# Patient Record
Sex: MALE | Race: WHITE | Employment: OTHER | URBAN - NONMETROPOLITAN AREA
[De-identification: names, ages, dates, MRNs, and addresses within clinical notes are randomized per-mention and may not be internally consistent; named-entity substitution may affect disease eponyms.]

---

## 2017-02-14 RX ORDER — OXYCODONE HYDROCHLORIDE 15 MG/1
15 TABLET ORAL 4 TIMES DAILY PRN
Qty: 120 TABLET | Refills: 0 | Status: SHIPPED | OUTPATIENT
Start: 2017-02-14 | End: 2017-03-14 | Stop reason: SDUPTHER

## 2017-03-06 ENCOUNTER — HOSPITAL ENCOUNTER (OUTPATIENT)
Dept: CT IMAGING | Age: 62
Discharge: HOME | End: 2017-03-06
Payer: MEDICARE

## 2017-03-06 DIAGNOSIS — J18.9 PNEUMONIA DUE TO INFECTIOUS ORGANISM, UNSPECIFIED LATERALITY, UNSPECIFIED PART OF LUNG: ICD-10-CM

## 2017-03-06 PROCEDURE — 71250 CT THORAX DX C-: CPT

## 2017-03-15 RX ORDER — OXYCODONE HYDROCHLORIDE 15 MG/1
15 TABLET ORAL 4 TIMES DAILY PRN
Qty: 120 TABLET | Refills: 0 | Status: SHIPPED | OUTPATIENT
Start: 2017-03-16 | End: 2017-03-28 | Stop reason: SDUPTHER

## 2017-03-28 ENCOUNTER — HOSPITAL ENCOUNTER (OUTPATIENT)
Dept: PAIN MANAGEMENT | Age: 62
Discharge: HOME | End: 2017-03-28
Payer: MEDICARE

## 2017-03-28 VITALS
WEIGHT: 206 LBS | TEMPERATURE: 97.6 F | HEIGHT: 66 IN | BODY MASS INDEX: 33.11 KG/M2 | DIASTOLIC BLOOD PRESSURE: 80 MMHG | RESPIRATION RATE: 20 BRPM | SYSTOLIC BLOOD PRESSURE: 137 MMHG | OXYGEN SATURATION: 95 % | HEART RATE: 83 BPM

## 2017-03-28 DIAGNOSIS — G89.29 CHRONIC MIDLINE LOW BACK PAIN WITH LEFT-SIDED SCIATICA: ICD-10-CM

## 2017-03-28 DIAGNOSIS — M54.42 CHRONIC MIDLINE LOW BACK PAIN WITH LEFT-SIDED SCIATICA: ICD-10-CM

## 2017-03-28 PROCEDURE — 80307 DRUG TEST PRSMV CHEM ANLYZR: CPT

## 2017-03-28 PROCEDURE — 99213 OFFICE O/P EST LOW 20 MIN: CPT

## 2017-03-28 RX ORDER — OXYCODONE HYDROCHLORIDE 15 MG/1
15 TABLET ORAL 4 TIMES DAILY PRN
Qty: 120 TABLET | Refills: 0 | Status: SHIPPED | OUTPATIENT
Start: 2017-04-15 | End: 2017-05-12 | Stop reason: SDUPTHER

## 2017-03-28 ASSESSMENT — ACTIVITIES OF DAILY LIVING (ADL): EFFECT OF PAIN ON DAILY ACTIVITIES: DAILY ACTIVITY

## 2017-03-28 ASSESSMENT — PAIN DESCRIPTION - LOCATION: LOCATION: BACK

## 2017-03-28 ASSESSMENT — PAIN DESCRIPTION - DESCRIPTORS: DESCRIPTORS: SHARP;CONSTANT

## 2017-03-28 ASSESSMENT — PAIN DESCRIPTION - ONSET: ONSET: ON-GOING

## 2017-03-28 ASSESSMENT — PAIN SCALES - GENERAL: PAINLEVEL_OUTOF10: 7

## 2017-03-28 ASSESSMENT — PAIN DESCRIPTION - PAIN TYPE: TYPE: CHRONIC PAIN

## 2017-03-28 ASSESSMENT — PAIN DESCRIPTION - FREQUENCY: FREQUENCY: CONTINUOUS

## 2017-03-28 ASSESSMENT — PAIN DESCRIPTION - PROGRESSION: CLINICAL_PROGRESSION: NOT CHANGED

## 2017-03-28 ASSESSMENT — PAIN DESCRIPTION - DIRECTION: RADIATING_TOWARDS: RADIATES INTO LEFT LEG

## 2017-03-28 ASSESSMENT — PAIN DESCRIPTION - ORIENTATION: ORIENTATION: LOWER

## 2017-04-03 LAB
AMPHETAMINES, URINE: NEGATIVE NG/ML
BARBITURATES, URINE: NEGATIVE NG/ML
BENZODIAZEPINES, URINE: NEGATIVE NG/ML
CANNABINOIDS, URINE: NEGATIVE NG/ML
COCAINE METABOLITE, URINE: NEGATIVE NG/ML
CODEINE, URINE: NEGATIVE
CREATININE, URINE: 28.2 MG/DL (ref 20–300)
ETHANOL U, QUAN: NEGATIVE %
FENTANYL URINE: NEGATIVE PG/ML
HYDROCODONE, UR CONF: 355 NG/ML
HYDROCODONE, URINE: POSITIVE
HYDROMORPHONE, URINE: NEGATIVE
MEPERIDINE, UR: NEGATIVE NG/ML
METHADONE SCREEN, URINE: NEGATIVE NG/ML
MORPHINE URINE: NEGATIVE
OPIATES, URINE: ABNORMAL NG/ML
OPIATES, URINE: POSITIVE NG/ML
OXYCODONE URINE: POSITIVE
OXYCODONE, UR GC/MS: 2120 NG/ML
OXYCODONE/OXYMORPH, UR: POSITIVE
OXYCODONE/OXYMORPHONE, UR: ABNORMAL NG/ML
OXYMORPHONE, UR GC/MS: 366 NG/ML
OXYMORPHONE, URINE: POSITIVE
PH, URINE: 6.1 (ref 4.5–8.9)
PHENCYCLIDINE, URINE: NEGATIVE NG/ML
PROPOXYPHENE, URINE: NEGATIVE NG/ML

## 2017-04-24 ENCOUNTER — ANESTHESIA (OUTPATIENT)
Dept: ENDOSCOPY | Age: 62
End: 2017-04-24
Payer: MEDICARE

## 2017-04-24 ENCOUNTER — HOSPITAL ENCOUNTER (OUTPATIENT)
Age: 62
Setting detail: OUTPATIENT SURGERY
Discharge: HOME | End: 2017-04-24
Attending: INTERNAL MEDICINE | Admitting: INTERNAL MEDICINE
Payer: MEDICARE

## 2017-04-24 ENCOUNTER — ANESTHESIA EVENT (OUTPATIENT)
Dept: ENDOSCOPY | Age: 62
End: 2017-04-24
Payer: MEDICARE

## 2017-04-24 VITALS
DIASTOLIC BLOOD PRESSURE: 70 MMHG | SYSTOLIC BLOOD PRESSURE: 124 MMHG | BODY MASS INDEX: 32.14 KG/M2 | HEIGHT: 66 IN | RESPIRATION RATE: 20 BRPM | HEART RATE: 76 BPM | TEMPERATURE: 97.6 F | OXYGEN SATURATION: 96 % | WEIGHT: 200 LBS

## 2017-04-24 VITALS
DIASTOLIC BLOOD PRESSURE: 70 MMHG | OXYGEN SATURATION: 92 % | RESPIRATION RATE: 15 BRPM | SYSTOLIC BLOOD PRESSURE: 132 MMHG

## 2017-04-24 PROCEDURE — 3609012700 HC EGD DILATION SAVORY: Performed by: INTERNAL MEDICINE

## 2017-04-24 PROCEDURE — 2580000003 HC RX 258: Performed by: INTERNAL MEDICINE

## 2017-04-24 PROCEDURE — 3700000001 HC ADD 15 MINUTES (ANESTHESIA): Performed by: INTERNAL MEDICINE

## 2017-04-24 PROCEDURE — 2500000003 HC RX 250 WO HCPCS: Performed by: INTERNAL MEDICINE

## 2017-04-24 PROCEDURE — 3700000000 HC ANESTHESIA ATTENDED CARE: Performed by: INTERNAL MEDICINE

## 2017-04-24 PROCEDURE — 7100000011 HC PHASE II RECOVERY - ADDTL 15 MIN: Performed by: INTERNAL MEDICINE

## 2017-04-24 PROCEDURE — 3609012800 HC EGD DIAGNOSTIC ONLY: Performed by: INTERNAL MEDICINE

## 2017-04-24 PROCEDURE — 7100000010 HC PHASE II RECOVERY - FIRST 15 MIN: Performed by: INTERNAL MEDICINE

## 2017-04-24 PROCEDURE — 43248 EGD GUIDE WIRE INSERTION: CPT | Performed by: INTERNAL MEDICINE

## 2017-04-24 PROCEDURE — 2500000003 HC RX 250 WO HCPCS: Performed by: NURSE ANESTHETIST, CERTIFIED REGISTERED

## 2017-04-24 PROCEDURE — 6360000002 HC RX W HCPCS: Performed by: NURSE ANESTHETIST, CERTIFIED REGISTERED

## 2017-04-24 RX ORDER — PROPOFOL 10 MG/ML
INJECTION, EMULSION INTRAVENOUS PRN
Status: DISCONTINUED | OUTPATIENT
Start: 2017-04-24 | End: 2017-04-24 | Stop reason: SDUPTHER

## 2017-04-24 RX ORDER — DIPHENHYDRAMINE HYDROCHLORIDE 50 MG/ML
12.5 INJECTION INTRAMUSCULAR; INTRAVENOUS
Status: DISCONTINUED | OUTPATIENT
Start: 2017-04-24 | End: 2017-04-24 | Stop reason: HOSPADM

## 2017-04-24 RX ORDER — PROMETHAZINE HYDROCHLORIDE 25 MG/ML
6.25 INJECTION, SOLUTION INTRAMUSCULAR; INTRAVENOUS
Status: DISCONTINUED | OUTPATIENT
Start: 2017-04-24 | End: 2017-04-24 | Stop reason: HOSPADM

## 2017-04-24 RX ORDER — SODIUM CHLORIDE, SODIUM LACTATE, POTASSIUM CHLORIDE, CALCIUM CHLORIDE 600; 310; 30; 20 MG/100ML; MG/100ML; MG/100ML; MG/100ML
INJECTION, SOLUTION INTRAVENOUS CONTINUOUS
Status: DISCONTINUED | OUTPATIENT
Start: 2017-04-24 | End: 2017-04-24 | Stop reason: HOSPADM

## 2017-04-24 RX ORDER — ONDANSETRON 2 MG/ML
4 INJECTION INTRAMUSCULAR; INTRAVENOUS
Status: DISCONTINUED | OUTPATIENT
Start: 2017-04-24 | End: 2017-04-24 | Stop reason: HOSPADM

## 2017-04-24 RX ORDER — LIDOCAINE HYDROCHLORIDE 10 MG/ML
1 INJECTION, SOLUTION EPIDURAL; INFILTRATION; INTRACAUDAL; PERINEURAL ONCE
Status: COMPLETED | OUTPATIENT
Start: 2017-04-24 | End: 2017-04-24

## 2017-04-24 RX ORDER — LIDOCAINE HYDROCHLORIDE 20 MG/ML
INJECTION, SOLUTION INFILTRATION; PERINEURAL PRN
Status: DISCONTINUED | OUTPATIENT
Start: 2017-04-24 | End: 2017-04-24 | Stop reason: SDUPTHER

## 2017-04-24 RX ADMIN — LIDOCAINE HYDROCHLORIDE 40 MG: 20 INJECTION, SOLUTION INFILTRATION; PERINEURAL at 12:47

## 2017-04-24 RX ADMIN — LIDOCAINE HYDROCHLORIDE 1 ML: 10 INJECTION, SOLUTION EPIDURAL; INFILTRATION; INTRACAUDAL; PERINEURAL at 11:45

## 2017-04-24 RX ADMIN — PROPOFOL 240 MG: 10 INJECTION, EMULSION INTRAVENOUS at 12:47

## 2017-04-24 RX ADMIN — SODIUM CHLORIDE, POTASSIUM CHLORIDE, SODIUM LACTATE AND CALCIUM CHLORIDE: 600; 310; 30; 20 INJECTION, SOLUTION INTRAVENOUS at 11:45

## 2017-04-24 ASSESSMENT — PAIN SCALES - GENERAL
PAINLEVEL_OUTOF10: 0
PAINLEVEL_OUTOF10: 0

## 2017-04-24 ASSESSMENT — COPD QUESTIONNAIRES: CAT_SEVERITY: MODERATE

## 2017-04-24 ASSESSMENT — PAIN - FUNCTIONAL ASSESSMENT: PAIN_FUNCTIONAL_ASSESSMENT: 0-10

## 2017-05-08 ENCOUNTER — OFFICE VISIT (OUTPATIENT)
Dept: GASTROENTEROLOGY | Age: 62
End: 2017-05-08
Payer: MEDICARE

## 2017-05-08 VITALS
OXYGEN SATURATION: 93 % | WEIGHT: 208 LBS | DIASTOLIC BLOOD PRESSURE: 60 MMHG | BODY MASS INDEX: 33.43 KG/M2 | SYSTOLIC BLOOD PRESSURE: 102 MMHG | HEIGHT: 66 IN | HEART RATE: 65 BPM

## 2017-05-08 DIAGNOSIS — Z87.19 STATUS POST DILATION OF ESOPHAGEAL NARROWING: ICD-10-CM

## 2017-05-08 DIAGNOSIS — Z87.19 HISTORY OF ESOPHAGEAL STRICTURE: ICD-10-CM

## 2017-05-08 DIAGNOSIS — Z98.890 STATUS POST DILATION OF ESOPHAGEAL NARROWING: ICD-10-CM

## 2017-05-08 DIAGNOSIS — K21.9 CHRONIC GERD: Primary | ICD-10-CM

## 2017-05-08 DIAGNOSIS — K29.50 CHRONIC GASTRITIS WITHOUT BLEEDING, UNSPECIFIED GASTRITIS TYPE: ICD-10-CM

## 2017-05-08 PROCEDURE — G8417 CALC BMI ABV UP PARAM F/U: HCPCS | Performed by: NURSE PRACTITIONER

## 2017-05-08 PROCEDURE — 99213 OFFICE O/P EST LOW 20 MIN: CPT | Performed by: NURSE PRACTITIONER

## 2017-05-08 PROCEDURE — G8427 DOCREV CUR MEDS BY ELIG CLIN: HCPCS | Performed by: NURSE PRACTITIONER

## 2017-05-08 PROCEDURE — 3017F COLORECTAL CA SCREEN DOC REV: CPT | Performed by: NURSE PRACTITIONER

## 2017-05-08 PROCEDURE — 1036F TOBACCO NON-USER: CPT | Performed by: NURSE PRACTITIONER

## 2017-05-08 RX ORDER — OMEPRAZOLE 40 MG/1
40 CAPSULE, DELAYED RELEASE ORAL DAILY
COMMUNITY
Start: 2017-02-10

## 2017-05-08 ASSESSMENT — ENCOUNTER SYMPTOMS
CHEST TIGHTNESS: 0
BACK PAIN: 0
TROUBLE SWALLOWING: 0
VOICE CHANGE: 0
RECTAL PAIN: 0
VOMITING: 0
CONSTIPATION: 0
SORE THROAT: 0
SHORTNESS OF BREATH: 0
COUGH: 0
ALLERGIC/IMMUNOLOGIC NEGATIVE: 1
EYES NEGATIVE: 1
NAUSEA: 0
BLOOD IN STOOL: 0
DIARRHEA: 0
ABDOMINAL PAIN: 0

## 2017-05-15 RX ORDER — AMITRIPTYLINE HYDROCHLORIDE 25 MG/1
25-50 TABLET, FILM COATED ORAL NIGHTLY PRN
Qty: 60 TABLET | Refills: 5 | OUTPATIENT
Start: 2017-05-15 | End: 2017-10-19 | Stop reason: SDUPTHER

## 2017-05-15 RX ORDER — OXYCODONE HYDROCHLORIDE 15 MG/1
15 TABLET ORAL 4 TIMES DAILY PRN
Qty: 120 TABLET | Refills: 0 | Status: SHIPPED | OUTPATIENT
Start: 2017-05-15 | End: 2017-06-12 | Stop reason: SDUPTHER

## 2017-06-12 RX ORDER — OXYCODONE HYDROCHLORIDE 15 MG/1
15 TABLET ORAL 4 TIMES DAILY PRN
Qty: 120 TABLET | Refills: 0 | Status: SHIPPED | OUTPATIENT
Start: 2017-06-14 | End: 2017-07-10 | Stop reason: SDUPTHER

## 2017-07-11 RX ORDER — OXYCODONE HYDROCHLORIDE 15 MG/1
15 TABLET ORAL 4 TIMES DAILY PRN
Qty: 120 TABLET | Refills: 0 | Status: SHIPPED | OUTPATIENT
Start: 2017-07-14 | End: 2017-08-09 | Stop reason: SDUPTHER

## 2017-07-17 ENCOUNTER — HOSPITAL ENCOUNTER (OUTPATIENT)
Dept: PAIN MANAGEMENT | Age: 62
Discharge: HOME | End: 2017-07-17
Payer: MEDICARE

## 2017-07-17 VITALS
TEMPERATURE: 97.7 F | BODY MASS INDEX: 34.39 KG/M2 | DIASTOLIC BLOOD PRESSURE: 76 MMHG | HEART RATE: 89 BPM | WEIGHT: 214 LBS | HEIGHT: 66 IN | OXYGEN SATURATION: 90 % | RESPIRATION RATE: 18 BRPM | SYSTOLIC BLOOD PRESSURE: 128 MMHG

## 2017-07-17 DIAGNOSIS — M54.42 CHRONIC MIDLINE LOW BACK PAIN WITH LEFT-SIDED SCIATICA: ICD-10-CM

## 2017-07-17 DIAGNOSIS — G89.29 CHRONIC MIDLINE LOW BACK PAIN WITH LEFT-SIDED SCIATICA: ICD-10-CM

## 2017-07-17 PROCEDURE — 99213 OFFICE O/P EST LOW 20 MIN: CPT

## 2017-07-17 PROCEDURE — 80307 DRUG TEST PRSMV CHEM ANLYZR: CPT

## 2017-07-17 ASSESSMENT — ACTIVITIES OF DAILY LIVING (ADL): EFFECT OF PAIN ON DAILY ACTIVITIES: LIMITS ACTIVITY

## 2017-07-17 ASSESSMENT — PAIN SCALES - GENERAL: PAINLEVEL_OUTOF10: 7

## 2017-07-17 ASSESSMENT — PAIN DESCRIPTION - DESCRIPTORS: DESCRIPTORS: SHARP;CONSTANT

## 2017-07-17 ASSESSMENT — PAIN DESCRIPTION - LOCATION: LOCATION: BACK

## 2017-07-17 ASSESSMENT — PAIN DESCRIPTION - ORIENTATION: ORIENTATION: LOWER

## 2017-07-17 ASSESSMENT — PAIN DESCRIPTION - DIRECTION: RADIATING_TOWARDS: INTO LEFT LEG

## 2017-07-17 ASSESSMENT — PAIN DESCRIPTION - PAIN TYPE: TYPE: CHRONIC PAIN

## 2017-07-17 ASSESSMENT — PAIN DESCRIPTION - FREQUENCY: FREQUENCY: CONTINUOUS

## 2017-07-17 ASSESSMENT — PAIN DESCRIPTION - ONSET: ONSET: ON-GOING

## 2017-07-17 ASSESSMENT — PAIN DESCRIPTION - PROGRESSION: CLINICAL_PROGRESSION: NOT CHANGED

## 2017-07-22 LAB
AMPHETAMINES, URINE: NEGATIVE NG/ML
BARBITURATES, URINE: NEGATIVE NG/ML
BENZODIAZEPINES, URINE: NEGATIVE NG/ML
CANNABINOIDS, URINE: NEGATIVE NG/ML
COCAINE METABOLITE, URINE: NEGATIVE NG/ML
CODEINE, URINE: NEGATIVE
CREATININE, URINE: 30.4 MG/DL (ref 20–300)
ETHANOL U, QUAN: NEGATIVE %
FENTANYL URINE: NEGATIVE PG/ML
HYDROCODONE, UR CONF: 541 NG/ML
HYDROCODONE, URINE: POSITIVE
HYDROMORPHONE, URINE: NEGATIVE
MEPERIDINE, UR: NEGATIVE NG/ML
METHADONE SCREEN, URINE: NEGATIVE NG/ML
MORPHINE URINE: NEGATIVE
OPIATES, URINE: ABNORMAL NG/ML
OPIATES, URINE: POSITIVE NG/ML
OXYCODONE URINE: POSITIVE
OXYCODONE, UR GC/MS: 2990 NG/ML
OXYCODONE/OXYMORPH, UR: POSITIVE
OXYCODONE/OXYMORPHONE, UR: ABNORMAL NG/ML
OXYMORPHONE, UR GC/MS: 376 NG/ML
OXYMORPHONE, URINE: POSITIVE
PH, URINE: 5.6 (ref 4.5–8.9)
PHENCYCLIDINE, URINE: NEGATIVE NG/ML
PROPOXYPHENE, URINE: NEGATIVE NG/ML

## 2017-08-11 RX ORDER — OXYCODONE HYDROCHLORIDE 15 MG/1
15 TABLET ORAL 4 TIMES DAILY PRN
Qty: 120 TABLET | Refills: 0 | Status: SHIPPED | OUTPATIENT
Start: 2017-08-13 | End: 2017-09-11 | Stop reason: SDUPTHER

## 2017-09-11 RX ORDER — OXYCODONE HYDROCHLORIDE 15 MG/1
15 TABLET ORAL 4 TIMES DAILY PRN
Qty: 120 TABLET | Refills: 0 | Status: SHIPPED | OUTPATIENT
Start: 2017-09-12 | End: 2017-10-10 | Stop reason: SDUPTHER

## 2017-10-11 RX ORDER — OXYCODONE HYDROCHLORIDE 15 MG/1
15 TABLET ORAL 4 TIMES DAILY PRN
Qty: 120 TABLET | Refills: 0 | Status: SHIPPED | OUTPATIENT
Start: 2017-10-12 | End: 2017-10-19 | Stop reason: SDUPTHER

## 2017-10-19 ENCOUNTER — HOSPITAL ENCOUNTER (OUTPATIENT)
Dept: PAIN MANAGEMENT | Age: 62
Discharge: HOME | End: 2017-10-19
Payer: MEDICARE

## 2017-10-19 VITALS
HEART RATE: 91 BPM | WEIGHT: 216 LBS | SYSTOLIC BLOOD PRESSURE: 112 MMHG | OXYGEN SATURATION: 90 % | HEIGHT: 66 IN | RESPIRATION RATE: 22 BRPM | DIASTOLIC BLOOD PRESSURE: 68 MMHG | BODY MASS INDEX: 34.72 KG/M2 | TEMPERATURE: 97.3 F

## 2017-10-19 PROCEDURE — 80307 DRUG TEST PRSMV CHEM ANLYZR: CPT

## 2017-10-19 PROCEDURE — 99214 OFFICE O/P EST MOD 30 MIN: CPT

## 2017-10-19 RX ORDER — TIZANIDINE 4 MG/1
4 TABLET ORAL 3 TIMES DAILY PRN
Qty: 90 TABLET | Refills: 2 | Status: SHIPPED | OUTPATIENT
Start: 2017-10-19

## 2017-10-19 RX ORDER — AMITRIPTYLINE HYDROCHLORIDE 25 MG/1
25-50 TABLET, FILM COATED ORAL NIGHTLY PRN
Qty: 60 TABLET | Refills: 2 | Status: SHIPPED | OUTPATIENT
Start: 2017-10-19 | End: 2021-09-29

## 2017-10-19 RX ORDER — OXYCODONE HYDROCHLORIDE 15 MG/1
15 TABLET ORAL 4 TIMES DAILY PRN
Qty: 120 TABLET | Refills: 0 | Status: SHIPPED | OUTPATIENT
Start: 2017-11-11 | End: 2017-12-06 | Stop reason: SDUPTHER

## 2017-10-19 ASSESSMENT — PAIN DESCRIPTION - PAIN TYPE: TYPE: CHRONIC PAIN

## 2017-10-19 ASSESSMENT — PAIN DESCRIPTION - DIRECTION: RADIATING_TOWARDS: INTO LEFT LEG

## 2017-10-19 ASSESSMENT — PAIN DESCRIPTION - ONSET: ONSET: ON-GOING

## 2017-10-19 ASSESSMENT — PAIN DESCRIPTION - FREQUENCY: FREQUENCY: CONTINUOUS

## 2017-10-19 ASSESSMENT — PAIN DESCRIPTION - LOCATION: LOCATION: BACK;LEG

## 2017-10-19 ASSESSMENT — ACTIVITIES OF DAILY LIVING (ADL): EFFECT OF PAIN ON DAILY ACTIVITIES: LIMITS ACTIVITY

## 2017-10-19 ASSESSMENT — PAIN DESCRIPTION - ORIENTATION: ORIENTATION: LOWER;LEFT

## 2017-10-19 ASSESSMENT — PAIN DESCRIPTION - PROGRESSION: CLINICAL_PROGRESSION: NOT CHANGED

## 2017-10-19 ASSESSMENT — PAIN DESCRIPTION - DESCRIPTORS: DESCRIPTORS: SHARP;CONSTANT

## 2017-10-19 ASSESSMENT — PAIN SCALES - GENERAL: PAINLEVEL_OUTOF10: 7

## 2017-10-19 NOTE — PROGRESS NOTES
Nursing Admission Record    Current Issues / Falls / ER Visits:  No    Percentage of Pain Relief after Last Procedure:  na %    How long lasted:  na     Radiology exams received during the last 12 months: No       When na                                              Where na       Imaging on chart: No         Imaging records requested: No  MRI exams received in the past 2 years:  No  Physical therapy during the last 6 months: No       When: na                                             Where na  Labs during the last 12 months: Yes    Education Provided:  [x] Review of Josph Colla  [] Agreement Review  [] Compliance Issues Discussed    [] Cognitive Behavior Needs [x] Exercise [] Review of Test [] Financial Issues  [x] Tobacco/Alcohol Use [x] Teaching [] New Patient [] Picture Obtained    Physician Plan:  [] Outgoing Referral  [] Pharmacy Consult  [] Test Ordered   [] Obtained Test Results / Consult Notes  [] UDS due at next visit, verified per EPIC      [] Suspected Physical Abuse or Suicide Risk assessed - IF YES COMPLETE QUESTIONS BELOW    If any of the following questions are answered yes - contact attending physician for referral:    Has been considering harming self to escape stress, pain problems? [] YES  [] NO  Has a suicide plan? [] YES  [] NO  Has attempted suicide in the past?   [] YES  [] NO  Has a close friend or family member who committed suicide? [] YES  [] NO    Patient Referred To :      Additional Notes:    Assessment Completed by:  Electronically signed by Philippe Van RN on 10/19/2017 at 8:42 AM

## 2017-10-19 NOTE — PROGRESS NOTES
Manjula Garcia/Latosha  Patient Pain Assessment  Progress Note       Chief Complaint   Patient presents with    Lower Back Pain     radiates down left lower extremity    Knee Pain     bilateral     Pain Assessment  Pain Assessment: 0-10  Pain Level: 7  Pain Type: Chronic pain  Pain Location: Back, Leg  Pain Orientation: Lower, Left  Pain Radiating Towards: into left leg  Pain Descriptors: Sharp, Constant  Pain Frequency: Continuous  Pain Onset: On-going  Clinical Progression: Not changed  Effect of Pain on Daily Activities: limits activity         []  Issues of Concern:     [x]  Reports current medication is helping, but continues to have on-going pain    [x]  Reports current pain medication increases ability to do activities of daily living     [x]  Current narcotic medications    [x]  Discussed possible medication side effects, risk of tolerance and/or dependence, alternative treatments    [x]  Encouraged to set goals of decreasing daily narcotic intake    [x]  Discussed effects of long term narcotic use      [x]  Injection options discussed     []Yes [x]No  Current medication side effects, comment if applicable:      []Yes [x]No   Acute bladder or bowel changes    Previous Procedure / Percentage of pain control / Imaging / PT History:  Percentage of Pain Relief after Last Procedure: N/A      Radiology exams received during the last 12 months: No  MRI exams received in the past 2 years:  No  Physical therapy during the last 6 months: No     BMI: Body mass index is 34.86 kg/m².     [x]  Nutrient rich, low fat, low carbohydrate diet discussed   [x]  Positive effect of weight management on pain control discussed    Activity:   [x] Exercise discussed as beneficial to pain reduction, encouraged stretching exercises and to set daily goals    Tobacco use:    [x] Former     Social History     Social History    Marital status:      Spouse name: N/A    Number of children: N/A    Years of education: N/A delayed release capsule       tiZANidine (ZANAFLEX) 4 MG tablet Take 1 tablet by mouth 3 times daily as needed (muscle spasms) 90 tablet 5    latanoprost (XALATAN) 0.005 % ophthalmic solution Place 1 drop into both eyes nightly      timolol (TIMOPTIC-XE) 0.5 % ophthalmic gel-forming Place 1 drop into both eyes 2 times daily      NONFORMULARY Indications: eye drops for glaucoma      albuterol sulfate HFA (PROAIR HFA) 108 (90 BASE) MCG/ACT inhaler Inhale 2 puffs into the lungs every 6 hours as needed for Wheezing 1 Inhaler 0    atorvastatin (LIPITOR) 40 MG tablet Take 40 mg by mouth daily.  Aspirin (ASPIR-81 PO) Take by mouth daily       AMLODIPINE BESYLATE PO Take 5 mg by mouth daily.  LISINOPRIL PO Take 20 mg by mouth daily. No current facility-administered medications for this encounter.       UDS:    Lab Results   Component Value Date    AMPHETUR Negative 07/17/2017    BARBITURATES Negative 07/17/2017    BENZODIAZURI Negative 07/17/2017    CANNANBINURI Negative 07/17/2017    COCAINEMETUR Negative 07/17/2017    OPIAU Positive * (repeat UDS today) 07/17/2017    OXYCOOXYMO Positive * (Oxy-IR per med list) 07/17/2017    PHENCYCU Negative 07/17/2017    LABMETH Negative 07/17/2017    PPXUR Negative 07/17/2017    MEPERIDU Negative 07/17/2017    FENTU Negative 07/17/2017    ETHUQN Negative 07/17/2017          Vitals:  /68   Pulse 91   Temp 97.3 °F (36.3 °C) (Temporal)   Resp 22   Ht 5' 6\" (1.676 m)   Wt 216 lb (98 kg)   SpO2 90%   BMI 34.86 kg/m²    Reports he has COPD and uses supplemental oxygen PRN     Physical Exam:  General appearance: no acute distress  Head: NCAT, EOMI  Skin: Warm, Dry   Musculoskeletal: ambulatory per self, steady gait  Neurologic: alert and oriented X 3, speech clear  Mood and affect: appropriate, no SI or HI    Assessment:    *     Lumbar DDD  *     Lumbar Radiculopathy     Plan:   [x]  Patient is to call with any questions or concerns which may arise prior to

## 2017-11-01 LAB
AMPHETAMINES, URINE: NEGATIVE NG/ML
BARBITURATES, URINE: NEGATIVE NG/ML
BENZODIAZEPINES, URINE: NEGATIVE NG/ML
CANNABINOIDS, URINE: NEGATIVE NG/ML
COCAINE METABOLITE, URINE: NEGATIVE NG/ML
CODEINE, URINE: NEGATIVE
CREATININE, URINE: 67.6 MG/DL (ref 20–300)
ETHANOL U, QUAN: NEGATIVE %
FENTANYL URINE: NEGATIVE PG/ML
HYDROCODONE, UR CONF: 943 NG/ML
HYDROCODONE, URINE: POSITIVE
HYDROMORPHONE, URINE: NEGATIVE
MEPERIDINE, UR: NEGATIVE NG/ML
METHADONE SCREEN, URINE: NEGATIVE NG/ML
MORPHINE URINE: NEGATIVE
OPIATES, URINE: ABNORMAL NG/ML
OPIATES, URINE: POSITIVE NG/ML
OXYCODONE URINE: POSITIVE
OXYCODONE, UR GC/MS: >3000 NG/ML
OXYCODONE/OXYMORPH, UR: POSITIVE
OXYCODONE/OXYMORPHONE, UR: ABNORMAL NG/ML
OXYMORPHONE, UR GC/MS: 984 NG/ML
OXYMORPHONE, URINE: POSITIVE
PH, URINE: 5.8 (ref 4.5–8.9)
PHENCYCLIDINE, URINE: NEGATIVE NG/ML
PROPOXYPHENE, URINE: NEGATIVE NG/ML

## 2017-12-07 RX ORDER — OXYCODONE HYDROCHLORIDE 15 MG/1
15 TABLET ORAL 4 TIMES DAILY PRN
Qty: 120 TABLET | Refills: 0 | Status: SHIPPED | OUTPATIENT
Start: 2017-12-11 | End: 2021-09-29

## 2017-12-12 ENCOUNTER — TELEPHONE (OUTPATIENT)
Dept: PAIN MANAGEMENT | Age: 62
End: 2017-12-12

## 2017-12-19 ENCOUNTER — HOSPITAL ENCOUNTER (OUTPATIENT)
Dept: CT IMAGING | Age: 62
Discharge: HOME | End: 2017-12-19
Payer: MEDICARE

## 2017-12-19 DIAGNOSIS — J44.9 STAGE 2 MODERATE COPD BY GOLD CLASSIFICATION (HCC): ICD-10-CM

## 2017-12-19 PROCEDURE — 71250 CT THORAX DX C-: CPT

## 2018-11-07 ENCOUNTER — TRANSCRIBE ORDERS (OUTPATIENT)
Dept: PULMONOLOGY | Facility: CLINIC | Age: 63
End: 2018-11-07

## 2018-11-07 DIAGNOSIS — R91.8 LUNG NODULES: Primary | ICD-10-CM

## 2018-11-12 ENCOUNTER — HOSPITAL ENCOUNTER (OUTPATIENT)
Dept: CT IMAGING | Age: 63
Discharge: HOME | End: 2018-11-12
Payer: MEDICARE

## 2018-11-12 DIAGNOSIS — R91.8 LUNG NODULES: ICD-10-CM

## 2018-11-12 PROCEDURE — 71250 CT THORAX DX C-: CPT

## 2018-11-27 ENCOUNTER — OFFICE VISIT (OUTPATIENT)
Dept: PULMONOLOGY | Facility: CLINIC | Age: 63
End: 2018-11-27

## 2018-11-27 VITALS
SYSTOLIC BLOOD PRESSURE: 148 MMHG | DIASTOLIC BLOOD PRESSURE: 72 MMHG | HEIGHT: 66 IN | OXYGEN SATURATION: 95 % | WEIGHT: 240 LBS | BODY MASS INDEX: 38.57 KG/M2 | HEART RATE: 88 BPM | RESPIRATION RATE: 16 BRPM

## 2018-11-27 DIAGNOSIS — Z87.891 PERSONAL HISTORY OF NICOTINE DEPENDENCE: ICD-10-CM

## 2018-11-27 DIAGNOSIS — R42 LIGHTHEADEDNESS: ICD-10-CM

## 2018-11-27 DIAGNOSIS — E66.01 CLASS 2 SEVERE OBESITY DUE TO EXCESS CALORIES WITH SERIOUS COMORBIDITY AND BODY MASS INDEX (BMI) OF 38.0 TO 38.9 IN ADULT (HCC): ICD-10-CM

## 2018-11-27 DIAGNOSIS — R91.1 LUNG NODULE: ICD-10-CM

## 2018-11-27 DIAGNOSIS — R40.0 SOMNOLENCE: ICD-10-CM

## 2018-11-27 DIAGNOSIS — J44.9 STAGE 3 SEVERE COPD BY GOLD CLASSIFICATION (HCC): Primary | ICD-10-CM

## 2018-11-27 DIAGNOSIS — J96.11 CHRONIC RESPIRATORY FAILURE WITH HYPOXIA (HCC): ICD-10-CM

## 2018-11-27 DIAGNOSIS — J43.9 PULMONARY EMPHYSEMA, UNSPECIFIED EMPHYSEMA TYPE (HCC): ICD-10-CM

## 2018-11-27 DIAGNOSIS — R06.02 SHORTNESS OF BREATH: ICD-10-CM

## 2018-11-27 DIAGNOSIS — K21.9 GASTROESOPHAGEAL REFLUX DISEASE WITHOUT ESOPHAGITIS: ICD-10-CM

## 2018-11-27 PROBLEM — E66.812 CLASS 2 SEVERE OBESITY DUE TO EXCESS CALORIES WITH SERIOUS COMORBIDITY AND BODY MASS INDEX (BMI) OF 38.0 TO 38.9 IN ADULT: Status: ACTIVE | Noted: 2018-11-27

## 2018-11-27 PROCEDURE — 99214 OFFICE O/P EST MOD 30 MIN: CPT | Performed by: NURSE PRACTITIONER

## 2018-11-27 RX ORDER — BRINZOLAMIDE/BRIMONIDINE TARTRATE 10; 2 MG/ML; MG/ML
SUSPENSION/ DROPS OPHTHALMIC
Refills: 5 | COMMUNITY
Start: 2018-11-15 | End: 2019-01-29

## 2018-11-27 RX ORDER — AMLODIPINE BESYLATE 5 MG/1
5 TABLET ORAL DAILY
Refills: 1 | COMMUNITY
Start: 2018-11-15

## 2018-11-27 RX ORDER — ATORVASTATIN CALCIUM 40 MG/1
40 TABLET, FILM COATED ORAL
Refills: 0 | COMMUNITY
Start: 2018-09-20

## 2018-11-27 RX ORDER — LISINOPRIL 40 MG/1
40 TABLET ORAL DAILY
Refills: 1 | COMMUNITY
Start: 2018-11-15

## 2018-11-27 RX ORDER — OXYCODONE HYDROCHLORIDE 15 MG/1
20 TABLET ORAL
COMMUNITY
Start: 2017-12-11

## 2018-11-27 RX ORDER — ALBUTEROL SULFATE 90 UG/1
AEROSOL, METERED RESPIRATORY (INHALATION)
COMMUNITY
Start: 2016-01-06

## 2018-11-27 RX ORDER — OMEPRAZOLE 40 MG/1
40 CAPSULE, DELAYED RELEASE ORAL DAILY
Refills: 1 | COMMUNITY
Start: 2018-10-31

## 2018-11-27 RX ORDER — LATANOPROST 50 UG/ML
SOLUTION/ DROPS OPHTHALMIC
COMMUNITY
End: 2022-03-31 | Stop reason: CLARIF

## 2018-11-27 RX ORDER — TIZANIDINE 4 MG/1
TABLET ORAL
Refills: 3 | COMMUNITY
Start: 2018-10-16

## 2018-11-27 RX ORDER — AMITRIPTYLINE HYDROCHLORIDE 50 MG/1
50 TABLET, FILM COATED ORAL
Refills: 1 | COMMUNITY
Start: 2018-11-19

## 2018-12-14 ENCOUNTER — HOSPITAL ENCOUNTER (OUTPATIENT)
Dept: NON INVASIVE DIAGNOSTICS | Age: 63
Discharge: HOME | End: 2018-12-14
Payer: MEDICARE

## 2018-12-14 LAB
LV EF: 58 %
LVEF MODALITY: NORMAL

## 2018-12-14 PROCEDURE — 93306 TTE W/DOPPLER COMPLETE: CPT

## 2018-12-17 ENCOUNTER — HOSPITAL ENCOUNTER (OUTPATIENT)
Dept: PULMONOLOGY | Age: 63
Setting detail: THERAPIES SERIES
Discharge: HOME | End: 2018-12-17
Payer: MEDICARE

## 2018-12-17 PROCEDURE — 2700000000 HC OXYGEN THERAPY PER DAY

## 2018-12-17 PROCEDURE — G0424 PULMONARY REHAB W EXER: HCPCS

## 2018-12-19 ENCOUNTER — HOSPITAL ENCOUNTER (OUTPATIENT)
Dept: PULMONOLOGY | Age: 63
Setting detail: THERAPIES SERIES
Discharge: HOME | End: 2018-12-19
Payer: MEDICARE

## 2018-12-19 PROCEDURE — G0424 PULMONARY REHAB W EXER: HCPCS

## 2018-12-19 PROCEDURE — 2700000000 HC OXYGEN THERAPY PER DAY

## 2018-12-21 ENCOUNTER — HOSPITAL ENCOUNTER (OUTPATIENT)
Dept: PULMONOLOGY | Age: 63
Setting detail: THERAPIES SERIES
Discharge: HOME | End: 2018-12-21
Payer: MEDICARE

## 2018-12-21 DIAGNOSIS — R40.0 SOMNOLENCE: ICD-10-CM

## 2018-12-21 PROCEDURE — 2700000000 HC OXYGEN THERAPY PER DAY

## 2018-12-21 PROCEDURE — G0424 PULMONARY REHAB W EXER: HCPCS

## 2018-12-24 ENCOUNTER — HOSPITAL ENCOUNTER (OUTPATIENT)
Dept: PULMONOLOGY | Age: 63
Setting detail: THERAPIES SERIES
End: 2018-12-24
Payer: MEDICARE

## 2018-12-26 ENCOUNTER — HOSPITAL ENCOUNTER (OUTPATIENT)
Dept: PULMONOLOGY | Age: 63
Setting detail: THERAPIES SERIES
Discharge: HOME | End: 2018-12-26
Payer: MEDICARE

## 2018-12-26 DIAGNOSIS — J96.11 CHRONIC RESPIRATORY FAILURE WITH HYPOXIA (HCC): Primary | ICD-10-CM

## 2018-12-26 PROCEDURE — G0424 PULMONARY REHAB W EXER: HCPCS

## 2018-12-26 PROCEDURE — 2700000000 HC OXYGEN THERAPY PER DAY

## 2018-12-27 ENCOUNTER — TELEPHONE (OUTPATIENT)
Dept: PULMONOLOGY | Facility: CLINIC | Age: 63
End: 2018-12-27

## 2018-12-27 ENCOUNTER — APPOINTMENT (OUTPATIENT)
Dept: GENERAL RADIOLOGY | Age: 63
DRG: 193 | End: 2018-12-27
Payer: MEDICARE

## 2018-12-27 ENCOUNTER — HOSPITAL ENCOUNTER (INPATIENT)
Age: 63
LOS: 5 days | Discharge: HOME | DRG: 193 | End: 2019-01-01
Attending: EMERGENCY MEDICINE | Admitting: HOSPITALIST
Payer: MEDICARE

## 2018-12-27 DIAGNOSIS — J96.21 ACUTE ON CHRONIC RESPIRATORY FAILURE WITH HYPOXIA (HCC): Primary | ICD-10-CM

## 2018-12-27 DIAGNOSIS — J44.1 COPD EXACERBATION (HCC): ICD-10-CM

## 2018-12-27 DIAGNOSIS — J96.11 CHRONIC RESPIRATORY FAILURE WITH HYPOXIA (HCC): Primary | ICD-10-CM

## 2018-12-27 DIAGNOSIS — J18.9 PNEUMONIA DUE TO ORGANISM: ICD-10-CM

## 2018-12-27 LAB
ALBUMIN SERPL-MCNC: 3.4 G/DL (ref 3.5–5.2)
ALP BLD-CCNC: 167 U/L (ref 40–130)
ALT SERPL-CCNC: 38 U/L (ref 5–41)
ANION GAP SERPL CALCULATED.3IONS-SCNC: 5 MMOL/L (ref 7–19)
AST SERPL-CCNC: 24 U/L (ref 5–40)
BASE EXCESS ARTERIAL: 6.4 MMOL/L (ref -2–2)
BASOPHILS ABSOLUTE: 0 K/UL (ref 0–0.2)
BASOPHILS RELATIVE PERCENT: 0.3 % (ref 0–1)
BILIRUB SERPL-MCNC: 0.3 MG/DL (ref 0.2–1.2)
BUN BLDV-MCNC: 12 MG/DL (ref 8–23)
CALCIUM SERPL-MCNC: 8.4 MG/DL (ref 8.8–10.2)
CARBOXYHEMOGLOBIN ARTERIAL: 2.3 % (ref 0–5)
CHLORIDE BLD-SCNC: 96 MMOL/L (ref 98–111)
CO2: 36 MMOL/L (ref 22–29)
CREAT SERPL-MCNC: 0.8 MG/DL (ref 0.5–1.2)
EOSINOPHILS ABSOLUTE: 0.5 K/UL (ref 0–0.6)
EOSINOPHILS RELATIVE PERCENT: 5.2 % (ref 0–5)
GFR NON-AFRICAN AMERICAN: >60
GLUCOSE BLD-MCNC: 156 MG/DL (ref 74–109)
HCO3 ARTERIAL: 32.7 MMOL/L (ref 22–26)
HCT VFR BLD CALC: 35.5 % (ref 42–52)
HEMOGLOBIN, ART, EXTENDED: 11.6 G/DL (ref 14–18)
HEMOGLOBIN: 11 G/DL (ref 14–18)
LACTIC ACID: 1.8 MMOL/L (ref 0.5–1.9)
LYMPHOCYTES ABSOLUTE: 1 K/UL (ref 1.1–4.5)
LYMPHOCYTES RELATIVE PERCENT: 10.5 % (ref 20–40)
MAGNESIUM: 2 MG/DL (ref 1.6–2.4)
MCH RBC QN AUTO: 28.7 PG (ref 27–31)
MCHC RBC AUTO-ENTMCNC: 31 G/DL (ref 33–37)
MCV RBC AUTO: 92.7 FL (ref 80–94)
METHEMOGLOBIN ARTERIAL: 1.3 %
MONOCYTES ABSOLUTE: 0.9 K/UL (ref 0–0.9)
MONOCYTES RELATIVE PERCENT: 9.7 % (ref 0–10)
NEUTROPHILS ABSOLUTE: 7.1 K/UL (ref 1.5–7.5)
NEUTROPHILS RELATIVE PERCENT: 73.6 % (ref 50–65)
O2 CONTENT ARTERIAL: 14.5 ML/DL
O2 SAT, ARTERIAL: 89.1 %
O2 THERAPY: ABNORMAL
PCO2 ARTERIAL: 54 MMHG (ref 35–45)
PDW BLD-RTO: 14.7 % (ref 11.5–14.5)
PH ARTERIAL: 7.39 (ref 7.35–7.45)
PLATELET # BLD: 247 K/UL (ref 130–400)
PMV BLD AUTO: 8.3 FL (ref 9.4–12.4)
PO2 ARTERIAL: 52 MMHG (ref 80–100)
POTASSIUM SERPL-SCNC: 4.7 MMOL/L (ref 3.5–5)
POTASSIUM, WHOLE BLOOD: 3.9
PRO-BNP: 61 PG/ML (ref 0–900)
RBC # BLD: 3.83 M/UL (ref 4.7–6.1)
SODIUM BLD-SCNC: 137 MMOL/L (ref 136–145)
TOTAL PROTEIN: 6.1 G/DL (ref 6.6–8.7)
TROPONIN: <0.01 NG/ML (ref 0–0.03)
WBC # BLD: 9.6 K/UL (ref 4.8–10.8)

## 2018-12-27 PROCEDURE — 1210000000 HC MED SURG R&B

## 2018-12-27 PROCEDURE — 87077 CULTURE AEROBIC IDENTIFY: CPT

## 2018-12-27 PROCEDURE — 71046 X-RAY EXAM CHEST 2 VIEWS: CPT

## 2018-12-27 PROCEDURE — 83880 ASSAY OF NATRIURETIC PEPTIDE: CPT

## 2018-12-27 PROCEDURE — 83735 ASSAY OF MAGNESIUM: CPT

## 2018-12-27 PROCEDURE — 96365 THER/PROPH/DIAG IV INF INIT: CPT

## 2018-12-27 PROCEDURE — 94664 DEMO&/EVAL PT USE INHALER: CPT

## 2018-12-27 PROCEDURE — 2700000000 HC OXYGEN THERAPY PER DAY

## 2018-12-27 PROCEDURE — 84484 ASSAY OF TROPONIN QUANT: CPT

## 2018-12-27 PROCEDURE — 6370000000 HC RX 637 (ALT 250 FOR IP): Performed by: NURSE PRACTITIONER

## 2018-12-27 PROCEDURE — 83605 ASSAY OF LACTIC ACID: CPT

## 2018-12-27 PROCEDURE — 6360000002 HC RX W HCPCS: Performed by: NURSE PRACTITIONER

## 2018-12-27 PROCEDURE — 87040 BLOOD CULTURE FOR BACTERIA: CPT

## 2018-12-27 PROCEDURE — 94640 AIRWAY INHALATION TREATMENT: CPT

## 2018-12-27 PROCEDURE — 96375 TX/PRO/DX INJ NEW DRUG ADDON: CPT

## 2018-12-27 PROCEDURE — 82803 BLOOD GASES ANY COMBINATION: CPT

## 2018-12-27 PROCEDURE — 93005 ELECTROCARDIOGRAM TRACING: CPT

## 2018-12-27 PROCEDURE — 2580000003 HC RX 258: Performed by: NURSE PRACTITIONER

## 2018-12-27 PROCEDURE — 36415 COLL VENOUS BLD VENIPUNCTURE: CPT

## 2018-12-27 PROCEDURE — 99223 1ST HOSP IP/OBS HIGH 75: CPT | Performed by: HOSPITALIST

## 2018-12-27 PROCEDURE — 80053 COMPREHEN METABOLIC PANEL: CPT

## 2018-12-27 PROCEDURE — 87186 SC STD MICRODIL/AGAR DIL: CPT

## 2018-12-27 PROCEDURE — 99285 EMERGENCY DEPT VISIT HI MDM: CPT | Performed by: EMERGENCY MEDICINE

## 2018-12-27 PROCEDURE — 84132 ASSAY OF SERUM POTASSIUM: CPT

## 2018-12-27 PROCEDURE — 99285 EMERGENCY DEPT VISIT HI MDM: CPT

## 2018-12-27 PROCEDURE — 36600 WITHDRAWAL OF ARTERIAL BLOOD: CPT

## 2018-12-27 PROCEDURE — 85025 COMPLETE CBC W/AUTO DIFF WBC: CPT

## 2018-12-27 PROCEDURE — 6360000002 HC RX W HCPCS: Performed by: HOSPITALIST

## 2018-12-27 RX ORDER — ONDANSETRON 2 MG/ML
4 INJECTION INTRAMUSCULAR; INTRAVENOUS EVERY 6 HOURS PRN
Status: DISCONTINUED | OUTPATIENT
Start: 2018-12-27 | End: 2019-01-01 | Stop reason: HOSPADM

## 2018-12-27 RX ORDER — SODIUM CHLORIDE 0.9 % (FLUSH) 0.9 %
10 SYRINGE (ML) INJECTION PRN
Status: DISCONTINUED | OUTPATIENT
Start: 2018-12-27 | End: 2019-01-01 | Stop reason: HOSPADM

## 2018-12-27 RX ORDER — IPRATROPIUM BROMIDE AND ALBUTEROL SULFATE 2.5; .5 MG/3ML; MG/3ML
1 SOLUTION RESPIRATORY (INHALATION) ONCE
Status: COMPLETED | OUTPATIENT
Start: 2018-12-27 | End: 2018-12-27

## 2018-12-27 RX ORDER — ATORVASTATIN CALCIUM 40 MG/1
40 TABLET, FILM COATED ORAL NIGHTLY
Status: DISCONTINUED | OUTPATIENT
Start: 2018-12-28 | End: 2019-01-01 | Stop reason: HOSPADM

## 2018-12-27 RX ORDER — METHYLPREDNISOLONE SODIUM SUCCINATE 40 MG/ML
40 INJECTION, POWDER, LYOPHILIZED, FOR SOLUTION INTRAMUSCULAR; INTRAVENOUS EVERY 24 HOURS
Status: DISCONTINUED | OUTPATIENT
Start: 2018-12-27 | End: 2018-12-29

## 2018-12-27 RX ORDER — FORMOTEROL FUMARATE 20 UG/2ML
20 SOLUTION RESPIRATORY (INHALATION) EVERY 12 HOURS SCHEDULED
Status: DISCONTINUED | OUTPATIENT
Start: 2018-12-27 | End: 2019-01-01 | Stop reason: HOSPADM

## 2018-12-27 RX ORDER — DEXAMETHASONE SODIUM PHOSPHATE 10 MG/ML
10 INJECTION, SOLUTION INTRAMUSCULAR; INTRAVENOUS ONCE
Status: COMPLETED | OUTPATIENT
Start: 2018-12-27 | End: 2018-12-27

## 2018-12-27 RX ORDER — ASPIRIN 81 MG/1
81 TABLET ORAL DAILY
Status: DISCONTINUED | OUTPATIENT
Start: 2018-12-28 | End: 2019-01-01 | Stop reason: HOSPADM

## 2018-12-27 RX ORDER — PANTOPRAZOLE SODIUM 40 MG/1
40 TABLET, DELAYED RELEASE ORAL
Status: DISCONTINUED | OUTPATIENT
Start: 2018-12-28 | End: 2018-12-27

## 2018-12-27 RX ORDER — LATANOPROST 50 UG/ML
1 SOLUTION/ DROPS OPHTHALMIC NIGHTLY
Status: DISCONTINUED | OUTPATIENT
Start: 2018-12-28 | End: 2019-01-01 | Stop reason: HOSPADM

## 2018-12-27 RX ORDER — MAGNESIUM SULFATE 1 G/100ML
1 INJECTION INTRAVENOUS PRN
Status: DISCONTINUED | OUTPATIENT
Start: 2018-12-27 | End: 2019-01-01 | Stop reason: HOSPADM

## 2018-12-27 RX ORDER — SODIUM CHLORIDE 0.9 % (FLUSH) 0.9 %
10 SYRINGE (ML) INJECTION EVERY 12 HOURS SCHEDULED
Status: DISCONTINUED | OUTPATIENT
Start: 2018-12-27 | End: 2019-01-01 | Stop reason: HOSPADM

## 2018-12-27 RX ORDER — TIZANIDINE 4 MG/1
4 TABLET ORAL 3 TIMES DAILY PRN
Status: DISCONTINUED | OUTPATIENT
Start: 2018-12-27 | End: 2019-01-01 | Stop reason: HOSPADM

## 2018-12-27 RX ORDER — POTASSIUM CHLORIDE 20 MEQ/1
40 TABLET, EXTENDED RELEASE ORAL PRN
Status: DISCONTINUED | OUTPATIENT
Start: 2018-12-27 | End: 2019-01-01 | Stop reason: HOSPADM

## 2018-12-27 RX ORDER — TIMOLOL MALEATE 5 MG/ML
1 SOLUTION/ DROPS OPHTHALMIC 2 TIMES DAILY
Status: DISCONTINUED | OUTPATIENT
Start: 2018-12-28 | End: 2019-01-01 | Stop reason: HOSPADM

## 2018-12-27 RX ORDER — PANTOPRAZOLE SODIUM 40 MG/1
40 TABLET, DELAYED RELEASE ORAL
Status: DISCONTINUED | OUTPATIENT
Start: 2018-12-28 | End: 2019-01-01 | Stop reason: HOSPADM

## 2018-12-27 RX ORDER — POTASSIUM CHLORIDE 20MEQ/15ML
40 LIQUID (ML) ORAL PRN
Status: DISCONTINUED | OUTPATIENT
Start: 2018-12-27 | End: 2019-01-01 | Stop reason: HOSPADM

## 2018-12-27 RX ORDER — AMLODIPINE BESYLATE 5 MG/1
5 TABLET ORAL DAILY
Status: DISCONTINUED | OUTPATIENT
Start: 2018-12-28 | End: 2019-01-01 | Stop reason: HOSPADM

## 2018-12-27 RX ORDER — ACETAMINOPHEN 325 MG/1
650 TABLET ORAL EVERY 4 HOURS PRN
Status: DISCONTINUED | OUTPATIENT
Start: 2018-12-27 | End: 2019-01-01 | Stop reason: HOSPADM

## 2018-12-27 RX ORDER — BUDESONIDE 0.5 MG/2ML
0.5 INHALANT ORAL EVERY 12 HOURS
Status: DISCONTINUED | OUTPATIENT
Start: 2018-12-27 | End: 2019-01-01 | Stop reason: HOSPADM

## 2018-12-27 RX ORDER — AMITRIPTYLINE HYDROCHLORIDE 50 MG/1
50 TABLET, FILM COATED ORAL NIGHTLY
Status: DISCONTINUED | OUTPATIENT
Start: 2018-12-28 | End: 2019-01-01 | Stop reason: HOSPADM

## 2018-12-27 RX ORDER — LISINOPRIL 20 MG/1
20 TABLET ORAL DAILY
Status: DISCONTINUED | OUTPATIENT
Start: 2018-12-28 | End: 2019-01-01 | Stop reason: HOSPADM

## 2018-12-27 RX ORDER — OXYCODONE HYDROCHLORIDE 5 MG/1
15 TABLET ORAL 4 TIMES DAILY PRN
Status: DISCONTINUED | OUTPATIENT
Start: 2018-12-27 | End: 2019-01-01 | Stop reason: HOSPADM

## 2018-12-27 RX ORDER — ALBUTEROL SULFATE 2.5 MG/3ML
2.5 SOLUTION RESPIRATORY (INHALATION)
Status: DISCONTINUED | OUTPATIENT
Start: 2018-12-27 | End: 2019-01-01 | Stop reason: HOSPADM

## 2018-12-27 RX ORDER — POTASSIUM CHLORIDE 7.45 MG/ML
10 INJECTION INTRAVENOUS PRN
Status: DISCONTINUED | OUTPATIENT
Start: 2018-12-27 | End: 2018-12-31

## 2018-12-27 RX ADMIN — IPRATROPIUM BROMIDE AND ALBUTEROL SULFATE 1 AMPULE: .5; 3 SOLUTION RESPIRATORY (INHALATION) at 18:41

## 2018-12-27 RX ADMIN — Medication 500 MG: at 20:25

## 2018-12-27 RX ADMIN — IPRATROPIUM BROMIDE 0.5 MG: 0.5 SOLUTION RESPIRATORY (INHALATION) at 22:15

## 2018-12-27 RX ADMIN — BUDESONIDE 500 MCG: 0.5 INHALANT RESPIRATORY (INHALATION) at 22:15

## 2018-12-27 RX ADMIN — CEFTRIAXONE 1 G: 1 INJECTION, POWDER, FOR SOLUTION INTRAMUSCULAR; INTRAVENOUS at 20:25

## 2018-12-27 RX ADMIN — DEXAMETHASONE SODIUM PHOSPHATE 10 MG: 10 INJECTION, SOLUTION INTRAMUSCULAR; INTRAVENOUS at 18:53

## 2018-12-27 RX ADMIN — IPRATROPIUM BROMIDE AND ALBUTEROL SULFATE 1 AMPULE: .5; 3 SOLUTION RESPIRATORY (INHALATION) at 19:43

## 2018-12-27 RX ADMIN — FORMOTEROL FUMARATE DIHYDRATE 20 MCG: 20 SOLUTION RESPIRATORY (INHALATION) at 22:27

## 2018-12-27 ASSESSMENT — ENCOUNTER SYMPTOMS
COUGH: 1
WHEEZING: 1
SHORTNESS OF BREATH: 1

## 2018-12-28 PROBLEM — J18.9 COMMUNITY ACQUIRED PNEUMONIA OF RIGHT LOWER LOBE OF LUNG: Status: ACTIVE | Noted: 2018-12-28

## 2018-12-28 PROBLEM — R73.9 HYPERGLYCEMIA: Status: ACTIVE | Noted: 2018-12-28

## 2018-12-28 PROBLEM — Z51.5 PALLIATIVE CARE PATIENT: Status: ACTIVE | Noted: 2018-12-28

## 2018-12-28 PROBLEM — H91.93 BILATERAL HEARING LOSS: Chronic | Status: ACTIVE | Noted: 2018-12-28

## 2018-12-28 LAB
ANION GAP SERPL CALCULATED.3IONS-SCNC: 11 MMOL/L (ref 7–19)
BASOPHILS ABSOLUTE: 0 K/UL (ref 0–0.2)
BASOPHILS RELATIVE PERCENT: 0.2 % (ref 0–1)
BUN BLDV-MCNC: 11 MG/DL (ref 8–23)
CALCIUM SERPL-MCNC: 9.1 MG/DL (ref 8.8–10.2)
CHLORIDE BLD-SCNC: 98 MMOL/L (ref 98–111)
CO2: 30 MMOL/L (ref 22–29)
CREAT SERPL-MCNC: 0.8 MG/DL (ref 0.5–1.2)
EKG P AXIS: 70 DEGREES
EKG P-R INTERVAL: 156 MS
EKG Q-T INTERVAL: 318 MS
EKG QRS DURATION: 96 MS
EKG QTC CALCULATION (BAZETT): 400 MS
EKG T AXIS: 77 DEGREES
EOSINOPHILS ABSOLUTE: 0 K/UL (ref 0–0.6)
EOSINOPHILS RELATIVE PERCENT: 0.1 % (ref 0–5)
GFR NON-AFRICAN AMERICAN: >60
GLUCOSE BLD-MCNC: 149 MG/DL (ref 70–99)
GLUCOSE BLD-MCNC: 170 MG/DL (ref 70–99)
GLUCOSE BLD-MCNC: 199 MG/DL (ref 70–99)
GLUCOSE BLD-MCNC: 239 MG/DL (ref 74–109)
HBA1C MFR BLD: 6.3 % (ref 4–6)
HCT VFR BLD CALC: 36 % (ref 42–52)
HEMOGLOBIN: 11.1 G/DL (ref 14–18)
LYMPHOCYTES ABSOLUTE: 0.3 K/UL (ref 1.1–4.5)
LYMPHOCYTES RELATIVE PERCENT: 2.7 % (ref 20–40)
MCH RBC QN AUTO: 28.6 PG (ref 27–31)
MCHC RBC AUTO-ENTMCNC: 30.8 G/DL (ref 33–37)
MCV RBC AUTO: 92.8 FL (ref 80–94)
MONOCYTES ABSOLUTE: 0.1 K/UL (ref 0–0.9)
MONOCYTES RELATIVE PERCENT: 0.9 % (ref 0–10)
NEUTROPHILS ABSOLUTE: 11.2 K/UL (ref 1.5–7.5)
NEUTROPHILS RELATIVE PERCENT: 95.2 % (ref 50–65)
PDW BLD-RTO: 14.6 % (ref 11.5–14.5)
PERFORMED ON: ABNORMAL
PLATELET # BLD: 265 K/UL (ref 130–400)
PMV BLD AUTO: 8.7 FL (ref 9.4–12.4)
POTASSIUM REFLEX MAGNESIUM: 4.5 MMOL/L (ref 3.5–5)
RBC # BLD: 3.88 M/UL (ref 4.7–6.1)
SODIUM BLD-SCNC: 139 MMOL/L (ref 136–145)
WBC # BLD: 11.8 K/UL (ref 4.8–10.8)

## 2018-12-28 PROCEDURE — 80048 BASIC METABOLIC PNL TOTAL CA: CPT

## 2018-12-28 PROCEDURE — 6370000000 HC RX 637 (ALT 250 FOR IP): Performed by: HOSPITALIST

## 2018-12-28 PROCEDURE — 6360000002 HC RX W HCPCS: Performed by: HOSPITALIST

## 2018-12-28 PROCEDURE — 36415 COLL VENOUS BLD VENIPUNCTURE: CPT

## 2018-12-28 PROCEDURE — 87040 BLOOD CULTURE FOR BACTERIA: CPT

## 2018-12-28 PROCEDURE — 99233 SBSQ HOSP IP/OBS HIGH 50: CPT | Performed by: HOSPITALIST

## 2018-12-28 PROCEDURE — 85025 COMPLETE CBC W/AUTO DIFF WBC: CPT

## 2018-12-28 PROCEDURE — 83036 HEMOGLOBIN GLYCOSYLATED A1C: CPT

## 2018-12-28 PROCEDURE — 1210000000 HC MED SURG R&B

## 2018-12-28 PROCEDURE — 94640 AIRWAY INHALATION TREATMENT: CPT

## 2018-12-28 PROCEDURE — 2700000000 HC OXYGEN THERAPY PER DAY

## 2018-12-28 PROCEDURE — 82948 REAGENT STRIP/BLOOD GLUCOSE: CPT

## 2018-12-28 PROCEDURE — 2580000003 HC RX 258: Performed by: HOSPITALIST

## 2018-12-28 RX ORDER — DEXTROSE MONOHYDRATE 50 MG/ML
100 INJECTION, SOLUTION INTRAVENOUS PRN
Status: DISCONTINUED | OUTPATIENT
Start: 2018-12-28 | End: 2019-01-01 | Stop reason: HOSPADM

## 2018-12-28 RX ORDER — NICOTINE POLACRILEX 4 MG
15 LOZENGE BUCCAL PRN
Status: DISCONTINUED | OUTPATIENT
Start: 2018-12-28 | End: 2019-01-01 | Stop reason: HOSPADM

## 2018-12-28 RX ORDER — DEXTROSE MONOHYDRATE 25 G/50ML
12.5 INJECTION, SOLUTION INTRAVENOUS PRN
Status: DISCONTINUED | OUTPATIENT
Start: 2018-12-28 | End: 2019-01-01 | Stop reason: HOSPADM

## 2018-12-28 RX ADMIN — Medication 10 ML: at 08:24

## 2018-12-28 RX ADMIN — Medication 10 ML: at 00:19

## 2018-12-28 RX ADMIN — ATORVASTATIN CALCIUM 40 MG: 40 TABLET, FILM COATED ORAL at 20:29

## 2018-12-28 RX ADMIN — ATORVASTATIN CALCIUM 40 MG: 40 TABLET, FILM COATED ORAL at 00:18

## 2018-12-28 RX ADMIN — ENOXAPARIN SODIUM 40 MG: 40 INJECTION SUBCUTANEOUS at 08:21

## 2018-12-28 RX ADMIN — AZITHROMYCIN MONOHYDRATE 250 MG: 500 INJECTION, POWDER, LYOPHILIZED, FOR SOLUTION INTRAVENOUS at 20:29

## 2018-12-28 RX ADMIN — METHYLPREDNISOLONE SODIUM SUCCINATE 40 MG: 40 INJECTION, POWDER, FOR SOLUTION INTRAMUSCULAR; INTRAVENOUS at 00:19

## 2018-12-28 RX ADMIN — Medication 1 G: at 17:50

## 2018-12-28 RX ADMIN — LISINOPRIL 20 MG: 20 TABLET ORAL at 08:22

## 2018-12-28 RX ADMIN — AMITRIPTYLINE HYDROCHLORIDE 50 MG: 50 TABLET, FILM COATED ORAL at 00:18

## 2018-12-28 RX ADMIN — OXYCODONE HYDROCHLORIDE 15 MG: 5 TABLET ORAL at 00:18

## 2018-12-28 RX ADMIN — METHYLPREDNISOLONE SODIUM SUCCINATE 40 MG: 40 INJECTION, POWDER, FOR SOLUTION INTRAMUSCULAR; INTRAVENOUS at 20:28

## 2018-12-28 RX ADMIN — IPRATROPIUM BROMIDE 0.5 MG: 0.5 SOLUTION RESPIRATORY (INHALATION) at 18:34

## 2018-12-28 RX ADMIN — INSULIN LISPRO 3 UNITS: 100 INJECTION, SOLUTION INTRAVENOUS; SUBCUTANEOUS at 12:09

## 2018-12-28 RX ADMIN — BUDESONIDE 500 MCG: 0.5 INHALANT RESPIRATORY (INHALATION) at 06:30

## 2018-12-28 RX ADMIN — IPRATROPIUM BROMIDE 0.5 MG: 0.5 SOLUTION RESPIRATORY (INHALATION) at 14:45

## 2018-12-28 RX ADMIN — OXYCODONE HYDROCHLORIDE 15 MG: 5 TABLET ORAL at 20:28

## 2018-12-28 RX ADMIN — FORMOTEROL FUMARATE DIHYDRATE 20 MCG: 20 SOLUTION RESPIRATORY (INHALATION) at 06:30

## 2018-12-28 RX ADMIN — ASPIRIN 81 MG: 81 TABLET, COATED ORAL at 08:22

## 2018-12-28 RX ADMIN — OXYCODONE HYDROCHLORIDE 15 MG: 5 TABLET ORAL at 14:25

## 2018-12-28 RX ADMIN — TIMOLOL MALEATE 1 DROP: 5 SOLUTION OPHTHALMIC at 00:25

## 2018-12-28 RX ADMIN — TIMOLOL MALEATE 1 DROP: 5 SOLUTION OPHTHALMIC at 20:29

## 2018-12-28 RX ADMIN — Medication 10 ML: at 20:30

## 2018-12-28 RX ADMIN — AMLODIPINE BESYLATE 5 MG: 5 TABLET ORAL at 08:22

## 2018-12-28 RX ADMIN — LATANOPROST 1 DROP: 50 SOLUTION/ DROPS OPHTHALMIC at 20:29

## 2018-12-28 RX ADMIN — TIMOLOL MALEATE 1 DROP: 5 SOLUTION OPHTHALMIC at 08:23

## 2018-12-28 RX ADMIN — OXYCODONE HYDROCHLORIDE 15 MG: 5 TABLET ORAL at 08:25

## 2018-12-28 RX ADMIN — AMITRIPTYLINE HYDROCHLORIDE 50 MG: 50 TABLET, FILM COATED ORAL at 20:29

## 2018-12-28 RX ADMIN — BUDESONIDE 500 MCG: 0.5 INHALANT RESPIRATORY (INHALATION) at 18:40

## 2018-12-28 RX ADMIN — IPRATROPIUM BROMIDE 0.5 MG: 0.5 SOLUTION RESPIRATORY (INHALATION) at 06:20

## 2018-12-28 RX ADMIN — LATANOPROST 1 DROP: 50 SOLUTION/ DROPS OPHTHALMIC at 00:25

## 2018-12-28 RX ADMIN — PANTOPRAZOLE SODIUM 40 MG: 40 TABLET, DELAYED RELEASE ORAL at 08:30

## 2018-12-28 RX ADMIN — IPRATROPIUM BROMIDE 0.5 MG: 0.5 SOLUTION RESPIRATORY (INHALATION) at 10:29

## 2018-12-28 ASSESSMENT — PAIN SCALES - GENERAL
PAINLEVEL_OUTOF10: 8
PAINLEVEL_OUTOF10: 7
PAINLEVEL_OUTOF10: 4

## 2018-12-29 ENCOUNTER — APPOINTMENT (OUTPATIENT)
Dept: GENERAL RADIOLOGY | Age: 63
DRG: 193 | End: 2018-12-29
Payer: MEDICARE

## 2018-12-29 LAB
ANION GAP SERPL CALCULATED.3IONS-SCNC: 8 MMOL/L (ref 7–19)
BASOPHILS ABSOLUTE: 0 K/UL (ref 0–0.2)
BASOPHILS RELATIVE PERCENT: 0.1 % (ref 0–1)
BUN BLDV-MCNC: 19 MG/DL (ref 8–23)
CALCIUM SERPL-MCNC: 8.8 MG/DL (ref 8.8–10.2)
CHLORIDE BLD-SCNC: 97 MMOL/L (ref 98–111)
CO2: 32 MMOL/L (ref 22–29)
CREAT SERPL-MCNC: 0.8 MG/DL (ref 0.5–1.2)
EOSINOPHILS ABSOLUTE: 0 K/UL (ref 0–0.6)
EOSINOPHILS RELATIVE PERCENT: 0 % (ref 0–5)
GFR NON-AFRICAN AMERICAN: >60
GLUCOSE BLD-MCNC: 124 MG/DL (ref 70–99)
GLUCOSE BLD-MCNC: 133 MG/DL (ref 70–99)
GLUCOSE BLD-MCNC: 148 MG/DL (ref 70–99)
GLUCOSE BLD-MCNC: 151 MG/DL (ref 74–109)
GLUCOSE BLD-MCNC: 154 MG/DL (ref 70–99)
HCT VFR BLD CALC: 37.5 % (ref 42–52)
HEMOGLOBIN: 11.3 G/DL (ref 14–18)
LYMPHOCYTES ABSOLUTE: 1.5 K/UL (ref 1.1–4.5)
LYMPHOCYTES RELATIVE PERCENT: 6.4 % (ref 20–40)
MCH RBC QN AUTO: 28.5 PG (ref 27–31)
MCHC RBC AUTO-ENTMCNC: 30.1 G/DL (ref 33–37)
MCV RBC AUTO: 94.5 FL (ref 80–94)
MONOCYTES ABSOLUTE: 0.6 K/UL (ref 0–0.9)
MONOCYTES RELATIVE PERCENT: 2.4 % (ref 0–10)
NEUTROPHILS ABSOLUTE: 20.4 K/UL (ref 1.5–7.5)
NEUTROPHILS RELATIVE PERCENT: 90.1 % (ref 50–65)
PDW BLD-RTO: 14.6 % (ref 11.5–14.5)
PERFORMED ON: ABNORMAL
PLATELET # BLD: 284 K/UL (ref 130–400)
PMV BLD AUTO: 8.4 FL (ref 9.4–12.4)
POTASSIUM SERPL-SCNC: 4.9 MMOL/L (ref 3.5–5)
RBC # BLD: 3.97 M/UL (ref 4.7–6.1)
SODIUM BLD-SCNC: 137 MMOL/L (ref 136–145)
WBC # BLD: 22.7 K/UL (ref 4.8–10.8)

## 2018-12-29 PROCEDURE — 6370000000 HC RX 637 (ALT 250 FOR IP): Performed by: HOSPITALIST

## 2018-12-29 PROCEDURE — 6360000002 HC RX W HCPCS: Performed by: HOSPITALIST

## 2018-12-29 PROCEDURE — 36415 COLL VENOUS BLD VENIPUNCTURE: CPT

## 2018-12-29 PROCEDURE — 1210000000 HC MED SURG R&B

## 2018-12-29 PROCEDURE — 80048 BASIC METABOLIC PNL TOTAL CA: CPT

## 2018-12-29 PROCEDURE — 82948 REAGENT STRIP/BLOOD GLUCOSE: CPT

## 2018-12-29 PROCEDURE — 71046 X-RAY EXAM CHEST 2 VIEWS: CPT

## 2018-12-29 PROCEDURE — 99233 SBSQ HOSP IP/OBS HIGH 50: CPT | Performed by: HOSPITALIST

## 2018-12-29 PROCEDURE — 85025 COMPLETE CBC W/AUTO DIFF WBC: CPT

## 2018-12-29 PROCEDURE — 2700000000 HC OXYGEN THERAPY PER DAY

## 2018-12-29 PROCEDURE — 2580000003 HC RX 258: Performed by: HOSPITALIST

## 2018-12-29 PROCEDURE — 94640 AIRWAY INHALATION TREATMENT: CPT

## 2018-12-29 RX ADMIN — TIMOLOL MALEATE 1 DROP: 5 SOLUTION OPHTHALMIC at 07:15

## 2018-12-29 RX ADMIN — OXYCODONE HYDROCHLORIDE 15 MG: 5 TABLET ORAL at 21:04

## 2018-12-29 RX ADMIN — IPRATROPIUM BROMIDE 0.5 MG: 0.5 SOLUTION RESPIRATORY (INHALATION) at 15:12

## 2018-12-29 RX ADMIN — AMLODIPINE BESYLATE 5 MG: 5 TABLET ORAL at 07:07

## 2018-12-29 RX ADMIN — BUDESONIDE 500 MCG: 0.5 INHALANT RESPIRATORY (INHALATION) at 19:23

## 2018-12-29 RX ADMIN — Medication 1 G: at 17:27

## 2018-12-29 RX ADMIN — LATANOPROST 1 DROP: 50 SOLUTION/ DROPS OPHTHALMIC at 21:04

## 2018-12-29 RX ADMIN — AMITRIPTYLINE HYDROCHLORIDE 50 MG: 50 TABLET, FILM COATED ORAL at 21:04

## 2018-12-29 RX ADMIN — LISINOPRIL 20 MG: 20 TABLET ORAL at 07:07

## 2018-12-29 RX ADMIN — FORMOTEROL FUMARATE DIHYDRATE 20 MCG: 20 SOLUTION RESPIRATORY (INHALATION) at 06:12

## 2018-12-29 RX ADMIN — BUDESONIDE 500 MCG: 0.5 INHALANT RESPIRATORY (INHALATION) at 06:12

## 2018-12-29 RX ADMIN — OXYCODONE HYDROCHLORIDE 15 MG: 5 TABLET ORAL at 07:07

## 2018-12-29 RX ADMIN — INSULIN LISPRO 3 UNITS: 100 INJECTION, SOLUTION INTRAVENOUS; SUBCUTANEOUS at 07:07

## 2018-12-29 RX ADMIN — Medication 10 ML: at 21:05

## 2018-12-29 RX ADMIN — Medication 10 ML: at 07:14

## 2018-12-29 RX ADMIN — INSULIN LISPRO 3 UNITS: 100 INJECTION, SOLUTION INTRAVENOUS; SUBCUTANEOUS at 17:27

## 2018-12-29 RX ADMIN — IPRATROPIUM BROMIDE 0.5 MG: 0.5 SOLUTION RESPIRATORY (INHALATION) at 10:35

## 2018-12-29 RX ADMIN — FORMOTEROL FUMARATE DIHYDRATE 20 MCG: 20 SOLUTION RESPIRATORY (INHALATION) at 19:24

## 2018-12-29 RX ADMIN — ATORVASTATIN CALCIUM 40 MG: 40 TABLET, FILM COATED ORAL at 21:04

## 2018-12-29 RX ADMIN — OXYCODONE HYDROCHLORIDE 15 MG: 5 TABLET ORAL at 14:10

## 2018-12-29 RX ADMIN — AZITHROMYCIN MONOHYDRATE 250 MG: 500 INJECTION, POWDER, LYOPHILIZED, FOR SOLUTION INTRAVENOUS at 19:45

## 2018-12-29 RX ADMIN — ASPIRIN 81 MG: 81 TABLET, COATED ORAL at 07:07

## 2018-12-29 RX ADMIN — TIMOLOL MALEATE 1 DROP: 5 SOLUTION OPHTHALMIC at 21:04

## 2018-12-29 RX ADMIN — IPRATROPIUM BROMIDE 0.5 MG: 0.5 SOLUTION RESPIRATORY (INHALATION) at 19:13

## 2018-12-29 RX ADMIN — PANTOPRAZOLE SODIUM 40 MG: 40 TABLET, DELAYED RELEASE ORAL at 07:07

## 2018-12-29 RX ADMIN — IPRATROPIUM BROMIDE 0.5 MG: 0.5 SOLUTION RESPIRATORY (INHALATION) at 06:23

## 2018-12-29 RX ADMIN — ENOXAPARIN SODIUM 40 MG: 40 INJECTION SUBCUTANEOUS at 07:07

## 2018-12-29 ASSESSMENT — PAIN SCALES - GENERAL
PAINLEVEL_OUTOF10: 9
PAINLEVEL_OUTOF10: 0
PAINLEVEL_OUTOF10: 0
PAINLEVEL_OUTOF10: 8
PAINLEVEL_OUTOF10: 0
PAINLEVEL_OUTOF10: 8

## 2018-12-30 LAB
ANION GAP SERPL CALCULATED.3IONS-SCNC: 6 MMOL/L (ref 7–19)
BASOPHILS ABSOLUTE: 0 K/UL (ref 0–0.2)
BASOPHILS RELATIVE PERCENT: 0.1 % (ref 0–1)
BUN BLDV-MCNC: 23 MG/DL (ref 8–23)
CALCIUM SERPL-MCNC: 8.5 MG/DL (ref 8.8–10.2)
CHLORIDE BLD-SCNC: 99 MMOL/L (ref 98–111)
CO2: 35 MMOL/L (ref 22–29)
CREAT SERPL-MCNC: 0.8 MG/DL (ref 0.5–1.2)
EOSINOPHILS ABSOLUTE: 0 K/UL (ref 0–0.6)
EOSINOPHILS RELATIVE PERCENT: 0.1 % (ref 0–5)
GFR NON-AFRICAN AMERICAN: >60
GLUCOSE BLD-MCNC: 101 MG/DL (ref 74–109)
GLUCOSE BLD-MCNC: 103 MG/DL (ref 70–99)
GLUCOSE BLD-MCNC: 149 MG/DL (ref 70–99)
GLUCOSE BLD-MCNC: 91 MG/DL (ref 70–99)
GLUCOSE BLD-MCNC: 91 MG/DL (ref 70–99)
GLUCOSE BLD-MCNC: 95 MG/DL (ref 70–99)
HCT VFR BLD CALC: 33.7 % (ref 42–52)
HEMOGLOBIN: 10.3 G/DL (ref 14–18)
LYMPHOCYTES ABSOLUTE: 2.7 K/UL (ref 1.1–4.5)
LYMPHOCYTES RELATIVE PERCENT: 13.1 % (ref 20–40)
MCH RBC QN AUTO: 29 PG (ref 27–31)
MCHC RBC AUTO-ENTMCNC: 30.6 G/DL (ref 33–37)
MCV RBC AUTO: 94.9 FL (ref 80–94)
MONOCYTES ABSOLUTE: 1.1 K/UL (ref 0–0.9)
MONOCYTES RELATIVE PERCENT: 5.3 % (ref 0–10)
NEUTROPHILS ABSOLUTE: 16.8 K/UL (ref 1.5–7.5)
NEUTROPHILS RELATIVE PERCENT: 80.6 % (ref 50–65)
PDW BLD-RTO: 14.7 % (ref 11.5–14.5)
PERFORMED ON: ABNORMAL
PERFORMED ON: ABNORMAL
PERFORMED ON: NORMAL
PLATELET # BLD: 271 K/UL (ref 130–400)
PMV BLD AUTO: 8.5 FL (ref 9.4–12.4)
POTASSIUM SERPL-SCNC: 4.9 MMOL/L (ref 3.5–5)
RBC # BLD: 3.55 M/UL (ref 4.7–6.1)
SODIUM BLD-SCNC: 140 MMOL/L (ref 136–145)
WBC # BLD: 20.9 K/UL (ref 4.8–10.8)

## 2018-12-30 PROCEDURE — 99232 SBSQ HOSP IP/OBS MODERATE 35: CPT | Performed by: INTERNAL MEDICINE

## 2018-12-30 PROCEDURE — 87205 SMEAR GRAM STAIN: CPT

## 2018-12-30 PROCEDURE — 1210000000 HC MED SURG R&B

## 2018-12-30 PROCEDURE — 6360000002 HC RX W HCPCS: Performed by: HOSPITALIST

## 2018-12-30 PROCEDURE — 80048 BASIC METABOLIC PNL TOTAL CA: CPT

## 2018-12-30 PROCEDURE — 82948 REAGENT STRIP/BLOOD GLUCOSE: CPT

## 2018-12-30 PROCEDURE — 6370000000 HC RX 637 (ALT 250 FOR IP): Performed by: HOSPITALIST

## 2018-12-30 PROCEDURE — 2700000000 HC OXYGEN THERAPY PER DAY

## 2018-12-30 PROCEDURE — 94640 AIRWAY INHALATION TREATMENT: CPT

## 2018-12-30 PROCEDURE — 85025 COMPLETE CBC W/AUTO DIFF WBC: CPT

## 2018-12-30 PROCEDURE — 2580000003 HC RX 258: Performed by: HOSPITALIST

## 2018-12-30 PROCEDURE — 87070 CULTURE OTHR SPECIMN AEROBIC: CPT

## 2018-12-30 PROCEDURE — 36415 COLL VENOUS BLD VENIPUNCTURE: CPT

## 2018-12-30 RX ADMIN — BUDESONIDE 500 MCG: 0.5 INHALANT RESPIRATORY (INHALATION) at 18:46

## 2018-12-30 RX ADMIN — OXYCODONE HYDROCHLORIDE 15 MG: 5 TABLET ORAL at 06:13

## 2018-12-30 RX ADMIN — AZITHROMYCIN MONOHYDRATE 250 MG: 500 INJECTION, POWDER, LYOPHILIZED, FOR SOLUTION INTRAVENOUS at 19:50

## 2018-12-30 RX ADMIN — ATORVASTATIN CALCIUM 40 MG: 40 TABLET, FILM COATED ORAL at 20:23

## 2018-12-30 RX ADMIN — LISINOPRIL 20 MG: 20 TABLET ORAL at 07:47

## 2018-12-30 RX ADMIN — IPRATROPIUM BROMIDE 0.5 MG: 0.5 SOLUTION RESPIRATORY (INHALATION) at 10:00

## 2018-12-30 RX ADMIN — LATANOPROST 1 DROP: 50 SOLUTION/ DROPS OPHTHALMIC at 20:23

## 2018-12-30 RX ADMIN — OXYCODONE HYDROCHLORIDE 15 MG: 5 TABLET ORAL at 23:34

## 2018-12-30 RX ADMIN — OXYCODONE HYDROCHLORIDE 15 MG: 5 TABLET ORAL at 12:13

## 2018-12-30 RX ADMIN — AMITRIPTYLINE HYDROCHLORIDE 50 MG: 50 TABLET, FILM COATED ORAL at 20:23

## 2018-12-30 RX ADMIN — TIMOLOL MALEATE 1 DROP: 5 SOLUTION OPHTHALMIC at 20:23

## 2018-12-30 RX ADMIN — Medication 1 G: at 18:10

## 2018-12-30 RX ADMIN — ASPIRIN 81 MG: 81 TABLET, COATED ORAL at 07:47

## 2018-12-30 RX ADMIN — FORMOTEROL FUMARATE DIHYDRATE 20 MCG: 20 SOLUTION RESPIRATORY (INHALATION) at 06:15

## 2018-12-30 RX ADMIN — IPRATROPIUM BROMIDE 0.5 MG: 0.5 SOLUTION RESPIRATORY (INHALATION) at 18:46

## 2018-12-30 RX ADMIN — Medication 10 ML: at 07:49

## 2018-12-30 RX ADMIN — BUDESONIDE 500 MCG: 0.5 INHALANT RESPIRATORY (INHALATION) at 06:15

## 2018-12-30 RX ADMIN — FORMOTEROL FUMARATE DIHYDRATE 20 MCG: 20 SOLUTION RESPIRATORY (INHALATION) at 18:46

## 2018-12-30 RX ADMIN — TIMOLOL MALEATE 1 DROP: 5 SOLUTION OPHTHALMIC at 07:49

## 2018-12-30 RX ADMIN — ENOXAPARIN SODIUM 40 MG: 40 INJECTION SUBCUTANEOUS at 07:46

## 2018-12-30 RX ADMIN — PANTOPRAZOLE SODIUM 40 MG: 40 TABLET, DELAYED RELEASE ORAL at 06:13

## 2018-12-30 RX ADMIN — IPRATROPIUM BROMIDE 0.5 MG: 0.5 SOLUTION RESPIRATORY (INHALATION) at 06:04

## 2018-12-30 RX ADMIN — IPRATROPIUM BROMIDE 0.5 MG: 0.5 SOLUTION RESPIRATORY (INHALATION) at 13:58

## 2018-12-30 RX ADMIN — OXYCODONE HYDROCHLORIDE 15 MG: 5 TABLET ORAL at 18:10

## 2018-12-30 RX ADMIN — AMLODIPINE BESYLATE 5 MG: 5 TABLET ORAL at 07:47

## 2018-12-30 ASSESSMENT — PAIN SCALES - GENERAL
PAINLEVEL_OUTOF10: 0
PAINLEVEL_OUTOF10: 6
PAINLEVEL_OUTOF10: 5
PAINLEVEL_OUTOF10: 7
PAINLEVEL_OUTOF10: 7
PAINLEVEL_OUTOF10: 8
PAINLEVEL_OUTOF10: 8

## 2018-12-31 ENCOUNTER — HOSPITAL ENCOUNTER (OUTPATIENT)
Dept: PULMONOLOGY | Age: 63
Setting detail: THERAPIES SERIES
End: 2018-12-31
Payer: MEDICARE

## 2018-12-31 PROBLEM — I51.89 GRADE I DIASTOLIC DYSFUNCTION: Chronic | Status: ACTIVE | Noted: 2018-12-31

## 2018-12-31 LAB
ANION GAP SERPL CALCULATED.3IONS-SCNC: 7 MMOL/L (ref 7–19)
BASOPHILS ABSOLUTE: 0.1 K/UL (ref 0–0.2)
BASOPHILS RELATIVE PERCENT: 0.3 % (ref 0–1)
BUN BLDV-MCNC: 23 MG/DL (ref 8–23)
CALCIUM SERPL-MCNC: 8.3 MG/DL (ref 8.8–10.2)
CHLORIDE BLD-SCNC: 97 MMOL/L (ref 98–111)
CO2: 36 MMOL/L (ref 22–29)
CREAT SERPL-MCNC: 0.8 MG/DL (ref 0.5–1.2)
EOSINOPHILS ABSOLUTE: 0.2 K/UL (ref 0–0.6)
EOSINOPHILS RELATIVE PERCENT: 1.2 % (ref 0–5)
GFR NON-AFRICAN AMERICAN: >60
GLUCOSE BLD-MCNC: 105 MG/DL (ref 74–109)
GLUCOSE BLD-MCNC: 114 MG/DL (ref 70–99)
GLUCOSE BLD-MCNC: 144 MG/DL (ref 70–99)
GLUCOSE BLD-MCNC: 99 MG/DL (ref 70–99)
HCT VFR BLD CALC: 36.1 % (ref 42–52)
HEMOGLOBIN: 11 G/DL (ref 14–18)
LYMPHOCYTES ABSOLUTE: 2.9 K/UL (ref 1.1–4.5)
LYMPHOCYTES RELATIVE PERCENT: 19.2 % (ref 20–40)
MCH RBC QN AUTO: 28.3 PG (ref 27–31)
MCHC RBC AUTO-ENTMCNC: 30.5 G/DL (ref 33–37)
MCV RBC AUTO: 92.8 FL (ref 80–94)
MONOCYTES ABSOLUTE: 0.9 K/UL (ref 0–0.9)
MONOCYTES RELATIVE PERCENT: 6.3 % (ref 0–10)
NEUTROPHILS ABSOLUTE: 10.7 K/UL (ref 1.5–7.5)
NEUTROPHILS RELATIVE PERCENT: 72.1 % (ref 50–65)
PDW BLD-RTO: 14.9 % (ref 11.5–14.5)
PERFORMED ON: ABNORMAL
PERFORMED ON: ABNORMAL
PERFORMED ON: NORMAL
PLATELET # BLD: 275 K/UL (ref 130–400)
PMV BLD AUTO: 8.4 FL (ref 9.4–12.4)
POTASSIUM SERPL-SCNC: 4.1 MMOL/L (ref 3.5–5)
RBC # BLD: 3.89 M/UL (ref 4.7–6.1)
SODIUM BLD-SCNC: 140 MMOL/L (ref 136–145)
WBC # BLD: 14.9 K/UL (ref 4.8–10.8)

## 2018-12-31 PROCEDURE — 6370000000 HC RX 637 (ALT 250 FOR IP): Performed by: PHYSICIAN ASSISTANT

## 2018-12-31 PROCEDURE — 82948 REAGENT STRIP/BLOOD GLUCOSE: CPT

## 2018-12-31 PROCEDURE — 99232 SBSQ HOSP IP/OBS MODERATE 35: CPT | Performed by: PHYSICIAN ASSISTANT

## 2018-12-31 PROCEDURE — 6360000002 HC RX W HCPCS: Performed by: HOSPITALIST

## 2018-12-31 PROCEDURE — 94640 AIRWAY INHALATION TREATMENT: CPT

## 2018-12-31 PROCEDURE — 94664 DEMO&/EVAL PT USE INHALER: CPT

## 2018-12-31 PROCEDURE — 80048 BASIC METABOLIC PNL TOTAL CA: CPT

## 2018-12-31 PROCEDURE — 36415 COLL VENOUS BLD VENIPUNCTURE: CPT

## 2018-12-31 PROCEDURE — 85025 COMPLETE CBC W/AUTO DIFF WBC: CPT

## 2018-12-31 PROCEDURE — 1210000000 HC MED SURG R&B

## 2018-12-31 PROCEDURE — 2580000003 HC RX 258: Performed by: HOSPITALIST

## 2018-12-31 PROCEDURE — 2700000000 HC OXYGEN THERAPY PER DAY

## 2018-12-31 PROCEDURE — 6370000000 HC RX 637 (ALT 250 FOR IP): Performed by: HOSPITALIST

## 2018-12-31 RX ORDER — PREDNISONE 20 MG/1
40 TABLET ORAL DAILY
Status: DISCONTINUED | OUTPATIENT
Start: 2018-12-31 | End: 2019-01-01 | Stop reason: HOSPADM

## 2018-12-31 RX ORDER — PREDNISONE 10 MG/1
10 TABLET ORAL DAILY
Status: DISCONTINUED | OUTPATIENT
Start: 2019-01-09 | End: 2019-01-01 | Stop reason: HOSPADM

## 2018-12-31 RX ORDER — PREDNISONE 20 MG/1
20 TABLET ORAL DAILY
Status: DISCONTINUED | OUTPATIENT
Start: 2019-01-06 | End: 2019-01-01 | Stop reason: HOSPADM

## 2018-12-31 RX ORDER — AZITHROMYCIN 250 MG/1
250 TABLET, FILM COATED ORAL DAILY
Status: DISCONTINUED | OUTPATIENT
Start: 2018-12-31 | End: 2019-01-01 | Stop reason: HOSPADM

## 2018-12-31 RX ORDER — CEFDINIR 300 MG/1
300 CAPSULE ORAL EVERY 12 HOURS SCHEDULED
Status: DISCONTINUED | OUTPATIENT
Start: 2018-12-31 | End: 2019-01-01 | Stop reason: HOSPADM

## 2018-12-31 RX ORDER — GUAIFENESIN 600 MG/1
600 TABLET, EXTENDED RELEASE ORAL 2 TIMES DAILY
Status: DISCONTINUED | OUTPATIENT
Start: 2018-12-31 | End: 2019-01-01 | Stop reason: HOSPADM

## 2018-12-31 RX ORDER — FUROSEMIDE 40 MG/1
40 TABLET ORAL ONCE
Status: COMPLETED | OUTPATIENT
Start: 2018-12-31 | End: 2018-12-31

## 2018-12-31 RX ADMIN — PREDNISONE 40 MG: 20 TABLET ORAL at 10:19

## 2018-12-31 RX ADMIN — IPRATROPIUM BROMIDE 0.5 MG: 0.5 SOLUTION RESPIRATORY (INHALATION) at 14:18

## 2018-12-31 RX ADMIN — PANTOPRAZOLE SODIUM 40 MG: 40 TABLET, DELAYED RELEASE ORAL at 06:14

## 2018-12-31 RX ADMIN — IPRATROPIUM BROMIDE 0.5 MG: 0.5 SOLUTION RESPIRATORY (INHALATION) at 18:46

## 2018-12-31 RX ADMIN — TIMOLOL MALEATE 1 DROP: 5 SOLUTION OPHTHALMIC at 20:36

## 2018-12-31 RX ADMIN — OXYCODONE HYDROCHLORIDE 15 MG: 5 TABLET ORAL at 06:14

## 2018-12-31 RX ADMIN — IPRATROPIUM BROMIDE 0.5 MG: 0.5 SOLUTION RESPIRATORY (INHALATION) at 06:16

## 2018-12-31 RX ADMIN — FUROSEMIDE 40 MG: 40 TABLET ORAL at 08:57

## 2018-12-31 RX ADMIN — GUAIFENESIN 600 MG: 600 TABLET, EXTENDED RELEASE ORAL at 12:34

## 2018-12-31 RX ADMIN — FORMOTEROL FUMARATE DIHYDRATE 20 MCG: 20 SOLUTION RESPIRATORY (INHALATION) at 18:34

## 2018-12-31 RX ADMIN — OXYCODONE HYDROCHLORIDE 15 MG: 5 TABLET ORAL at 13:00

## 2018-12-31 RX ADMIN — Medication 10 ML: at 08:40

## 2018-12-31 RX ADMIN — AZITHROMYCIN 250 MG: 250 TABLET, FILM COATED ORAL at 08:57

## 2018-12-31 RX ADMIN — TIMOLOL MALEATE 1 DROP: 5 SOLUTION OPHTHALMIC at 08:41

## 2018-12-31 RX ADMIN — FORMOTEROL FUMARATE DIHYDRATE 20 MCG: 20 SOLUTION RESPIRATORY (INHALATION) at 06:28

## 2018-12-31 RX ADMIN — GUAIFENESIN 600 MG: 600 TABLET, EXTENDED RELEASE ORAL at 20:34

## 2018-12-31 RX ADMIN — CEFDINIR 300 MG: 300 CAPSULE ORAL at 08:57

## 2018-12-31 RX ADMIN — LISINOPRIL 20 MG: 20 TABLET ORAL at 08:38

## 2018-12-31 RX ADMIN — LATANOPROST 1 DROP: 50 SOLUTION/ DROPS OPHTHALMIC at 20:35

## 2018-12-31 RX ADMIN — CEFDINIR 300 MG: 300 CAPSULE ORAL at 20:34

## 2018-12-31 RX ADMIN — IPRATROPIUM BROMIDE 0.5 MG: 0.5 SOLUTION RESPIRATORY (INHALATION) at 10:01

## 2018-12-31 RX ADMIN — ATORVASTATIN CALCIUM 40 MG: 40 TABLET, FILM COATED ORAL at 20:34

## 2018-12-31 RX ADMIN — AMLODIPINE BESYLATE 5 MG: 5 TABLET ORAL at 08:39

## 2018-12-31 RX ADMIN — AMITRIPTYLINE HYDROCHLORIDE 50 MG: 50 TABLET, FILM COATED ORAL at 20:34

## 2018-12-31 RX ADMIN — OXYCODONE HYDROCHLORIDE 15 MG: 5 TABLET ORAL at 19:05

## 2018-12-31 RX ADMIN — ENOXAPARIN SODIUM 40 MG: 40 INJECTION SUBCUTANEOUS at 08:37

## 2018-12-31 RX ADMIN — BUDESONIDE 500 MCG: 0.5 INHALANT RESPIRATORY (INHALATION) at 06:28

## 2018-12-31 RX ADMIN — ASPIRIN 81 MG: 81 TABLET, COATED ORAL at 08:38

## 2018-12-31 RX ADMIN — BUDESONIDE 500 MCG: 0.5 INHALANT RESPIRATORY (INHALATION) at 18:34

## 2018-12-31 ASSESSMENT — PAIN SCALES - GENERAL
PAINLEVEL_OUTOF10: 6
PAINLEVEL_OUTOF10: 8
PAINLEVEL_OUTOF10: 6
PAINLEVEL_OUTOF10: 9

## 2019-01-01 VITALS
SYSTOLIC BLOOD PRESSURE: 132 MMHG | HEIGHT: 66 IN | BODY MASS INDEX: 38.57 KG/M2 | DIASTOLIC BLOOD PRESSURE: 76 MMHG | TEMPERATURE: 97.2 F | OXYGEN SATURATION: 97 % | RESPIRATION RATE: 18 BRPM | HEART RATE: 85 BPM | WEIGHT: 240 LBS

## 2019-01-01 LAB
BLOOD CULTURE, ROUTINE: NORMAL
CULTURE, RESPIRATORY: ABNORMAL
GLUCOSE BLD-MCNC: 178 MG/DL (ref 70–99)
GRAM STAIN RESULT: ABNORMAL
ORGANISM: ABNORMAL
ORGANISM: ABNORMAL
PERFORMED ON: ABNORMAL

## 2019-01-01 PROCEDURE — 99239 HOSP IP/OBS DSCHRG MGMT >30: CPT | Performed by: PHYSICIAN ASSISTANT

## 2019-01-01 PROCEDURE — 6370000000 HC RX 637 (ALT 250 FOR IP): Performed by: HOSPITALIST

## 2019-01-01 PROCEDURE — 6370000000 HC RX 637 (ALT 250 FOR IP): Performed by: PHYSICIAN ASSISTANT

## 2019-01-01 PROCEDURE — 82948 REAGENT STRIP/BLOOD GLUCOSE: CPT

## 2019-01-01 PROCEDURE — 2700000000 HC OXYGEN THERAPY PER DAY

## 2019-01-01 PROCEDURE — 6360000002 HC RX W HCPCS: Performed by: HOSPITALIST

## 2019-01-01 PROCEDURE — 94640 AIRWAY INHALATION TREATMENT: CPT

## 2019-01-01 RX ORDER — AZITHROMYCIN 250 MG/1
250 TABLET, FILM COATED ORAL DAILY
Qty: 4 TABLET | Refills: 0 | Status: SHIPPED | OUTPATIENT
Start: 2019-01-01 | End: 2019-01-05

## 2019-01-01 RX ORDER — PREDNISONE 10 MG/1
TABLET ORAL
Qty: 40 TABLET | Refills: 0 | Status: SHIPPED | OUTPATIENT
Start: 2019-01-01 | End: 2019-01-01

## 2019-01-01 RX ORDER — PREDNISONE 10 MG/1
TABLET ORAL
Qty: 40 TABLET | Refills: 0 | Status: SHIPPED | OUTPATIENT
Start: 2019-01-01 | End: 2019-05-06

## 2019-01-01 RX ORDER — CEFDINIR 300 MG/1
300 CAPSULE ORAL EVERY 12 HOURS SCHEDULED
Qty: 14 CAPSULE | Refills: 0 | Status: SHIPPED | OUTPATIENT
Start: 2019-01-01 | End: 2019-01-08

## 2019-01-01 RX ADMIN — AZITHROMYCIN 250 MG: 250 TABLET, FILM COATED ORAL at 10:13

## 2019-01-01 RX ADMIN — FORMOTEROL FUMARATE DIHYDRATE 20 MCG: 20 SOLUTION RESPIRATORY (INHALATION) at 06:53

## 2019-01-01 RX ADMIN — TIMOLOL MALEATE 1 DROP: 5 SOLUTION OPHTHALMIC at 10:15

## 2019-01-01 RX ADMIN — IPRATROPIUM BROMIDE 0.5 MG: 0.5 SOLUTION RESPIRATORY (INHALATION) at 10:01

## 2019-01-01 RX ADMIN — ASPIRIN 81 MG: 81 TABLET, COATED ORAL at 10:14

## 2019-01-01 RX ADMIN — BUDESONIDE 500 MCG: 0.5 INHALANT RESPIRATORY (INHALATION) at 06:53

## 2019-01-01 RX ADMIN — PANTOPRAZOLE SODIUM 40 MG: 40 TABLET, DELAYED RELEASE ORAL at 06:10

## 2019-01-01 RX ADMIN — OXYCODONE HYDROCHLORIDE 15 MG: 5 TABLET ORAL at 06:10

## 2019-01-01 RX ADMIN — LISINOPRIL 20 MG: 20 TABLET ORAL at 10:13

## 2019-01-01 RX ADMIN — CEFDINIR 300 MG: 300 CAPSULE ORAL at 10:13

## 2019-01-01 RX ADMIN — PREDNISONE 40 MG: 20 TABLET ORAL at 10:14

## 2019-01-01 RX ADMIN — GUAIFENESIN 600 MG: 600 TABLET, EXTENDED RELEASE ORAL at 10:14

## 2019-01-01 RX ADMIN — IPRATROPIUM BROMIDE 0.5 MG: 0.5 SOLUTION RESPIRATORY (INHALATION) at 06:38

## 2019-01-01 RX ADMIN — ENOXAPARIN SODIUM 40 MG: 40 INJECTION SUBCUTANEOUS at 10:14

## 2019-01-01 RX ADMIN — AMLODIPINE BESYLATE 5 MG: 5 TABLET ORAL at 10:14

## 2019-01-01 ASSESSMENT — PAIN SCALES - GENERAL: PAINLEVEL_OUTOF10: 8

## 2019-01-02 ENCOUNTER — CARE COORDINATION (OUTPATIENT)
Dept: CASE MANAGEMENT | Age: 64
End: 2019-01-02

## 2019-01-02 ENCOUNTER — HOSPITAL ENCOUNTER (OUTPATIENT)
Dept: PULMONOLOGY | Age: 64
Setting detail: THERAPIES SERIES
End: 2019-01-02
Payer: MEDICARE

## 2019-01-02 LAB
CULTURE, BLOOD 2: ABNORMAL
CULTURE, BLOOD 2: ABNORMAL
ORGANISM: ABNORMAL

## 2019-01-03 LAB — CULTURE, BLOOD 3: NORMAL

## 2019-01-04 ENCOUNTER — APPOINTMENT (OUTPATIENT)
Dept: PULMONOLOGY | Age: 64
End: 2019-01-04
Payer: MEDICARE

## 2019-01-09 ENCOUNTER — HOSPITAL ENCOUNTER (OUTPATIENT)
Dept: PULMONOLOGY | Age: 64
Setting detail: THERAPIES SERIES
Discharge: HOME | End: 2019-01-09
Payer: MEDICARE

## 2019-01-09 ENCOUNTER — HOSPITAL ENCOUNTER (OUTPATIENT)
Dept: SLEEP CENTER | Age: 64
Discharge: HOME | End: 2019-01-11
Payer: MEDICARE

## 2019-01-09 PROCEDURE — 2700000000 HC OXYGEN THERAPY PER DAY

## 2019-01-09 PROCEDURE — 95810 POLYSOM 6/> YRS 4/> PARAM: CPT | Performed by: PSYCHIATRY & NEUROLOGY

## 2019-01-09 PROCEDURE — G0424 PULMONARY REHAB W EXER: HCPCS

## 2019-01-09 PROCEDURE — 95810 POLYSOM 6/> YRS 4/> PARAM: CPT

## 2019-01-11 ENCOUNTER — HOSPITAL ENCOUNTER (OUTPATIENT)
Dept: PULMONOLOGY | Age: 64
Setting detail: THERAPIES SERIES
Discharge: HOME | End: 2019-01-11
Payer: MEDICARE

## 2019-01-11 PROCEDURE — 2700000000 HC OXYGEN THERAPY PER DAY

## 2019-01-11 PROCEDURE — G0424 PULMONARY REHAB W EXER: HCPCS

## 2019-01-14 ENCOUNTER — HOSPITAL ENCOUNTER (OUTPATIENT)
Dept: PULMONOLOGY | Age: 64
Setting detail: THERAPIES SERIES
Discharge: HOME | End: 2019-01-14
Payer: MEDICARE

## 2019-01-14 PROCEDURE — G0424 PULMONARY REHAB W EXER: HCPCS

## 2019-01-14 PROCEDURE — 2700000000 HC OXYGEN THERAPY PER DAY

## 2019-01-16 ENCOUNTER — HOSPITAL ENCOUNTER (OUTPATIENT)
Dept: PULMONOLOGY | Age: 64
Setting detail: THERAPIES SERIES
Discharge: HOME | End: 2019-01-16
Payer: MEDICARE

## 2019-01-16 PROCEDURE — G0424 PULMONARY REHAB W EXER: HCPCS

## 2019-01-16 PROCEDURE — 2700000000 HC OXYGEN THERAPY PER DAY

## 2019-01-17 ENCOUNTER — HOSPITAL ENCOUNTER (OUTPATIENT)
Dept: GENERAL RADIOLOGY | Age: 64
Discharge: HOME | End: 2019-01-17
Payer: MEDICARE

## 2019-01-17 ENCOUNTER — HOSPITAL ENCOUNTER (OUTPATIENT)
Dept: CT IMAGING | Age: 64
Discharge: HOME | End: 2019-01-17
Payer: MEDICARE

## 2019-01-17 DIAGNOSIS — J44.1 CHRONIC OBSTRUCTIVE PULMONARY DISEASE WITH ACUTE EXACERBATION (HCC): ICD-10-CM

## 2019-01-17 DIAGNOSIS — R91.1 LUNG NODULE: ICD-10-CM

## 2019-01-17 DIAGNOSIS — J15.9 UNSPECIFIED BACTERIAL PNEUMONIA: ICD-10-CM

## 2019-01-17 PROCEDURE — 71250 CT THORAX DX C-: CPT

## 2019-01-17 PROCEDURE — 71046 X-RAY EXAM CHEST 2 VIEWS: CPT

## 2019-01-18 ENCOUNTER — HOSPITAL ENCOUNTER (OUTPATIENT)
Dept: PULMONOLOGY | Age: 64
Setting detail: THERAPIES SERIES
Discharge: HOME | End: 2019-01-18
Payer: MEDICARE

## 2019-01-18 DIAGNOSIS — R91.1 LUNG NODULE: ICD-10-CM

## 2019-01-18 PROCEDURE — 2700000000 HC OXYGEN THERAPY PER DAY

## 2019-01-18 PROCEDURE — G0424 PULMONARY REHAB W EXER: HCPCS

## 2019-01-21 ENCOUNTER — HOSPITAL ENCOUNTER (OUTPATIENT)
Dept: PULMONOLOGY | Age: 64
Setting detail: THERAPIES SERIES
Discharge: HOME | End: 2019-01-21
Payer: MEDICARE

## 2019-01-21 DIAGNOSIS — J44.9 STAGE 3 SEVERE COPD BY GOLD CLASSIFICATION (HCC): Primary | ICD-10-CM

## 2019-01-21 PROCEDURE — G0424 PULMONARY REHAB W EXER: HCPCS

## 2019-01-21 PROCEDURE — 2700000000 HC OXYGEN THERAPY PER DAY

## 2019-01-23 ENCOUNTER — HOSPITAL ENCOUNTER (OUTPATIENT)
Dept: PULMONOLOGY | Age: 64
Setting detail: THERAPIES SERIES
Discharge: HOME | End: 2019-01-23
Payer: MEDICARE

## 2019-01-23 PROCEDURE — 2700000000 HC OXYGEN THERAPY PER DAY

## 2019-01-23 PROCEDURE — G0424 PULMONARY REHAB W EXER: HCPCS

## 2019-01-25 ENCOUNTER — HOSPITAL ENCOUNTER (OUTPATIENT)
Dept: PULMONOLOGY | Age: 64
Setting detail: THERAPIES SERIES
Discharge: HOME | End: 2019-01-25
Payer: MEDICARE

## 2019-01-25 PROCEDURE — G0424 PULMONARY REHAB W EXER: HCPCS

## 2019-01-25 PROCEDURE — 2700000000 HC OXYGEN THERAPY PER DAY

## 2019-01-28 ENCOUNTER — HOSPITAL ENCOUNTER (OUTPATIENT)
Dept: PULMONOLOGY | Age: 64
Setting detail: THERAPIES SERIES
Discharge: HOME | End: 2019-01-28
Payer: MEDICARE

## 2019-01-28 PROCEDURE — G0424 PULMONARY REHAB W EXER: HCPCS

## 2019-01-28 PROCEDURE — 2700000000 HC OXYGEN THERAPY PER DAY

## 2019-01-29 ENCOUNTER — OFFICE VISIT (OUTPATIENT)
Dept: PULMONOLOGY | Facility: CLINIC | Age: 64
End: 2019-01-29

## 2019-01-29 VITALS
HEIGHT: 66 IN | BODY MASS INDEX: 38.57 KG/M2 | WEIGHT: 240 LBS | HEART RATE: 88 BPM | SYSTOLIC BLOOD PRESSURE: 160 MMHG | OXYGEN SATURATION: 90 % | DIASTOLIC BLOOD PRESSURE: 96 MMHG

## 2019-01-29 DIAGNOSIS — R09.02 HYPOXIA: ICD-10-CM

## 2019-01-29 DIAGNOSIS — J44.9 STAGE 3 SEVERE COPD BY GOLD CLASSIFICATION (HCC): Primary | ICD-10-CM

## 2019-01-29 DIAGNOSIS — J96.11 CHRONIC RESPIRATORY FAILURE WITH HYPOXIA (HCC): ICD-10-CM

## 2019-01-29 DIAGNOSIS — J96.21 ACUTE ON CHRONIC RESPIRATORY FAILURE WITH HYPOXEMIA (HCC): ICD-10-CM

## 2019-01-29 LAB — SAO2 % BLD: NORMAL %

## 2019-01-29 PROCEDURE — 94761 N-INVAS EAR/PLS OXIMETRY MLT: CPT | Performed by: NURSE PRACTITIONER

## 2019-01-29 PROCEDURE — 99214 OFFICE O/P EST MOD 30 MIN: CPT | Performed by: NURSE PRACTITIONER

## 2019-01-29 RX ORDER — BUSPIRONE HYDROCHLORIDE 7.5 MG/1
TABLET ORAL
Refills: 2 | COMMUNITY
Start: 2019-01-24 | End: 2019-02-28

## 2019-01-29 RX ORDER — TIMOLOL MALEATE 5 MG/ML
1 SOLUTION/ DROPS OPHTHALMIC 2 TIMES DAILY
COMMUNITY
End: 2022-03-31 | Stop reason: CLARIF

## 2019-02-01 ENCOUNTER — HOSPITAL ENCOUNTER (OUTPATIENT)
Dept: PULMONOLOGY | Age: 64
Setting detail: THERAPIES SERIES
Discharge: HOME | End: 2019-02-01
Payer: MEDICARE

## 2019-02-01 PROCEDURE — G0424 PULMONARY REHAB W EXER: HCPCS

## 2019-02-01 PROCEDURE — 2700000000 HC OXYGEN THERAPY PER DAY

## 2019-02-02 DIAGNOSIS — G47.33 OBSTRUCTIVE SLEEP APNEA: Primary | ICD-10-CM

## 2019-02-04 ENCOUNTER — HOSPITAL ENCOUNTER (OUTPATIENT)
Dept: PULMONOLOGY | Age: 64
Setting detail: THERAPIES SERIES
Discharge: HOME | End: 2019-02-04
Payer: MEDICARE

## 2019-02-04 PROCEDURE — 2700000000 HC OXYGEN THERAPY PER DAY

## 2019-02-04 PROCEDURE — G0424 PULMONARY REHAB W EXER: HCPCS

## 2019-02-06 ENCOUNTER — HOSPITAL ENCOUNTER (OUTPATIENT)
Dept: PULMONOLOGY | Age: 64
Setting detail: THERAPIES SERIES
Discharge: HOME | End: 2019-02-06
Payer: MEDICARE

## 2019-02-06 PROCEDURE — G0424 PULMONARY REHAB W EXER: HCPCS

## 2019-02-06 PROCEDURE — 2700000000 HC OXYGEN THERAPY PER DAY

## 2019-02-08 ENCOUNTER — HOSPITAL ENCOUNTER (OUTPATIENT)
Dept: PULMONOLOGY | Age: 64
Setting detail: THERAPIES SERIES
Discharge: HOME | End: 2019-02-08
Payer: MEDICARE

## 2019-02-08 PROCEDURE — G0424 PULMONARY REHAB W EXER: HCPCS

## 2019-02-08 PROCEDURE — 2700000000 HC OXYGEN THERAPY PER DAY

## 2019-02-11 ENCOUNTER — HOSPITAL ENCOUNTER (OUTPATIENT)
Dept: PULMONOLOGY | Age: 64
Setting detail: THERAPIES SERIES
Discharge: HOME | End: 2019-02-11
Payer: MEDICARE

## 2019-02-11 PROCEDURE — G0424 PULMONARY REHAB W EXER: HCPCS

## 2019-02-11 PROCEDURE — 2700000000 HC OXYGEN THERAPY PER DAY

## 2019-02-13 ENCOUNTER — HOSPITAL ENCOUNTER (OUTPATIENT)
Dept: PULMONOLOGY | Age: 64
Setting detail: THERAPIES SERIES
Discharge: HOME | End: 2019-02-13
Payer: MEDICARE

## 2019-02-13 PROCEDURE — 2700000000 HC OXYGEN THERAPY PER DAY

## 2019-02-13 PROCEDURE — G0424 PULMONARY REHAB W EXER: HCPCS

## 2019-02-15 ENCOUNTER — HOSPITAL ENCOUNTER (OUTPATIENT)
Dept: PULMONOLOGY | Age: 64
Setting detail: THERAPIES SERIES
Discharge: HOME | End: 2019-02-15
Payer: MEDICARE

## 2019-02-15 PROCEDURE — 2700000000 HC OXYGEN THERAPY PER DAY

## 2019-02-15 PROCEDURE — G0424 PULMONARY REHAB W EXER: HCPCS

## 2019-02-18 ENCOUNTER — HOSPITAL ENCOUNTER (OUTPATIENT)
Dept: PULMONOLOGY | Age: 64
Setting detail: THERAPIES SERIES
Discharge: HOME | End: 2019-02-18
Payer: MEDICARE

## 2019-02-18 PROCEDURE — 2700000000 HC OXYGEN THERAPY PER DAY

## 2019-02-18 PROCEDURE — G0424 PULMONARY REHAB W EXER: HCPCS

## 2019-02-20 ENCOUNTER — HOSPITAL ENCOUNTER (OUTPATIENT)
Dept: PULMONOLOGY | Age: 64
Setting detail: THERAPIES SERIES
Discharge: HOME | End: 2019-02-20
Payer: MEDICARE

## 2019-02-20 PROCEDURE — G0424 PULMONARY REHAB W EXER: HCPCS

## 2019-02-20 PROCEDURE — 2700000000 HC OXYGEN THERAPY PER DAY

## 2019-02-27 ENCOUNTER — HOSPITAL ENCOUNTER (OUTPATIENT)
Dept: PULMONOLOGY | Age: 64
Setting detail: THERAPIES SERIES
Discharge: HOME | End: 2019-02-27
Payer: MEDICARE

## 2019-02-27 PROCEDURE — G0424 PULMONARY REHAB W EXER: HCPCS

## 2019-02-27 PROCEDURE — 2700000000 HC OXYGEN THERAPY PER DAY

## 2019-02-28 ENCOUNTER — TELEPHONE (OUTPATIENT)
Dept: GASTROENTEROLOGY | Facility: CLINIC | Age: 64
End: 2019-02-28

## 2019-02-28 ENCOUNTER — OFFICE VISIT (OUTPATIENT)
Dept: GASTROENTEROLOGY | Facility: CLINIC | Age: 64
End: 2019-02-28

## 2019-02-28 ENCOUNTER — TELEPHONE (OUTPATIENT)
Dept: PULMONOLOGY | Facility: CLINIC | Age: 64
End: 2019-02-28

## 2019-02-28 VITALS
HEART RATE: 101 BPM | OXYGEN SATURATION: 100 % | TEMPERATURE: 97.6 F | BODY MASS INDEX: 38.57 KG/M2 | SYSTOLIC BLOOD PRESSURE: 168 MMHG | HEIGHT: 66 IN | WEIGHT: 240 LBS | DIASTOLIC BLOOD PRESSURE: 78 MMHG

## 2019-02-28 DIAGNOSIS — I10 ESSENTIAL HYPERTENSION: ICD-10-CM

## 2019-02-28 DIAGNOSIS — Z86.010 HISTORY OF ADENOMATOUS POLYP OF COLON: ICD-10-CM

## 2019-02-28 DIAGNOSIS — J44.9 STAGE 3 SEVERE COPD BY GOLD CLASSIFICATION (HCC): ICD-10-CM

## 2019-02-28 DIAGNOSIS — J44.9 STAGE 3 SEVERE COPD BY GOLD CLASSIFICATION (HCC): Primary | ICD-10-CM

## 2019-02-28 DIAGNOSIS — R13.14 PHARYNGOESOPHAGEAL DYSPHAGIA: Primary | ICD-10-CM

## 2019-02-28 PROCEDURE — 99214 OFFICE O/P EST MOD 30 MIN: CPT | Performed by: NURSE PRACTITIONER

## 2019-03-05 ENCOUNTER — HOSPITAL ENCOUNTER (OUTPATIENT)
Dept: GENERAL RADIOLOGY | Age: 64
Discharge: HOME | End: 2019-03-05
Payer: MEDICARE

## 2019-03-05 DIAGNOSIS — J44.9 OBSTRUCTIVE CHRONIC BRONCHITIS WITHOUT EXACERBATION (HCC): ICD-10-CM

## 2019-03-05 LAB
BASE EXCESS ARTERIAL: 2.5 MMOL/L (ref -2–2)
CARBOXYHEMOGLOBIN ARTERIAL: 2 % (ref 0–5)
HCO3 ARTERIAL: 28.8 MMOL/L (ref 22–26)
HEMOGLOBIN, ART, EXTENDED: 12.1 G/DL (ref 14–18)
METHEMOGLOBIN ARTERIAL: 1.1 %
O2 CONTENT ARTERIAL: 16.4 ML/DL
O2 SAT, ARTERIAL: 95.7 %
O2 THERAPY: ABNORMAL
PCO2 ARTERIAL: 51 MMHG (ref 35–45)
PH ARTERIAL: 7.36 (ref 7.35–7.45)
PO2 ARTERIAL: 88 MMHG (ref 80–100)
POTASSIUM, WHOLE BLOOD: 4

## 2019-03-05 PROCEDURE — 82803 BLOOD GASES ANY COMBINATION: CPT

## 2019-03-05 PROCEDURE — 71046 X-RAY EXAM CHEST 2 VIEWS: CPT

## 2019-03-05 PROCEDURE — 36600 WITHDRAWAL OF ARTERIAL BLOOD: CPT

## 2019-03-05 PROCEDURE — 84132 ASSAY OF SERUM POTASSIUM: CPT

## 2019-03-15 ENCOUNTER — PROCEDURE VISIT (OUTPATIENT)
Dept: PULMONOLOGY | Facility: CLINIC | Age: 64
End: 2019-03-15

## 2019-03-15 ENCOUNTER — OFFICE VISIT (OUTPATIENT)
Dept: PULMONOLOGY | Facility: CLINIC | Age: 64
End: 2019-03-15

## 2019-03-15 ENCOUNTER — HOSPITAL ENCOUNTER (OUTPATIENT)
Dept: PULMONOLOGY | Age: 64
Setting detail: THERAPIES SERIES
Discharge: HOME | End: 2019-03-15
Payer: MEDICARE

## 2019-03-15 VITALS
BODY MASS INDEX: 38.73 KG/M2 | WEIGHT: 241 LBS | SYSTOLIC BLOOD PRESSURE: 122 MMHG | OXYGEN SATURATION: 95 % | HEIGHT: 66 IN | HEART RATE: 96 BPM | DIASTOLIC BLOOD PRESSURE: 74 MMHG

## 2019-03-15 DIAGNOSIS — Z01.811 PREOP PULMONARY/RESPIRATORY EXAM: Primary | ICD-10-CM

## 2019-03-15 DIAGNOSIS — J44.9 STAGE 3 SEVERE COPD BY GOLD CLASSIFICATION (HCC): Primary | ICD-10-CM

## 2019-03-15 DIAGNOSIS — J43.9 PULMONARY EMPHYSEMA, UNSPECIFIED EMPHYSEMA TYPE (HCC): ICD-10-CM

## 2019-03-15 DIAGNOSIS — R06.02 SHORTNESS OF BREATH: ICD-10-CM

## 2019-03-15 DIAGNOSIS — J98.4 SCARRING OF LUNG: ICD-10-CM

## 2019-03-15 DIAGNOSIS — J96.11 CHRONIC RESPIRATORY FAILURE WITH HYPOXIA (HCC): ICD-10-CM

## 2019-03-15 DIAGNOSIS — J44.9 STAGE 3 SEVERE COPD BY GOLD CLASSIFICATION (HCC): ICD-10-CM

## 2019-03-15 DIAGNOSIS — G47.33 OBSTRUCTIVE SLEEP APNEA: ICD-10-CM

## 2019-03-15 DIAGNOSIS — E66.01 CLASS 2 SEVERE OBESITY DUE TO EXCESS CALORIES WITH SERIOUS COMORBIDITY AND BODY MASS INDEX (BMI) OF 38.0 TO 38.9 IN ADULT (HCC): ICD-10-CM

## 2019-03-15 DIAGNOSIS — K21.9 GASTROESOPHAGEAL REFLUX DISEASE WITHOUT ESOPHAGITIS: ICD-10-CM

## 2019-03-15 DIAGNOSIS — R13.14 PHARYNGOESOPHAGEAL DYSPHAGIA: ICD-10-CM

## 2019-03-15 LAB
DIFF CAP.CO: NORMAL ML/MMHG SEC
FEV1/FVC: 61.5 %
FEV1: NORMAL LITERS
FVC VOL RESPIRATORY: NORMAL LITERS

## 2019-03-15 PROCEDURE — 94729 DIFFUSING CAPACITY: CPT | Performed by: NURSE PRACTITIONER

## 2019-03-15 PROCEDURE — 2700000000 HC OXYGEN THERAPY PER DAY

## 2019-03-15 PROCEDURE — 99214 OFFICE O/P EST MOD 30 MIN: CPT | Performed by: NURSE PRACTITIONER

## 2019-03-15 PROCEDURE — G0424 PULMONARY REHAB W EXER: HCPCS

## 2019-03-15 PROCEDURE — 94010 BREATHING CAPACITY TEST: CPT | Performed by: NURSE PRACTITIONER

## 2019-03-15 RX ORDER — BUSPIRONE HYDROCHLORIDE 7.5 MG/1
TABLET ORAL
COMMUNITY
Start: 2019-01-24

## 2019-03-15 ASSESSMENT — PULMONARY FUNCTION TESTS: FEV1/FVC: 61.50

## 2019-03-18 ENCOUNTER — HOSPITAL ENCOUNTER (OUTPATIENT)
Dept: PULMONOLOGY | Age: 64
Setting detail: THERAPIES SERIES
Discharge: HOME | End: 2019-03-18
Payer: MEDICARE

## 2019-03-18 PROCEDURE — 2700000000 HC OXYGEN THERAPY PER DAY

## 2019-03-18 PROCEDURE — G0424 PULMONARY REHAB W EXER: HCPCS

## 2019-03-20 ENCOUNTER — TELEPHONE (OUTPATIENT)
Dept: GASTROENTEROLOGY | Facility: CLINIC | Age: 64
End: 2019-03-20

## 2019-03-21 ENCOUNTER — HOSPITAL ENCOUNTER (OUTPATIENT)
Facility: HOSPITAL | Age: 64
Setting detail: HOSPITAL OUTPATIENT SURGERY
Discharge: HOME | End: 2019-03-21
Attending: INTERNAL MEDICINE | Admitting: INTERNAL MEDICINE

## 2019-03-21 ENCOUNTER — ANESTHESIA (OUTPATIENT)
Dept: GASTROENTEROLOGY | Facility: HOSPITAL | Age: 64
End: 2019-03-21

## 2019-03-21 ENCOUNTER — ANESTHESIA EVENT (OUTPATIENT)
Dept: GASTROENTEROLOGY | Facility: HOSPITAL | Age: 64
End: 2019-03-21

## 2019-03-21 VITALS
RESPIRATION RATE: 18 BRPM | HEART RATE: 86 BPM | HEIGHT: 65 IN | WEIGHT: 235 LBS | TEMPERATURE: 97.8 F | OXYGEN SATURATION: 99 % | BODY MASS INDEX: 39.15 KG/M2 | DIASTOLIC BLOOD PRESSURE: 72 MMHG | SYSTOLIC BLOOD PRESSURE: 136 MMHG

## 2019-03-21 DIAGNOSIS — R13.14 PHARYNGOESOPHAGEAL DYSPHAGIA: ICD-10-CM

## 2019-03-21 PROCEDURE — 43248 EGD GUIDE WIRE INSERTION: CPT | Performed by: INTERNAL MEDICINE

## 2019-03-21 PROCEDURE — 43239 EGD BIOPSY SINGLE/MULTIPLE: CPT | Performed by: INTERNAL MEDICINE

## 2019-03-21 PROCEDURE — 25010000002 PROPOFOL 10 MG/ML EMULSION: Performed by: NURSE ANESTHETIST, CERTIFIED REGISTERED

## 2019-03-21 PROCEDURE — 88305 TISSUE EXAM BY PATHOLOGIST: CPT | Performed by: INTERNAL MEDICINE

## 2019-03-21 RX ORDER — SODIUM CHLORIDE 9 MG/ML
100 INJECTION, SOLUTION INTRAVENOUS CONTINUOUS
Status: CANCELLED | OUTPATIENT
Start: 2019-03-21

## 2019-03-21 RX ORDER — PROPOFOL 10 MG/ML
VIAL (ML) INTRAVENOUS AS NEEDED
Status: DISCONTINUED | OUTPATIENT
Start: 2019-03-21 | End: 2019-03-21 | Stop reason: SURG

## 2019-03-21 RX ORDER — SODIUM CHLORIDE 0.9 % (FLUSH) 0.9 %
3 SYRINGE (ML) INJECTION EVERY 12 HOURS SCHEDULED
Status: CANCELLED | OUTPATIENT
Start: 2019-03-21

## 2019-03-21 RX ORDER — SODIUM CHLORIDE 0.9 % (FLUSH) 0.9 %
3 SYRINGE (ML) INJECTION AS NEEDED
Status: DISCONTINUED | OUTPATIENT
Start: 2019-03-21 | End: 2019-03-21 | Stop reason: HOSPADM

## 2019-03-21 RX ORDER — LIDOCAINE HYDROCHLORIDE 20 MG/ML
INJECTION, SOLUTION INFILTRATION; PERINEURAL AS NEEDED
Status: DISCONTINUED | OUTPATIENT
Start: 2019-03-21 | End: 2019-03-21 | Stop reason: SURG

## 2019-03-21 RX ORDER — ONDANSETRON 2 MG/ML
4 INJECTION INTRAMUSCULAR; INTRAVENOUS ONCE AS NEEDED
Status: DISCONTINUED | OUTPATIENT
Start: 2019-03-21 | End: 2019-03-21 | Stop reason: HOSPADM

## 2019-03-21 RX ORDER — SODIUM CHLORIDE 9 MG/ML
500 INJECTION, SOLUTION INTRAVENOUS CONTINUOUS PRN
Status: DISCONTINUED | OUTPATIENT
Start: 2019-03-21 | End: 2019-03-21 | Stop reason: HOSPADM

## 2019-03-21 RX ORDER — SODIUM CHLORIDE 0.9 % (FLUSH) 0.9 %
3-10 SYRINGE (ML) INJECTION AS NEEDED
Status: CANCELLED | OUTPATIENT
Start: 2019-03-21

## 2019-03-21 RX ORDER — IPRATROPIUM BROMIDE AND ALBUTEROL SULFATE 2.5; .5 MG/3ML; MG/3ML
3 SOLUTION RESPIRATORY (INHALATION) ONCE
Status: COMPLETED | OUTPATIENT
Start: 2019-03-21 | End: 2019-03-21

## 2019-03-21 RX ADMIN — SODIUM CHLORIDE 500 ML: 9 INJECTION, SOLUTION INTRAVENOUS at 07:34

## 2019-03-21 RX ADMIN — LIDOCAINE HYDROCHLORIDE 100 MG: 20 INJECTION, SOLUTION INFILTRATION; PERINEURAL at 09:18

## 2019-03-21 RX ADMIN — PROPOFOL 250 MG: 10 INJECTION, EMULSION INTRAVENOUS at 09:18

## 2019-03-21 RX ADMIN — IPRATROPIUM BROMIDE AND ALBUTEROL SULFATE 3 ML: 2.5; .5 SOLUTION RESPIRATORY (INHALATION) at 07:36

## 2019-03-22 LAB
CYTO UR: NORMAL
LAB AP CASE REPORT: NORMAL
PATH REPORT.FINAL DX SPEC: NORMAL
PATH REPORT.GROSS SPEC: NORMAL

## 2019-04-01 ENCOUNTER — HOSPITAL ENCOUNTER (OUTPATIENT)
Dept: PULMONOLOGY | Age: 64
Setting detail: THERAPIES SERIES
Discharge: HOME | End: 2019-04-01
Payer: MEDICARE

## 2019-04-01 PROCEDURE — 2700000000 HC OXYGEN THERAPY PER DAY

## 2019-04-01 PROCEDURE — G0424 PULMONARY REHAB W EXER: HCPCS

## 2019-04-03 ENCOUNTER — HOSPITAL ENCOUNTER (OUTPATIENT)
Dept: PULMONOLOGY | Age: 64
Setting detail: THERAPIES SERIES
Discharge: HOME | End: 2019-04-03
Payer: MEDICARE

## 2019-04-05 ENCOUNTER — HOSPITAL ENCOUNTER (OUTPATIENT)
Dept: PULMONOLOGY | Age: 64
Setting detail: THERAPIES SERIES
Discharge: HOME | End: 2019-04-05
Payer: MEDICARE

## 2019-04-05 PROCEDURE — G0424 PULMONARY REHAB W EXER: HCPCS

## 2019-04-05 PROCEDURE — 2700000000 HC OXYGEN THERAPY PER DAY

## 2019-04-23 ENCOUNTER — HOSPITAL ENCOUNTER (OUTPATIENT)
Dept: SLEEP CENTER | Age: 64
Discharge: HOME | End: 2019-04-25
Payer: MEDICARE

## 2019-04-24 ENCOUNTER — HOSPITAL ENCOUNTER (OUTPATIENT)
Dept: PULMONOLOGY | Age: 64
Setting detail: THERAPIES SERIES
Discharge: HOME | End: 2019-04-24
Payer: MEDICARE

## 2019-04-24 PROCEDURE — 2700000000 HC OXYGEN THERAPY PER DAY

## 2019-04-24 PROCEDURE — G0424 PULMONARY REHAB W EXER: HCPCS

## 2019-04-24 NOTE — PROGRESS NOTES
Patient could not tolerate CPAP Pressure of +4 cmh2o. He was hooked up with wires and in bed but could not use the mask. He said the pressure was too much for him, said it made his throat close off. He could not even tolerate when he was sitting up. He left at 10 pm before study was even started.

## 2019-04-26 ENCOUNTER — HOSPITAL ENCOUNTER (OUTPATIENT)
Dept: PULMONOLOGY | Age: 64
Setting detail: THERAPIES SERIES
Discharge: HOME | End: 2019-04-26
Payer: MEDICARE

## 2019-04-26 PROCEDURE — 2700000000 HC OXYGEN THERAPY PER DAY

## 2019-04-26 PROCEDURE — G0424 PULMONARY REHAB W EXER: HCPCS

## 2019-04-29 ENCOUNTER — HOSPITAL ENCOUNTER (OUTPATIENT)
Dept: PULMONOLOGY | Age: 64
Setting detail: THERAPIES SERIES
Discharge: HOME | End: 2019-04-29
Payer: MEDICARE

## 2019-04-29 PROCEDURE — 2700000000 HC OXYGEN THERAPY PER DAY

## 2019-04-29 PROCEDURE — G0424 PULMONARY REHAB W EXER: HCPCS

## 2019-04-30 ENCOUNTER — TELEPHONE (OUTPATIENT)
Dept: PULMONOLOGY | Facility: CLINIC | Age: 64
End: 2019-04-30

## 2019-05-01 ENCOUNTER — HOSPITAL ENCOUNTER (OUTPATIENT)
Dept: PULMONOLOGY | Age: 64
Setting detail: THERAPIES SERIES
Discharge: HOME | End: 2019-05-01
Payer: MEDICARE

## 2019-05-01 PROCEDURE — 2700000000 HC OXYGEN THERAPY PER DAY

## 2019-05-01 PROCEDURE — G0424 PULMONARY REHAB W EXER: HCPCS

## 2019-05-03 ENCOUNTER — HOSPITAL ENCOUNTER (OUTPATIENT)
Dept: PULMONOLOGY | Age: 64
Setting detail: THERAPIES SERIES
Discharge: HOME | End: 2019-05-03
Payer: MEDICARE

## 2019-05-03 PROCEDURE — G0424 PULMONARY REHAB W EXER: HCPCS

## 2019-05-03 PROCEDURE — 2700000000 HC OXYGEN THERAPY PER DAY

## 2019-05-06 ENCOUNTER — APPOINTMENT (OUTPATIENT)
Dept: GENERAL RADIOLOGY | Age: 64
End: 2019-05-06
Payer: MEDICARE

## 2019-05-06 ENCOUNTER — HOSPITAL ENCOUNTER (EMERGENCY)
Age: 64
Discharge: HOME | End: 2019-05-06
Attending: EMERGENCY MEDICINE
Payer: MEDICARE

## 2019-05-06 VITALS
HEART RATE: 90 BPM | RESPIRATION RATE: 20 BRPM | OXYGEN SATURATION: 94 % | WEIGHT: 240 LBS | BODY MASS INDEX: 38.57 KG/M2 | HEIGHT: 66 IN | DIASTOLIC BLOOD PRESSURE: 80 MMHG | SYSTOLIC BLOOD PRESSURE: 159 MMHG | TEMPERATURE: 98.9 F

## 2019-05-06 DIAGNOSIS — J44.1 COPD EXACERBATION (HCC): Primary | ICD-10-CM

## 2019-05-06 LAB
ALBUMIN SERPL-MCNC: 3.6 G/DL (ref 3.5–5.2)
ALP BLD-CCNC: 196 U/L (ref 40–130)
ALT SERPL-CCNC: 24 U/L (ref 5–41)
ANION GAP SERPL CALCULATED.3IONS-SCNC: 12 MMOL/L (ref 7–19)
AST SERPL-CCNC: 29 U/L (ref 5–40)
BASE EXCESS ARTERIAL: 7 MMOL/L (ref -2–2)
BASOPHILS ABSOLUTE: 0.1 K/UL (ref 0–0.2)
BASOPHILS RELATIVE PERCENT: 0.5 % (ref 0–1)
BILIRUB SERPL-MCNC: <0.2 MG/DL (ref 0.2–1.2)
BUN BLDV-MCNC: 9 MG/DL (ref 8–23)
CALCIUM SERPL-MCNC: 9.1 MG/DL (ref 8.8–10.2)
CARBOXYHEMOGLOBIN ARTERIAL: 1.9 % (ref 0–5)
CHLORIDE BLD-SCNC: 98 MMOL/L (ref 98–111)
CO2: 28 MMOL/L (ref 22–29)
CREAT SERPL-MCNC: 0.7 MG/DL (ref 0.5–1.2)
EOSINOPHILS ABSOLUTE: 0.2 K/UL (ref 0–0.6)
EOSINOPHILS RELATIVE PERCENT: 2.1 % (ref 0–5)
GFR NON-AFRICAN AMERICAN: >60
GLUCOSE BLD-MCNC: 138 MG/DL (ref 74–109)
HCO3 ARTERIAL: 33 MMOL/L (ref 22–26)
HCT VFR BLD CALC: 39.3 % (ref 42–52)
HEMOGLOBIN, ART, EXTENDED: 12.3 G/DL (ref 14–18)
HEMOGLOBIN: 12 G/DL (ref 14–18)
LACTIC ACID: 1.6 MMOL/L (ref 0.5–1.9)
LYMPHOCYTES ABSOLUTE: 1.7 K/UL (ref 1.1–4.5)
LYMPHOCYTES RELATIVE PERCENT: 16 % (ref 20–40)
MCH RBC QN AUTO: 29.1 PG (ref 27–31)
MCHC RBC AUTO-ENTMCNC: 30.5 G/DL (ref 33–37)
MCV RBC AUTO: 95.4 FL (ref 80–94)
METHEMOGLOBIN ARTERIAL: 1.3 %
MONOCYTES ABSOLUTE: 0.6 K/UL (ref 0–0.9)
MONOCYTES RELATIVE PERCENT: 5.6 % (ref 0–10)
NEUTROPHILS ABSOLUTE: 8 K/UL (ref 1.5–7.5)
NEUTROPHILS RELATIVE PERCENT: 75.4 % (ref 50–65)
O2 CONTENT ARTERIAL: 16.1 ML/DL
O2 SAT, ARTERIAL: 93.1 %
O2 THERAPY: ABNORMAL
PCO2 ARTERIAL: 52 MMHG (ref 35–45)
PDW BLD-RTO: 13.2 % (ref 11.5–14.5)
PH ARTERIAL: 7.41 (ref 7.35–7.45)
PLATELET # BLD: 303 K/UL (ref 130–400)
PMV BLD AUTO: 8.6 FL (ref 9.4–12.4)
PO2 ARTERIAL: 69 MMHG (ref 80–100)
POTASSIUM SERPL-SCNC: 4.2 MMOL/L (ref 3.5–5)
POTASSIUM, WHOLE BLOOD: 4.1
PRO-BNP: 35 PG/ML (ref 0–900)
RBC # BLD: 4.12 M/UL (ref 4.7–6.1)
SODIUM BLD-SCNC: 138 MMOL/L (ref 136–145)
TOTAL PROTEIN: 7.9 G/DL (ref 6.6–8.7)
TROPONIN: <0.01 NG/ML (ref 0–0.03)
WBC # BLD: 10.6 K/UL (ref 4.8–10.8)

## 2019-05-06 PROCEDURE — 82803 BLOOD GASES ANY COMBINATION: CPT

## 2019-05-06 PROCEDURE — 6370000000 HC RX 637 (ALT 250 FOR IP): Performed by: NURSE PRACTITIONER

## 2019-05-06 PROCEDURE — 36600 WITHDRAWAL OF ARTERIAL BLOOD: CPT

## 2019-05-06 PROCEDURE — 94640 AIRWAY INHALATION TREATMENT: CPT

## 2019-05-06 PROCEDURE — 80053 COMPREHEN METABOLIC PANEL: CPT

## 2019-05-06 PROCEDURE — 6360000002 HC RX W HCPCS: Performed by: EMERGENCY MEDICINE

## 2019-05-06 PROCEDURE — 96374 THER/PROPH/DIAG INJ IV PUSH: CPT

## 2019-05-06 PROCEDURE — 94664 DEMO&/EVAL PT USE INHALER: CPT

## 2019-05-06 PROCEDURE — 99285 EMERGENCY DEPT VISIT HI MDM: CPT

## 2019-05-06 PROCEDURE — 83880 ASSAY OF NATRIURETIC PEPTIDE: CPT

## 2019-05-06 PROCEDURE — 87040 BLOOD CULTURE FOR BACTERIA: CPT

## 2019-05-06 PROCEDURE — 71046 X-RAY EXAM CHEST 2 VIEWS: CPT

## 2019-05-06 PROCEDURE — 6370000000 HC RX 637 (ALT 250 FOR IP): Performed by: EMERGENCY MEDICINE

## 2019-05-06 PROCEDURE — 93005 ELECTROCARDIOGRAM TRACING: CPT

## 2019-05-06 PROCEDURE — 99284 EMERGENCY DEPT VISIT MOD MDM: CPT | Performed by: EMERGENCY MEDICINE

## 2019-05-06 PROCEDURE — 96375 TX/PRO/DX INJ NEW DRUG ADDON: CPT

## 2019-05-06 PROCEDURE — 84132 ASSAY OF SERUM POTASSIUM: CPT

## 2019-05-06 PROCEDURE — 36415 COLL VENOUS BLD VENIPUNCTURE: CPT

## 2019-05-06 PROCEDURE — 2580000003 HC RX 258: Performed by: EMERGENCY MEDICINE

## 2019-05-06 PROCEDURE — 85025 COMPLETE CBC W/AUTO DIFF WBC: CPT

## 2019-05-06 PROCEDURE — 84484 ASSAY OF TROPONIN QUANT: CPT

## 2019-05-06 PROCEDURE — 83605 ASSAY OF LACTIC ACID: CPT

## 2019-05-06 RX ORDER — METHYLPREDNISOLONE SODIUM SUCCINATE 125 MG/2ML
125 INJECTION, POWDER, LYOPHILIZED, FOR SOLUTION INTRAMUSCULAR; INTRAVENOUS ONCE
Status: COMPLETED | OUTPATIENT
Start: 2019-05-06 | End: 2019-05-06

## 2019-05-06 RX ORDER — IPRATROPIUM BROMIDE AND ALBUTEROL SULFATE 2.5; .5 MG/3ML; MG/3ML
1 SOLUTION RESPIRATORY (INHALATION) ONCE
Status: COMPLETED | OUTPATIENT
Start: 2019-05-06 | End: 2019-05-06

## 2019-05-06 RX ORDER — PREDNISONE 50 MG/1
50 TABLET ORAL DAILY
Qty: 5 TABLET | Refills: 0 | Status: SHIPPED | OUTPATIENT
Start: 2019-05-06 | End: 2019-05-11

## 2019-05-06 RX ORDER — CEFDINIR 300 MG/1
300 CAPSULE ORAL 2 TIMES DAILY
Qty: 20 CAPSULE | Refills: 0 | Status: SHIPPED | OUTPATIENT
Start: 2019-05-06 | End: 2019-05-16

## 2019-05-06 RX ORDER — ALBUTEROL SULFATE 2.5 MG/3ML
2.5 SOLUTION RESPIRATORY (INHALATION) EVERY 4 HOURS PRN
Qty: 120 EACH | Refills: 1 | Status: SHIPPED | OUTPATIENT
Start: 2019-05-06 | End: 2021-09-29 | Stop reason: ALTCHOICE

## 2019-05-06 RX ORDER — GUAIFENESIN 600 MG/1
600 TABLET, EXTENDED RELEASE ORAL 2 TIMES DAILY
Qty: 30 TABLET | Refills: 0 | Status: SHIPPED | OUTPATIENT
Start: 2019-05-06 | End: 2019-05-21

## 2019-05-06 RX ORDER — BUSPIRONE HYDROCHLORIDE 7.5 MG/1
7.5 TABLET ORAL 2 TIMES DAILY
COMMUNITY
Start: 2019-01-24

## 2019-05-06 RX ADMIN — METHYLPREDNISOLONE SODIUM SUCCINATE 125 MG: 125 INJECTION, POWDER, FOR SOLUTION INTRAMUSCULAR; INTRAVENOUS at 14:04

## 2019-05-06 RX ADMIN — CEFTRIAXONE 1 G: 1 INJECTION, POWDER, FOR SOLUTION INTRAMUSCULAR; INTRAVENOUS at 14:04

## 2019-05-06 RX ADMIN — IPRATROPIUM BROMIDE AND ALBUTEROL SULFATE 1 AMPULE: .5; 3 SOLUTION RESPIRATORY (INHALATION) at 14:02

## 2019-05-06 RX ADMIN — IPRATROPIUM BROMIDE AND ALBUTEROL SULFATE 1 AMPULE: .5; 3 SOLUTION RESPIRATORY (INHALATION) at 12:20

## 2019-05-06 ASSESSMENT — ENCOUNTER SYMPTOMS: SHORTNESS OF BREATH: 1

## 2019-05-06 NOTE — ED PROVIDER NOTES
[facility] EM[facility] DEPT  eMERGENCY dEPARTMENT eNCOUnter      Pt Name: Yecenia Tenorio  MRN: [id number]  Ranjitgfgallo 1955  Date of evaluation: 5/6/2019  Provider: CATY Portillo    CHIEF COMPLAINT       Chief Complaint   Patient presents with    Shortness of Breath     has copd and is usually on 3l. patient could not catch his breath today         HISTORY OF PRESENT ILLNESS   (Location/Symptom, Timing/Onset,Context/Setting, Quality, Duration, Modifying Factors, Severity)  Note limiting factors. Elie Edgar a 59 y.o. male who presents to the emergency department for evaluation of shortness of breath. Pt tells me that he has had increased shortness of breath from baseline today while walking in his house wearing oxygen at 3L per nasal prongs. He tells me that he has had no change in usual chronic oxygen use. He checked spo2 at home with readings in the 80's per patient. He denies fever as well as changes in cough. He has had no chest pain or worsening lower extremity edema. He has not recently required steroid use. He tells me that he continues to follow with Dr. Fransisca Carlton giving history of COPD. He has no history of pe/dvt. HPI    Nursing Notes were reviewed. REVIEW OF SYSTEMS    (2-9 systems for level 4, 10 or more for level 5)     Review of Systems   Respiratory: Positive for shortness of breath. All other systems reviewed and are negative. A complete review of systems was performed and is negative except as noted above in the HPI.        PAST MEDICAL HISTORY     Past Medical History:   Diagnosis Date    Arthritis     Bilateral hearing loss     CAD (coronary artery disease)     Chronic back pain     Colon polyps     COPD [facility] obstructive pulmonary disease) ([facility] [address]     Depression     Hyperlipidemia     Hypertension     Low back pain 1/29/2016    Oxygen dependent     uses at night    Palliative care patient 12/28/2018         SURGICAL HISTORY       Past Surgical History:   Procedure Laterality Take by mouth daily Historical Med      AMLODIPINE BESYLATE PO Take 5 mg by mouth daily. LISINOPRIL PO Take 20 mg by mouth daily.              ALLERGIES     Lyrica [pregabalin] and Neurontin [gabapentin]    FAMILY HISTORY       Family History   Problem Relation Age of Onset    Kidney Disease Mother     High Blood Pressure Mother     Glaucoma Mother     Alcohol Abuse Father     Heart Disease Brother     No Known Problems Brother     Other Sister         pancreatitis    Brain Cancer Son         also had brain clot, both legs were removed s/p MVA    Colon Cancer Neg Hx     Colon Polyps Neg Hx           SOCIAL HISTORY       Social History     Socioeconomic History    Marital status:      Spouse name: None    Number of children: 1    Years of education: None    Highest education level: None   Occupational History    Occupation: retired , was a bansal of 6 years   Social Needs    Financial resource strain: None    Food insecurity:     Worry: None     Inability: None    Transportation needs:     Medical: None     Non-medical: None   Tobacco Use    Smoking status: Former Smoker     Packs/day: 2.00     Years: 46.00     Pack years: 92.00     Types: Cigarettes     Last attempt to quit: 1/1/2016     Years since quitting: 3.3    Smokeless tobacco: Never Used    Tobacco comment: started at age 15, quit at age 61, smoked at 2 PPD   Substance and Sexual Activity    Alcohol use: Yes     Comment: quit in ~2013    Drug use: No    Sexual activity: None   Lifestyle    Physical activity:     Days per week: None     Minutes per session: None    Stress: None   Relationships    Social connections:     Talks on phone: None     Gets together: None     Attends Orthodox service: None     Active member of club or organization: None     Attends meetings of clubs or organizations: None     Relationship status: None    Intimate partner violence:     Fear of current or ex partner: None     Emotionally abused: None     Physically abused: None     Forced sexual activity: None   Other Topics Concern    None   Social History Narrative    Domiciled: lives in a mobile home, has 3 steps in back, lives with his son and his wife, home is ramped in front    Ambulates: used a walker in the past, walks on his own    Code Status: Full Code    Health Care Proxy: Mrs. Piyush Manley, [phone]       SCREENINGS    Michelle Coma Scale  Eye Opening: Spontaneous  Best Verbal Response: Oriented  Best Motor Response: Obeys commands  Michelle Coma Scale Score: 15        PHYSICAL EXAM    (up to 7 for level 4, 8 or more for level 5)     ED Triage Vitals   BP Temp Temp src Pulse Resp SpO2 Height Weight   05/06/19 1201 05/06/19 1200 -- 05/06/19 1200 05/06/19 1200 05/06/19 1200 05/06/19 1200 05/06/19 1200   (!) 168/68 98.9 °F (37.2 °C)  105 26 92 % 5' 6\" (1.676 m) 240 lb (108.9 kg)       Physical Exam   Constitutional: He is oriented to person, place, and time. He appears well-nourished. Tachycardia and tachypnea on arrival     HENT:   Head: Normocephalic. Right Ear: External ear normal.   Left Ear: External ear normal.   Mouth/Throat: Oropharynx is clear and moist.   Eyes: Pupils are equal, round, and reactive to light. Conjunctivae and EOM are normal.   Neck: Normal range of motion. Cardiovascular: Normal rate, regular rhythm and intact distal pulses. Pulmonary/Chest: Effort normal.   Decreased breath sounds throughout   Abdominal: Soft. Bowel sounds are normal.   Musculoskeletal: Normal range of motion. 2+ edema bilateral lower extremities   Neurological: He is alert and oriented to person, place, and time. Skin: Skin is warm and dry. Vitals reviewed.       DIAGNOSTIC RESULTS     EKG: All EKG's are interpreted by the Emergency Department Physician who either signs or Co-signs this chart in the absence of acardiologist.        RADIOLOGY:   Non-plain film images such as CT, Ultrasound andMRI are read by the radiologist. Plain radiographic images are visualized and preliminarily interpreted by the emergency physician with the below findings:        Interpretation per the Radiologist below, if available at the time of this note:    XR CHEST STANDARD (2 VW)   Final Result   1. COPD with linear right upper lobe scarring and stable right lateral   pleural thickening. Epicardial fat seen along the right heart margin   as described above. No convincing acute radiographic cardiopulmonary   process. .   Signed by Dr Betty Mehta on 5/6/2019 1:47 PM            ED BEDSIDE ULTRASOUND:   Performed by ED Physician - none    LABS:  Labs Reviewed   BLOOD GAS, ARTERIAL - Abnormal; Notable for the following components:       Result Value    pCO2, Arterial 52.0 (*)     pO2, Arterial 69.0 (*)     HCO3, Arterial 33.0 (*)     Base Excess, Arterial 7.0 (*)     Hemoglobin, Art, Extended 12.3 (*)     All other components within normal limits   CBC WITH AUTO DIFFERENTIAL - Abnormal; Notable for the following components:    RBC 4.12 (*)     Hemoglobin 12.0 (*)     Hematocrit 39.3 (*)     MCV 95.4 (*)     MCHC 30.5 (*)     MPV 8.6 (*)     Neutrophils % 75.4 (*)     Lymphocytes % 16.0 (*)     Neutrophils # 8.0 (*)     All other components within normal limits   COMPREHENSIVE METABOLIC PANEL - Abnormal; Notable for the following components:    Glucose 138 (*)     Alkaline Phosphatase 196 (*)     All other components within normal limits   CULTURE BLOOD #1   CULTURE BLOOD #2   BRAIN NATRIURETIC PEPTIDE   LACTIC ACID, PLASMA   TROPONIN   POTASSIUM, WHOLE BLOOD       All other labs were within normal range or not returned as of this dictation.     RE-ASSESSMENT           EMERGENCY DEPARTMENT COURSE and DIFFERENTIALDIAGNOSIS/MDM:   Vitals:    Vitals:    05/06/19 1402 05/06/19 1403 05/06/19 1430 05/06/19 1454   BP: (!) 148/71  (!) 175/82 (!) 159/80   Pulse: 94  95 90   Resp: 16   20   Temp:       SpO2:  93% 94% 94%   Weight:       Height: MDM      CONSULTS:  None    PROCEDURES:  Unless otherwise notedbelow, none     Procedures    FINAL IMPRESSION     1. COPD exacerbation ([address]          DISPOSITION/PLAN   DISPOSITION Decision To Discharge 05/06/2019 02:37:05 PM      PATIENT REFERRED TO:  Ben Laura MD  [address] [facility] [address]  [phone]    Schedule an appointment as soon as possible for a visit         DISCHARGE MEDICATIONS:       Discharge Medication List as of 5/6/2019  2:41 PM      CONTINUE these medications which have CHANGED    Details   predniSONE (DELTASONE) 50 MG tablet Take 1 tablet by mouth daily for 5 days, Disp-5 tablet, R-0Print              Medication List      START taking these medications    cefdinir 300 MG capsule  Commonly known as:  OMNICEF  Take 1 capsule by mouth 2 times daily for 10 days     guaiFENesin 600 MG extended release tablet  Commonly known as:  MUCINEX  Take 1 tablet by mouth 2 times daily for 15 days        CHANGE how you take these medications    * albuterol sulfate  (90 Base) MCG/ACT inhaler  Commonly known as:  PROAIR HFA  Inhale 2 puffs into the lungs every 6 hours as needed for Wheezing  What changed:  Another medication with the same name was added. Make sure you understand how and when to take each. * albuterol (2.5 MG/3ML) 0.083% nebulizer solution  Commonly known as:  PROVENTIL  Take 3 mLs by nebulization every 4 hours as needed for Wheezing or Shortness of Breath  What changed: You were already taking a medication with the same name, and this prescription was added. Make sure you understand how and when to take each. predniSONE 50 MG tablet  Commonly known as:  DELTASONE  Take 1 tablet by mouth daily for 5 days  What changed:    · medication strength  · how much to take  · how to take this  · when to take this  · additional instructions         * This list has 2 medication(s) that are the same as other medications prescribed for you.  Read the directions carefully, and ask your doctor or other care provider to review them with you. ASK your doctor about these medications    amitriptyline 25 MG tablet  Commonly known as:  ELAVIL  Take 1-2 tablets by mouth nightly as needed for Sleep     AMLODIPINE BESYLATE PO     ASPIR-81 PO     atorvastatin 40 MG tablet  Commonly known as:  LIPITOR     busPIRone 7.5 MG tablet  Commonly known as:  BUSPAR     latanoprost 0.005 % ophthalmic solution  Commonly known as:  XALATAN     LISINOPRIL PO     omeprazole 40 MG delayed release capsule  Commonly known as:  PRILOSEC     oxyCODONE 15 MG immediate release tablet  Commonly known as:  OXY-IR  Take 1 tablet by mouth 4 times daily as needed for Pain .  Earliest Fill Date: 12/11/17     timolol 0.5 % ophthalmic gel-forming  Commonly known as:  TIMOPTIC-XE     tiZANidine 4 MG tablet  Commonly known as:  ZANAFLEX  Take 1 tablet by mouth 3 times daily as needed (muscle spasms)     TRELEGY ELLIPTA 100-62.5-25 MCG/INH Aepb  Generic drug:  Fluticasone-Umeclidin-Vilant           Where to Get Your Medications      You can get these medications from any pharmacy    Bring a paper prescription for each of these medications  · albuterol (2.5 MG/3ML) 0.083% nebulizer solution  · cefdinir 300 MG capsule  · guaiFENesin 600 MG extended release tablet  · predniSONE 50 MG tablet         (Pleasenote that portions of this note were completed with a voice recognition program.  Efforts were made to edit the dictations but occasionally words are mis-transcribed.)              Liu Carlton, APRN  05/07/19 0009

## 2019-05-06 NOTE — ED NOTES
Pt ambulated 50ft easily states SOA. 87% O2 sat on 3 L and pulse of 107 when returned to room.       Arabella Magaña RN  05/06/19 Ul. Lizbet Espinosa RN  05/06/19 [id number]

## 2019-05-07 DIAGNOSIS — J44.9 STAGE 3 SEVERE COPD BY GOLD CLASSIFICATION (HCC): Primary | ICD-10-CM

## 2019-05-07 LAB
EKG P AXIS: 67 DEGREES
EKG P-R INTERVAL: 160 MS
EKG Q-T INTERVAL: 350 MS
EKG QRS DURATION: 102 MS
EKG QTC CALCULATION (BAZETT): 409 MS
EKG T AXIS: 75 DEGREES

## 2019-05-07 RX ORDER — ALBUTEROL SULFATE 2.5 MG/3ML
2.5 SOLUTION RESPIRATORY (INHALATION) 4 TIMES DAILY PRN
Qty: 125 VIAL | Refills: 11 | Status: SHIPPED | OUTPATIENT
Start: 2019-05-07 | End: 2020-07-22

## 2019-05-08 ENCOUNTER — HOSPITAL ENCOUNTER (OUTPATIENT)
Dept: PULMONOLOGY | Age: 64
Setting detail: THERAPIES SERIES
Discharge: HOME | End: 2019-05-08
Payer: MEDICARE

## 2019-05-08 PROCEDURE — G0424 PULMONARY REHAB W EXER: HCPCS

## 2019-05-08 PROCEDURE — 2700000000 HC OXYGEN THERAPY PER DAY

## 2019-05-10 ENCOUNTER — HOSPITAL ENCOUNTER (OUTPATIENT)
Dept: PULMONOLOGY | Age: 64
Setting detail: THERAPIES SERIES
Discharge: HOME | End: 2019-05-10
Payer: MEDICARE

## 2019-05-10 PROCEDURE — G0424 PULMONARY REHAB W EXER: HCPCS

## 2019-05-10 PROCEDURE — 2700000000 HC OXYGEN THERAPY PER DAY

## 2019-05-11 LAB
BLOOD CULTURE, ROUTINE: NORMAL
CULTURE, BLOOD 2: NORMAL

## 2019-05-13 ENCOUNTER — HOSPITAL ENCOUNTER (OUTPATIENT)
Dept: PULMONOLOGY | Age: 64
Setting detail: THERAPIES SERIES
Discharge: HOME | End: 2019-05-13
Payer: MEDICARE

## 2019-05-13 PROCEDURE — G0424 PULMONARY REHAB W EXER: HCPCS

## 2019-05-13 PROCEDURE — 2700000000 HC OXYGEN THERAPY PER DAY

## 2019-05-15 ENCOUNTER — HOSPITAL ENCOUNTER (OUTPATIENT)
Dept: PULMONOLOGY | Age: 64
Setting detail: THERAPIES SERIES
End: 2019-05-15
Payer: MEDICARE

## 2019-05-17 ENCOUNTER — OFFICE VISIT (OUTPATIENT)
Dept: PULMONOLOGY | Facility: CLINIC | Age: 64
End: 2019-05-17

## 2019-05-17 VITALS
WEIGHT: 248 LBS | HEART RATE: 94 BPM | OXYGEN SATURATION: 91 % | SYSTOLIC BLOOD PRESSURE: 140 MMHG | DIASTOLIC BLOOD PRESSURE: 80 MMHG | HEIGHT: 66 IN | BODY MASS INDEX: 39.86 KG/M2

## 2019-05-17 DIAGNOSIS — E66.01 MORBID (SEVERE) OBESITY DUE TO EXCESS CALORIES (HCC): ICD-10-CM

## 2019-05-17 DIAGNOSIS — G47.33 OBSTRUCTIVE SLEEP APNEA: ICD-10-CM

## 2019-05-17 DIAGNOSIS — J96.11 CHRONIC RESPIRATORY FAILURE WITH HYPOXIA (HCC): Primary | ICD-10-CM

## 2019-05-17 DIAGNOSIS — Z87.891 PERSONAL HISTORY OF NICOTINE DEPENDENCE: ICD-10-CM

## 2019-05-17 DIAGNOSIS — J44.9 STAGE 3 SEVERE COPD BY GOLD CLASSIFICATION (HCC): ICD-10-CM

## 2019-05-17 PROCEDURE — 99214 OFFICE O/P EST MOD 30 MIN: CPT | Performed by: INTERNAL MEDICINE

## 2019-09-20 ENCOUNTER — OFFICE VISIT (OUTPATIENT)
Dept: PULMONOLOGY | Facility: CLINIC | Age: 64
End: 2019-09-20

## 2019-09-20 ENCOUNTER — TELEPHONE (OUTPATIENT)
Dept: PULMONOLOGY | Facility: CLINIC | Age: 64
End: 2019-09-20

## 2019-09-20 VITALS
HEIGHT: 66 IN | SYSTOLIC BLOOD PRESSURE: 142 MMHG | OXYGEN SATURATION: 98 % | WEIGHT: 249 LBS | BODY MASS INDEX: 40.02 KG/M2 | HEART RATE: 106 BPM | DIASTOLIC BLOOD PRESSURE: 80 MMHG

## 2019-09-20 DIAGNOSIS — J96.11 CHRONIC RESPIRATORY FAILURE WITH HYPOXIA (HCC): Primary | ICD-10-CM

## 2019-09-20 DIAGNOSIS — Z87.891 PERSONAL HISTORY OF NICOTINE DEPENDENCE: ICD-10-CM

## 2019-09-20 DIAGNOSIS — J44.9 STAGE 3 SEVERE COPD BY GOLD CLASSIFICATION (HCC): ICD-10-CM

## 2019-09-20 DIAGNOSIS — E66.01 MORBID OBESITY WITH BMI OF 40.0-44.9, ADULT (HCC): ICD-10-CM

## 2019-09-20 DIAGNOSIS — J43.2 CENTRILOBULAR EMPHYSEMA (HCC): ICD-10-CM

## 2019-09-20 DIAGNOSIS — G47.33 OBSTRUCTIVE SLEEP APNEA: ICD-10-CM

## 2019-09-20 LAB
FEV1/FVC: 62.93 %
FEV1: NORMAL LITERS
FVC VOL RESPIRATORY: NORMAL LITERS

## 2019-09-20 PROCEDURE — 99214 OFFICE O/P EST MOD 30 MIN: CPT | Performed by: INTERNAL MEDICINE

## 2019-09-20 PROCEDURE — 94010 BREATHING CAPACITY TEST: CPT | Performed by: INTERNAL MEDICINE

## 2019-09-20 ASSESSMENT — PULMONARY FUNCTION TESTS: FEV1/FVC: 62.93

## 2019-10-25 DIAGNOSIS — J44.9 STAGE 3 SEVERE COPD BY GOLD CLASSIFICATION (HCC): ICD-10-CM

## 2019-12-13 DIAGNOSIS — J44.9 STAGE 3 SEVERE COPD BY GOLD CLASSIFICATION (HCC): ICD-10-CM

## 2020-02-04 ENCOUNTER — HOSPITAL ENCOUNTER (INPATIENT)
Age: 65
LOS: 5 days | Discharge: HOME | DRG: 871 | End: 2020-02-09
Attending: EMERGENCY MEDICINE | Admitting: INTERNAL MEDICINE
Payer: MEDICARE

## 2020-02-04 ENCOUNTER — APPOINTMENT (OUTPATIENT)
Dept: CT IMAGING | Age: 65
DRG: 871 | End: 2020-02-04
Payer: MEDICARE

## 2020-02-04 ENCOUNTER — APPOINTMENT (OUTPATIENT)
Dept: GENERAL RADIOLOGY | Age: 65
DRG: 871 | End: 2020-02-04
Payer: MEDICARE

## 2020-02-04 DIAGNOSIS — J44.9 STAGE 3 SEVERE COPD BY GOLD CLASSIFICATION (HCC): ICD-10-CM

## 2020-02-04 PROBLEM — Z91.199 PERSONAL HISTORY OF NONCOMPLIANCE WITH MEDICAL TREATMENT, PRESENTING HAZARDS TO HEALTH: Status: ACTIVE | Noted: 2020-02-04

## 2020-02-04 PROBLEM — J18.9 PNEUMONIA: Status: ACTIVE | Noted: 2020-02-04

## 2020-02-04 PROBLEM — E66.09 OBESITY DUE TO EXCESS CALORIES: Status: ACTIVE | Noted: 2020-02-04

## 2020-02-04 LAB
ALBUMIN SERPL-MCNC: 3.9 G/DL (ref 3.5–5.2)
ALP BLD-CCNC: 202 U/L (ref 40–130)
ALT SERPL-CCNC: 22 U/L (ref 5–41)
ANION GAP SERPL CALCULATED.3IONS-SCNC: 11 MMOL/L (ref 7–19)
AST SERPL-CCNC: 19 U/L (ref 5–40)
BASE EXCESS ARTERIAL: 8.1 MMOL/L (ref -2–2)
BASOPHILS ABSOLUTE: 0.1 K/UL (ref 0–0.2)
BASOPHILS RELATIVE PERCENT: 0.3 % (ref 0–1)
BILIRUB SERPL-MCNC: 0.5 MG/DL (ref 0.2–1.2)
BUN BLDV-MCNC: 9 MG/DL (ref 8–23)
CALCIUM SERPL-MCNC: 9 MG/DL (ref 8.8–10.2)
CARBOXYHEMOGLOBIN ARTERIAL: 2.5 % (ref 0–5)
CHLORIDE BLD-SCNC: 90 MMOL/L (ref 98–111)
CO2: 31 MMOL/L (ref 22–29)
CREAT SERPL-MCNC: 0.8 MG/DL (ref 0.5–1.2)
EOSINOPHILS ABSOLUTE: 0 K/UL (ref 0–0.6)
EOSINOPHILS RELATIVE PERCENT: 0 % (ref 0–5)
GFR NON-AFRICAN AMERICAN: >60
GLUCOSE BLD-MCNC: 127 MG/DL (ref 74–109)
HCO3 ARTERIAL: 34.2 MMOL/L (ref 22–26)
HCT VFR BLD CALC: 37.2 % (ref 42–52)
HEMOGLOBIN, ART, EXTENDED: 11.2 G/DL (ref 14–18)
HEMOGLOBIN: 11.3 G/DL (ref 14–18)
IMMATURE GRANULOCYTES #: 0.2 K/UL
LACTIC ACID: 1.6 MMOL/L (ref 0.5–1.9)
LYMPHOCYTES ABSOLUTE: 1 K/UL (ref 1.1–4.5)
LYMPHOCYTES RELATIVE PERCENT: 4.1 % (ref 20–40)
MAGNESIUM: 1.7 MG/DL (ref 1.6–2.4)
MCH RBC QN AUTO: 27.8 PG (ref 27–31)
MCHC RBC AUTO-ENTMCNC: 30.4 G/DL (ref 33–37)
MCV RBC AUTO: 91.6 FL (ref 80–94)
METHEMOGLOBIN ARTERIAL: 0.8 %
MONOCYTES ABSOLUTE: 0.9 K/UL (ref 0–0.9)
MONOCYTES RELATIVE PERCENT: 3.9 % (ref 0–10)
NEUTROPHILS ABSOLUTE: 21.6 K/UL (ref 1.5–7.5)
NEUTROPHILS RELATIVE PERCENT: 91.1 % (ref 50–65)
O2 CONTENT ARTERIAL: 13.3 ML/DL
O2 SAT, ARTERIAL: 84.6 %
O2 THERAPY: ABNORMAL
PCO2 ARTERIAL: 54 MMHG (ref 35–45)
PDW BLD-RTO: 14.8 % (ref 11.5–14.5)
PH ARTERIAL: 7.41 (ref 7.35–7.45)
PLATELET # BLD: 353 K/UL (ref 130–400)
PMV BLD AUTO: 8.5 FL (ref 9.4–12.4)
PO2 ARTERIAL: 46 MMHG (ref 80–100)
POTASSIUM SERPL-SCNC: 4.1 MMOL/L (ref 3.5–5)
POTASSIUM, WHOLE BLOOD: 3.9
PRO-BNP: 102 PG/ML (ref 0–900)
RAPID INFLUENZA  B AGN: NEGATIVE
RAPID INFLUENZA A AGN: NEGATIVE
RBC # BLD: 4.06 M/UL (ref 4.7–6.1)
SODIUM BLD-SCNC: 132 MMOL/L (ref 136–145)
TOTAL PROTEIN: 9.1 G/DL (ref 6.6–8.7)
TROPONIN: <0.01 NG/ML (ref 0–0.03)
WBC # BLD: 23.7 K/UL (ref 4.8–10.8)

## 2020-02-04 PROCEDURE — 94640 AIRWAY INHALATION TREATMENT: CPT

## 2020-02-04 PROCEDURE — 6360000004 HC RX CONTRAST MEDICATION: Performed by: EMERGENCY MEDICINE

## 2020-02-04 PROCEDURE — 83605 ASSAY OF LACTIC ACID: CPT

## 2020-02-04 PROCEDURE — 83735 ASSAY OF MAGNESIUM: CPT

## 2020-02-04 PROCEDURE — 82803 BLOOD GASES ANY COMBINATION: CPT

## 2020-02-04 PROCEDURE — 6370000000 HC RX 637 (ALT 250 FOR IP): Performed by: EMERGENCY MEDICINE

## 2020-02-04 PROCEDURE — 85025 COMPLETE CBC W/AUTO DIFF WBC: CPT

## 2020-02-04 PROCEDURE — 84484 ASSAY OF TROPONIN QUANT: CPT

## 2020-02-04 PROCEDURE — 2580000003 HC RX 258: Performed by: HOSPITALIST

## 2020-02-04 PROCEDURE — 84132 ASSAY OF SERUM POTASSIUM: CPT

## 2020-02-04 PROCEDURE — 87804 INFLUENZA ASSAY W/OPTIC: CPT

## 2020-02-04 PROCEDURE — 96374 THER/PROPH/DIAG INJ IV PUSH: CPT

## 2020-02-04 PROCEDURE — 2500000003 HC RX 250 WO HCPCS: Performed by: HOSPITALIST

## 2020-02-04 PROCEDURE — 6360000002 HC RX W HCPCS: Performed by: HOSPITALIST

## 2020-02-04 PROCEDURE — 71045 X-RAY EXAM CHEST 1 VIEW: CPT

## 2020-02-04 PROCEDURE — 80053 COMPREHEN METABOLIC PANEL: CPT

## 2020-02-04 PROCEDURE — 96375 TX/PRO/DX INJ NEW DRUG ADDON: CPT

## 2020-02-04 PROCEDURE — 6360000002 HC RX W HCPCS: Performed by: EMERGENCY MEDICINE

## 2020-02-04 PROCEDURE — 36600 WITHDRAWAL OF ARTERIAL BLOOD: CPT

## 2020-02-04 PROCEDURE — 87040 BLOOD CULTURE FOR BACTERIA: CPT

## 2020-02-04 PROCEDURE — 99285 EMERGENCY DEPT VISIT HI MDM: CPT

## 2020-02-04 PROCEDURE — 6370000000 HC RX 637 (ALT 250 FOR IP): Performed by: HOSPITALIST

## 2020-02-04 PROCEDURE — 36415 COLL VENOUS BLD VENIPUNCTURE: CPT

## 2020-02-04 PROCEDURE — 83880 ASSAY OF NATRIURETIC PEPTIDE: CPT

## 2020-02-04 PROCEDURE — 2580000003 HC RX 258: Performed by: EMERGENCY MEDICINE

## 2020-02-04 PROCEDURE — 2700000000 HC OXYGEN THERAPY PER DAY

## 2020-02-04 PROCEDURE — 1210000000 HC MED SURG R&B

## 2020-02-04 PROCEDURE — 71275 CT ANGIOGRAPHY CHEST: CPT

## 2020-02-04 PROCEDURE — 93005 ELECTROCARDIOGRAM TRACING: CPT | Performed by: EMERGENCY MEDICINE

## 2020-02-04 RX ORDER — HYDRALAZINE HYDROCHLORIDE 25 MG/1
25 TABLET, FILM COATED ORAL EVERY 8 HOURS PRN
Status: DISCONTINUED | OUTPATIENT
Start: 2020-02-04 | End: 2020-02-09 | Stop reason: HOSPADM

## 2020-02-04 RX ORDER — TIZANIDINE 4 MG/1
4 TABLET ORAL 3 TIMES DAILY PRN
Status: DISCONTINUED | OUTPATIENT
Start: 2020-02-04 | End: 2020-02-09 | Stop reason: HOSPADM

## 2020-02-04 RX ORDER — LATANOPROST 50 UG/ML
1 SOLUTION/ DROPS OPHTHALMIC NIGHTLY
Status: DISCONTINUED | OUTPATIENT
Start: 2020-02-04 | End: 2020-02-09 | Stop reason: HOSPADM

## 2020-02-04 RX ORDER — LISINOPRIL 20 MG/1
20 TABLET ORAL DAILY
Status: DISCONTINUED | OUTPATIENT
Start: 2020-02-05 | End: 2020-02-09 | Stop reason: HOSPADM

## 2020-02-04 RX ORDER — METHYLPREDNISOLONE SODIUM SUCCINATE 125 MG/2ML
60 INJECTION, POWDER, LYOPHILIZED, FOR SOLUTION INTRAMUSCULAR; INTRAVENOUS EVERY 8 HOURS
Status: DISCONTINUED | OUTPATIENT
Start: 2020-02-05 | End: 2020-02-08

## 2020-02-04 RX ORDER — METOPROLOL TARTRATE 5 MG/5ML
2.5 INJECTION INTRAVENOUS ONCE
Status: COMPLETED | OUTPATIENT
Start: 2020-02-04 | End: 2020-02-04

## 2020-02-04 RX ORDER — TIMOLOL MALEATE 5 MG/ML
1 SOLUTION/ DROPS OPHTHALMIC 2 TIMES DAILY
Status: DISCONTINUED | OUTPATIENT
Start: 2020-02-04 | End: 2020-02-09 | Stop reason: HOSPADM

## 2020-02-04 RX ORDER — OXYCODONE HYDROCHLORIDE 5 MG/1
15 TABLET ORAL 4 TIMES DAILY PRN
Status: DISCONTINUED | OUTPATIENT
Start: 2020-02-04 | End: 2020-02-09 | Stop reason: HOSPADM

## 2020-02-04 RX ORDER — BUSPIRONE HYDROCHLORIDE 5 MG/1
7.5 TABLET ORAL 2 TIMES DAILY PRN
Status: DISCONTINUED | OUTPATIENT
Start: 2020-02-04 | End: 2020-02-09 | Stop reason: HOSPADM

## 2020-02-04 RX ORDER — ALBUTEROL SULFATE 2.5 MG/3ML
2.5 SOLUTION RESPIRATORY (INHALATION) EVERY 4 HOURS PRN
Status: DISCONTINUED | OUTPATIENT
Start: 2020-02-04 | End: 2020-02-09 | Stop reason: HOSPADM

## 2020-02-04 RX ORDER — AMITRIPTYLINE HYDROCHLORIDE 50 MG/1
50 TABLET, FILM COATED ORAL NIGHTLY
Status: DISCONTINUED | OUTPATIENT
Start: 2020-02-04 | End: 2020-02-09 | Stop reason: HOSPADM

## 2020-02-04 RX ORDER — IPRATROPIUM BROMIDE AND ALBUTEROL SULFATE 2.5; .5 MG/3ML; MG/3ML
1 SOLUTION RESPIRATORY (INHALATION) EVERY 4 HOURS PRN
Status: DISCONTINUED | OUTPATIENT
Start: 2020-02-04 | End: 2020-02-09 | Stop reason: HOSPADM

## 2020-02-04 RX ORDER — BUDESONIDE 0.5 MG/2ML
0.5 INHALANT ORAL EVERY 12 HOURS
Status: DISCONTINUED | OUTPATIENT
Start: 2020-02-04 | End: 2020-02-05

## 2020-02-04 RX ORDER — POLYETHYLENE GLYCOL 3350 17 G/17G
17 POWDER, FOR SOLUTION ORAL DAILY PRN
Status: DISCONTINUED | OUTPATIENT
Start: 2020-02-04 | End: 2020-02-09 | Stop reason: HOSPADM

## 2020-02-04 RX ORDER — ACETAMINOPHEN 650 MG/1
650 SUPPOSITORY RECTAL EVERY 4 HOURS PRN
Status: DISCONTINUED | OUTPATIENT
Start: 2020-02-04 | End: 2020-02-09 | Stop reason: HOSPADM

## 2020-02-04 RX ORDER — SODIUM CHLORIDE, SODIUM LACTATE, POTASSIUM CHLORIDE, CALCIUM CHLORIDE 600; 310; 30; 20 MG/100ML; MG/100ML; MG/100ML; MG/100ML
INJECTION, SOLUTION INTRAVENOUS CONTINUOUS
Status: ACTIVE | OUTPATIENT
Start: 2020-02-04 | End: 2020-02-05

## 2020-02-04 RX ORDER — METHYLPREDNISOLONE SODIUM SUCCINATE 125 MG/2ML
125 INJECTION, POWDER, LYOPHILIZED, FOR SOLUTION INTRAMUSCULAR; INTRAVENOUS ONCE
Status: COMPLETED | OUTPATIENT
Start: 2020-02-04 | End: 2020-02-04

## 2020-02-04 RX ORDER — SODIUM CHLORIDE 0.9 % (FLUSH) 0.9 %
10 SYRINGE (ML) INJECTION EVERY 12 HOURS SCHEDULED
Status: DISCONTINUED | OUTPATIENT
Start: 2020-02-04 | End: 2020-02-09 | Stop reason: HOSPADM

## 2020-02-04 RX ORDER — SODIUM CHLORIDE, SODIUM LACTATE, POTASSIUM CHLORIDE, AND CALCIUM CHLORIDE .6; .31; .03; .02 G/100ML; G/100ML; G/100ML; G/100ML
500 INJECTION, SOLUTION INTRAVENOUS ONCE
Status: COMPLETED | OUTPATIENT
Start: 2020-02-04 | End: 2020-02-04

## 2020-02-04 RX ORDER — ALBUTEROL SULFATE 2.5 MG/3ML
2.5 SOLUTION RESPIRATORY (INHALATION) EVERY 4 HOURS PRN
Status: DISCONTINUED | OUTPATIENT
Start: 2020-02-04 | End: 2020-02-04

## 2020-02-04 RX ORDER — SODIUM CHLORIDE, SODIUM LACTATE, POTASSIUM CHLORIDE, CALCIUM CHLORIDE 600; 310; 30; 20 MG/100ML; MG/100ML; MG/100ML; MG/100ML
INJECTION, SOLUTION INTRAVENOUS CONTINUOUS
Status: DISCONTINUED | OUTPATIENT
Start: 2020-02-04 | End: 2020-02-04

## 2020-02-04 RX ORDER — IPRATROPIUM BROMIDE AND ALBUTEROL SULFATE 2.5; .5 MG/3ML; MG/3ML
1 SOLUTION RESPIRATORY (INHALATION) ONCE
Status: COMPLETED | OUTPATIENT
Start: 2020-02-04 | End: 2020-02-04

## 2020-02-04 RX ORDER — SODIUM CHLORIDE 0.9 % (FLUSH) 0.9 %
10 SYRINGE (ML) INJECTION PRN
Status: DISCONTINUED | OUTPATIENT
Start: 2020-02-04 | End: 2020-02-09 | Stop reason: HOSPADM

## 2020-02-04 RX ORDER — AMLODIPINE BESYLATE 5 MG/1
5 TABLET ORAL DAILY
Status: DISCONTINUED | OUTPATIENT
Start: 2020-02-05 | End: 2020-02-09 | Stop reason: HOSPADM

## 2020-02-04 RX ORDER — PANTOPRAZOLE SODIUM 40 MG/1
40 TABLET, DELAYED RELEASE ORAL
Status: DISCONTINUED | OUTPATIENT
Start: 2020-02-05 | End: 2020-02-09 | Stop reason: HOSPADM

## 2020-02-04 RX ORDER — ATORVASTATIN CALCIUM 20 MG/1
40 TABLET, FILM COATED ORAL NIGHTLY
Status: DISCONTINUED | OUTPATIENT
Start: 2020-02-04 | End: 2020-02-09 | Stop reason: HOSPADM

## 2020-02-04 RX ORDER — ARFORMOTEROL TARTRATE 15 UG/2ML
15 SOLUTION RESPIRATORY (INHALATION) 2 TIMES DAILY
Status: DISCONTINUED | OUTPATIENT
Start: 2020-02-04 | End: 2020-02-05

## 2020-02-04 RX ORDER — ASPIRIN 81 MG/1
81 TABLET ORAL DAILY
Status: DISCONTINUED | OUTPATIENT
Start: 2020-02-05 | End: 2020-02-09 | Stop reason: HOSPADM

## 2020-02-04 RX ADMIN — METOPROLOL TARTRATE 2.5 MG: 5 INJECTION, SOLUTION INTRAVENOUS at 22:18

## 2020-02-04 RX ADMIN — IPRATROPIUM BROMIDE AND ALBUTEROL SULFATE 1 AMPULE: .5; 3 SOLUTION RESPIRATORY (INHALATION) at 17:05

## 2020-02-04 RX ADMIN — AMITRIPTYLINE HYDROCHLORIDE 50 MG: 50 TABLET, FILM COATED ORAL at 22:18

## 2020-02-04 RX ADMIN — SODIUM CHLORIDE, POTASSIUM CHLORIDE, SODIUM LACTATE AND CALCIUM CHLORIDE: 600; 310; 30; 20 INJECTION, SOLUTION INTRAVENOUS at 22:18

## 2020-02-04 RX ADMIN — METHYLPREDNISOLONE SODIUM SUCCINATE 125 MG: 125 INJECTION, POWDER, FOR SOLUTION INTRAMUSCULAR; INTRAVENOUS at 17:13

## 2020-02-04 RX ADMIN — IOPAMIDOL 90 ML: 755 INJECTION, SOLUTION INTRAVENOUS at 17:42

## 2020-02-04 RX ADMIN — IPRATROPIUM BROMIDE 0.5 MG: 0.5 SOLUTION RESPIRATORY (INHALATION) at 22:48

## 2020-02-04 RX ADMIN — ENOXAPARIN SODIUM 40 MG: 40 INJECTION SUBCUTANEOUS at 22:19

## 2020-02-04 RX ADMIN — BUDESONIDE 500 MCG: 0.5 INHALANT RESPIRATORY (INHALATION) at 22:48

## 2020-02-04 RX ADMIN — Medication 500 MG: at 19:13

## 2020-02-04 RX ADMIN — BUSPIRONE HYDROCHLORIDE 7.5 MG: 5 TABLET ORAL at 22:17

## 2020-02-04 RX ADMIN — CEFTRIAXONE 1 G: 1 INJECTION, POWDER, FOR SOLUTION INTRAMUSCULAR; INTRAVENOUS at 19:07

## 2020-02-04 RX ADMIN — SODIUM CHLORIDE, POTASSIUM CHLORIDE, SODIUM LACTATE AND CALCIUM CHLORIDE 500 ML: 600; 310; 30; 20 INJECTION, SOLUTION INTRAVENOUS at 19:06

## 2020-02-04 RX ADMIN — ARFORMOTEROL TARTRATE 15 MCG: 15 SOLUTION RESPIRATORY (INHALATION) at 22:59

## 2020-02-04 RX ADMIN — ATORVASTATIN CALCIUM 40 MG: 20 TABLET, FILM COATED ORAL at 22:18

## 2020-02-04 RX ADMIN — OXYCODONE HYDROCHLORIDE 15 MG: 5 TABLET ORAL at 22:17

## 2020-02-04 ASSESSMENT — ENCOUNTER SYMPTOMS
COUGH: 1
ABDOMINAL PAIN: 0
VOMITING: 0
SHORTNESS OF BREATH: 1

## 2020-02-04 ASSESSMENT — PAIN SCALES - GENERAL
PAINLEVEL_OUTOF10: 4
PAINLEVEL_OUTOF10: 8

## 2020-02-04 NOTE — LETTER
Beneficiary Notification Letter  BPCI Advanced     Your Doctor or [address],    We wanted to let you know that your health care provider, GERARD BARRETT, has volunteered to take part in our [facility] for [facility] & Medicaid Services (CMS) Bundled Payments for [facility] (BPCI Advanced). This doesnt change your Medicare rights or benefits and you dont need to do anything. What are bundled payments? A bundled payment combines, or bundles together, payments that Medicare makes to your health care providers for the many different kinds of medical services you might get in a specific time period. In BPCI Advanced, this time period could include a hospital inpatient stay or outpatient procedure, plus 90 days. Why would Medicare bundle payments? Bundled payments are thought of as a value-based way to pay because health care providers are responsible for both the quality and cost of medical care they give. This is a relatively new way of paying health care providers compared to thefee-for-service way Medicare has traditionally paid, where providers are paid separately for each service they provide. Bundled payments encourage these providers to work together to provide better, more coordinated care during your hospital stay, or outpatient procedure, and through your recovery. What does BPCI Advance mean for me? Youre more likely to get even better care when hospitals, doctors, and other health care providers work together. In BPCI Advanced, hospitals, doctors, and other health care providers may be rewarded for providing better, more coordinated health care. Medicare will watch BPCI Advanced participants closely to make sure that you and other patients keep getting efficient, high quality care. What do I need to know about BPCI Advanced? Whats most important for you to know is that your Medicare rights and benefits wont change because your health care provider is participating in [address]. Medicare will keep covering all of your medically necessary services. Even though Medicare will pay your doctor in a different way under BPCI Advanced, how much you have to pay wont change. Health care providers and suppliers who are enrolled in Medicare will submit their Medicare claims like they always have. Youll have all the same Medicare rights and protections, including the right to choose which hospital, doctor, or other health care provider you see. If you dont want to get care from a health care provider whos participating in [address] [place], then youll have to choose a different health care provider whos not participating in the Model. How can I give feedback about my health care? Medicare might ask you to take a voluntary survey about the services and care you received from [facility] during your hospital stay or outpatient procedure and for a specific period of time afterwards. You can decide whether you want to take the voluntary survey, but if you do, itll help Medicare make BPCI Advanced and the care of other Medicare patients better. If you have concerns or complaints about your care, you can:   · Talk to your doctor or health care provider. · Contact your Beneficiary and Family Centered Care Quality Improvement   Organization [facility]). You can get your BF[employer]-QIOs phone number  at Medicare.gov/contacts or by calling 1-800-MEDICARE. TTY users can  call 8-470.574.1180. Where can I learn more about BPCI Advanced? Learn more about BPCI Advanced at https://innovation.cms.gov/initiatives/bpci-advanced/:  · A list of all the hospitals and physician group practices in the country participating in [address].   · All of the inpatient and outpatient Clinical Episodes that are currently included under BPCI Advanced. A Clinical Episode is a grouping of medical conditions or diagnoses that are included in the [address] [facility].

## 2020-02-04 NOTE — PROGRESS NOTES
Results for [facility] (MRN [id number]) as of 2/4/2020 17:06   Ref.  Range 2/4/2020 16:57   Hemoglobin, Art, Extended Latest Ref Range: 14.0 - 18.0 g/dL 11.2 (L)   pH, Arterial Latest Ref Range: 7.350 - 7.450  7.410   pCO2, Arterial Latest Ref Range: 35.0 - 45.0 mmHg 54.0 (H)   pO2, Arterial Latest Ref Range: 80.0 - 100.0 mmHg 46.0 (LL)   HCO3, Arterial Latest Ref Range: 22.0 - 26.0 mmol/L 34.2 (H)   Base Excess, Arterial Latest Ref Range: -2.0 - 2.0 mmol/L 8.1 (H)   O2 Sat, Arterial Latest Ref Range: >92 % 84.6 (LL)   O2 Content, Arterial Latest Ref Range: Not Established mL/dL 13.3   Methemoglobin, Arterial Latest Ref Range: <1.5 % 0.8     NC at 3 lpm  Site RR  AT +

## 2020-02-05 PROBLEM — A41.9 SEPSIS (HCC): Status: ACTIVE | Noted: 2020-02-05

## 2020-02-05 LAB
ALBUMIN SERPL-MCNC: 3.6 G/DL (ref 3.5–5.2)
ALP BLD-CCNC: 185 U/L (ref 40–130)
ALT SERPL-CCNC: 22 U/L (ref 5–41)
ANION GAP SERPL CALCULATED.3IONS-SCNC: 13 MMOL/L (ref 7–19)
AST SERPL-CCNC: 20 U/L (ref 5–40)
BASOPHILS ABSOLUTE: 0 K/UL (ref 0–0.2)
BASOPHILS RELATIVE PERCENT: 0.1 % (ref 0–1)
BILIRUB SERPL-MCNC: 0.3 MG/DL (ref 0.2–1.2)
BUN BLDV-MCNC: 11 MG/DL (ref 8–23)
CALCIUM SERPL-MCNC: 9.1 MG/DL (ref 8.8–10.2)
CHLORIDE BLD-SCNC: 92 MMOL/L (ref 98–111)
CO2: 28 MMOL/L (ref 22–29)
CREAT SERPL-MCNC: 0.7 MG/DL (ref 0.5–1.2)
EKG P AXIS: 70 DEGREES
EKG P-R INTERVAL: 166 MS
EKG Q-T INTERVAL: 304 MS
EKG QRS DURATION: 98 MS
EKG QTC CALCULATION (BAZETT): 402 MS
EKG T AXIS: 66 DEGREES
EOSINOPHILS ABSOLUTE: 0 K/UL (ref 0–0.6)
EOSINOPHILS RELATIVE PERCENT: 0 % (ref 0–5)
GFR NON-AFRICAN AMERICAN: >60
GLUCOSE BLD-MCNC: 175 MG/DL (ref 74–109)
HCT VFR BLD CALC: 36.4 % (ref 42–52)
HEMOGLOBIN: 11.1 G/DL (ref 14–18)
IMMATURE GRANULOCYTES #: 0.3 K/UL
LYMPHOCYTES ABSOLUTE: 0.9 K/UL (ref 1.1–4.5)
LYMPHOCYTES RELATIVE PERCENT: 4.1 % (ref 20–40)
MCH RBC QN AUTO: 27.8 PG (ref 27–31)
MCHC RBC AUTO-ENTMCNC: 30.5 G/DL (ref 33–37)
MCV RBC AUTO: 91.2 FL (ref 80–94)
MONOCYTES ABSOLUTE: 0.3 K/UL (ref 0–0.9)
MONOCYTES RELATIVE PERCENT: 1.2 % (ref 0–10)
NEUTROPHILS ABSOLUTE: 20.8 K/UL (ref 1.5–7.5)
NEUTROPHILS RELATIVE PERCENT: 93.5 % (ref 50–65)
PDW BLD-RTO: 14.7 % (ref 11.5–14.5)
PHOSPHORUS: 3.7 MG/DL (ref 2.5–4.5)
PLATELET # BLD: 361 K/UL (ref 130–400)
PMV BLD AUTO: 8.4 FL (ref 9.4–12.4)
POTASSIUM REFLEX MAGNESIUM: 3.8 MMOL/L (ref 3.5–5)
RBC # BLD: 3.99 M/UL (ref 4.7–6.1)
SODIUM BLD-SCNC: 133 MMOL/L (ref 136–145)
TOTAL PROTEIN: 8.7 G/DL (ref 6.6–8.7)
WBC # BLD: 22.3 K/UL (ref 4.8–10.8)

## 2020-02-05 PROCEDURE — 6360000002 HC RX W HCPCS: Performed by: HOSPITALIST

## 2020-02-05 PROCEDURE — 94640 AIRWAY INHALATION TREATMENT: CPT

## 2020-02-05 PROCEDURE — 36415 COLL VENOUS BLD VENIPUNCTURE: CPT

## 2020-02-05 PROCEDURE — 6370000000 HC RX 637 (ALT 250 FOR IP): Performed by: HOSPITALIST

## 2020-02-05 PROCEDURE — 2580000003 HC RX 258: Performed by: HOSPITALIST

## 2020-02-05 PROCEDURE — 84145 PROCALCITONIN (PCT): CPT

## 2020-02-05 PROCEDURE — 85025 COMPLETE CBC W/AUTO DIFF WBC: CPT

## 2020-02-05 PROCEDURE — 2700000000 HC OXYGEN THERAPY PER DAY

## 2020-02-05 PROCEDURE — 93010 ELECTROCARDIOGRAM REPORT: CPT | Performed by: INTERNAL MEDICINE

## 2020-02-05 PROCEDURE — 80053 COMPREHEN METABOLIC PANEL: CPT

## 2020-02-05 PROCEDURE — 1210000000 HC MED SURG R&B

## 2020-02-05 PROCEDURE — 84100 ASSAY OF PHOSPHORUS: CPT

## 2020-02-05 RX ADMIN — IPRATROPIUM BROMIDE 0.5 MG: 0.5 SOLUTION RESPIRATORY (INHALATION) at 07:31

## 2020-02-05 RX ADMIN — OXYCODONE HYDROCHLORIDE 15 MG: 5 TABLET ORAL at 11:55

## 2020-02-05 RX ADMIN — LATANOPROST 1 DROP: 50 SOLUTION OPHTHALMIC at 21:07

## 2020-02-05 RX ADMIN — IPRATROPIUM BROMIDE 0.5 MG: 0.5 SOLUTION RESPIRATORY (INHALATION) at 18:59

## 2020-02-05 RX ADMIN — AZITHROMYCIN MONOHYDRATE 250 MG: 500 INJECTION, POWDER, LYOPHILIZED, FOR SOLUTION INTRAVENOUS at 12:32

## 2020-02-05 RX ADMIN — TIMOLOL MALEATE 1 DROP: 5 SOLUTION OPHTHALMIC at 17:32

## 2020-02-05 RX ADMIN — OXYCODONE HYDROCHLORIDE 15 MG: 5 TABLET ORAL at 08:03

## 2020-02-05 RX ADMIN — ASPIRIN 81 MG: 81 TABLET, COATED ORAL at 08:03

## 2020-02-05 RX ADMIN — METHYLPREDNISOLONE SODIUM SUCCINATE 60 MG: 125 INJECTION, POWDER, FOR SOLUTION INTRAMUSCULAR; INTRAVENOUS at 11:55

## 2020-02-05 RX ADMIN — OXYCODONE HYDROCHLORIDE 15 MG: 5 TABLET ORAL at 17:22

## 2020-02-05 RX ADMIN — ENOXAPARIN SODIUM 40 MG: 40 INJECTION SUBCUTANEOUS at 21:08

## 2020-02-05 RX ADMIN — CEFTRIAXONE 1 G: 1 INJECTION, POWDER, FOR SOLUTION INTRAMUSCULAR; INTRAVENOUS at 12:33

## 2020-02-05 RX ADMIN — ARFORMOTEROL TARTRATE 15 MCG: 15 SOLUTION RESPIRATORY (INHALATION) at 07:18

## 2020-02-05 RX ADMIN — IPRATROPIUM BROMIDE 0.5 MG: 0.5 SOLUTION RESPIRATORY (INHALATION) at 11:32

## 2020-02-05 RX ADMIN — ATORVASTATIN CALCIUM 40 MG: 20 TABLET, FILM COATED ORAL at 21:07

## 2020-02-05 RX ADMIN — METHYLPREDNISOLONE SODIUM SUCCINATE 60 MG: 125 INJECTION, POWDER, FOR SOLUTION INTRAMUSCULAR; INTRAVENOUS at 02:44

## 2020-02-05 RX ADMIN — AMITRIPTYLINE HYDROCHLORIDE 50 MG: 50 TABLET, FILM COATED ORAL at 21:07

## 2020-02-05 RX ADMIN — OXYCODONE HYDROCHLORIDE 15 MG: 5 TABLET ORAL at 21:07

## 2020-02-05 RX ADMIN — BUDESONIDE 500 MCG: 0.5 INHALANT RESPIRATORY (INHALATION) at 07:31

## 2020-02-05 RX ADMIN — SODIUM CHLORIDE, PRESERVATIVE FREE 10 ML: 5 INJECTION INTRAVENOUS at 21:08

## 2020-02-05 RX ADMIN — METHYLPREDNISOLONE SODIUM SUCCINATE 60 MG: 125 INJECTION, POWDER, FOR SOLUTION INTRAMUSCULAR; INTRAVENOUS at 17:23

## 2020-02-05 RX ADMIN — TIMOLOL MALEATE 1 DROP: 5 SOLUTION OPHTHALMIC at 21:07

## 2020-02-05 RX ADMIN — AMLODIPINE BESYLATE 5 MG: 5 TABLET ORAL at 08:03

## 2020-02-05 RX ADMIN — PANTOPRAZOLE SODIUM 40 MG: 40 TABLET, DELAYED RELEASE ORAL at 08:03

## 2020-02-05 RX ADMIN — LISINOPRIL 20 MG: 20 TABLET ORAL at 08:03

## 2020-02-05 RX ADMIN — IPRATROPIUM BROMIDE 0.5 MG: 0.5 SOLUTION RESPIRATORY (INHALATION) at 15:06

## 2020-02-05 ASSESSMENT — PAIN SCALES - GENERAL
PAINLEVEL_OUTOF10: 8
PAINLEVEL_OUTOF10: 8
PAINLEVEL_OUTOF10: 3
PAINLEVEL_OUTOF10: 7
PAINLEVEL_OUTOF10: 8

## 2020-02-05 ASSESSMENT — PAIN DESCRIPTION - ONSET: ONSET: ON-GOING

## 2020-02-05 ASSESSMENT — PAIN DESCRIPTION - PAIN TYPE: TYPE: CHRONIC PAIN

## 2020-02-05 ASSESSMENT — PAIN DESCRIPTION - LOCATION: LOCATION: BACK

## 2020-02-05 ASSESSMENT — PAIN - FUNCTIONAL ASSESSMENT: PAIN_FUNCTIONAL_ASSESSMENT: ACTIVITIES ARE NOT PREVENTED

## 2020-02-05 ASSESSMENT — PAIN DESCRIPTION - PROGRESSION: CLINICAL_PROGRESSION: NOT CHANGED

## 2020-02-05 ASSESSMENT — PAIN DESCRIPTION - FREQUENCY: FREQUENCY: CONTINUOUS

## 2020-02-05 ASSESSMENT — PAIN DESCRIPTION - DESCRIPTORS: DESCRIPTORS: ACHING

## 2020-02-05 ASSESSMENT — PAIN DESCRIPTION - ORIENTATION: ORIENTATION: LOWER

## 2020-02-05 NOTE — PROGRESS NOTES
Palliative Care/Spiritual Care: Met with pt to initiate Palliative Care. Pt says he was  Having trouble breathing and had COPD. Pt is known to Palliative Care. Advance Directives: Pt says he does not have an AD/LW saying his wife and daughter will make medical decisions. Pt is not interested at this time in completing AD/LW. Pain/other symptoms: Pt says he has pain all the time and began receiving pain medication last night. Social/Spiritual: Pt says he attends [employer].         Pt/family discussion r/t goals: Pt says he has a daughter, son, and grandchildren. Pt says he ambulates around his house without assistance. He enjoys spending time with his family. Pt says he cares for his daily needs and can take himself to the bathroom and care for showering needs. Pt's goal is to return home to previous quality of life and also says he is on oxygen at home. Provided spiritual care with sustaining presence, nurtured hope, and prayer. Pt expressed gratitude for spiritual care.      Electronically signed by Suresh Mcgee on 2/5/2020 at 11:45 AM

## 2020-02-05 NOTE — ED PROVIDER NOTES
[facility] EMERGENCY DEPT  eMERGENCY dEPARTMENT eNCOUnter      Pt Name: Dewayne Eastman  MRN: [id number]  [place] 1955  Date of evaluation: 2/4/2020  Provider: John Beck MD    [address] [place] [address]       Chief Complaint   Patient presents with    Shortness of Breath     began last night         HISTORY OF PRESENT ILLNESS   (Location/Symptom, Timing/Onset,Context/Setting, Quality, Duration, Modifying Factors, Severity)  Note limiting factors. Dewayne Eastman is a 59 y.o. male who presents to the emergency department for evaluation of increasing shortness of breath and productive cough. Patient reports the symptoms began yesterday, have been a moderate severity. States that is been producing some yellowish-green sputum. He does report a prior history of chronic lung disease. He wears oxygen intermittently at home. There have really been no relieving factors for the symptoms and he feels they are getting progressively worse. He has not been on any recent antibiotics. He denies any chest pain, palpitations or syncopal events. There have been no relieving factors for the symptoms. HPI    NursingNotes were reviewed. REVIEW OF SYSTEMS    (2-9 systems for level 4, 10 or more for level 5)     Review of Systems   Constitutional: Negative for chills and fever. Respiratory: Positive for cough and shortness of breath. Cardiovascular: Positive for leg swelling. Negative for chest pain and palpitations. Gastrointestinal: Negative for abdominal pain and vomiting. Neurological: Negative for dizziness and syncope. All other systems reviewed and are negative.            PAST MEDICALHISTORY     Past Medical History:   Diagnosis Date    Arthritis     Bilateral hearing loss     CAD (coronary artery disease)     Chronic back pain     Colon polyps     COPD (chronic obstructive pulmonary disease) (HCC)     Depression     Hyperlipidemia     Hypertension     Low back pain 1/29/2016    Oxygen dependent     uses at night    Palliative care patient 12/28/2018         SURGICAL HISTORY       Past Surgical History:   Procedure Laterality Date    BACK SURGERY  2010    COLONOSCOPY  ? 2005    Dr. Liz Rizvi COLONOSCOPY  01/2012    3 polyps, 2 adenomatous, 1 hyperplastic    COLONOSCOPY  01/2015    diverticulosis    FRACTURE SURGERY      KNEE ARTHROSCOPY  2014    GA EGD TRANSORAL BIOPSY SINGLE/MULTIPLE N/A 12/21/2016    Dr MARIA ELENA Rehman-w/dilation over wire, 48 French-Schatzki's Ring, gastritis/gastropathy-prn    UPPER GASTROINTESTINAL ENDOSCOPY  4/24/2017    Dr MARIA ELENA Rehman-thai/dilation over wire, 51 French-Schatzki Ring     UPPER GASTROINTESTINAL ENDOSCOPY  4/24/2017    Dr MARIA ELENA Rehman-w/dilation over wire, 51 French-Schatzki Ring     WRIST FRACTURE SURGERY      [id number]         CURRENT MEDICATIONS     Previous Medications    ALBUTEROL (PROVENTIL) (2.5 MG/3ML) 0.083% NEBULIZER SOLUTION    Take 3 mLs by nebulization every 4 hours as needed for Wheezing or Shortness of Breath    ALBUTEROL SULFATE HFA (PROAIR HFA) 108 (90 BASE) MCG/ACT INHALER    Inhale 2 puffs into the lungs every 6 hours as needed for Wheezing    AMITRIPTYLINE (ELAVIL) 25 MG TABLET    Take 1-2 tablets by mouth nightly as needed for Sleep    AMLODIPINE BESYLATE PO    Take 5 mg by mouth daily. ASPIRIN (ASPIR-81 PO)    Take by mouth daily     ATORVASTATIN (LIPITOR) 40 MG TABLET    Take 40 mg by mouth daily. BUSPIRONE (BUSPAR) 7.5 MG TABLET    1 tablet by Oral route 2 times per day for anxiety    FLUTICASONE-UMECLIDIN-VILANT (TRELEGY ELLIPTA) 100-62.5-25 MCG/INH AEPB    Inhale 1 each into the lungs    LATANOPROST (XALATAN) 0.005 % OPHTHALMIC SOLUTION    Place 1 drop into both eyes nightly    LISINOPRIL PO    Take 20 mg by mouth daily. OMEPRAZOLE (PRILOSEC) 40 MG DELAYED RELEASE CAPSULE    40 mg daily     OXYCODONE (OXY-IR) 15 MG IMMEDIATE RELEASE TABLET    Take 1 tablet by mouth 4 times daily as needed for Pain .  Earliest Fill Date: 12/11/17    TIMOLOL (TIMOPTIC-XE) 0.5 % partner: None     Emotionally abused: None     Physically abused: None     Forced sexual activity: None   Other Topics Concern    None   Social History Narrative    Domiciled: lives in a mobile home, has 3 steps in back, lives with his son and his wife, home is ramped in front    Ambulates: used a walker in the past, walks on his own    Code Status: Full Code    Health Care Proxy: Mrs. Julián Claros, [phone]       SCREENINGS             PHYSICAL EXAM    (up to 7 for level 4, 8 or more for level 5)     ED Triage Vitals [02/04/20 1411]   BP Temp Temp Source Pulse Resp SpO2 Height Weight   (!) 140/74 98.3 °F (36.8 °C) Oral 110 22 (!) 79 % -- 240 lb (108.9 kg)       Physical Exam  Vitals signs and nursing note reviewed. HENT:      Head: Atraumatic. Right Ear: External ear normal.      Left Ear: External ear normal.      Mouth/Throat:      Mouth: Mucous membranes are moist. Mucous membranes are not dry. Pharynx: No posterior oropharyngeal erythema. Eyes:      General: No scleral icterus. Pupils: Pupils are equal, round, and reactive to light. Neck:      Trachea: No tracheal deviation. Cardiovascular:      Rate and Rhythm: Regular rhythm. Tachycardia present. Pulses: Normal pulses. Heart sounds: Normal heart sounds. No murmur. Pulmonary:      Effort: Respiratory distress (tachypnea) present. Breath sounds: No stridor. Rhonchi present. Abdominal:      General: There is no distension. Palpations: Abdomen is soft. Tenderness: There is no abdominal tenderness. There is no guarding. Musculoskeletal:      Right lower leg: Edema (1+) present. Left lower leg: Edema (1+) present. Skin:     Capillary Refill: Capillary refill takes less than 2 seconds. Coloration: Skin is not pale. Findings: No rash. Neurological:      General: No focal deficit present. Mental Status: He is alert and oriented to person, place, and time.    Psychiatric:         Mood

## 2020-02-05 NOTE — PROGRESS NOTES
[facility] Progress Note    Patient:  Silva Beatty  YOB: 1955  Date of Service: 2/5/2020  MRN: [id number]   Acct: [de-identified]   Primary Care Physician: Regan Wagner MD  Advance Directive: Full Code  Admit Date: 2/4/2020       Hospital Day: 1      CHIEF COMPLAINT:     Chief Complaint   Patient presents with    Shortness of Breath     began last night       2/5/2020 8:41 AM  Subjective / Interval History:   Shortness of breath improved. Patient endorses overall improvement. No acute overnight event reported. Patient denies any acute complaints at this time. Brief History:   Mr. Davenport, a 71-year-old male, with a history of CAD, COPD (on home O2), presented to the ED (02/04/2020), on account of progressively worsening shortness of breath and productive cough. Patient was noted to be hypoxic on presentation, with SPO2 in the 80s. Initial chest x-ray, reporting stable chronic lung changes, pulmonary hypoventilation with no acute infiltrate or evidence of CHF. CTA, reports no evidence of acute PE. However reported patchy nodular areas of infiltrates in the right upper lobe and middle lobe, likely infectious etiology. CTA further reported a new mediastinal and hilar lymphadenopathy (infectious versus neoplastic lymph node involvement) compared to previous study. Review of Systems:   Review of Systems  ROS: 14 point review of systems is negative except as specifically addressed above. DIET CARDIAC;   No intake or output data in the 24 hours ending 02/05/20 0841    Medications:  Current Facility-Administered Medications   Medication Dose Route Frequency Provider Last Rate Last Dose    methylPREDNISolone sodium (SOLU-MEDROL) injection 60 mg  60 mg Intravenous Q8H Marlon Dover MD   60 mg at 02/05/20 0244    cefTRIAXone (ROCEPHIN) 1 g in sterile water 10 mL IV syringe  1 g Intravenous Q24H Marlon Dover MD        azithromycin (ZITHROMAX) 250 mg in dextrose 5 % 250 mL IVPB ipratropium (ATROVENT) 0.02 % nebulizer solution 0.5 mg  0.5 mg Nebulization 4x daily Lila Chan MD   0.5 mg at 02/05/20 0731    budesonide (PULMICORT) nebulizer suspension 500 mcg  0.5 mg Nebulization Q12H Lila Chan MD   500 mcg at 02/05/20 0731    Arformoterol Tartrate (BROVANA) nebulizer solution 15 mcg  15 mcg Nebulization BID Lila Chan MD   15 mcg at 02/05/20 7009           methylPREDNISolone  60 mg Intravenous Q8H    cefTRIAXone (ROCEPHIN) IV  1 g Intravenous Q24H    azithromycin  250 mg Intravenous Q24H    sodium chloride flush  10 mL Intravenous 2 times per day    enoxaparin  40 mg Subcutaneous Q24H    timolol  1 drop Both Eyes BID    pantoprazole  40 mg Oral QAM AC    latanoprost  1 drop Both Eyes Nightly    aspirin  81 mg Oral Daily    atorvastatin  40 mg Oral Nightly    amitriptyline  50 mg Oral Nightly    amLODIPine  5 mg Oral Daily    lisinopril  20 mg Oral Daily    ipratropium  0.5 mg Nebulization 4x daily    budesonide  0.5 mg Nebulization Q12H    Arformoterol Tartrate  15 mcg Nebulization BID     sodium chloride flush, polyethylene glycol, ipratropium-albuterol, acetaminophen, hydrALAZINE, tiZANidine, oxyCODONE, busPIRone, albuterol  DIET CARDIAC;       Labs:   CBC with DIFF:  Recent Labs     02/04/20  1500 02/05/20  0440   WBC 23.7* 22.3*   RBC 4.06* 3.99*   HGB 11.3* 11.1*   HCT 37.2* 36.4*   MCV 91.6 91.2   MCH 27.8 27.8   MCHC 30.4* 30.5*   RDW 14.8* 14.7*    361   MPV 8.5* 8.4*   NEUTOPHILPCT 91.1* 93.5*   LYMPHOPCT 4.1* 4.1*   MONOPCT 3.9 1.2   EOSRELPCT 0.0 0.0   BASOPCT 0.3 0.1   NEUTROABS 21.6* 20.8*   LYMPHSABS 1.0* 0.9*   MONOSABS 0.90 0.30   EOSABS 0.00 0.00   BASOSABS 0.10 0.00       CMP/BMP:  Recent Labs     02/04/20  1500 02/04/20  1657 02/05/20  0440   *  --  133*   K 4.1 3.9 3.8   CL 90*  --  92*   CO2 31*  --  28   ANIONGAP 11  --  13   GLUCOSE 127*  --  175*   BUN 9  --  11   CREATININE 0.8  --  0.7   LABGLOM >60  --  >60   CALCIUM 9.0 interface at the right cardiophrenic angle most likely represents a prominent pericardial fat pad, this is stable. No pneumothorax or pleural effusion is identified. No acute osseous abnormality is seen. 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 Pulmonary hypoventilation with no acute infiltrates or evidence of CHF. Stable chronic lung changes. Signed by Dr Shree Stern. Tana on 2/4/2020 5:07 PM    Cta Pulmonary W Contrast    Result Date: 2/4/2020  EXAMINATION:  CTA PULMONARY W CONTRAST  2/4/2020 6:12 PM HISTORY: Hypoxia. Shortness of air. COMPARISON: No comparison study. DLP: 851 mGy-cm. Automated exposure control was utilized. TECHNIQUE: Spiral CT angiography was performed of the chest with contrast. Sagittal, coronal and 3-D images were reconstructed. MEDIASTINUM/VASCULAR: There is atheromatous disease of the thoracic aorta. There is no CT evidence of pulmonary embolus. The heart is normal in size. There is mediastinal lymphadenopathy. A subcarinal lymph node has a short axis diameter of 1.9 cm. Several other lymph nodes are noted in the mediastinum with a couple measuring up to about 1.1 cm in short axis diameter. There are small right hilar lymph nodes. LUNGS: A scarlike area in the right upper lobe anteriorly remains stable. There are some clustered nodules in the right upper lobe medially on images 37 through 39. There is also a clustering of right middle lobe nodules predominantly on image 42. There is paraseptal emphysema. There is probable dependent atelectasis in the lung bases. BONES/SOFT TISSUES/: There are degenerative changes of the spine and shoulders. UPPER ABDOMEN: There is fatty infiltration of the liver. There is fatty infiltration of the pancreas. There is a probable large cyst arising from the upper pole left kidney measuring 5.5 cm.    1. Scarring in the right upper lobe anteriorly. This is stable.  2. Patchy nodular areas of infiltrate in the right upper lobe and right middle lobe likely sounds: Normal heart sounds. No murmur. No friction rub. No gallop. Pulmonary:      Effort: Pulmonary effort is normal. No respiratory distress. Breath sounds: No stridor. Rhonchi present. No wheezing. Comments: Decreased breath sounds bilaterally  Chest:      Chest wall: No tenderness. Abdominal:      General: Bowel sounds are normal. There is no distension. Palpations: Abdomen is soft. Tenderness: There is no abdominal tenderness. Musculoskeletal: Normal range of motion. General: No swelling, tenderness, deformity or signs of injury. Right lower leg: No edema. Left lower leg: No edema. Skin:     General: Skin is warm and dry. Capillary Refill: Capillary refill takes less than 2 seconds. Coloration: Skin is not jaundiced or pale. Findings: No bruising, erythema or lesion. Neurological:      General: No focal deficit present. Mental Status: He is alert and oriented to person, place, and time. Cranial Nerves: No cranial nerve deficit. Sensory: No sensory deficit. Motor: No weakness. Coordination: Coordination normal.   Psychiatric:         Mood and Affect: Mood normal.         Behavior: Behavior normal.         Thought Content: Thought content normal.         Judgment: Judgment normal.         Assessment/plan:           Principal Problem:    Acute on chronic respiratory failure with hypoxia and hypercapnia (HCC)  Active Problems:    COPD with acute exacerbation (HCC)    Leukocytosis (leucocytosis)    Hyperlipidemia    Chronic low back pain    Uncontrolled hypertension    Chronic GERD    History of esophageal stricture    Status post dilation of esophageal narrowing    Grade I diastolic dysfunction    Pneumonia    Obesity due to excess calories    Personal history of noncompliance with medical treatment, presenting hazards to health  Resolved Problems:    * No resolved hospital problems.  *          Acute hypoxic and hypercarbic respiratory failure enthesis acute on chronic)  Pneumonia, due to on specified organism.  Presented to the shortness of breath and productive cough    Hypoxemic on initial presentation, with a PaO2 of 46 mmHg on initial ABG   Hypercarbic, with initial PCO2 of 54 mmHg on initial ABG.  ABG (02/04/2020): 7.41/54/46/34.2   Rapid influenza a and B antigen negative   proBNP within lab reference range   -  Afebrile   -  Leukocytosis (WBC: 23.7)   - Initial Chest x-ray reporting \"Pulmonary hypoventilation with no acute infiltrates or evidence of CHF. Stable chronic lung changes\"  · CTA - 02/04/2020: Scarring in the right upper lobe anteriorly. This is stable. Patchy nodular areas of infiltrate in the right upper lobe and right middle lobe likely representing infectious disease. There is new mediastinal and hilar lymphadenopathy compared to the prior study. These findings are probably related to infection. Neoplastic lymph node involvement is not entirely ruled out. Follow-up recommended. Fatty infiltration of the liver and pancreas. Probable benign cyst arising from the upper pole left kidney measuring 5.5 cm not fully characterized on this study. It appears relatively stable compared to 1/17/2019.  -Procalcitonin level   - Blood culture    - Sputum culture   -Continue Ceftriaxone and Azithromycin   - Oxygen supplementation to keep SPO2 > 92%   - Bronchodilators as needed   - Dextromethorphan/Guaifenesin for symptomatic relief   - Acetaminophen 650 mg PO Q6H/PRN for fever   - Continue to monitor       History of COPD, with acute exacerbation  · - Patient appears to be with labored breathing  · - Requiring additional oxygen supplementation, from baseline, to keep SPO2 >89  · - Nebulized Bronchodilators scheduled and on as-needed basis.   · - Continue Systemic Steroids    · --> methylprednisolone (Solu-Medrol) 125 mg IV ×1 dose Initiated in the ED  · --> Methylprednisolone (Solu-Medrol) 60 mg IV every 8 hours  · - IV antibiotics per practice guidelines. · - Supportive care and pulmonary toilet. · - Supplemental oxygen for respiratory failure. · - Check Blood Gases to monitor for hypercapnia and ensure adequate ventilation and if inadequate will plan noninvasive positive pressure ventilation trial and assess response to therapy. · - Consider BiPAP to help work of breathing if no improvement. · - Continue to monitor          Sepsis   · SIRS 3/4 - afebrile, tachycardic up to HR of 123, tachypneic with RR of up to 27 leukocytosis. · Peak acid within lab reference range  · Source of infection as noted above. · Continue antibiotics        Continue management of other chronic medical conditions - see above and orders. Advance Directive: Full Code    DIET CARDIAC;     DVT prophylaxis: Enoxaparin    Consults Made:   PALLIATIVE CARE EVAL    Discharge planning: tbd    Time Spent is 35 mins in the examination, evaluation, counseling and review of medications, assessment and plan.      Electronically signed by   Carmela Delgado MD, MPH,   Internal Medicine Hospitalist   2/5/2020 8:41 AM

## 2020-02-05 NOTE — H&P
[address] [facility] [facility], [address]    DEPARTMENT OF HOSPITALIST MEDICINE        HISTORY & PHYSICAL:          REASON FOR ADMISSION:  Chief Complaint   Patient presents with    Shortness of Breath     began last night        HISTORY OF PRESENT ILLNESS:  Jina Rinne is an 59 y.o. male. He is a very unfortunate gentleman with COPD with home oxygen dependence who has a 80 pack smoking history, with a history of noncompliance with meds and medical recommendations. He quit smoking 4 years ago and continues with COPD treatment at home. He came to the ER for evaluation with complaints of shortness of breath and cough off and on for the last 2 to 3 weeks which is now getting worse. He did not seek medical attention as he thought itwill improved by itself. Work-up done in the ER which showed right upper lobe pneumonia with COPD exacerbation. He is being admitted for IV antibiotics, breathing treatment and medical management until he gets clinically stable.     PAST MEDICAL HISTORY:  Past Medical History:   Diagnosis Date    Arthritis     Bilateral hearing loss     CAD (coronary artery disease)     Chronic back pain     Colon polyps     COPD (chronic obstructive pulmonary disease) ([facility] [address]     Depression     Hyperlipidemia     Hypertension     Low back pain 1/29/2016    Oxygen dependent     uses at night    Palliative care patient 12/28/2018         PAST SURGICAL HISTORY:  Past Surgical History:   Procedure Laterality Date    BACK SURGERY  2010    COLONOSCOPY  ? 2005    Dr. Francisco Alcaraz COLONOSCOPY  01/2012    3 polyps, 2 adenomatous, 1 hyperplastic    COLONOSCOPY  01/2015    diverticulosis    FRACTURE SURGERY      KNEE ARTHROSCOPY  2014    MD EGD TRANSORAL BIOPSY SINGLE/MULTIPLE N/A 12/21/2016    Dr MARIA ELENA Rehman-thai/dilation over wire, 48 Setswana-Schatzki's Ring, gastritis/gastropathy-prn    UPPER GASTROINTESTINAL ENDOSCOPY  4/24/2017    Dr MARIA ELENA Gómez/dilation over wire, 46 medical treatment, presenting hazards to health  STRICTLY advised patient to be compliant with meds and medical recommendations  Advised patient to get established with a PCP upon discharge, take care of meds, diet and bring patient's self and life back on track       Hyperlipidemia    Chronic low back pain    Chronic GERD    History of esophageal stricture    Status post dilation of esophageal narrowing    Grade I diastolic dysfunction  Chronic medical issues . .. Continue with home meds. Monitor patient closely while admitted. Advised very close f/u with patient's PCP as an outpatient to address chronic medical issues. Repeat labs in a.m. Electrolyte replacement as per protocol. Patient will be monitored very closely on the floor. Further recommendations as per the hospital course. Patient's management will be taken over by our [facility] Team in am.      Attestation:  A minimum of two midnights of inpatient hospital care is anticipated to be required based on the patient's clinical condition which requires intensive medical therapy. At this time the patient is not clinically optimized for safe discharge.        Shaheen Wilson MD  8:16 PM 2/4/2020

## 2020-02-06 ENCOUNTER — TELEPHONE (OUTPATIENT)
Dept: GASTROENTEROLOGY | Facility: CLINIC | Age: 65
End: 2020-02-06

## 2020-02-06 LAB
ANION GAP SERPL CALCULATED.3IONS-SCNC: 10 MMOL/L (ref 7–19)
BASE EXCESS ARTERIAL: 10.1 MMOL/L (ref -2–2)
BASOPHILS ABSOLUTE: 0 K/UL (ref 0–0.2)
BASOPHILS RELATIVE PERCENT: 0.1 % (ref 0–1)
BUN BLDV-MCNC: 19 MG/DL (ref 8–23)
CALCIUM SERPL-MCNC: 9.2 MG/DL (ref 8.8–10.2)
CARBOXYHEMOGLOBIN ARTERIAL: 1.8 % (ref 0–5)
CHLORIDE BLD-SCNC: 93 MMOL/L (ref 98–111)
CO2: 32 MMOL/L (ref 22–29)
CREAT SERPL-MCNC: 0.6 MG/DL (ref 0.5–1.2)
EOSINOPHILS ABSOLUTE: 0 K/UL (ref 0–0.6)
EOSINOPHILS RELATIVE PERCENT: 0 % (ref 0–5)
GFR NON-AFRICAN AMERICAN: >60
GLUCOSE BLD-MCNC: 115 MG/DL (ref 74–109)
HCO3 ARTERIAL: 37.3 MMOL/L (ref 22–26)
HCT VFR BLD CALC: 36.5 % (ref 42–52)
HEMOGLOBIN, ART, EXTENDED: 11.6 G/DL (ref 14–18)
HEMOGLOBIN: 10.9 G/DL (ref 14–18)
IMMATURE GRANULOCYTES #: 0.2 K/UL
LYMPHOCYTES ABSOLUTE: 0.9 K/UL (ref 1.1–4.5)
LYMPHOCYTES RELATIVE PERCENT: 3.6 % (ref 20–40)
MAGNESIUM: 2.4 MG/DL (ref 1.6–2.4)
MCH RBC QN AUTO: 27.8 PG (ref 27–31)
MCHC RBC AUTO-ENTMCNC: 29.9 G/DL (ref 33–37)
MCV RBC AUTO: 93.1 FL (ref 80–94)
METHEMOGLOBIN ARTERIAL: 1.3 %
MONOCYTES ABSOLUTE: 1.2 K/UL (ref 0–0.9)
MONOCYTES RELATIVE PERCENT: 4.8 % (ref 0–10)
NEUTROPHILS ABSOLUTE: 22.1 K/UL (ref 1.5–7.5)
NEUTROPHILS RELATIVE PERCENT: 90.6 % (ref 50–65)
O2 CONTENT ARTERIAL: 14.8 ML/DL
O2 SAT, ARTERIAL: 90.4 %
O2 THERAPY: ABNORMAL
PCO2 ARTERIAL: 63 MMHG (ref 35–45)
PDW BLD-RTO: 14.8 % (ref 11.5–14.5)
PH ARTERIAL: 7.38 (ref 7.35–7.45)
PHOSPHORUS: 3.1 MG/DL (ref 2.5–4.5)
PLATELET # BLD: 412 K/UL (ref 130–400)
PMV BLD AUTO: 8.5 FL (ref 9.4–12.4)
PO2 ARTERIAL: 60 MMHG (ref 80–100)
POTASSIUM SERPL-SCNC: 3.7 MMOL/L (ref 3.5–5)
POTASSIUM, WHOLE BLOOD: 4.3
RBC # BLD: 3.92 M/UL (ref 4.7–6.1)
SODIUM BLD-SCNC: 135 MMOL/L (ref 136–145)
WBC # BLD: 24.4 K/UL (ref 4.8–10.8)

## 2020-02-06 PROCEDURE — 2580000003 HC RX 258: Performed by: HOSPITALIST

## 2020-02-06 PROCEDURE — 36600 WITHDRAWAL OF ARTERIAL BLOOD: CPT

## 2020-02-06 PROCEDURE — 6370000000 HC RX 637 (ALT 250 FOR IP): Performed by: HOSPITALIST

## 2020-02-06 PROCEDURE — 6360000002 HC RX W HCPCS: Performed by: HOSPITALIST

## 2020-02-06 PROCEDURE — 85025 COMPLETE CBC W/AUTO DIFF WBC: CPT

## 2020-02-06 PROCEDURE — 82803 BLOOD GASES ANY COMBINATION: CPT

## 2020-02-06 PROCEDURE — 83735 ASSAY OF MAGNESIUM: CPT

## 2020-02-06 PROCEDURE — 1210000000 HC MED SURG R&B

## 2020-02-06 PROCEDURE — 84132 ASSAY OF SERUM POTASSIUM: CPT

## 2020-02-06 PROCEDURE — 94640 AIRWAY INHALATION TREATMENT: CPT

## 2020-02-06 PROCEDURE — 84100 ASSAY OF PHOSPHORUS: CPT

## 2020-02-06 PROCEDURE — 36415 COLL VENOUS BLD VENIPUNCTURE: CPT

## 2020-02-06 PROCEDURE — 2700000000 HC OXYGEN THERAPY PER DAY

## 2020-02-06 PROCEDURE — 80048 BASIC METABOLIC PNL TOTAL CA: CPT

## 2020-02-06 RX ADMIN — METHYLPREDNISOLONE SODIUM SUCCINATE 60 MG: 125 INJECTION, POWDER, FOR SOLUTION INTRAMUSCULAR; INTRAVENOUS at 02:07

## 2020-02-06 RX ADMIN — IPRATROPIUM BROMIDE 0.5 MG: 0.5 SOLUTION RESPIRATORY (INHALATION) at 11:19

## 2020-02-06 RX ADMIN — PANTOPRAZOLE SODIUM 40 MG: 40 TABLET, DELAYED RELEASE ORAL at 07:00

## 2020-02-06 RX ADMIN — OXYCODONE HYDROCHLORIDE 15 MG: 5 TABLET ORAL at 21:32

## 2020-02-06 RX ADMIN — SODIUM CHLORIDE, PRESERVATIVE FREE 10 ML: 5 INJECTION INTRAVENOUS at 20:24

## 2020-02-06 RX ADMIN — IPRATROPIUM BROMIDE 0.5 MG: 0.5 SOLUTION RESPIRATORY (INHALATION) at 18:38

## 2020-02-06 RX ADMIN — OXYCODONE HYDROCHLORIDE 15 MG: 5 TABLET ORAL at 17:22

## 2020-02-06 RX ADMIN — IPRATROPIUM BROMIDE 0.5 MG: 0.5 SOLUTION RESPIRATORY (INHALATION) at 07:19

## 2020-02-06 RX ADMIN — LATANOPROST 1 DROP: 50 SOLUTION OPHTHALMIC at 20:21

## 2020-02-06 RX ADMIN — AZITHROMYCIN MONOHYDRATE 250 MG: 500 INJECTION, POWDER, LYOPHILIZED, FOR SOLUTION INTRAVENOUS at 14:20

## 2020-02-06 RX ADMIN — TIMOLOL MALEATE 1 DROP: 5 SOLUTION OPHTHALMIC at 09:06

## 2020-02-06 RX ADMIN — METHYLPREDNISOLONE SODIUM SUCCINATE 60 MG: 125 INJECTION, POWDER, FOR SOLUTION INTRAMUSCULAR; INTRAVENOUS at 09:06

## 2020-02-06 RX ADMIN — AMLODIPINE BESYLATE 5 MG: 5 TABLET ORAL at 09:06

## 2020-02-06 RX ADMIN — ATORVASTATIN CALCIUM 40 MG: 20 TABLET, FILM COATED ORAL at 20:20

## 2020-02-06 RX ADMIN — CEFTRIAXONE 1 G: 1 INJECTION, POWDER, FOR SOLUTION INTRAMUSCULAR; INTRAVENOUS at 14:19

## 2020-02-06 RX ADMIN — ENOXAPARIN SODIUM 40 MG: 40 INJECTION SUBCUTANEOUS at 20:20

## 2020-02-06 RX ADMIN — METHYLPREDNISOLONE SODIUM SUCCINATE 60 MG: 125 INJECTION, POWDER, FOR SOLUTION INTRAMUSCULAR; INTRAVENOUS at 18:19

## 2020-02-06 RX ADMIN — SODIUM CHLORIDE, PRESERVATIVE FREE 10 ML: 5 INJECTION INTRAVENOUS at 09:06

## 2020-02-06 RX ADMIN — IPRATROPIUM BROMIDE 0.5 MG: 0.5 SOLUTION RESPIRATORY (INHALATION) at 15:13

## 2020-02-06 RX ADMIN — OXYCODONE HYDROCHLORIDE 15 MG: 5 TABLET ORAL at 11:04

## 2020-02-06 RX ADMIN — AMITRIPTYLINE HYDROCHLORIDE 50 MG: 50 TABLET, FILM COATED ORAL at 20:20

## 2020-02-06 RX ADMIN — LISINOPRIL 20 MG: 20 TABLET ORAL at 09:06

## 2020-02-06 RX ADMIN — OXYCODONE HYDROCHLORIDE 15 MG: 5 TABLET ORAL at 04:40

## 2020-02-06 RX ADMIN — ASPIRIN 81 MG: 81 TABLET, COATED ORAL at 09:06

## 2020-02-06 RX ADMIN — TIMOLOL MALEATE 1 DROP: 5 SOLUTION OPHTHALMIC at 20:20

## 2020-02-06 ASSESSMENT — PAIN DESCRIPTION - PROGRESSION: CLINICAL_PROGRESSION: NOT CHANGED

## 2020-02-06 ASSESSMENT — PAIN DESCRIPTION - FREQUENCY
FREQUENCY: CONTINUOUS
FREQUENCY: CONTINUOUS

## 2020-02-06 ASSESSMENT — PAIN DESCRIPTION - PAIN TYPE
TYPE: CHRONIC PAIN
TYPE: CHRONIC PAIN

## 2020-02-06 ASSESSMENT — PAIN SCALES - GENERAL
PAINLEVEL_OUTOF10: 3
PAINLEVEL_OUTOF10: 9
PAINLEVEL_OUTOF10: 2
PAINLEVEL_OUTOF10: 8
PAINLEVEL_OUTOF10: 0
PAINLEVEL_OUTOF10: 3
PAINLEVEL_OUTOF10: 7
PAINLEVEL_OUTOF10: 3
PAINLEVEL_OUTOF10: 8

## 2020-02-06 ASSESSMENT — PAIN DESCRIPTION - DESCRIPTORS
DESCRIPTORS: ACHING
DESCRIPTORS: ACHING

## 2020-02-06 ASSESSMENT — PAIN DESCRIPTION - ONSET
ONSET: ON-GOING
ONSET: ON-GOING

## 2020-02-06 ASSESSMENT — PAIN DESCRIPTION - ORIENTATION
ORIENTATION: LOWER
ORIENTATION: LOWER

## 2020-02-06 ASSESSMENT — PAIN DESCRIPTION - LOCATION
LOCATION: BACK
LOCATION: BACK

## 2020-02-06 ASSESSMENT — PAIN - FUNCTIONAL ASSESSMENT
PAIN_FUNCTIONAL_ASSESSMENT: ACTIVITIES ARE NOT PREVENTED
PAIN_FUNCTIONAL_ASSESSMENT: ACTIVITIES ARE NOT PREVENTED

## 2020-02-06 NOTE — PLAN OF CARE
Problem: Falls - Risk of:  Goal: Will remain free from falls  Description  Will remain free from falls  2/6/2020 0013 by Mango Alvarenga LPN  Outcome: Ongoing  2/5/2020 1701 by Pirmitivo Tilley RN  Outcome: Ongoing  Goal: Absence of physical injury  Description  Absence of physical injury  2/6/2020 0013 by Mango Alvarenga LPN  Outcome: Ongoing  2/5/2020 1701 by Primitivo Tilley RN  Outcome: Ongoing     Problem: Breathing Pattern - Ineffective:  Goal: Ability to achieve and maintain a regular respiratory rate will improve  Description  Ability to achieve and maintain a regular respiratory rate will improve  Outcome: Ongoing

## 2020-02-06 NOTE — PROGRESS NOTES
[facility] Progress Note    Patient:  Ese Reyna  YOB: 1955  Date of Service: 2/6/2020  MRN: [id number]   Acct: [de-identified]   Primary Care Physician: Loki Meza MD  Advance Directive: Full Code  Admit Date: 2/4/2020       Hospital Day: 2      CHIEF COMPLAINT:     Chief Complaint   Patient presents with    Shortness of Breath     began last night       2/6/2020 8:41 AM  Subjective / Interval History:   No acute overnight event reported. Patient report dyspnea with minimal exertion. Reportedly desaturated, to the 80s while walking to the restroom. Patient however reports overall improvement, including improvement in his shortness of breath. Denies any other acute complaints or distress at this time. · Patient appears to be with some increased work of breathing this a.m. · Follow-up ABG  · Follow-up routine labs this a.m.    02/05/2020  Shortness of breath improved. Patient endorses overall improvement. No acute overnight event reported. Patient denies any acute complaints at this time. Brief History:   Mr. Davenport, a 59-year-old male, with a history of CAD, COPD (on home O2), presented to the ED (02/04/2020), on account of progressively worsening shortness of breath and productive cough. Patient was noted to be hypoxic on presentation, with SPO2 in the 80s. Initial chest x-ray, reporting stable chronic lung changes, pulmonary hypoventilation with no acute infiltrate or evidence of CHF. CTA, reports no evidence of acute PE. However reported patchy nodular areas of infiltrates in the right upper lobe and middle lobe, likely infectious etiology. CTA further reported a new mediastinal and hilar lymphadenopathy (infectious versus neoplastic lymph node involvement) compared to previous study. Review of Systems:   Review of Systems  ROS: 14 point review of systems is negative except as specifically addressed above. DIET CARDIAC;     Intake/Output Summary (Last 24 hours) at 2/6/2020 0841  Last data filed at 2/5/2020 2108  Gross per 24 hour   Intake 730 ml   Output --   Net 730 ml       Medications:  Current Facility-Administered Medications   Medication Dose Route Frequency Provider Last Rate Last Dose    methylPREDNISolone sodium (SOLU-MEDROL) injection 60 mg  60 mg Intravenous Q8H Marlon Dover MD   60 mg at 02/06/20 0207    cefTRIAXone (ROCEPHIN) 1 g in sterile water 10 mL IV syringe  1 g Intravenous Q24H Marlon Dover MD   1 g at 02/05/20 1233    azithromycin (ZITHROMAX) 250 mg in dextrose 5 % 250 mL IVPB  250 mg Intravenous Q24H Marlon Dover MD   Stopped at 02/05/20 1409    sodium chloride flush 0.9 % injection 10 mL  10 mL Intravenous 2 times per day Marlon Dover MD   10 mL at 02/05/20 2108    sodium chloride flush 0.9 % injection 10 mL  10 mL Intravenous PRN Marlon Dover MD        enoxaparin (LOVENOX) injection 40 mg  40 mg Subcutaneous Q24H Marlon Dover MD   40 mg at 02/05/20 2108    polyethylene glycol (GLYCOLAX) packet 17 g  17 g Oral Daily PRN Marlon Dover MD        ipratropium-albuterol (DUONEB) nebulizer solution 1 ampule  1 ampule Inhalation Q4H PRN Marlon Dover MD        acetaminophen (TYLENOL) suppository 650 mg  650 mg Rectal Q4H PRN Marlon Dover MD        hydrALAZINE (APRESOLINE) tablet 25 mg  25 mg Oral Q8H PRN Marlon Dover MD        tiZANidine (ZANAFLEX) tablet 4 mg  4 mg Oral TID PRN Basia Valencia MD        timolol (TIMOPTIC) 0.5 % ophthalmic solution 1 drop  1 drop Both Eyes BID Basia Valencia MD   1 drop at 02/05/20 2107    oxyCODONE (ROXICODONE) immediate release tablet 15 mg  15 mg Oral 4x Daily PRN Basia Valencia MD   15 mg at 02/06/20 0440    pantoprazole (PROTONIX) tablet 40 mg  40 mg Oral QAM AC Basia Valencia MD   40 mg at 02/06/20 0700    busPIRone (BUSPAR) tablet 7.5 mg  7.5 mg Oral BID PRN Basia Valencia MD   7.5 mg at 02/04/20 8337    latanoprost (XALATAN) 0.005 % ophthalmic solution 1 drop  1 drop Both Eyes Nightly Shirley De La Paz BASOSABS 0.10 0.00       CMP/BMP:  Recent Labs     02/04/20  1500 02/04/20  1657 02/05/20  0440   *  --  133*   K 4.1 3.9 3.8   CL 90*  --  92*   CO2 31*  --  28   ANIONGAP 11  --  13   GLUCOSE 127*  --  175*   BUN 9  --  11   CREATININE 0.8  --  0.7   LABGLOM >60  --  >60   CALCIUM 9.0  --  9.1   PROT 9.1*  --  8.7   LABALBU 3.9  --  3.6   BILITOT 0.5  --  0.3   ALKPHOS 202*  --  185*   ALT 22  --  22   AST 19  --  20         CRP:  No results for input(s): CRP in the last 72 hours. Sed Rate:  No results for input(s): SEDRATE in the last 72 hours. HgBA1c:  No components found for: HGBA1C  FLP:  No results found for: TRIG, HDL, LDLCALC, LDLDIRECT, LABVLDL  TSH:  No results found for: TSH  Troponin T:   Recent Labs     02/04/20  1500   TROPONINI <0.01     Pro-BNP: No results for input(s): BNP in the last 72 hours. INR: No results for input(s): INR in the last 72 hours. ABGs:   Lab Results   Component Value Date    PHART 7.410 02/04/2020    PO2ART 46.0 02/04/2020    CTT3XNC 54.0 02/04/2020     UA:No results for input(s): NITRITE, COLORU, PHUR, LABCAST, WBCUA, RBCUA, MUCUS, TRICHOMONAS, YEAST, BACTERIA, CLARITYU, SPECGRAV, LEUKOCYTESUR, UROBILINOGEN, BILIRUBINUR, BLOODU, GLUCOSEU, AMORPHOUS in the last 72 hours. Invalid input(s): Mamta Kroner      Culture Results:    No results for input(s): CXSURG in the last 720 hours. Blood Culture Recent:   Recent Labs     02/04/20 1650   BC No Growth to date. Any change in status will be called. Cultures:   No results for input(s): CULTURE in the last 72 hours. Recent Labs     02/04/20  1650 02/04/20  1655   BC No Growth to date. Any change in status will be called. --    BLOODCULT2  --  No Growth to date. Any change in status will be called. No results for input(s): CXSURG in the last 72 hours.       Recent Labs     02/04/20  1500 02/05/20  0440   MG 1.7  --    PHOS  --  3.7     Recent Labs     02/04/20  1500 02/05/20  0440   AST 19 20   ALT 22 22 BILITOT 0.5 0.3   ALKPHOS 202* 185*         RAD:   Xr Chest Portable    Result Date: 2/4/2020  EXAMINATION: XR CHEST PORTABLE 2/4/2020 5:05 PM HISTORY: Cough. Report: A portable upright frontal view of the chest is compared with the chest x-ray 5/6/2019. The lungs are moderately hypoaerated, there is mild central basilar vascular crowding. There is mild interstitial prominence as before. No pulmonary consolidation is identified. Heart size is normal. Low-density interface at the right cardiophrenic angle most likely represents a prominent pericardial fat pad, this is stable. No pneumothorax or pleural effusion is identified. No acute osseous abnormality is seen. 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 Pulmonary hypoventilation with no acute infiltrates or evidence of CHF. Stable chronic lung changes. Signed by Dr Nikki Thurman. Tana on 2/4/2020 5:07 PM    Cta Pulmonary W Contrast    Result Date: 2/4/2020  EXAMINATION:  CTA PULMONARY W CONTRAST  2/4/2020 6:12 PM HISTORY: Hypoxia. Shortness of air. COMPARISON: No comparison study. DLP: 782 mGy-cm. Automated exposure control was utilized. TECHNIQUE: Spiral CT angiography was performed of the chest with contrast. Sagittal, coronal and 3-D images were reconstructed. MEDIASTINUM/VASCULAR: There is atheromatous disease of the thoracic aorta. There is no CT evidence of pulmonary embolus. The heart is normal in size. There is mediastinal lymphadenopathy. A subcarinal lymph node has a short axis diameter of 1.9 cm. Several other lymph nodes are noted in the mediastinum with a couple measuring up to about 1.1 cm in short axis diameter. There are small right hilar lymph nodes. LUNGS: A scarlike area in the right upper lobe anteriorly remains stable. There are some clustered nodules in the right upper lobe medially on images 37 through 39. There is also a clustering of right middle lobe nodules predominantly on image 42. There is paraseptal emphysema.  There is probable dependent atelectasis in the lung bases. BONES/SOFT TISSUES/: There are degenerative changes of the spine and shoulders. UPPER ABDOMEN: There is fatty infiltration of the liver. There is fatty infiltration of the pancreas. There is a probable large cyst arising from the upper pole left kidney measuring 5.5 cm.    1. Scarring in the right upper lobe anteriorly. This is stable. 2. Patchy nodular areas of infiltrate in the right upper lobe and right middle lobe likely representing infectious disease. 3. There is new mediastinal and hilar lymphadenopathy compared to the prior study. These findings are probably related to infection. Neoplastic lymph node involvement is not entirely ruled out. Follow-up recommended. 4. Fatty infiltration of the liver and pancreas. Probable benign cyst arising from the upper pole left kidney measuring 5.5 cm not fully characterized on this study. It appears relatively stable compared to 1/17/2019. The full report of this exam was immediately signed and available to the emergency room. The patient is currently in the emergency room. Signed by Dr Meri Calderon on 2/4/2020 6:18 PM          Objective:   Vitals: /68   Pulse 88   Temp 97 °F (36.1 °C) (Temporal)   Resp 20   Ht 5' 6\" (1.676 m)   Wt 240 lb (108.9 kg)   SpO2 90%   BMI 38.74 kg/m²   24HR INTAKE/OUTPUT:      Intake/Output Summary (Last 24 hours) at 2/6/2020 0841  Last data filed at 2/5/2020 2108  Gross per 24 hour   Intake 730 ml   Output --   Net 730 ml       Physical Exam  Vitals signs and nursing note reviewed. Constitutional:       General: He is not in acute distress. Appearance: Normal appearance. He is obese. He is not ill-appearing, toxic-appearing or diaphoretic. HENT:      Head: Normocephalic and atraumatic.       Right Ear: External ear normal.      Left Ear: External ear normal.      Nose: Nose normal.      Mouth/Throat:      Mouth: Mucous membranes are moist.      Pharynx: No oropharyngeal exudate or posterior oropharyngeal erythema. Eyes:      General: No scleral icterus. Right eye: No discharge. Left eye: No discharge. Extraocular Movements: Extraocular movements intact. Conjunctiva/sclera: Conjunctivae normal.      Pupils: Pupils are equal, round, and reactive to light. Neck:      Musculoskeletal: Normal range of motion and neck supple. No neck rigidity or muscular tenderness. Vascular: No carotid bruit. Cardiovascular:      Rate and Rhythm: Normal rate and regular rhythm. Pulses: Normal pulses. Heart sounds: Normal heart sounds. No murmur. No friction rub. No gallop. Pulmonary:      Effort: No respiratory distress. Breath sounds: No stridor. Rhonchi present. No wheezing. Comments: Patient with some increased work of breathing. Decreased breath sounds bilaterally  Chest:      Chest wall: No tenderness. Abdominal:      General: Bowel sounds are normal. There is no distension. Palpations: Abdomen is soft. Tenderness: There is no abdominal tenderness. Musculoskeletal: Normal range of motion. General: No swelling, tenderness, deformity or signs of injury. Right lower leg: No edema. Left lower leg: No edema. Skin:     General: Skin is warm and dry. Capillary Refill: Capillary refill takes less than 2 seconds. Coloration: Skin is not jaundiced or pale. Findings: No bruising, erythema or lesion. Neurological:      General: No focal deficit present. Mental Status: He is alert and oriented to person, place, and time. Cranial Nerves: No cranial nerve deficit. Sensory: No sensory deficit. Motor: No weakness. Coordination: Coordination normal.   Psychiatric:         Mood and Affect: Mood normal.         Behavior: Behavior normal.         Thought Content:  Thought content normal.         Judgment: Judgment normal.         Assessment/plan:           Principal Problem:    Acute on chronic respiratory failure with hypoxia and hypercapnia (HCC)  Active Problems:    COPD with acute exacerbation (HCC)    Leukocytosis (leucocytosis)    Hyperlipidemia    Chronic low back pain    Uncontrolled hypertension    Chronic GERD    History of esophageal stricture    Status post dilation of esophageal narrowing    Grade I diastolic dysfunction    Pneumonia    Obesity due to excess calories    Personal history of noncompliance with medical treatment, presenting hazards to health    Sepsis [facility])  Resolved Problems:    * No resolved hospital problems. *          Acute hypoxic and hypercarbic respiratory failure enthesis acute on chronic)  Pneumonia, due to on specified organism.  Presented to the shortness of breath and productive cough    Hypoxemic on initial presentation, with a PaO2 of 46 mmHg on initial ABG   Hypercarbic, with initial PCO2 of 54 mmHg on initial ABG.  ABG (02/04/2020): 7.41/54/46/34.2   Rapid influenza a and B antigen negative   proBNP within lab reference range   -  Afebrile   -  Leukocytosis (WBC: 23.7)   - Initial Chest x-ray reporting \"Pulmonary hypoventilation with no acute infiltrates or evidence of CHF. Stable chronic lung changes\"  · CTA - 02/04/2020: Scarring in the right upper lobe anteriorly. This is stable. Patchy nodular areas of infiltrate in the right upper lobe and right middle lobe likely representing infectious disease. There is new mediastinal and hilar lymphadenopathy compared to the prior study. These findings are probably related to infection. Neoplastic lymph node involvement is not entirely ruled out. Follow-up recommended. Fatty infiltration of the liver and pancreas. Probable benign cyst arising from the upper pole left kidney measuring 5.5 cm not fully characterized on this study. It appears relatively stable compared to 1/17/2019.    -Procalcitonin level   - Blood culture no growth to date   -Continue Ceftriaxone and Azithromycin   - Oxygen supplementation to keep SPO2 > 92%   - Bronchodilators as needed   - Dextromethorphan/Guaifenesin for symptomatic relief   - Acetaminophen 650 mg PO Q6H/PRN for fever   - Continue to monitor       History of COPD, with acute exacerbation  · - Patient appears to be with labored breathing  · - Requiring additional oxygen supplementation, from baseline, to keep SPO2 >89  · - Nebulized Bronchodilators scheduled and on as-needed basis. · - Continue Systemic Steroids    · --> Methylprednisolone (Solu-Medrol) 125 mg IV ×1 dose Initiated in the ED  · --> Continue Methylprednisolone (Solu-Medrol) 60 mg IV every 8 hours  · - IV antibiotics per practice guidelines. · - Supportive care and pulmonary toilet. · - Supplemental oxygen for respiratory failure. · F/u pending Blood Gases to monitor for hypercapnia and ensure adequate ventilation and if inadequate will plan noninvasive positive pressure ventilation trial and assess response to therapy. · - Continue to monitor          Sepsis   · SIRS 3/4 - afebrile, tachycardic up to HR of 123, tachypneic with RR of up to 27 leukocytosis. · Peak acid within lab reference range  · Source of infection as noted above. · Continue antibiotics        Continue management of other chronic medical conditions - see above and orders. Advance Directive: Full Code    DIET CARDIAC;     DVT prophylaxis: Enoxaparin    Consults Made:   PALLIATIVE CARE EVAL    Discharge planning: tbd    Time Spent is 35 mins in the examination, evaluation, counseling and review of medications, assessment and plan.      Electronically signed by   Analy Dominguez MD, MPH,   Internal Medicine Hospitalist   2/6/2020 8:41 AM

## 2020-02-07 LAB
ANION GAP SERPL CALCULATED.3IONS-SCNC: 11 MMOL/L (ref 7–19)
BASE EXCESS ARTERIAL: 11.1 MMOL/L (ref -2–2)
BASE EXCESS ARTERIAL: 7.6 MMOL/L (ref -2–2)
BASOPHILS ABSOLUTE: 0 K/UL (ref 0–0.2)
BASOPHILS RELATIVE PERCENT: 0.1 % (ref 0–1)
BUN BLDV-MCNC: 20 MG/DL (ref 8–23)
CALCIUM SERPL-MCNC: 8.7 MG/DL (ref 8.8–10.2)
CARBOXYHEMOGLOBIN ARTERIAL: 2.2 % (ref 0–5)
CARBOXYHEMOGLOBIN ARTERIAL: 2.2 % (ref 0–5)
CHLORIDE BLD-SCNC: 94 MMOL/L (ref 98–111)
CO2: 32 MMOL/L (ref 22–29)
CREAT SERPL-MCNC: 0.6 MG/DL (ref 0.5–1.2)
EOSINOPHILS ABSOLUTE: 0 K/UL (ref 0–0.6)
EOSINOPHILS RELATIVE PERCENT: 0 % (ref 0–5)
GFR NON-AFRICAN AMERICAN: >60
GLUCOSE BLD-MCNC: 162 MG/DL (ref 74–109)
HCO3 ARTERIAL: 34.7 MMOL/L (ref 22–26)
HCO3 ARTERIAL: 38.7 MMOL/L (ref 22–26)
HCT VFR BLD CALC: 36.8 % (ref 42–52)
HEMOGLOBIN, ART, EXTENDED: 11.1 G/DL (ref 14–18)
HEMOGLOBIN, ART, EXTENDED: 11.8 G/DL (ref 14–18)
HEMOGLOBIN: 11.3 G/DL (ref 14–18)
IMMATURE GRANULOCYTES #: 0.2 K/UL
LYMPHOCYTES ABSOLUTE: 0.9 K/UL (ref 1.1–4.5)
LYMPHOCYTES RELATIVE PERCENT: 4.4 % (ref 20–40)
MCH RBC QN AUTO: 28.3 PG (ref 27–31)
MCHC RBC AUTO-ENTMCNC: 30.7 G/DL (ref 33–37)
MCV RBC AUTO: 92.2 FL (ref 80–94)
METHEMOGLOBIN ARTERIAL: 0.9 %
METHEMOGLOBIN ARTERIAL: 1 %
MONOCYTES ABSOLUTE: 0.4 K/UL (ref 0–0.9)
MONOCYTES RELATIVE PERCENT: 2 % (ref 0–10)
NEUTROPHILS ABSOLUTE: 18.6 K/UL (ref 1.5–7.5)
NEUTROPHILS RELATIVE PERCENT: 92.4 % (ref 50–65)
O2 CONTENT ARTERIAL: 14.8 ML/DL
O2 CONTENT ARTERIAL: 15.5 ML/DL
O2 SAT, ARTERIAL: 93.2 %
O2 SAT, ARTERIAL: 94.5 %
O2 THERAPY: ABNORMAL
O2 THERAPY: ABNORMAL
PCO2 ARTERIAL: 60 MMHG (ref 35–45)
PCO2 ARTERIAL: 67 MMHG (ref 35–45)
PDW BLD-RTO: 14.7 % (ref 11.5–14.5)
PH ARTERIAL: 7.37 (ref 7.35–7.45)
PH ARTERIAL: 7.37 (ref 7.35–7.45)
PLATELET # BLD: 411 K/UL (ref 130–400)
PMV BLD AUTO: 8.3 FL (ref 9.4–12.4)
PO2 ARTERIAL: 70 MMHG (ref 80–100)
PO2 ARTERIAL: 75 MMHG (ref 80–100)
POTASSIUM SERPL-SCNC: 4.1 MMOL/L (ref 3.5–5)
POTASSIUM, WHOLE BLOOD: 3.7
POTASSIUM, WHOLE BLOOD: 4.1
RBC # BLD: 3.99 M/UL (ref 4.7–6.1)
SODIUM BLD-SCNC: 137 MMOL/L (ref 136–145)
WBC # BLD: 20.2 K/UL (ref 4.8–10.8)

## 2020-02-07 PROCEDURE — 6370000000 HC RX 637 (ALT 250 FOR IP): Performed by: HOSPITALIST

## 2020-02-07 PROCEDURE — 36415 COLL VENOUS BLD VENIPUNCTURE: CPT

## 2020-02-07 PROCEDURE — 2580000003 HC RX 258: Performed by: HOSPITALIST

## 2020-02-07 PROCEDURE — 1210000000 HC MED SURG R&B

## 2020-02-07 PROCEDURE — 2700000000 HC OXYGEN THERAPY PER DAY

## 2020-02-07 PROCEDURE — 84132 ASSAY OF SERUM POTASSIUM: CPT

## 2020-02-07 PROCEDURE — 6360000002 HC RX W HCPCS: Performed by: HOSPITALIST

## 2020-02-07 PROCEDURE — 36600 WITHDRAWAL OF ARTERIAL BLOOD: CPT

## 2020-02-07 PROCEDURE — 85025 COMPLETE CBC W/AUTO DIFF WBC: CPT

## 2020-02-07 PROCEDURE — 94640 AIRWAY INHALATION TREATMENT: CPT

## 2020-02-07 PROCEDURE — 80048 BASIC METABOLIC PNL TOTAL CA: CPT

## 2020-02-07 PROCEDURE — 82803 BLOOD GASES ANY COMBINATION: CPT

## 2020-02-07 RX ADMIN — TIMOLOL MALEATE 1 DROP: 5 SOLUTION OPHTHALMIC at 20:00

## 2020-02-07 RX ADMIN — OXYCODONE HYDROCHLORIDE 15 MG: 5 TABLET ORAL at 22:43

## 2020-02-07 RX ADMIN — CEFTRIAXONE 1 G: 1 INJECTION, POWDER, FOR SOLUTION INTRAMUSCULAR; INTRAVENOUS at 14:32

## 2020-02-07 RX ADMIN — IPRATROPIUM BROMIDE 0.5 MG: 0.5 SOLUTION RESPIRATORY (INHALATION) at 20:55

## 2020-02-07 RX ADMIN — METHYLPREDNISOLONE SODIUM SUCCINATE 60 MG: 125 INJECTION, POWDER, FOR SOLUTION INTRAMUSCULAR; INTRAVENOUS at 01:39

## 2020-02-07 RX ADMIN — OXYCODONE HYDROCHLORIDE 15 MG: 5 TABLET ORAL at 04:23

## 2020-02-07 RX ADMIN — AZITHROMYCIN MONOHYDRATE 250 MG: 500 INJECTION, POWDER, LYOPHILIZED, FOR SOLUTION INTRAVENOUS at 14:25

## 2020-02-07 RX ADMIN — IPRATROPIUM BROMIDE 0.5 MG: 0.5 SOLUTION RESPIRATORY (INHALATION) at 07:43

## 2020-02-07 RX ADMIN — PANTOPRAZOLE SODIUM 40 MG: 40 TABLET, DELAYED RELEASE ORAL at 06:03

## 2020-02-07 RX ADMIN — ATORVASTATIN CALCIUM 40 MG: 20 TABLET, FILM COATED ORAL at 20:00

## 2020-02-07 RX ADMIN — ASPIRIN 81 MG: 81 TABLET, COATED ORAL at 09:22

## 2020-02-07 RX ADMIN — SODIUM CHLORIDE, PRESERVATIVE FREE 5 ML: 5 INJECTION INTRAVENOUS at 01:39

## 2020-02-07 RX ADMIN — AMLODIPINE BESYLATE 5 MG: 5 TABLET ORAL at 09:22

## 2020-02-07 RX ADMIN — OXYCODONE HYDROCHLORIDE 15 MG: 5 TABLET ORAL at 17:42

## 2020-02-07 RX ADMIN — IPRATROPIUM BROMIDE 0.5 MG: 0.5 SOLUTION RESPIRATORY (INHALATION) at 15:29

## 2020-02-07 RX ADMIN — ENOXAPARIN SODIUM 40 MG: 40 INJECTION SUBCUTANEOUS at 20:00

## 2020-02-07 RX ADMIN — OXYCODONE HYDROCHLORIDE 15 MG: 5 TABLET ORAL at 10:35

## 2020-02-07 RX ADMIN — LATANOPROST 1 DROP: 50 SOLUTION OPHTHALMIC at 20:00

## 2020-02-07 RX ADMIN — METHYLPREDNISOLONE SODIUM SUCCINATE 60 MG: 125 INJECTION, POWDER, FOR SOLUTION INTRAMUSCULAR; INTRAVENOUS at 09:23

## 2020-02-07 RX ADMIN — LISINOPRIL 20 MG: 20 TABLET ORAL at 09:22

## 2020-02-07 RX ADMIN — SODIUM CHLORIDE, PRESERVATIVE FREE 10 ML: 5 INJECTION INTRAVENOUS at 09:23

## 2020-02-07 RX ADMIN — TIMOLOL MALEATE 1 DROP: 5 SOLUTION OPHTHALMIC at 09:22

## 2020-02-07 RX ADMIN — SODIUM CHLORIDE, PRESERVATIVE FREE 10 ML: 5 INJECTION INTRAVENOUS at 20:00

## 2020-02-07 RX ADMIN — METHYLPREDNISOLONE SODIUM SUCCINATE 60 MG: 125 INJECTION, POWDER, FOR SOLUTION INTRAMUSCULAR; INTRAVENOUS at 17:42

## 2020-02-07 RX ADMIN — IPRATROPIUM BROMIDE 0.5 MG: 0.5 SOLUTION RESPIRATORY (INHALATION) at 11:04

## 2020-02-07 RX ADMIN — AMITRIPTYLINE HYDROCHLORIDE 50 MG: 50 TABLET, FILM COATED ORAL at 20:00

## 2020-02-07 ASSESSMENT — PAIN DESCRIPTION - PAIN TYPE: TYPE: CHRONIC PAIN

## 2020-02-07 ASSESSMENT — PAIN SCALES - GENERAL
PAINLEVEL_OUTOF10: 8
PAINLEVEL_OUTOF10: 4
PAINLEVEL_OUTOF10: 2
PAINLEVEL_OUTOF10: 8

## 2020-02-07 ASSESSMENT — PAIN DESCRIPTION - FREQUENCY: FREQUENCY: CONTINUOUS

## 2020-02-07 ASSESSMENT — PAIN DESCRIPTION - ORIENTATION: ORIENTATION: LOWER

## 2020-02-07 ASSESSMENT — PAIN DESCRIPTION - DESCRIPTORS: DESCRIPTORS: ACHING

## 2020-02-07 ASSESSMENT — PAIN DESCRIPTION - PROGRESSION: CLINICAL_PROGRESSION: NOT CHANGED

## 2020-02-07 ASSESSMENT — PAIN - FUNCTIONAL ASSESSMENT: PAIN_FUNCTIONAL_ASSESSMENT: ACTIVITIES ARE NOT PREVENTED

## 2020-02-07 ASSESSMENT — PAIN DESCRIPTION - LOCATION: LOCATION: BACK

## 2020-02-07 ASSESSMENT — PAIN DESCRIPTION - ONSET: ONSET: ON-GOING

## 2020-02-07 NOTE — PLAN OF CARE
Problem: Falls - Risk of:  Goal: Will remain free from falls  Description  Will remain free from falls  Outcome: Ongoing  Goal: Absence of physical injury  Description  Absence of physical injury  Outcome: Ongoing     Problem: Breathing Pattern - Ineffective:  Goal: Ability to achieve and maintain a regular respiratory rate will improve  Description  Ability to achieve and maintain a regular respiratory rate will improve  Outcome: Ongoing     Problem: Pain:  Goal: Pain level will decrease  Description  Pain level will decrease  Outcome: Ongoing  Goal: Control of acute pain  Description  Control of acute pain  Outcome: Ongoing  Goal: Control of chronic pain  Description  Control of chronic pain  Outcome: Ongoing     Problem: Discharge Planning:  Goal: Discharged to appropriate level of care  Description  Discharged to appropriate level of care  Outcome: Ongoing     Problem:  Activity Intolerance:  Goal: Ability to tolerate increased activity will improve  Description  Ability to tolerate increased activity will improve  Outcome: Ongoing     Problem: Airway Clearance - Ineffective:  Goal: Ability to maintain a clear airway will improve  Description  Ability to maintain a clear airway will improve  Outcome: Ongoing     Problem: Breathing Pattern - Ineffective:  Goal: Ability to achieve and maintain a regular respiratory rate will improve  Description  Ability to achieve and maintain a regular respiratory rate will improve  Outcome: Ongoing     Problem: Gas Exchange - Impaired:  Goal: Levels of oxygenation will improve  Description  Levels of oxygenation will improve  Outcome: Ongoing

## 2020-02-07 NOTE — PROGRESS NOTES
acute infiltrate or evidence of CHF. CTA, reports no evidence of acute PE. However reported patchy nodular areas of infiltrates in the right upper lobe and middle lobe, likely infectious etiology. CTA further reported a new mediastinal and hilar lymphadenopathy (infectious versus neoplastic lymph node involvement) compared to previous study. Review of Systems:   Review of Systems  ROS: 14 point review of systems is negative except as specifically addressed above. DIET CARDIAC;     Intake/Output Summary (Last 24 hours) at 2/7/2020 0759  Last data filed at 2/6/2020 1427  Gross per 24 hour   Intake 820 ml   Output --   Net 820 ml       Medications:  Current Facility-Administered Medications   Medication Dose Route Frequency Provider Last Rate Last Dose    methylPREDNISolone sodium (SOLU-MEDROL) injection 60 mg  60 mg Intravenous Q8H Marlon Dover MD   60 mg at 02/07/20 0139    cefTRIAXone (ROCEPHIN) 1 g in sterile water 10 mL IV syringe  1 g Intravenous Q24H Marlno Dover MD   1 g at 02/06/20 1419    azithromycin (ZITHROMAX) 250 mg in dextrose 5 % 250 mL IVPB  250 mg Intravenous Q24H Marlon Dover MD   Stopped at 02/06/20 1520    sodium chloride flush 0.9 % injection 10 mL  10 mL Intravenous 2 times per day Marlon Dover MD   10 mL at 02/06/20 2024    sodium chloride flush 0.9 % injection 10 mL  10 mL Intravenous PRN Marlon Dover MD   5 mL at 02/07/20 0139    enoxaparin (LOVENOX) injection 40 mg  40 mg Subcutaneous Q24H Marlon Dover MD   40 mg at 02/06/20 2020    polyethylene glycol (GLYCOLAX) packet 17 g  17 g Oral Daily PRN Marlon Dover MD        ipratropium-albuterol (DUONEB) nebulizer solution 1 ampule  1 ampule Inhalation Q4H PRN Marlon Dover MD        acetaminophen (TYLENOL) suppository 650 mg  650 mg Rectal Q4H PRN Marlon Dover MD        hydrALAZINE (APRESOLINE) tablet 25 mg  25 mg Oral Q8H PRN Marlon Dover MD        tiZANidine (ZANAFLEX) tablet 4 mg  4 mg Oral TID PRN Ismael Cruz MD  timolol (TIMOPTIC) 0.5 % ophthalmic solution 1 drop  1 drop Both Eyes BID Chucky Nelson MD   1 drop at 02/06/20 2020    oxyCODONE (ROXICODONE) immediate release tablet 15 mg  15 mg Oral 4x Daily PRN Chucky Nelson MD   15 mg at 02/07/20 0423    pantoprazole (PROTONIX) tablet 40 mg  40 mg Oral QAM AC Chucky Nelson MD   40 mg at 02/07/20 0603    busPIRone (BUSPAR) tablet 7.5 mg  7.5 mg Oral BID PRN Chucky Nelson MD   7.5 mg at 02/04/20 2217    latanoprost (XALATAN) 0.005 % ophthalmic solution 1 drop  1 drop Both Eyes Nightly Chucky Nelson MD   1 drop at 02/06/20 2021    aspirin EC tablet 81 mg  81 mg Oral Daily Chucky Nelson MD   81 mg at 02/06/20 7909    atorvastatin (LIPITOR) tablet 40 mg  40 mg Oral Nightly Chucky Nelson MD   40 mg at 02/06/20 2020    amitriptyline (ELAVIL) tablet 50 mg  50 mg Oral Nightly Chucky Nelson MD   50 mg at 02/06/20 2020    albuterol (PROVENTIL) nebulizer solution 2.5 mg  2.5 mg Nebulization Q4H PRN Chucky Nelson MD        amLODIPine (NORVASC) tablet 5 mg  5 mg Oral Daily Chucky Nelson MD   5 mg at 02/06/20 6994    lisinopril (PRINIVIL;ZESTRIL) tablet 20 mg  20 mg Oral Daily Chucky Nelson MD   20 mg at 02/06/20 2404    ipratropium (ATROVENT) 0.02 % nebulizer solution 0.5 mg  0.5 mg Nebulization 4x daily Chucky Nelson MD   0.5 mg at 02/07/20 0743           methylPREDNISolone  60 mg Intravenous Q8H    cefTRIAXone (ROCEPHIN) IV  1 g Intravenous Q24H    azithromycin  250 mg Intravenous Q24H    sodium chloride flush  10 mL Intravenous 2 times per day    enoxaparin  40 mg Subcutaneous Q24H    timolol  1 drop Both Eyes BID    pantoprazole  40 mg Oral QAM AC    latanoprost  1 drop Both Eyes Nightly    aspirin  81 mg Oral Daily    atorvastatin  40 mg Oral Nightly    amitriptyline  50 mg Oral Nightly    amLODIPine  5 mg Oral Daily    lisinopril  20 mg Oral Daily    ipratropium  0.5 mg Nebulization 4x daily     sodium chloride flush, polyethylene glycol, ipratropium-albuterol, acetaminophen, hydrALAZINE, tiZANidine, oxyCODONE, busPIRone, albuterol  DIET CARDIAC;       Labs:   CBC with DIFF:  Recent Labs     02/05/20  0440 02/06/20  1302 02/07/20  0513   WBC 22.3* 24.4* 20.2*   RBC 3.99* 3.92* 3.99*   HGB 11.1* 10.9* 11.3*   HCT 36.4* 36.5* 36.8*   MCV 91.2 93.1 92.2   MCH 27.8 27.8 28.3   MCHC 30.5* 29.9* 30.7*   RDW 14.7* 14.8* 14.7*    412* 411*   MPV 8.4* 8.5* 8.3*   NEUTOPHILPCT 93.5* 90.6* 92.4*   LYMPHOPCT 4.1* 3.6* 4.4*   MONOPCT 1.2 4.8 2.0   EOSRELPCT 0.0 0.0 0.0   BASOPCT 0.1 0.1 0.1   NEUTROABS 20.8* 22.1* 18.6*   LYMPHSABS 0.9* 0.9* 0.9*   MONOSABS 0.30 1.20* 0.40   EOSABS 0.00 0.00 0.00   BASOSABS 0.00 0.00 0.00       CMP/BMP:  Recent Labs     02/04/20  1500  02/05/20  0440 02/06/20  1302 02/06/20  1325 02/07/20  0513   *  --  133* 135*  --  137   K 4.1   < > 3.8 3.7 4.3 4.1   CL 90*  --  92* 93*  --  94*   CO2 31*  --  28 32*  --  32*   ANIONGAP 11  --  13 10  --  11   GLUCOSE 127*  --  175* 115*  --  162*   BUN 9  --  11 19  --  20   CREATININE 0.8  --  0.7 0.6  --  0.6   LABGLOM >60  --  >60 >60  --  >60   CALCIUM 9.0  --  9.1 9.2  --  8.7*   PROT 9.1*  --  8.7  --   --   --    LABALBU 3.9  --  3.6  --   --   --    BILITOT 0.5  --  0.3  --   --   --    ALKPHOS 202*  --  185*  --   --   --    ALT 22  --  22  --   --   --    AST 19  --  20  --   --   --     < > = values in this interval not displayed. CRP:  No results for input(s): CRP in the last 72 hours. Sed Rate:  No results for input(s): SEDRATE in the last 72 hours. HgBA1c:  No components found for: HGBA1C  FLP:  No results found for: TRIG, HDL, LDLCALC, LDLDIRECT, LABVLDL  TSH:  No results found for: TSH  Troponin T:   Recent Labs     02/04/20  1500   TROPONINI <0.01     Pro-BNP: No results for input(s): BNP in the last 72 hours. INR: No results for input(s): INR in the last 72 hours.   ABGs:   Lab Results   Component Value Date    PHART 7.380 02/06/2020    PO2ART 60.0 02/06/2020    OWC0GUN 63.0 02/06/2020     UA:No results for input(s): NITRITE, COLORU, PHUR, LABCAST, WBCUA, RBCUA, MUCUS, TRICHOMONAS, YEAST, BACTERIA, CLARITYU, SPECGRAV, LEUKOCYTESUR, UROBILINOGEN, BILIRUBINUR, BLOODU, GLUCOSEU, AMORPHOUS in the last 72 hours. Invalid input(s): Jane Vo      Culture Results:    No results for input(s): CXSURG in the last 720 hours. Blood Culture Recent:   Recent Labs     02/04/20  1650   BC No Growth to date. Any change in status will be called. Cultures:   No results for input(s): CULTURE in the last 72 hours. Recent Labs     02/04/20  1650 02/04/20  1655   BC No Growth to date. Any change in status will be called. --    BLOODCULT2  --  No Growth to date. Any change in status will be called. No results for input(s): CXSURG in the last 72 hours. Recent Labs     02/04/20  1500 02/05/20  0440 02/06/20  1302   MG 1.7  --  2.4   PHOS  --  3.7 3.1     Recent Labs     02/04/20  1500 02/05/20  0440   AST 19 20   ALT 22 22   BILITOT 0.5 0.3   ALKPHOS 202* 185*         RAD:   Xr Chest Portable    Result Date: 2/4/2020  EXAMINATION: XR CHEST PORTABLE 2/4/2020 5:05 PM HISTORY: Cough. Report: A portable upright frontal view of the chest is compared with the chest x-ray 5/6/2019. The lungs are moderately hypoaerated, there is mild central basilar vascular crowding. There is mild interstitial prominence as before. No pulmonary consolidation is identified. Heart size is normal. Low-density interface at the right cardiophrenic angle most likely represents a prominent pericardial fat pad, this is stable. No pneumothorax or pleural effusion is identified. No acute osseous abnormality is seen. 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 Pulmonary hypoventilation with no acute infiltrates or evidence of CHF. Stable chronic lung changes. Signed by Dr Dilip Fajardo on 2/4/2020 5:07 PM    Cta Pulmonary W Contrast    Result Date: 2/4/2020  EXAMINATION:  CTA PULMONARY W CONTRAST  2/4/2020 6:12 PM HISTORY: Hypoxia. Shortness of air. COMPARISON: No comparison study. DLP: 568 mGy-cm. Automated exposure control was utilized. TECHNIQUE: Spiral CT angiography was performed of the chest with contrast. Sagittal, coronal and 3-D images were reconstructed. MEDIASTINUM/VASCULAR: There is atheromatous disease of the thoracic aorta. There is no CT evidence of pulmonary embolus. The heart is normal in size. There is mediastinal lymphadenopathy. A subcarinal lymph node has a short axis diameter of 1.9 cm. Several other lymph nodes are noted in the mediastinum with a couple measuring up to about 1.1 cm in short axis diameter. There are small right hilar lymph nodes. LUNGS: A scarlike area in the right upper lobe anteriorly remains stable. There are some clustered nodules in the right upper lobe medially on images 37 through 39. There is also a clustering of right middle lobe nodules predominantly on image 42. There is paraseptal emphysema. There is probable dependent atelectasis in the lung bases. BONES/SOFT TISSUES/: There are degenerative changes of the spine and shoulders. UPPER ABDOMEN: There is fatty infiltration of the liver. There is fatty infiltration of the pancreas. There is a probable large cyst arising from the upper pole left kidney measuring 5.5 cm.    1. Scarring in the right upper lobe anteriorly. This is stable. 2. Patchy nodular areas of infiltrate in the right upper lobe and right middle lobe likely representing infectious disease. 3. There is new mediastinal and hilar lymphadenopathy compared to the prior study. These findings are probably related to infection. Neoplastic lymph node involvement is not entirely ruled out. Follow-up recommended. 4. Fatty infiltration of the liver and pancreas. Probable benign cyst arising from the upper pole left kidney measuring 5.5 cm not fully characterized on this study. It appears relatively stable compared to 1/17/2019.  The full report of Tenderness: There is no abdominal tenderness. Musculoskeletal: Normal range of motion. General: No swelling, tenderness, deformity or signs of injury. Right lower leg: No edema. Left lower leg: No edema. Skin:     General: Skin is warm and dry. Capillary Refill: Capillary refill takes less than 2 seconds. Coloration: Skin is not jaundiced or pale. Findings: No bruising, erythema or lesion. Neurological:      General: No focal deficit present. Mental Status: He is alert and oriented to person, place, and time. Cranial Nerves: No cranial nerve deficit. Sensory: No sensory deficit. Motor: No weakness. Coordination: Coordination normal.   Psychiatric:         Mood and Affect: Mood normal.         Behavior: Behavior normal.         Thought Content: Thought content normal.         Judgment: Judgment normal.         Assessment/plan:           Principal Problem:    Acute on chronic respiratory failure with hypoxia and hypercapnia (HCC)  Active Problems:    COPD with acute exacerbation (HCC)    Leukocytosis (leucocytosis)    Hyperlipidemia    Chronic low back pain    Uncontrolled hypertension    Chronic GERD    History of esophageal stricture    Status post dilation of esophageal narrowing    Grade I diastolic dysfunction    Pneumonia    Obesity due to excess calories    Personal history of noncompliance with medical treatment, presenting hazards to health    Sepsis [facility])  Resolved Problems:    * No resolved hospital problems. *          Acute hypoxic and hypercarbic respiratory failure enthesis acute on chronic)  Pneumonia, due to on specified organism.  Presented to the shortness of breath and productive cough    Hypoxemic on initial presentation, with a PaO2 of 46 mmHg on initial ABG   Hypercarbic, with initial PCO2 of 54 mmHg on initial ABG.    Rapid influenza a and B antigen negative   proBNP within lab reference range   -  Afebrile   -  Leukocytosis (WBC: 23.7)   - Initial Chest x-ray reporting \"Pulmonary hypoventilation with no acute infiltrates or evidence of CHF. Stable chronic lung changes\"  · CTA - 02/04/2020: Scarring in the right upper lobe anteriorly. This is stable. Patchy nodular areas of infiltrate in the right upper lobe and right middle lobe likely representing infectious disease. There is new mediastinal and hilar lymphadenopathy compared to the prior study. These findings are probably related to infection. Neoplastic lymph node involvement is not entirely ruled out. Follow-up recommended. Fatty infiltration of the liver and pancreas. Probable benign cyst arising from the upper pole left kidney measuring 5.5 cm not fully characterized on this study. It appears relatively stable compared to 1/17/2019.  -Procalcitonin level pending   - Blood culture no growth to date   -Continue Ceftriaxone and Azithromycin   - Oxygen supplementation to keep SPO2 > 92%   -Continue Bronchodilators   - Dextromethorphan/Guaifenesin for symptomatic relief   - Acetaminophen 650 mg PO Q6H/PRN for fever   - Continue to monitor       History of COPD, with acute exacerbation  · - Patient appears to be with labored breathing  · - Requiring additional oxygen supplementation, from baseline, to keep SPO2 >89  · -Continue Nebulized Bronchodilators scheduled and on as-needed basis. · - Continue Systemic Steroids    · --> Methylprednisolone (Solu-Medrol) 125 mg IV ×1 dose Initiated in the ED  · --> Continue Methylprednisolone (Solu-Medrol) 60 mg IV every 8 hours  · - IV antibiotics per practice guidelines. · - Supportive care and pulmonary toilet. · - Supplemental oxygen for respiratory failure.   · Worsening Hypercapnia:   · ABG (02/04/2020): 7.41/54/46/34.2  · ABG (02/06/2020): 7.38/63/60/37.3   · F/u pending Blood Gases to monitor for hypercapnia and ensure adequate ventilation and if inadequate will plan noninvasive positive pressure ventilation trial and assess

## 2020-02-07 NOTE — CARE COORDINATION
Given this pt's readmission risk score being greater than 10% (current 17%), consider the initiation of Home Care services. If you feel appropriate, please order at discharge. Thank you.     Electronically signed by Jenny Florez on 2/7/2020 at 10:45 AM

## 2020-02-07 NOTE — CARE COORDINATION
[facility],[address]     2020    Patient: Buddy Charles Patient : 1955   MRN: [id number]  Reason for Admission:   RARS: Readmission Risk Score: 16         Spoke with: Buddy Charles and sister      Readmission Risk  Patient Active Problem List   Diagnosis    COPD with acute exacerbation ([facility] [facility]    Acute on chronic respiratory failure with hypoxia and hypercapnia (HCC)    Leukocytosis (leucocytosis)    Hyperlipidemia    Chronic low back pain    Acute exacerbation of chronic obstructive pulmonary disease (COPD) ([facility] [facility]    Uncontrolled hypertension    Pneumonia due to organism    Lower esophageal ring ([place])    Gastritis without bleeding    Chronic GERD    History of esophageal stricture    Status post dilation of esophageal narrowing    Right lower lobe pneumonia (HCC)    Bilateral hearing loss    Palliative care patient    Hyperglycemia    Community acquired pneumonia of right lower lobe of lung ([facility] [facility]    Grade I diastolic dysfunction    Pneumonia    Obesity due to excess calories    Personal history of noncompliance with medical treatment, presenting hazards to health    Sepsis [facility])       Inpatient Assessment  Care Transitions Summary    Care Transitions Inpatient Review  Medication Review  Are you able to afford your medications?:  Yes  How often do you have difficulty taking your medications?:  I always take them as prescribed. Housing Review  Who do you live with?:  Partner/Spouse/SO  Are you an active caregiver in your home?:  No  Social Support  Do you have a ?:  No  Do you have a [address] [place] [address]?:  No  Durable Medical Equipment  Patient DME:  Galo Harris, Oliverio cane  Patient Home Equipment:  Oxygen  Functional Review  Ability to seek help/take action for Emergent/Urgent situations i.e. fire, crime, inclement weather or health crisis. :  Independent  Ability handle personal hygiene needs (bathing/dressing/grooming): Independent  Ability to manage medications: Independent  Ability to prepare food:  Independent  Ability to maintain home (clean home, laundry):  Needs Assistance  Ability to drive and/or has transportation:  Independent  Ability to do shopping:  Independent  Ability to manage finances: Independent  Is patient able to live independently?:  Yes  Hearing and Vision  Visual Impairment:  Visual impairment (Glasses/contacts)  Hearing Impairment:  Hard of hearing  Care Transitions Interventions                                 Follow Up : Met at bedside with  Dmitry Ehsanofelia, discussed BPCI-A process and presented the CMS Beneficiary Letter. Contact information given. Angela Pearce is agreeable with appropriate follow up after discharge. Patient states he lives at home with his wife. He does wear home oxygen and has several items of DME. He is independent of ADL's, medications, transportation and finances. He has an adult mentally challenged son that he and his wife care for in the home. He says sometimes he has difficulty obtaining medications, especially his inhalers. Did  regarding [facility] [place], presented pamphlet, and discussed cash pay pricing for generic medications. He says he is hard of hearing, does have hearing aides, but does not wear them. Will follow as inpatient and upon discharge, as indicated. No future appointments. Health Maintenance  There are no preventive care reminders to display for this patient.     Lorena Medrano RN

## 2020-02-07 NOTE — PROGRESS NOTES
Results for Fran Kasper (MRN [id number]) as of 2/7/2020 08:28   Ref.  Range 2/7/2020 08:26   Hemoglobin, Art, Extended Latest Ref Range: 14.0 - 18.0 g/dL 11.1 (L)   pH, Arterial Latest Ref Range: 7.350 - 7.450  7.370   pCO2, Arterial Latest Ref Range: 35.0 - 45.0 mmHg 67.0 (H)   pO2, Arterial Latest Ref Range: 80.0 - 100.0 mmHg 75.0 (L)   HCO3, Arterial Latest Ref Range: 22.0 - 26.0 mmol/L 38.7 (H)   Base Excess, Arterial Latest Ref Range: -2.0 - 2.0 mmol/L 11.1 (H)   O2 Sat, Arterial Latest Ref Range: >92 % 94.5   O2 Content, Arterial Latest Ref Range: Not Established mL/dL 14.8   Methemoglobin, Arterial Latest Ref Range: <1.5 % 0.9   Carboxyhgb, Arterial Latest Ref Range: 0.0 - 5.0 % 2.2   AT +  NC @ 3 LPM  RR  Resting  RR16

## 2020-02-08 LAB
ANION GAP SERPL CALCULATED.3IONS-SCNC: 9 MMOL/L (ref 7–19)
BASOPHILS ABSOLUTE: 0 K/UL (ref 0–0.2)
BASOPHILS RELATIVE PERCENT: 0.2 % (ref 0–1)
BUN BLDV-MCNC: 22 MG/DL (ref 8–23)
CALCIUM SERPL-MCNC: 8.3 MG/DL (ref 8.8–10.2)
CHLORIDE BLD-SCNC: 94 MMOL/L (ref 98–111)
CO2: 35 MMOL/L (ref 22–29)
CREAT SERPL-MCNC: 0.7 MG/DL (ref 0.5–1.2)
EOSINOPHILS ABSOLUTE: 0 K/UL (ref 0–0.6)
EOSINOPHILS RELATIVE PERCENT: 0.1 % (ref 0–5)
GFR NON-AFRICAN AMERICAN: >60
GLUCOSE BLD-MCNC: 149 MG/DL (ref 74–109)
HCT VFR BLD CALC: 37.8 % (ref 42–52)
HEMOGLOBIN: 11.3 G/DL (ref 14–18)
IMMATURE GRANULOCYTES #: 0.2 K/UL
LYMPHOCYTES ABSOLUTE: 1.3 K/UL (ref 1.1–4.5)
LYMPHOCYTES RELATIVE PERCENT: 7.3 % (ref 20–40)
MCH RBC QN AUTO: 27.6 PG (ref 27–31)
MCHC RBC AUTO-ENTMCNC: 29.9 G/DL (ref 33–37)
MCV RBC AUTO: 92.4 FL (ref 80–94)
MONOCYTES ABSOLUTE: 0.6 K/UL (ref 0–0.9)
MONOCYTES RELATIVE PERCENT: 3.7 % (ref 0–10)
NEUTROPHILS ABSOLUTE: 15.2 K/UL (ref 1.5–7.5)
NEUTROPHILS RELATIVE PERCENT: 87.7 % (ref 50–65)
PDW BLD-RTO: 14.4 % (ref 11.5–14.5)
PLATELET # BLD: 418 K/UL (ref 130–400)
PMV BLD AUTO: 8.2 FL (ref 9.4–12.4)
POTASSIUM SERPL-SCNC: 4.4 MMOL/L (ref 3.5–5)
PROCALCITONIN: 0.12 NG/ML
RBC # BLD: 4.09 M/UL (ref 4.7–6.1)
SODIUM BLD-SCNC: 138 MMOL/L (ref 136–145)
WBC # BLD: 17.3 K/UL (ref 4.8–10.8)

## 2020-02-08 PROCEDURE — 2700000000 HC OXYGEN THERAPY PER DAY

## 2020-02-08 PROCEDURE — 2580000003 HC RX 258: Performed by: HOSPITALIST

## 2020-02-08 PROCEDURE — 80048 BASIC METABOLIC PNL TOTAL CA: CPT

## 2020-02-08 PROCEDURE — 6360000002 HC RX W HCPCS: Performed by: HOSPITALIST

## 2020-02-08 PROCEDURE — 1210000000 HC MED SURG R&B

## 2020-02-08 PROCEDURE — 85025 COMPLETE CBC W/AUTO DIFF WBC: CPT

## 2020-02-08 PROCEDURE — 94640 AIRWAY INHALATION TREATMENT: CPT

## 2020-02-08 PROCEDURE — 6370000000 HC RX 637 (ALT 250 FOR IP): Performed by: HOSPITALIST

## 2020-02-08 PROCEDURE — 36415 COLL VENOUS BLD VENIPUNCTURE: CPT

## 2020-02-08 RX ORDER — METHYLPREDNISOLONE SODIUM SUCCINATE 125 MG/2ML
60 INJECTION, POWDER, LYOPHILIZED, FOR SOLUTION INTRAMUSCULAR; INTRAVENOUS EVERY 12 HOURS
Status: DISCONTINUED | OUTPATIENT
Start: 2020-02-09 | End: 2020-02-09 | Stop reason: HOSPADM

## 2020-02-08 RX ADMIN — OXYCODONE HYDROCHLORIDE 15 MG: 5 TABLET ORAL at 23:41

## 2020-02-08 RX ADMIN — LATANOPROST 1 DROP: 50 SOLUTION OPHTHALMIC at 21:15

## 2020-02-08 RX ADMIN — ENOXAPARIN SODIUM 40 MG: 40 INJECTION SUBCUTANEOUS at 20:14

## 2020-02-08 RX ADMIN — OXYCODONE HYDROCHLORIDE 15 MG: 5 TABLET ORAL at 05:57

## 2020-02-08 RX ADMIN — ATORVASTATIN CALCIUM 40 MG: 20 TABLET, FILM COATED ORAL at 20:14

## 2020-02-08 RX ADMIN — AZITHROMYCIN MONOHYDRATE 250 MG: 500 INJECTION, POWDER, LYOPHILIZED, FOR SOLUTION INTRAVENOUS at 13:34

## 2020-02-08 RX ADMIN — SODIUM CHLORIDE, PRESERVATIVE FREE 10 ML: 5 INJECTION INTRAVENOUS at 08:55

## 2020-02-08 RX ADMIN — ASPIRIN 81 MG: 81 TABLET, COATED ORAL at 08:55

## 2020-02-08 RX ADMIN — AMLODIPINE BESYLATE 5 MG: 5 TABLET ORAL at 08:55

## 2020-02-08 RX ADMIN — TIMOLOL MALEATE 1 DROP: 5 SOLUTION OPHTHALMIC at 08:55

## 2020-02-08 RX ADMIN — PANTOPRAZOLE SODIUM 40 MG: 40 TABLET, DELAYED RELEASE ORAL at 05:57

## 2020-02-08 RX ADMIN — IPRATROPIUM BROMIDE 0.5 MG: 0.5 SOLUTION RESPIRATORY (INHALATION) at 15:03

## 2020-02-08 RX ADMIN — METHYLPREDNISOLONE SODIUM SUCCINATE 60 MG: 125 INJECTION, POWDER, FOR SOLUTION INTRAMUSCULAR; INTRAVENOUS at 05:58

## 2020-02-08 RX ADMIN — AMITRIPTYLINE HYDROCHLORIDE 50 MG: 50 TABLET, FILM COATED ORAL at 20:14

## 2020-02-08 RX ADMIN — METHYLPREDNISOLONE SODIUM SUCCINATE 60 MG: 125 INJECTION, POWDER, FOR SOLUTION INTRAMUSCULAR; INTRAVENOUS at 13:34

## 2020-02-08 RX ADMIN — CEFTRIAXONE 1 G: 1 INJECTION, POWDER, FOR SOLUTION INTRAMUSCULAR; INTRAVENOUS at 13:34

## 2020-02-08 RX ADMIN — IPRATROPIUM BROMIDE 0.5 MG: 0.5 SOLUTION RESPIRATORY (INHALATION) at 07:16

## 2020-02-08 RX ADMIN — LISINOPRIL 20 MG: 20 TABLET ORAL at 08:55

## 2020-02-08 RX ADMIN — OXYCODONE HYDROCHLORIDE 15 MG: 5 TABLET ORAL at 12:51

## 2020-02-08 RX ADMIN — OXYCODONE HYDROCHLORIDE 15 MG: 5 TABLET ORAL at 19:13

## 2020-02-08 RX ADMIN — IPRATROPIUM BROMIDE 0.5 MG: 0.5 SOLUTION RESPIRATORY (INHALATION) at 19:16

## 2020-02-08 RX ADMIN — IPRATROPIUM BROMIDE 0.5 MG: 0.5 SOLUTION RESPIRATORY (INHALATION) at 11:53

## 2020-02-08 RX ADMIN — TIMOLOL MALEATE 1 DROP: 5 SOLUTION OPHTHALMIC at 20:14

## 2020-02-08 ASSESSMENT — PAIN SCALES - GENERAL
PAINLEVEL_OUTOF10: 2
PAINLEVEL_OUTOF10: 0
PAINLEVEL_OUTOF10: 9
PAINLEVEL_OUTOF10: 7
PAINLEVEL_OUTOF10: 0
PAINLEVEL_OUTOF10: 8
PAINLEVEL_OUTOF10: 7

## 2020-02-08 ASSESSMENT — PAIN DESCRIPTION - DESCRIPTORS: DESCRIPTORS: ACHING;CONSTANT

## 2020-02-08 ASSESSMENT — PAIN DESCRIPTION - LOCATION: LOCATION: BACK

## 2020-02-08 ASSESSMENT — PAIN - FUNCTIONAL ASSESSMENT: PAIN_FUNCTIONAL_ASSESSMENT: PREVENTS OR INTERFERES SOME ACTIVE ACTIVITIES AND ADLS

## 2020-02-08 ASSESSMENT — PAIN DESCRIPTION - PROGRESSION: CLINICAL_PROGRESSION: NOT CHANGED

## 2020-02-08 ASSESSMENT — PAIN DESCRIPTION - ONSET: ONSET: ON-GOING

## 2020-02-08 ASSESSMENT — PAIN DESCRIPTION - PAIN TYPE: TYPE: CHRONIC PAIN

## 2020-02-08 ASSESSMENT — PAIN DESCRIPTION - ORIENTATION: ORIENTATION: LOWER

## 2020-02-08 ASSESSMENT — PAIN DESCRIPTION - FREQUENCY: FREQUENCY: CONTINUOUS

## 2020-02-08 NOTE — PLAN OF CARE
Problem: Falls - Risk of:  Goal: Will remain free from falls  Description  Will remain free from falls  2/8/2020 0905 by Andria Le RN  Outcome: Ongoing  2/8/2020 0007 by Favian Contreras RN  Outcome: Ongoing  Goal: Absence of physical injury  Description  Absence of physical injury  2/8/2020 0905 by Andria Le RN  Outcome: Ongoing  2/8/2020 0007 by Favian Contreras RN  Outcome: Ongoing     Problem: Breathing Pattern - Ineffective:  Goal: Ability to achieve and maintain a regular respiratory rate will improve  Description  Ability to achieve and maintain a regular respiratory rate will improve  2/8/2020 0905 by Andria Le RN  Outcome: Ongoing  2/8/2020 0007 by Favian Contreras RN  Outcome: Ongoing     Problem: Pain:  Goal: Pain level will decrease  Description  Pain level will decrease  2/8/2020 0905 by Andria Le RN  Outcome: Ongoing  2/8/2020 0007 by Favian Contreras RN  Outcome: Ongoing  Goal: Control of acute pain  Description  Control of acute pain  2/8/2020 0905 by Andria Le RN  Outcome: Ongoing  2/8/2020 0007 by Favian Contreras RN  Outcome: Ongoing  Goal: Control of chronic pain  Description  Control of chronic pain  2/8/2020 0905 by Andria Le RN  Outcome: Ongoing  2/8/2020 0007 by Favian Contreras RN  Outcome: Ongoing     Problem: Discharge Planning:  Goal: Discharged to appropriate level of care  Description  Discharged to appropriate level of care  2/8/2020 0905 by Andria Le RN  Outcome: Ongoing  2/8/2020 0007 by Favian Contreras RN  Outcome: Ongoing     Problem:  Activity Intolerance:  Goal: Ability to tolerate increased activity will improve  Description  Ability to tolerate increased activity will improve  2/8/2020 0905 by Andria Le RN  Outcome: Ongoing  2/8/2020 0007 by Favian Contreras RN  Outcome: Ongoing     Problem: Airway Clearance - Ineffective:  Goal: Ability to maintain a clear airway will improve  Description  Ability to

## 2020-02-08 NOTE — PROGRESS NOTES
Ref Range & Units 02/07/20 2050   pH, Arterial 7.350 - 7.450 7.370    pCO2, Arterial 35.0 - 45.0 mmHg 60.0High     pO2, Arterial 80.0 - 100.0 mmHg 70. 0Low     HCO3, Arterial 22.0 - 26.0 mmol/L 34.7High     Base Excess, Arterial -2.0 - 2.0 mmol/L 7.6High     Hemoglobin, Art, Extended 14.0 - 18.0 g/dL 11.8Low     O2 Sat, Arterial >92 % 93.2    Carboxyhgb, Arterial 0.0 - 5.0 % 2.2    Comment:      0.0-1.5   (Smokers 1.5-5.0)    Methemoglobin, Arterial <1.5 % 1.0    O2 Content, Arterial Not Established mL/dL 15.5      3 LPM NC  RR 18  MARK TEST +  SITE RR

## 2020-02-09 VITALS
RESPIRATION RATE: 18 BRPM | HEART RATE: 77 BPM | TEMPERATURE: 96.8 F | DIASTOLIC BLOOD PRESSURE: 78 MMHG | HEIGHT: 66 IN | WEIGHT: 221.3 LBS | OXYGEN SATURATION: 94 % | SYSTOLIC BLOOD PRESSURE: 141 MMHG | BODY MASS INDEX: 35.57 KG/M2

## 2020-02-09 PROBLEM — A41.9 SEPSIS (HCC): Status: RESOLVED | Noted: 2020-02-05 | Resolved: 2020-02-09

## 2020-02-09 LAB
ANION GAP SERPL CALCULATED.3IONS-SCNC: 7 MMOL/L (ref 7–19)
BASOPHILS ABSOLUTE: 0 K/UL (ref 0–0.2)
BASOPHILS RELATIVE PERCENT: 0.2 % (ref 0–1)
BLOOD CULTURE, ROUTINE: NORMAL
BUN BLDV-MCNC: 22 MG/DL (ref 8–23)
CALCIUM SERPL-MCNC: 8.2 MG/DL (ref 8.8–10.2)
CHLORIDE BLD-SCNC: 95 MMOL/L (ref 98–111)
CO2: 37 MMOL/L (ref 22–29)
CREAT SERPL-MCNC: 0.7 MG/DL (ref 0.5–1.2)
CULTURE, BLOOD 2: NORMAL
EOSINOPHILS ABSOLUTE: 0 K/UL (ref 0–0.6)
EOSINOPHILS RELATIVE PERCENT: 0.1 % (ref 0–5)
GFR NON-AFRICAN AMERICAN: >60
GLUCOSE BLD-MCNC: 117 MG/DL (ref 74–109)
HCT VFR BLD CALC: 35.6 % (ref 42–52)
HEMOGLOBIN: 10.7 G/DL (ref 14–18)
IMMATURE GRANULOCYTES #: 0.2 K/UL
LYMPHOCYTES ABSOLUTE: 2.2 K/UL (ref 1.1–4.5)
LYMPHOCYTES RELATIVE PERCENT: 11.6 % (ref 20–40)
MCH RBC QN AUTO: 27.8 PG (ref 27–31)
MCHC RBC AUTO-ENTMCNC: 30.1 G/DL (ref 33–37)
MCV RBC AUTO: 92.5 FL (ref 80–94)
MONOCYTES ABSOLUTE: 1.1 K/UL (ref 0–0.9)
MONOCYTES RELATIVE PERCENT: 5.8 % (ref 0–10)
NEUTROPHILS ABSOLUTE: 15.3 K/UL (ref 1.5–7.5)
NEUTROPHILS RELATIVE PERCENT: 81.2 % (ref 50–65)
PDW BLD-RTO: 14.3 % (ref 11.5–14.5)
PLATELET # BLD: 377 K/UL (ref 130–400)
PMV BLD AUTO: 8.2 FL (ref 9.4–12.4)
POTASSIUM SERPL-SCNC: 4.1 MMOL/L (ref 3.5–5)
RBC # BLD: 3.85 M/UL (ref 4.7–6.1)
SODIUM BLD-SCNC: 139 MMOL/L (ref 136–145)
WBC # BLD: 18.9 K/UL (ref 4.8–10.8)

## 2020-02-09 PROCEDURE — 94640 AIRWAY INHALATION TREATMENT: CPT

## 2020-02-09 PROCEDURE — 6360000002 HC RX W HCPCS: Performed by: HOSPITALIST

## 2020-02-09 PROCEDURE — 36415 COLL VENOUS BLD VENIPUNCTURE: CPT

## 2020-02-09 PROCEDURE — 2700000000 HC OXYGEN THERAPY PER DAY

## 2020-02-09 PROCEDURE — 6370000000 HC RX 637 (ALT 250 FOR IP): Performed by: HOSPITALIST

## 2020-02-09 PROCEDURE — 2580000003 HC RX 258: Performed by: HOSPITALIST

## 2020-02-09 PROCEDURE — 80048 BASIC METABOLIC PNL TOTAL CA: CPT

## 2020-02-09 PROCEDURE — 85025 COMPLETE CBC W/AUTO DIFF WBC: CPT

## 2020-02-09 RX ORDER — PREDNISONE 10 MG/1
20 TABLET ORAL DAILY
Qty: 6 TABLET | Refills: 0 | Status: SHIPPED | OUTPATIENT
Start: 2020-02-09 | End: 2020-02-12

## 2020-02-09 RX ORDER — PREDNISONE 20 MG/1
40 TABLET ORAL DAILY
Qty: 8 TABLET | Refills: 0 | Status: SHIPPED | OUTPATIENT
Start: 2020-02-09 | End: 2020-02-13

## 2020-02-09 RX ORDER — PREDNISONE 10 MG/1
10 TABLET ORAL DAILY
Qty: 3 TABLET | Refills: 0 | Status: SHIPPED | OUTPATIENT
Start: 2020-02-09 | End: 2020-02-12

## 2020-02-09 RX ORDER — PREDNISONE 1 MG/1
5 TABLET ORAL DAILY
Qty: 3 TABLET | Refills: 0 | Status: SHIPPED | OUTPATIENT
Start: 2020-02-09 | End: 2020-02-12

## 2020-02-09 RX ORDER — AZITHROMYCIN 500 MG/1
500 TABLET, FILM COATED ORAL DAILY
Qty: 3 TABLET | Refills: 0 | Status: SHIPPED | OUTPATIENT
Start: 2020-02-09 | End: 2020-02-12

## 2020-02-09 RX ADMIN — IPRATROPIUM BROMIDE 0.5 MG: 0.5 SOLUTION RESPIRATORY (INHALATION) at 06:55

## 2020-02-09 RX ADMIN — TIMOLOL MALEATE 1 DROP: 5 SOLUTION OPHTHALMIC at 10:32

## 2020-02-09 RX ADMIN — OXYCODONE HYDROCHLORIDE 15 MG: 5 TABLET ORAL at 11:39

## 2020-02-09 RX ADMIN — ASPIRIN 81 MG: 81 TABLET, COATED ORAL at 08:45

## 2020-02-09 RX ADMIN — AMLODIPINE BESYLATE 5 MG: 5 TABLET ORAL at 08:45

## 2020-02-09 RX ADMIN — PANTOPRAZOLE SODIUM 40 MG: 40 TABLET, DELAYED RELEASE ORAL at 06:19

## 2020-02-09 RX ADMIN — SODIUM CHLORIDE, PRESERVATIVE FREE 10 ML: 5 INJECTION INTRAVENOUS at 10:32

## 2020-02-09 RX ADMIN — LISINOPRIL 20 MG: 20 TABLET ORAL at 08:45

## 2020-02-09 RX ADMIN — IPRATROPIUM BROMIDE 0.5 MG: 0.5 SOLUTION RESPIRATORY (INHALATION) at 10:56

## 2020-02-09 RX ADMIN — OXYCODONE HYDROCHLORIDE 15 MG: 5 TABLET ORAL at 06:19

## 2020-02-09 ASSESSMENT — PAIN SCALES - GENERAL
PAINLEVEL_OUTOF10: 7
PAINLEVEL_OUTOF10: 0
PAINLEVEL_OUTOF10: 8

## 2020-02-09 NOTE — DISCHARGE SUMMARY
Tristan Anguiano  :  1955  MRN:  [id number]    Admit date:  2020  Discharge date:   2020       Admitting Physician:  Claudette Bajwa MD    Advance Directive: Full Code    Consults Made:   PALLIATIVE CARE EVAL  IP CONSULT TO HOME CARE NEEDS      Primary Care Physician:  Bakari Motley MD    Discharge Diagnoses:  Principal Problem:    Acute on chronic respiratory failure with hypoxia and hypercapnia (HCC)  Active Problems:    COPD with acute exacerbation (HCC)    Leukocytosis (leucocytosis)    Hyperlipidemia    Chronic low back pain    Uncontrolled hypertension    Chronic GERD    History of esophageal stricture    Status post dilation of esophageal narrowing    Grade I diastolic dysfunction    Pneumonia    Obesity due to excess calories    Personal history of noncompliance with medical treatment, presenting hazards to health  Resolved Problems:    Sepsis [facility])            Pertinent Labs:  CBC with DIFF:  Recent Labs     20   WBC 20.2* 17.3* 18.9*   RBC 3.99* 4.09* 3.85*   HGB 11.3* 11.3* 10.7*   HCT 36.8* 37.8* 35.6*   MCV 92.2 92.4 92.5   MCH 28.3 27.6 27.8   MCHC 30.7* 29.9* 30.1*   RDW 14.7* 14.4 14.3   * 418* 377   MPV 8.3* 8.2* 8.2*   NEUTOPHILPCT 92.4* 87.7* 81.2*   LYMPHOPCT 4.4* 7.3* 11.6*   MONOPCT 2.0 3.7 5.8   EOSRELPCT 0.0 0.1 0.1   BASOPCT 0.1 0.2 0.2   NEUTROABS 18.6* 15.2* 15.3*   LYMPHSABS 0.9* 1.3 2.2   MONOSABS 0.40 0.60 1.10*   EOSABS 0.00 0.00 0.00   BASOSABS 0.00 0.00 0.00       CMP/BMP:  Recent Labs     20  0820     --   --  138 139   K 4.1   < > 3.7 4.4 4.1   CL 94*  --   --  94* 95*   CO2 32*  --   --  35* 37*   ANIONGAP 11  --   --  9 7   GLUCOSE 162*  --   --  149* 117*   BUN 20  --   --  22 22   CREATININE 0.6  --   --  0.7 0.7   LABGLOM >60  --   --  >60 >60   CALCIUM 8.7*  --   --  8.3* 8.2*    < > = values in this interval not displayed.          CRP:  No results for pleural effusion is identified. No acute osseous abnormality is seen. 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 Pulmonary hypoventilation with no acute infiltrates or evidence of CHF. Stable chronic lung changes. Signed by Dr Saul Talley. Tana on 2/4/2020 5:07 PM    Cta Pulmonary W Contrast    Result Date: 2/4/2020  EXAMINATION:  CTA PULMONARY W CONTRAST  2/4/2020 6:12 PM HISTORY: Hypoxia. Shortness of air. COMPARISON: No comparison study. DLP: 799 mGy-cm. Automated exposure control was utilized. TECHNIQUE: Spiral CT angiography was performed of the chest with contrast. Sagittal, coronal and 3-D images were reconstructed. MEDIASTINUM/VASCULAR: There is atheromatous disease of the thoracic aorta. There is no CT evidence of pulmonary embolus. The heart is normal in size. There is mediastinal lymphadenopathy. A subcarinal lymph node has a short axis diameter of 1.9 cm. Several other lymph nodes are noted in the mediastinum with a couple measuring up to about 1.1 cm in short axis diameter. There are small right hilar lymph nodes. LUNGS: A scarlike area in the right upper lobe anteriorly remains stable. There are some clustered nodules in the right upper lobe medially on images 37 through 39. There is also a clustering of right middle lobe nodules predominantly on image 42. There is paraseptal emphysema. There is probable dependent atelectasis in the lung bases. BONES/SOFT TISSUES/: There are degenerative changes of the spine and shoulders. UPPER ABDOMEN: There is fatty infiltration of the liver. There is fatty infiltration of the pancreas. There is a probable large cyst arising from the upper pole left kidney measuring 5.5 cm.    1. Scarring in the right upper lobe anteriorly. This is stable. 2. Patchy nodular areas of infiltrate in the right upper lobe and right middle lobe likely representing infectious disease. 3. There is new mediastinal and hilar lymphadenopathy compared to the prior study.  These findings are nodular areas of infiltrate in the right upper lobe and right middle lobe likely representing infectious disease. There is new mediastinal and hilar lymphadenopathy compared to the prior study. These findings are probably related to infection. Neoplastic lymph node involvement is not entirely ruled out. Follow-up recommended. Fatty infiltration of the liver and pancreas. Probable benign cyst arising from the upper pole left kidney measuring 5.5 cm not fully characterized on this study. It appears relatively stable compared to 1/17/2019. · As per radiology, repeat CTA with an 8-week, to reevaluate the new reported mediastinal and hilar lymphadenopathy, after completion of treatment of pneumonia.  Mildly elevated Procalcitonin level: 0.12 <=0.07 ng/mL   - Blood culture no growth to date   -Initially started empirically on Ceftriaxone and Azithromycin (02/05/2020)   - Oxygen supplementation to keep SPO2 > 92%   -Continued Bronchodilators   -Now with baseline home O2 requirement.  Discussion with patient and family at bedside about radiology recommendation of repeating CTA within 8 weeks. History of COPD, with acute exacerbation  · - Patient appears to be with labored breathing  · - Requiring additional oxygen supplementation, from baseline, to keep SPO2 >89  · -Continued Nebulized Bronchodilators scheduled and on as-needed basis. · - Continued Systemic Steroids    · --> Methylprednisolone (Solu-Medrol) 125 mg IV ×1 dose Initiated in the ED  · --> Continued Methylprednisolone (Solu-Medrol)   · Patient to be discharged home on prednisone taper  · - Continued to monitor      Sepsis - resolved  · SIRS 3/4 - afebrile, tachycardic up to HR of 123, tachypneic with RR of up to 27 leukocytosis. · Lactic acid within lab reference range  · Source of infection as noted above.   · Continued antibiotics        Continued management of other chronic medical conditions          Physical Exam:  Vital Signs: BP (!) Much of this encounter note is an electronic transcription/translation of spoken language to printed text.  The electronic translation of spoken language may permit erroneous, or at times, nonsensical words or phrases to be inadvertently transcribed; although attempts have made to review the note for such errors, some may still exist.

## 2020-02-09 NOTE — CARE COORDINATION
Bradly Thompson will require the following home care treatments or therapies. Pt/ot . Home care will be necessary because of sepsis .   The patient is in agreement to receiving [facility]

## 2020-02-10 ENCOUNTER — CARE COORDINATION (OUTPATIENT)
Dept: CASE MANAGEMENT | Age: 65
End: 2020-02-10

## 2020-02-10 NOTE — CARE COORDINATION
Kevyn 45 Transitions Initial Follow Up Call    Call within 2 business days of discharge: Yes    Patient: Jethro Sanchez Patient : 1955   MRN: [id number]  Reason for Admission: acute hypoxemic respiratory failure  Discharge Date: 20 RARS: Readmission Risk Score: 17      Last Discharge [facility]       Complaint Diagnosis Description Type Department Provider    20 Shortness of Breath Acute hypoxemic respiratory failure ([address] . .. ED to Hosp-Admission (Discharged) (ADMITTED) [facility] ON[facility] Stella Byers MD; Haley Murillo. .. Spoke with: Kev Dillard. 192: [address]    Non-face-to-face services provided:  Reviewed encounter information for continuity of care prior to follow up phone call - chart notes, consults, progress notes, test results, med list, appointments, AVS, other information. Care Transitions 24 Hour Call    Do you have any ongoing symptoms?:  No  Do you have a copy of your discharge instructions?:  Yes  Do you have all of your prescriptions and are they filled?:  Yes  Have you been contacted by a [address]?:  No  Have you scheduled your follow up appointment?:  Yes  How are you going to get to your appointment?:  Car - family or friend to transport  Were you discharged with any Home Care or Post Acute Services:  Yes  Post Acute Services:  [address] [place] [address] Ham Wiley  Patient DME:  Marcy boss  Patient Home Equipment:  Oxygen  Do you have support at home?:  Partner/Spouse/SO  Do you feel like you have everything you need to keep you well at home?:  Yes  Are you an active caregiver in your home?:  Yes  Care Transitions Interventions                                 Follow Up : Spoke with patient today for initial follow up after discharge. He says he is doing pretty good since getting home [address] has not been out yet, but he just got home yesterday. He says he has a bit of cough, but non productive.  He was able to get his medications and reviewed them today. Says he is eating and drinking good. He has not made his follow up appointment yet, did advise him to do so, and that he needed a hospital follow up within 7 business days with his primary care provider. He sates he understands. He does wear home oxygen, no problems with that. No issues with bowels or bladder. Encouraged him to call with any issues or problems prn. Will follow up with patient at a later time . No future appointments.     Lorena Medrano RN

## 2020-02-14 ENCOUNTER — CARE COORDINATION (OUTPATIENT)
Dept: CASE MANAGEMENT | Age: 65
End: 2020-02-14

## 2020-02-19 ENCOUNTER — CARE COORDINATION (OUTPATIENT)
Dept: CASE MANAGEMENT | Age: 65
End: 2020-02-19

## 2020-03-04 ENCOUNTER — CARE COORDINATION (OUTPATIENT)
Dept: CASE MANAGEMENT | Age: 65
End: 2020-03-04

## 2020-03-10 ENCOUNTER — APPOINTMENT (OUTPATIENT)
Dept: GENERAL RADIOLOGY | Age: 65
DRG: 193 | End: 2020-03-10
Payer: MEDICARE

## 2020-03-10 ENCOUNTER — HOSPITAL ENCOUNTER (INPATIENT)
Age: 65
LOS: 5 days | Discharge: HOME | DRG: 193 | End: 2020-03-15
Attending: EMERGENCY MEDICINE | Admitting: HOSPITALIST
Payer: MEDICARE

## 2020-03-10 PROBLEM — D72.810 LYMPHOPENIA: Status: ACTIVE | Noted: 2020-03-10

## 2020-03-10 PROBLEM — J18.9 HCAP (HEALTHCARE-ASSOCIATED PNEUMONIA): Status: ACTIVE | Noted: 2020-03-10

## 2020-03-10 LAB
ALBUMIN SERPL-MCNC: 3.8 G/DL (ref 3.5–5.2)
ALP BLD-CCNC: 192 U/L (ref 40–130)
ALT SERPL-CCNC: 19 U/L (ref 5–41)
ANION GAP SERPL CALCULATED.3IONS-SCNC: 12 MMOL/L (ref 7–19)
APTT: 26.6 SEC (ref 26–36.2)
AST SERPL-CCNC: 20 U/L (ref 5–40)
BASE EXCESS ARTERIAL: 9.3 MMOL/L (ref -2–2)
BASOPHILS ABSOLUTE: 0.1 K/UL (ref 0–0.2)
BASOPHILS RELATIVE PERCENT: 0.2 % (ref 0–1)
BILIRUB SERPL-MCNC: 0.4 MG/DL (ref 0.2–1.2)
BILIRUBIN URINE: NEGATIVE
BLOOD, URINE: NEGATIVE
BUN BLDV-MCNC: 8 MG/DL (ref 8–23)
CALCIUM SERPL-MCNC: 9.2 MG/DL (ref 8.8–10.2)
CARBOXYHEMOGLOBIN ARTERIAL: 2.5 % (ref 0–5)
CHLORIDE BLD-SCNC: 90 MMOL/L (ref 98–111)
CLARITY: CLEAR
CO2: 30 MMOL/L (ref 22–29)
COLOR: YELLOW
CREAT SERPL-MCNC: 0.7 MG/DL (ref 0.5–1.2)
EOSINOPHILS ABSOLUTE: 0 K/UL (ref 0–0.6)
EOSINOPHILS RELATIVE PERCENT: 0.1 % (ref 0–5)
GFR NON-AFRICAN AMERICAN: >60
GLUCOSE BLD-MCNC: 128 MG/DL (ref 74–109)
GLUCOSE BLD-MCNC: 250 MG/DL (ref 70–99)
GLUCOSE URINE: NEGATIVE MG/DL
HCO3 ARTERIAL: 35.9 MMOL/L (ref 22–26)
HCT VFR BLD CALC: 38.6 % (ref 42–52)
HEMOGLOBIN, ART, EXTENDED: 11.8 G/DL (ref 14–18)
HEMOGLOBIN: 11.9 G/DL (ref 14–18)
IMMATURE GRANULOCYTES #: 0.2 K/UL
INR BLD: 0.98 (ref 0.88–1.18)
KETONES, URINE: NEGATIVE MG/DL
LACTIC ACID, SEPSIS: 1.6 MG/DL (ref 0.5–1.9)
LACTIC ACID, SEPSIS: 3.6 MG/DL (ref 0.5–1.9)
LEUKOCYTE ESTERASE, URINE: NEGATIVE
LYMPHOCYTES ABSOLUTE: 1.9 K/UL (ref 1.1–4.5)
LYMPHOCYTES RELATIVE PERCENT: 7.7 % (ref 20–40)
MCH RBC QN AUTO: 27.6 PG (ref 27–31)
MCHC RBC AUTO-ENTMCNC: 30.8 G/DL (ref 33–37)
MCV RBC AUTO: 89.6 FL (ref 80–94)
METHEMOGLOBIN ARTERIAL: 1.2 %
MONOCYTES ABSOLUTE: 1.2 K/UL (ref 0–0.9)
MONOCYTES RELATIVE PERCENT: 4.8 % (ref 0–10)
NEUTROPHILS ABSOLUTE: 20.9 K/UL (ref 1.5–7.5)
NEUTROPHILS RELATIVE PERCENT: 86.5 % (ref 50–65)
NITRITE, URINE: NEGATIVE
O2 CONTENT ARTERIAL: 14.6 ML/DL
O2 SAT, ARTERIAL: 88.2 %
O2 THERAPY: ABNORMAL
PCO2 ARTERIAL: 58 MMHG (ref 35–45)
PDW BLD-RTO: 14.5 % (ref 11.5–14.5)
PERFORMED ON: ABNORMAL
PH ARTERIAL: 7.4 (ref 7.35–7.45)
PH UA: 7 (ref 5–8)
PLATELET # BLD: 337 K/UL (ref 130–400)
PMV BLD AUTO: 8.2 FL (ref 9.4–12.4)
PO2 ARTERIAL: 52 MMHG (ref 80–100)
POTASSIUM REFLEX MAGNESIUM: 4.5 MMOL/L (ref 3.5–5)
POTASSIUM, WHOLE BLOOD: 4.4
PRO-BNP: 99 PG/ML (ref 0–900)
PROTEIN UA: NEGATIVE MG/DL
PROTHROMBIN TIME: 12.9 SEC (ref 12–14.6)
RBC # BLD: 4.31 M/UL (ref 4.7–6.1)
SODIUM BLD-SCNC: 132 MMOL/L (ref 136–145)
SPECIFIC GRAVITY UA: 1.01 (ref 1–1.03)
TOTAL PROTEIN: 8.2 G/DL (ref 6.6–8.7)
TROPONIN: <0.01 NG/ML (ref 0–0.03)
UROBILINOGEN, URINE: 1 E.U./DL
WBC # BLD: 24.1 K/UL (ref 4.8–10.8)

## 2020-03-10 PROCEDURE — 93005 ELECTROCARDIOGRAM TRACING: CPT | Performed by: EMERGENCY MEDICINE

## 2020-03-10 PROCEDURE — 6360000002 HC RX W HCPCS: Performed by: EMERGENCY MEDICINE

## 2020-03-10 PROCEDURE — 84484 ASSAY OF TROPONIN QUANT: CPT

## 2020-03-10 PROCEDURE — 82803 BLOOD GASES ANY COMBINATION: CPT

## 2020-03-10 PROCEDURE — 2580000003 HC RX 258: Performed by: PHYSICIAN ASSISTANT

## 2020-03-10 PROCEDURE — 71046 X-RAY EXAM CHEST 2 VIEWS: CPT

## 2020-03-10 PROCEDURE — 85730 THROMBOPLASTIN TIME PARTIAL: CPT

## 2020-03-10 PROCEDURE — 83605 ASSAY OF LACTIC ACID: CPT

## 2020-03-10 PROCEDURE — 2700000000 HC OXYGEN THERAPY PER DAY

## 2020-03-10 PROCEDURE — 6370000000 HC RX 637 (ALT 250 FOR IP): Performed by: PHYSICIAN ASSISTANT

## 2020-03-10 PROCEDURE — 81003 URINALYSIS AUTO W/O SCOPE: CPT

## 2020-03-10 PROCEDURE — 36415 COLL VENOUS BLD VENIPUNCTURE: CPT

## 2020-03-10 PROCEDURE — 6360000002 HC RX W HCPCS: Performed by: HOSPITALIST

## 2020-03-10 PROCEDURE — 87040 BLOOD CULTURE FOR BACTERIA: CPT

## 2020-03-10 PROCEDURE — 82947 ASSAY GLUCOSE BLOOD QUANT: CPT

## 2020-03-10 PROCEDURE — 96374 THER/PROPH/DIAG INJ IV PUSH: CPT

## 2020-03-10 PROCEDURE — 1210000000 HC MED SURG R&B

## 2020-03-10 PROCEDURE — 94640 AIRWAY INHALATION TREATMENT: CPT

## 2020-03-10 PROCEDURE — 2580000003 HC RX 258: Performed by: HOSPITALIST

## 2020-03-10 PROCEDURE — 2580000003 HC RX 258: Performed by: EMERGENCY MEDICINE

## 2020-03-10 PROCEDURE — 2500000003 HC RX 250 WO HCPCS: Performed by: PHYSICIAN ASSISTANT

## 2020-03-10 PROCEDURE — 85610 PROTHROMBIN TIME: CPT

## 2020-03-10 PROCEDURE — 80053 COMPREHEN METABOLIC PANEL: CPT

## 2020-03-10 PROCEDURE — 6370000000 HC RX 637 (ALT 250 FOR IP): Performed by: EMERGENCY MEDICINE

## 2020-03-10 PROCEDURE — 87086 URINE CULTURE/COLONY COUNT: CPT

## 2020-03-10 PROCEDURE — 85025 COMPLETE CBC W/AUTO DIFF WBC: CPT

## 2020-03-10 PROCEDURE — 84132 ASSAY OF SERUM POTASSIUM: CPT

## 2020-03-10 PROCEDURE — 6370000000 HC RX 637 (ALT 250 FOR IP): Performed by: HOSPITALIST

## 2020-03-10 PROCEDURE — 36600 WITHDRAWAL OF ARTERIAL BLOOD: CPT

## 2020-03-10 PROCEDURE — 99285 EMERGENCY DEPT VISIT HI MDM: CPT

## 2020-03-10 RX ORDER — GUAIFENESIN 600 MG/1
1200 TABLET, EXTENDED RELEASE ORAL 2 TIMES DAILY
Status: DISCONTINUED | OUTPATIENT
Start: 2020-03-10 | End: 2020-03-15 | Stop reason: HOSPADM

## 2020-03-10 RX ORDER — ATORVASTATIN CALCIUM 20 MG/1
40 TABLET, FILM COATED ORAL DAILY
Status: DISCONTINUED | OUTPATIENT
Start: 2020-03-11 | End: 2020-03-15 | Stop reason: HOSPADM

## 2020-03-10 RX ORDER — ASPIRIN 81 MG/1
81 TABLET ORAL DAILY
Status: DISCONTINUED | OUTPATIENT
Start: 2020-03-11 | End: 2020-03-15 | Stop reason: HOSPADM

## 2020-03-10 RX ORDER — LISINOPRIL 20 MG/1
40 TABLET ORAL DAILY
Status: DISCONTINUED | OUTPATIENT
Start: 2020-03-11 | End: 2020-03-15 | Stop reason: HOSPADM

## 2020-03-10 RX ORDER — IPRATROPIUM BROMIDE AND ALBUTEROL SULFATE 2.5; .5 MG/3ML; MG/3ML
1 SOLUTION RESPIRATORY (INHALATION)
Status: DISCONTINUED | OUTPATIENT
Start: 2020-03-10 | End: 2020-03-10

## 2020-03-10 RX ORDER — DEXTROSE MONOHYDRATE 50 MG/ML
100 INJECTION, SOLUTION INTRAVENOUS PRN
Status: DISCONTINUED | OUTPATIENT
Start: 2020-03-10 | End: 2020-03-15 | Stop reason: HOSPADM

## 2020-03-10 RX ORDER — METHYLPREDNISOLONE SODIUM SUCCINATE 125 MG/2ML
60 INJECTION, POWDER, LYOPHILIZED, FOR SOLUTION INTRAMUSCULAR; INTRAVENOUS EVERY 8 HOURS
Status: DISCONTINUED | OUTPATIENT
Start: 2020-03-10 | End: 2020-03-13

## 2020-03-10 RX ORDER — FUROSEMIDE 10 MG/ML
20 INJECTION INTRAMUSCULAR; INTRAVENOUS ONCE
Status: DISCONTINUED | OUTPATIENT
Start: 2020-03-10 | End: 2020-03-10

## 2020-03-10 RX ORDER — PANTOPRAZOLE SODIUM 40 MG/1
40 TABLET, DELAYED RELEASE ORAL
Status: DISCONTINUED | OUTPATIENT
Start: 2020-03-11 | End: 2020-03-15 | Stop reason: HOSPADM

## 2020-03-10 RX ORDER — IPRATROPIUM BROMIDE AND ALBUTEROL SULFATE 2.5; .5 MG/3ML; MG/3ML
SOLUTION RESPIRATORY (INHALATION)
Status: DISPENSED
Start: 2020-03-10 | End: 2020-03-11

## 2020-03-10 RX ORDER — LATANOPROST 50 UG/ML
1 SOLUTION/ DROPS OPHTHALMIC NIGHTLY
Status: DISCONTINUED | OUTPATIENT
Start: 2020-03-10 | End: 2020-03-15 | Stop reason: HOSPADM

## 2020-03-10 RX ORDER — IPRATROPIUM BROMIDE AND ALBUTEROL SULFATE 2.5; .5 MG/3ML; MG/3ML
1 SOLUTION RESPIRATORY (INHALATION)
Status: DISCONTINUED | OUTPATIENT
Start: 2020-03-10 | End: 2020-03-13

## 2020-03-10 RX ORDER — 0.9 % SODIUM CHLORIDE 0.9 %
30 INTRAVENOUS SOLUTION INTRAVENOUS ONCE
Status: COMPLETED | OUTPATIENT
Start: 2020-03-10 | End: 2020-03-11

## 2020-03-10 RX ORDER — IPRATROPIUM BROMIDE AND ALBUTEROL SULFATE 2.5; .5 MG/3ML; MG/3ML
1 SOLUTION RESPIRATORY (INHALATION) ONCE
Status: DISCONTINUED | OUTPATIENT
Start: 2020-03-10 | End: 2020-03-13

## 2020-03-10 RX ORDER — ACETAMINOPHEN 650 MG/1
650 SUPPOSITORY RECTAL EVERY 6 HOURS PRN
Status: DISCONTINUED | OUTPATIENT
Start: 2020-03-10 | End: 2020-03-15 | Stop reason: HOSPADM

## 2020-03-10 RX ORDER — SODIUM CHLORIDE 0.9 % (FLUSH) 0.9 %
10 SYRINGE (ML) INJECTION PRN
Status: DISCONTINUED | OUTPATIENT
Start: 2020-03-10 | End: 2020-03-15 | Stop reason: HOSPADM

## 2020-03-10 RX ORDER — AMITRIPTYLINE HYDROCHLORIDE 50 MG/1
50 TABLET, FILM COATED ORAL NIGHTLY
Status: DISCONTINUED | OUTPATIENT
Start: 2020-03-10 | End: 2020-03-15 | Stop reason: HOSPADM

## 2020-03-10 RX ORDER — TIMOLOL MALEATE 5 MG/ML
1 SOLUTION/ DROPS OPHTHALMIC 2 TIMES DAILY
Status: DISCONTINUED | OUTPATIENT
Start: 2020-03-10 | End: 2020-03-15 | Stop reason: HOSPADM

## 2020-03-10 RX ORDER — DEXAMETHASONE SODIUM PHOSPHATE 10 MG/ML
10 INJECTION, SOLUTION INTRAMUSCULAR; INTRAVENOUS ONCE
Status: COMPLETED | OUTPATIENT
Start: 2020-03-10 | End: 2020-03-10

## 2020-03-10 RX ORDER — METOPROLOL TARTRATE 5 MG/5ML
2.5 INJECTION INTRAVENOUS EVERY 6 HOURS PRN
Status: DISCONTINUED | OUTPATIENT
Start: 2020-03-10 | End: 2020-03-15 | Stop reason: HOSPADM

## 2020-03-10 RX ORDER — TIZANIDINE 4 MG/1
4 TABLET ORAL 3 TIMES DAILY PRN
Status: DISCONTINUED | OUTPATIENT
Start: 2020-03-10 | End: 2020-03-15 | Stop reason: HOSPADM

## 2020-03-10 RX ORDER — ONDANSETRON 2 MG/ML
4 INJECTION INTRAMUSCULAR; INTRAVENOUS EVERY 6 HOURS PRN
Status: DISCONTINUED | OUTPATIENT
Start: 2020-03-10 | End: 2020-03-15 | Stop reason: HOSPADM

## 2020-03-10 RX ORDER — DEXTROSE MONOHYDRATE 25 G/50ML
12.5 INJECTION, SOLUTION INTRAVENOUS PRN
Status: DISCONTINUED | OUTPATIENT
Start: 2020-03-10 | End: 2020-03-15 | Stop reason: HOSPADM

## 2020-03-10 RX ORDER — LEVOFLOXACIN 5 MG/ML
750 INJECTION, SOLUTION INTRAVENOUS ONCE
Status: COMPLETED | OUTPATIENT
Start: 2020-03-10 | End: 2020-03-10

## 2020-03-10 RX ORDER — ACETAMINOPHEN 325 MG/1
650 TABLET ORAL EVERY 6 HOURS PRN
Status: DISCONTINUED | OUTPATIENT
Start: 2020-03-10 | End: 2020-03-15 | Stop reason: HOSPADM

## 2020-03-10 RX ORDER — LEVOFLOXACIN 5 MG/ML
500 INJECTION, SOLUTION INTRAVENOUS EVERY 24 HOURS
Status: DISCONTINUED | OUTPATIENT
Start: 2020-03-11 | End: 2020-03-15

## 2020-03-10 RX ORDER — BUSPIRONE HYDROCHLORIDE 5 MG/1
7.5 TABLET ORAL 2 TIMES DAILY
Status: DISCONTINUED | OUTPATIENT
Start: 2020-03-10 | End: 2020-03-15 | Stop reason: HOSPADM

## 2020-03-10 RX ORDER — AMLODIPINE BESYLATE 5 MG/1
5 TABLET ORAL DAILY
Status: DISCONTINUED | OUTPATIENT
Start: 2020-03-11 | End: 2020-03-15 | Stop reason: HOSPADM

## 2020-03-10 RX ORDER — OXYCODONE HYDROCHLORIDE 5 MG/1
15 TABLET ORAL 4 TIMES DAILY PRN
Status: DISCONTINUED | OUTPATIENT
Start: 2020-03-10 | End: 2020-03-15 | Stop reason: HOSPADM

## 2020-03-10 RX ORDER — NICOTINE POLACRILEX 4 MG
15 LOZENGE BUCCAL PRN
Status: DISCONTINUED | OUTPATIENT
Start: 2020-03-10 | End: 2020-03-15 | Stop reason: HOSPADM

## 2020-03-10 RX ORDER — PROMETHAZINE HYDROCHLORIDE 25 MG/1
12.5 TABLET ORAL EVERY 6 HOURS PRN
Status: DISCONTINUED | OUTPATIENT
Start: 2020-03-10 | End: 2020-03-15 | Stop reason: HOSPADM

## 2020-03-10 RX ORDER — SODIUM CHLORIDE 0.9 % (FLUSH) 0.9 %
10 SYRINGE (ML) INJECTION EVERY 12 HOURS SCHEDULED
Status: DISCONTINUED | OUTPATIENT
Start: 2020-03-10 | End: 2020-03-15 | Stop reason: HOSPADM

## 2020-03-10 RX ADMIN — IPRATROPIUM BROMIDE AND ALBUTEROL SULFATE 1 AMPULE: .5; 3 SOLUTION RESPIRATORY (INHALATION) at 17:16

## 2020-03-10 RX ADMIN — PIPERACILLIN SODIUM AND TAZOBACTAM SODIUM 3.38 G: 3; .375 INJECTION, POWDER, LYOPHILIZED, FOR SOLUTION INTRAVENOUS at 17:41

## 2020-03-10 RX ADMIN — DEXAMETHASONE SODIUM PHOSPHATE 10 MG: 10 INJECTION, SOLUTION INTRAMUSCULAR; INTRAVENOUS at 16:58

## 2020-03-10 RX ADMIN — LEVOFLOXACIN 750 MG: 5 INJECTION, SOLUTION INTRAVENOUS at 17:30

## 2020-03-10 RX ADMIN — TIMOLOL MALEATE 1 DROP: 5 SOLUTION/ DROPS OPHTHALMIC at 23:04

## 2020-03-10 RX ADMIN — ENOXAPARIN SODIUM 40 MG: 40 INJECTION SUBCUTANEOUS at 22:40

## 2020-03-10 RX ADMIN — INSULIN LISPRO 3 UNITS: 100 INJECTION, SOLUTION INTRAVENOUS; SUBCUTANEOUS at 23:05

## 2020-03-10 RX ADMIN — AMITRIPTYLINE HYDROCHLORIDE 50 MG: 50 TABLET, FILM COATED ORAL at 22:40

## 2020-03-10 RX ADMIN — METOPROLOL TARTRATE 2.5 MG: 5 INJECTION, SOLUTION INTRAVENOUS at 23:04

## 2020-03-10 RX ADMIN — BUSPIRONE HYDROCHLORIDE 7.5 MG: 5 TABLET ORAL at 22:40

## 2020-03-10 RX ADMIN — SODIUM CHLORIDE, PRESERVATIVE FREE 10 ML: 5 INJECTION INTRAVENOUS at 22:41

## 2020-03-10 RX ADMIN — METHYLPREDNISOLONE SODIUM SUCCINATE 60 MG: 125 INJECTION, POWDER, FOR SOLUTION INTRAMUSCULAR; INTRAVENOUS at 23:31

## 2020-03-10 RX ADMIN — SODIUM CHLORIDE 3081 ML: 9 INJECTION, SOLUTION INTRAVENOUS at 23:48

## 2020-03-10 RX ADMIN — IPRATROPIUM BROMIDE AND ALBUTEROL SULFATE 1 AMPULE: .5; 3 SOLUTION RESPIRATORY (INHALATION) at 19:35

## 2020-03-10 RX ADMIN — OXYCODONE 15 MG: 5 TABLET ORAL at 23:04

## 2020-03-10 RX ADMIN — LATANOPROST 1 DROP: 50 SOLUTION OPHTHALMIC at 23:04

## 2020-03-10 RX ADMIN — GUAIFENESIN 1200 MG: 600 TABLET, EXTENDED RELEASE ORAL at 22:40

## 2020-03-10 ASSESSMENT — PAIN SCALES - GENERAL
PAINLEVEL_OUTOF10: 9
PAINLEVEL_OUTOF10: 2

## 2020-03-10 NOTE — ED PROVIDER NOTES
organizations: Not on file     Relationship status: Not on file    Intimate partner violence     Fear of current or ex partner: Not on file     Emotionally abused: Not on file     Physically abused: Not on file     Forced sexual activity: Not on file   Other Topics Concern    Not on file   Social History Narrative    Domiciled: lives in a mobile home, has 3 steps in back, lives with his son and his wife, home is ramped in front    Ambulates: used a walker in the past, walks on his own    Code Status: Full Code    Health Care Proxy: Mrs. Julián Claros, [phone]       SCREENINGS                PHYSICAL EXAM    (up to 7 for level 4, 8 or more for level 5)     ED Triage Vitals [03/10/20 1642]   BP Temp Temp src Pulse Resp SpO2 Height Weight   (!) 148/82 97 °F (36.1 °C) -- 107 24 (!) 81 % 5' 6\" (1.676 m) 225 lb (102.1 kg)       Physical Exam  Vitals signs reviewed. Constitutional:       Appearance: Normal appearance. HENT:      Head: Normocephalic and atraumatic. Nose: Nose normal.   Eyes:      Extraocular Movements: Extraocular movements intact. Pupils: Pupils are equal, round, and reactive to light. Neck:      Musculoskeletal: Normal range of motion and neck supple. No neck rigidity or muscular tenderness. Cardiovascular:      Rate and Rhythm: Normal rate. Pulses: Normal pulses. Heart sounds: Normal heart sounds. Pulmonary:      Effort: No respiratory distress. Breath sounds: Wheezing present. Chest:      Chest wall: No tenderness. Abdominal:      Palpations: Abdomen is soft. Musculoskeletal: Normal range of motion. General: No swelling. Skin:     General: Skin is warm and dry. Capillary Refill: Capillary refill takes less than 2 seconds. Neurological:      General: No focal deficit present. Mental Status: He is alert and oriented to person, place, and time. Cranial Nerves: No cranial nerve deficit. Sensory: No sensory deficit. DIAGNOSTIC RESULTS     EKG: All EKG's are interpreted by the Emergency Department Physician who either signs or Co-signs this chart in the absence of a cardiologist.        RADIOLOGY:   Non-plain film images such as CT, Ultrasound and MRI are read by the radiologist. Plain radiographic images are visualized and preliminarily interpreted by the emergency physician with the below findings:        Interpretation per the Radiologist below, if available at the time of this note:    XR CHEST STANDARD (2 VW)   Final Result   Impression:   1. Question mild developing patchy airspace opacities in the left lung   base laterally. Correlate with patient presentation.    2. COPD and chronic changes   Signed by Dr Joe Chand on 3/10/2020 6:26 PM            ED BEDSIDE ULTRASOUND:   Performed by ED Physician - none    LABS:  Labs Reviewed   BLOOD GAS, ARTERIAL - Abnormal; Notable for the following components:       Result Value    pCO2, Arterial 58.0 (*)     pO2, Arterial 52.0 (*)     HCO3, Arterial 35.9 (*)     Base Excess, Arterial 9.3 (*)     Hemoglobin, Art, Extended 11.8 (*)     O2 Sat, Arterial 88.2 (*)     All other components within normal limits   CBC WITH AUTO DIFFERENTIAL - Abnormal; Notable for the following components:    WBC 24.1 (*)     RBC 4.31 (*)     Hemoglobin 11.9 (*)     Hematocrit 38.6 (*)     MCHC 30.8 (*)     MPV 8.2 (*)     Neutrophils % 86.5 (*)     Lymphocytes % 7.7 (*)     Neutrophils Absolute 20.9 (*)     Monocytes Absolute 1.20 (*)     All other components within normal limits   COMPREHENSIVE METABOLIC PANEL W/ REFLEX TO MG FOR LOW K - Abnormal; Notable for the following components:    Sodium 132 (*)     Chloride 90 (*)     CO2 30 (*)     Glucose 128 (*)     Alkaline Phosphatase 192 (*)     All other components within normal limits   CULTURE, URINE   CULTURE, BLOOD 1   CULTURE, BLOOD 2   URINALYSIS   LACTATE, SEPSIS   APTT   PROTIME-INR   TROPONIN   POTASSIUM, WHOLE BLOOD   LACTATE, SEPSIS words are mis-transcribed.)    Alea Sepulveda MD (electronically signed)  Attending Emergency Physician            Alea Sepulveda MD  03/11/20 2756

## 2020-03-10 NOTE — ED NOTES
Bed: 10  Expected date:   Expected time:   Means of arrival:   Comments:     Soham Hoffmann, RICHARD  03/10/20 7839

## 2020-03-10 NOTE — LETTER
Beneficiary Notification Letter  BPCI Advanced     Your Doctor or [address],    We wanted to let you know that your health care provider, GERARD BARRETT, has volunteered to take part in our [facility] for [facility] & Medicaid Services (CMS) Bundled Payments for [facility] (BPCI Advanced). This doesnt change your Medicare rights or benefits and you dont need to do anything. What are bundled payments? A bundled payment combines, or bundles together, payments that Medicare makes to your health care providers for the many different kinds of medical services you might get in a specific time period. In BPCI Advanced, this time period could include a hospital inpatient stay or outpatient procedure, plus 90 days. Why would Medicare bundle payments? Bundled payments are thought of as a value-based way to pay because health care providers are responsible for both the quality and cost of medical care they give. This is a relatively new way of paying health care providers compared to thefee-for-service way Medicare has traditionally paid, where providers are paid separately for each service they provide. Bundled payments encourage these providers to work together to provide better, more coordinated care during your hospital stay, or outpatient procedure, and through your recovery. What does BPCI Advance mean for me? Youre more likely to get even better care when hospitals, doctors, and other health care providers work together. In BPCI Advanced, hospitals, doctors, and other health care providers may be rewarded for providing better, more coordinated health care. Medicare will watch BPCI Advanced participants closely to make sure that you and other patients keep getting efficient, high quality care. What do I need to know about BPCI Advanced? Whats most important for you to know is that your Medicare rights and benefits wont change because your health care provider is participating in [address]. Medicare will keep covering all of your medically necessary services. Even though Medicare will pay your doctor in a different way under BPCI Advanced, how much you have to pay wont change. Health care providers and suppliers who are enrolled in Medicare will submit their Medicare claims like they always have. Youll have all the same Medicare rights and protections, including the right to choose which hospital, doctor, or other health care provider you see. If you dont want to get care from a health care provider whos participating in [address], then youll have to choose a different health care provider whos not participating in the Model. How can I give feedback about my health care? Medicare might ask you to take a voluntary survey about the services and care you received from [facility] during your hospital stay or outpatient procedure and for a specific period of time afterwards. You can decide whether you want to take the voluntary survey, but if you do, itll help Medicare make BPCI Advanced and the care of other Medicare patients better. If you have concerns or complaints about your care, you can:   · Talk to your doctor or health care provider. · Contact your Beneficiary and Family Centered Care Quality Improvement   Organization [facility]). You can get your BFCC-QIOs phone number  at Medicare.gov/contacts or by calling 1-800-MEDICARE. TTY users can  call 5-310.777.4570. Where can I learn more about BPCI Advanced? Learn more about BPCI Advanced at https://innovation.cms.gov/initiatives/bpci-advanced/:  · A list of all the hospitals and physician group practices in the country participating in [facility].   · All of the inpatient and outpatient Clinical Episodes that are currently included under BPCI Advanced. A Clinical Episode is a grouping of medical conditions or diagnoses that are included in the [address].

## 2020-03-11 ENCOUNTER — OUTSIDE FACILITY SERVICE (OUTPATIENT)
Dept: PULMONOLOGY | Facility: CLINIC | Age: 65
End: 2020-03-11

## 2020-03-11 LAB
ANION GAP SERPL CALCULATED.3IONS-SCNC: 9 MMOL/L (ref 7–19)
BUN BLDV-MCNC: 7 MG/DL (ref 8–23)
CALCIUM SERPL-MCNC: 8.6 MG/DL (ref 8.8–10.2)
CHLORIDE BLD-SCNC: 100 MMOL/L (ref 98–111)
CO2: 27 MMOL/L (ref 22–29)
CREAT SERPL-MCNC: 0.5 MG/DL (ref 0.5–1.2)
EKG P AXIS: 66 DEGREES
EKG P-R INTERVAL: 156 MS
EKG Q-T INTERVAL: 326 MS
EKG QRS DURATION: 100 MS
EKG QTC CALCULATION (BAZETT): 396 MS
EKG T AXIS: 73 DEGREES
GFR NON-AFRICAN AMERICAN: >60
GLUCOSE BLD-MCNC: 161 MG/DL (ref 70–99)
GLUCOSE BLD-MCNC: 173 MG/DL (ref 74–109)
GLUCOSE BLD-MCNC: 182 MG/DL (ref 70–99)
GLUCOSE BLD-MCNC: 184 MG/DL (ref 70–99)
GLUCOSE BLD-MCNC: 253 MG/DL (ref 70–99)
HCT VFR BLD CALC: 35.7 % (ref 42–52)
HEMOGLOBIN: 10.6 G/DL (ref 14–18)
LACTIC ACID: 1.3 MMOL/L (ref 0.5–1.9)
LACTIC ACID: 2.2 MMOL/L (ref 0.5–1.9)
MCH RBC QN AUTO: 27.6 PG (ref 27–31)
MCHC RBC AUTO-ENTMCNC: 29.7 G/DL (ref 33–37)
MCV RBC AUTO: 93 FL (ref 80–94)
PDW BLD-RTO: 14.2 % (ref 11.5–14.5)
PERFORMED ON: ABNORMAL
PLATELET # BLD: 326 K/UL (ref 130–400)
PMV BLD AUTO: 8.4 FL (ref 9.4–12.4)
POTASSIUM SERPL-SCNC: 4.8 MMOL/L (ref 3.5–5)
RBC # BLD: 3.84 M/UL (ref 4.7–6.1)
SODIUM BLD-SCNC: 136 MMOL/L (ref 136–145)
WBC # BLD: 17.7 K/UL (ref 4.8–10.8)

## 2020-03-11 PROCEDURE — 2580000003 HC RX 258: Performed by: PHYSICIAN ASSISTANT

## 2020-03-11 PROCEDURE — 83605 ASSAY OF LACTIC ACID: CPT

## 2020-03-11 PROCEDURE — 1210000000 HC MED SURG R&B

## 2020-03-11 PROCEDURE — 94640 AIRWAY INHALATION TREATMENT: CPT

## 2020-03-11 PROCEDURE — 2700000000 HC OXYGEN THERAPY PER DAY

## 2020-03-11 PROCEDURE — 99223 1ST HOSP IP/OBS HIGH 75: CPT | Performed by: INTERNAL MEDICINE

## 2020-03-11 PROCEDURE — 6370000000 HC RX 637 (ALT 250 FOR IP): Performed by: PHYSICIAN ASSISTANT

## 2020-03-11 PROCEDURE — 93010 ELECTROCARDIOGRAM REPORT: CPT | Performed by: INTERNAL MEDICINE

## 2020-03-11 PROCEDURE — 80048 BASIC METABOLIC PNL TOTAL CA: CPT

## 2020-03-11 PROCEDURE — 6360000002 HC RX W HCPCS: Performed by: HOSPITALIST

## 2020-03-11 PROCEDURE — 36415 COLL VENOUS BLD VENIPUNCTURE: CPT

## 2020-03-11 PROCEDURE — 85027 COMPLETE CBC AUTOMATED: CPT

## 2020-03-11 PROCEDURE — 82947 ASSAY GLUCOSE BLOOD QUANT: CPT

## 2020-03-11 PROCEDURE — 2580000003 HC RX 258: Performed by: HOSPITALIST

## 2020-03-11 PROCEDURE — 83880 ASSAY OF NATRIURETIC PEPTIDE: CPT

## 2020-03-11 PROCEDURE — 6370000000 HC RX 637 (ALT 250 FOR IP): Performed by: HOSPITALIST

## 2020-03-11 RX ORDER — SODIUM CHLORIDE 9 MG/ML
INJECTION, SOLUTION INTRAVENOUS CONTINUOUS
Status: DISCONTINUED | OUTPATIENT
Start: 2020-03-11 | End: 2020-03-11

## 2020-03-11 RX ADMIN — OXYCODONE 15 MG: 5 TABLET ORAL at 08:33

## 2020-03-11 RX ADMIN — TIMOLOL MALEATE 1 DROP: 5 SOLUTION/ DROPS OPHTHALMIC at 08:38

## 2020-03-11 RX ADMIN — TIMOLOL MALEATE 1 DROP: 5 SOLUTION/ DROPS OPHTHALMIC at 20:44

## 2020-03-11 RX ADMIN — BUSPIRONE HYDROCHLORIDE 7.5 MG: 5 TABLET ORAL at 08:33

## 2020-03-11 RX ADMIN — METHYLPREDNISOLONE SODIUM SUCCINATE 60 MG: 125 INJECTION, POWDER, FOR SOLUTION INTRAMUSCULAR; INTRAVENOUS at 04:42

## 2020-03-11 RX ADMIN — GUAIFENESIN 1200 MG: 600 TABLET, EXTENDED RELEASE ORAL at 20:45

## 2020-03-11 RX ADMIN — AMITRIPTYLINE HYDROCHLORIDE 50 MG: 50 TABLET, FILM COATED ORAL at 22:07

## 2020-03-11 RX ADMIN — OXYCODONE 15 MG: 5 TABLET ORAL at 14:20

## 2020-03-11 RX ADMIN — LISINOPRIL 40 MG: 20 TABLET ORAL at 08:34

## 2020-03-11 RX ADMIN — AMLODIPINE BESYLATE 5 MG: 5 TABLET ORAL at 08:34

## 2020-03-11 RX ADMIN — ASPIRIN 81 MG: 81 TABLET, COATED ORAL at 08:33

## 2020-03-11 RX ADMIN — GUAIFENESIN 1200 MG: 600 TABLET, EXTENDED RELEASE ORAL at 08:34

## 2020-03-11 RX ADMIN — PIPERACILLIN SODIUM AND TAZOBACTAM SODIUM 3.38 G: 3; .375 INJECTION, POWDER, LYOPHILIZED, FOR SOLUTION INTRAVENOUS at 22:08

## 2020-03-11 RX ADMIN — IPRATROPIUM BROMIDE AND ALBUTEROL SULFATE 1 AMPULE: .5; 3 SOLUTION RESPIRATORY (INHALATION) at 06:46

## 2020-03-11 RX ADMIN — PIPERACILLIN SODIUM AND TAZOBACTAM SODIUM 3.38 G: 3; .375 INJECTION, POWDER, LYOPHILIZED, FOR SOLUTION INTRAVENOUS at 04:43

## 2020-03-11 RX ADMIN — SODIUM CHLORIDE: 9 INJECTION, SOLUTION INTRAVENOUS at 04:43

## 2020-03-11 RX ADMIN — IPRATROPIUM BROMIDE AND ALBUTEROL SULFATE 1 AMPULE: .5; 3 SOLUTION RESPIRATORY (INHALATION) at 19:57

## 2020-03-11 RX ADMIN — SODIUM CHLORIDE, PRESERVATIVE FREE 10 ML: 5 INJECTION INTRAVENOUS at 22:09

## 2020-03-11 RX ADMIN — TIZANIDINE 4 MG: 4 TABLET ORAL at 22:08

## 2020-03-11 RX ADMIN — PIPERACILLIN SODIUM AND TAZOBACTAM SODIUM 3.38 G: 3; .375 INJECTION, POWDER, LYOPHILIZED, FOR SOLUTION INTRAVENOUS at 13:00

## 2020-03-11 RX ADMIN — ATORVASTATIN CALCIUM 40 MG: 20 TABLET, FILM COATED ORAL at 08:33

## 2020-03-11 RX ADMIN — LATANOPROST 1 DROP: 50 SOLUTION OPHTHALMIC at 22:08

## 2020-03-11 RX ADMIN — TIZANIDINE 4 MG: 4 TABLET ORAL at 20:46

## 2020-03-11 RX ADMIN — BUSPIRONE HYDROCHLORIDE 7.5 MG: 5 TABLET ORAL at 20:44

## 2020-03-11 RX ADMIN — OXYCODONE 15 MG: 5 TABLET ORAL at 17:39

## 2020-03-11 RX ADMIN — IPRATROPIUM BROMIDE AND ALBUTEROL SULFATE 1 AMPULE: .5; 3 SOLUTION RESPIRATORY (INHALATION) at 10:14

## 2020-03-11 RX ADMIN — LEVOFLOXACIN 500 MG: 5 INJECTION, SOLUTION INTRAVENOUS at 17:32

## 2020-03-11 RX ADMIN — METHYLPREDNISOLONE SODIUM SUCCINATE 60 MG: 125 INJECTION, POWDER, FOR SOLUTION INTRAMUSCULAR; INTRAVENOUS at 16:06

## 2020-03-11 RX ADMIN — OXYCODONE 15 MG: 5 TABLET ORAL at 22:07

## 2020-03-11 RX ADMIN — IPRATROPIUM BROMIDE AND ALBUTEROL SULFATE 1 AMPULE: .5; 3 SOLUTION RESPIRATORY (INHALATION) at 14:22

## 2020-03-11 RX ADMIN — PANTOPRAZOLE SODIUM 40 MG: 40 TABLET, DELAYED RELEASE ORAL at 04:44

## 2020-03-11 RX ADMIN — METHYLPREDNISOLONE SODIUM SUCCINATE 60 MG: 125 INJECTION, POWDER, FOR SOLUTION INTRAMUSCULAR; INTRAVENOUS at 22:09

## 2020-03-11 RX ADMIN — INSULIN LISPRO 6 UNITS: 100 INJECTION, SOLUTION INTRAVENOUS; SUBCUTANEOUS at 12:00

## 2020-03-11 ASSESSMENT — ENCOUNTER SYMPTOMS
SHORTNESS OF BREATH: 1
WHEEZING: 1
HEMOPTYSIS: 0
EYES NEGATIVE: 1
COUGH: 1
ABDOMINAL PAIN: 0
GASTROINTESTINAL NEGATIVE: 1

## 2020-03-11 ASSESSMENT — PAIN SCALES - GENERAL
PAINLEVEL_OUTOF10: 8
PAINLEVEL_OUTOF10: 8
PAINLEVEL_OUTOF10: 9
PAINLEVEL_OUTOF10: 8

## 2020-03-11 NOTE — PLAN OF CARE
Problem: Falls - Risk of:  Goal: Will remain free from falls  Description: Will remain free from falls  3/11/2020 0846 by Koki Castaneda RN  Outcome: Ongoing  3/11/2020 0140 by Swapna Hernandez RN  Outcome: Ongoing  Goal: Absence of physical injury  Description: Absence of physical injury  3/11/2020 0846 by Koki Castaneda RN  Outcome: Ongoing  3/11/2020 0140 by Swapna Hernandez RN  Outcome: Ongoing

## 2020-03-11 NOTE — PROGRESS NOTES
Hospitalist Progress Note  3/11/2020 9:30 AM  Subjective:   Admit Date: 3/10/2020  PCP: Sammie Giles MD    Chief Complaint: shortness of breath    Subjective: Patient states he is feeling only slightly better than yesterday. Remains short of breath, on 4 LPM supplemental oxygen. Incentive spirometer ordered but not in room. States his breathing worsened when his steroid taper ended. Would like pulmonology informed that he is here. History is otherwise unchanged. Cumulative Hospital History:   3-10: Presents to ED with worsening shortness of breath, fatigue, edema since discharge 2-9. COPD exacerbation at that time, discharged on azithromycin and prednisone taper, worsened after prednisone taper ended. Some cough, wheezing, no fever. CXR showed LLL infiltrate, admitted to med/surg on levofloxacin, piperacillin/tazobactam for HCAP, steroids, DuoNebs. 3-11: Continued shortness of breath and wheezing, incentive spirometer ordered but not at bedside. Nursing informed. Guaifenesin/dextromethorphan for cough. ROS: 14 point review of systems is negative except as specifically addressed above.     DIET CARB CONTROL; Low Sodium (2 GM)  No intake or output data in the 24 hours ending 03/11/20 0930  Medications:   dextrose       Current Facility-Administered Medications   Medication Dose Route Frequency Provider Last Rate Last Dose    ipratropium-albuterol (DUONEB) nebulizer solution 1 ampule  1 ampule Inhalation Once Chan Miranda MD        sodium chloride flush 0.9 % injection 10 mL  10 mL Intravenous 2 times per day Tammy Osei MD   10 mL at 03/10/20 2241    sodium chloride flush 0.9 % injection 10 mL  10 mL Intravenous PRN Tammy Osei MD        acetaminophen (TYLENOL) tablet 650 mg  650 mg Oral Q6H PRN Tammy Osei MD        Or   Irais Phillips acetaminophen (TYLENOL) suppository 650 mg  650 mg Rectal Q6H PRN Tammy Osei MD        magnesium hydroxide (MILK OF MAGNESIA) 400 MG/5ML suspension 30 mL  30 mL Oral Daily PRN Tania Doan MD        promethazine (PHENERGAN) tablet 12.5 mg  12.5 mg Oral Q6H PRN Tania Doan MD        Or    ondansetron [facility]) injection 4 mg  4 mg Intravenous Q6H PRN Tania Doan MD        enoxaparin (LOVENOX) injection 40 mg  40 mg Subcutaneous Q24H Tania Doan MD   40 mg at 03/10/20 2240    levofloxacin (LEVAQUIN) 500 MG/100ML infusion 500 mg  500 mg Intravenous Q24H Tania Doan MD        piperacillin-tazobactam (ZOSYN) 3.375 g in dextrose 5 % 50 mL IVPB extended infusion (mini-bag)  3.375 g Intravenous Kevin Gonzalez MD   Stopped at 03/11/20 0838    methylPREDNISolone sodium (SOLU-MEDROL) injection 60 mg  60 mg Intravenous Q8H Tania Doan MD   60 mg at 03/11/20 0442    ipratropium-albuterol (DUONEB) nebulizer solution 1 ampule  1 ampule Inhalation Q4H WA Tania Doan MD   1 ampule at 03/11/20 0646    glucose (GLUTOSE) 40 % oral gel 15 g  15 g Oral PRN Tania Doan MD        dextrose 50 % IV solution  12.5 g Intravenous PRN Tania Doan MD        glucagon (rDNA) injection 1 mg  1 mg Intramuscular PRN Tania Doan MD        dextrose 5 % solution  100 mL/hr Intravenous PRN Tania Doan MD        amitriptyline (ELAVIL) tablet 50 mg  50 mg Oral Nightly RISHABH Montelongo-C   50 mg at 03/10/20 2240    amLODIPine (NORVASC) tablet 5 mg  5 mg Oral Daily Aman Flores PA-C   5 mg at 03/11/20 6481    aspirin EC tablet 81 mg  81 mg Oral Daily Aman Flores PA-C   81 mg at 03/11/20 0545    atorvastatin (LIPITOR) tablet 40 mg  40 mg Oral Daily Aman Flores PA-C   40 mg at 03/11/20 1468    busPIRone (BUSPAR) tablet 7.5 mg  7.5 mg Oral BID RISHABH Montelongo-C   7.5 mg at 03/11/20 3955    latanoprost (XALATAN) 0.005 % ophthalmic solution 1 drop  1 drop Both Eyes Nightly Aman Flores PA-C   1 drop at 03/10/20 2302    lisinopril (PRINIVIL;ZESTRIL) tablet 40 mg  40 mg Oral Daily Aman Flores PA-C   40 mg at 03/11/20 0834    pantoprazole (PROTONIX) tablet 40 mg  40 mg Oral QAM AC Meme Collins PA-C   40 mg at 03/11/20 0444    oxyCODONE (ROXICODONE) immediate release tablet 15 mg  15 mg Oral 4x Daily PRN Meme Collins PA-C   15 mg at 03/11/20 4222    timolol (TIMOPTIC) 0.5 % ophthalmic solution 1 drop  1 drop Both Eyes BID Meme Collins PA-C   1 drop at 03/11/20 6960    tiZANidine (ZANAFLEX) tablet 4 mg  4 mg Oral TID PRN Meme Collins PA-C        insulin lispro (HUMALOG) injection vial 0-12 Units  0-12 Units Subcutaneous TID WC Meme Collins PA-C        insulin lispro (HUMALOG) injection vial 0-6 Units  0-6 Units Subcutaneous Nightly Meme Collins PA-C   3 Units at 03/10/20 2305    guaiFENesin [facility]) extended release tablet 1,200 mg  1,200 mg Oral BID Meme Collins PA-C   1,200 mg at 03/11/20 6472    metoprolol (LOPRESSOR) injection 2.5 mg  2.5 mg Intravenous Q6H PRN Meme Collins PA-C   2.5 mg at 03/10/20 2304        Labs:     Recent Labs     03/10/20  1650 03/11/20  0451   WBC 24.1* 17.7*   RBC 4.31* 3.84*   HGB 11.9* 10.6*   HCT 38.6* 35.7*   MCV 89.6 93.0   MCH 27.6 27.6   MCHC 30.8* 29.7*    326     Recent Labs     03/10/20  1650 03/10/20  1715 03/11/20  0451   *  --  136   K 4.5 4.4 4.8   ANIONGAP 12  --  9   CL 90*  --  100   CO2 30*  --  27   BUN 8  --  7*   CREATININE 0.7  --  0.5   GLUCOSE 128*  --  173*   CALCIUM 9.2  --  8.6*     No results for input(s): MG, PHOS in the last 72 hours. Recent Labs     03/10/20  1650   AST 20   ALT 19   BILITOT 0.4   ALKPHOS 192*     ABGs:No results for input(s): PH, PO2, PCO2, HCO3, BE, O2SAT in the last 72 hours.   Troponin T:   Recent Labs     03/10/20  1650   TROPONINI <0.01     INR:   Recent Labs     03/10/20  1650   INR 0.98     Lactic Acid:   Recent Labs     03/11/20  0336   LACTA 1.3       Objective:   Vitals: BP (!) 142/82   Pulse 92   Temp 97.5 °F (36.4 °C) (Temporal)   Resp 20   Ht 5' 6\" (1.676 m)   Wt 226 lb 8 oz (102.7 kg)   SpO2 90%   BMI

## 2020-03-11 NOTE — H&P
endocrine: Denies easy bruising / bleeding / excessive sweating / heat or cold intolerance  Psychiatric:  Denies depression / anxiety / insomnia / mood changes  Skin:  Denies new rashes / lesions / + bilateral lower extremity skin changes    14 point review of systems is negative except as specifically addressed above. Physical Examination:  BP (!) 194/92   Pulse 102   Temp 97.4 °F (36.3 °C) (Temporal)   Resp 18   Ht 5' 6\" (1.676 m)   Wt 226 lb 8 oz (102.7 kg)   SpO2 91%   BMI 36.56 kg/m²   General appearance: alert, appears stated age, cooperative and no distress, sitting comfortably on side of bed with NC in place  Head: Normocephalic, without obvious abnormality, atraumatic  Eyes: conjunctivae/corneas clear. PERRL, EOM's intact. Ears: normal external ears and nose, throat without exudate  Neck: no adenopathy, no carotid bruit, no JVD, supple, symmetrical, trachea midline   Lungs: diminished throughout with RLL expiratory wheezes   Heart: tachycardic, regular rhythm, S1, S2 normal, no murmur  Abdomen:soft, non-tender; obese, non-distended, normal bowel sounds no masses, no organomegaly  Extremities:trace-1+ bilateral lower extremity edema, no tenderness to palpation-specifically no calf tenderness, peripheral pulses present  Skin: Chronic venous stasis changes BLE's  Lymphatic: No palpable lymph node enlargment  Neurologic: Alert and oriented X 3, normal strength and tone.  No focal deficits  Psychiatric: Alert and oriented, thought content appropriate, normal insight, mood appropriate    Diagnostic Data:  CBC:  Recent Labs     03/10/20  1650   WBC 24.1*   HGB 11.9*   HCT 38.6*        BMP:  Recent Labs     03/10/20  1650 03/10/20  1715   *  --    K 4.5 4.4   CL 90*  --    CO2 30*  --    BUN 8  --    CREATININE 0.7  --    CALCIUM 9.2  --      Recent Labs     03/10/20  1650   AST 20   ALT 19   BILITOT 0.4   ALKPHOS 192*     Coag Panel:   Recent Labs     03/10/20  1650   INR 0.98   PROTIME 12.9   APTT 26.6     Cardiac Enzymes:   Recent Labs     03/10/20  1650   TROPONINI <0.01     ABGs:  Lab Results   Component Value Date    PHART 7.400 03/10/2020    PO2ART 52.0 03/10/2020    ORW8TFP 58.0 03/10/2020     ABG:  Recent Labs     03/10/20  1715   PHART 7.400   ZMR1XDF 58.0*   PO2ART 52.0*   UZL0XNC 35.9*   BEART 9.3*   HGBAE 11.8*   B1LCIVSB 88.2*   CARBOXHGBART 2.5     Xr Chest Standard (2 Vw)  Impression: 1. Question mild developing patchy airspace opacities in the left lung base laterally. Correlate with patient presentation. 2. COPD and chronic changes Signed by Dr Alo Main on 3/10/2020 6:26 PM    Assessment/Plan:  Principal Problem:    Acute on chronic respiratory failure with hypoxia and hypercapnia (HCC)-2/2 COPD acute exacerbation/HCAP-continue supplemental oxygen, empiric antibiotics, await blood/sputum cultures. Active Problems:    COPD with acute exacerbation (HCC)-Solumedrol continued with Duonebs, abx, Mucinex, IS      HCAP (healthcare-associated pneumonia)-Levaquin, Zosyn continued, follow cultures. Follow CXR for resolution      Neutrophilic leukocytosis w/ lymphopenia- noted during previous hospitalization as well. Consider Hematology consultation       Hyperlipidemia-statin continued      Chronic low back pain-home medications continued, no complaints thus far      Uncontrolled hypertension-Zestril, Norvasc continued. PRN Lopressor.  Consider addition BB      Chronic GERD-Protonix      Grade I diastolic dysfunction-daily weights, strict I/O, Lasix x 1 dose, check BNP       Lower extremity edema-bilateral. Compression stockings, elevate LE's, Lasix    Further orders per clinical course/attending    Signed:  Ed Nation PA-C

## 2020-03-11 NOTE — CONSULTS
Pulmonary and Critical Care Consult Note  [facility]    MR# [id number]    Acct# [de-identified]  3/11/2020   11:48 AM CDT    Referring Provider:Chencho Osman DO  Chief Complaint: Shortness of breath. HPI: We have been consulted to see this 59y.o. year old male born on 1955. The patient is well-known to myself from being seen in the office on a prior admission here to [facility]). He has COPD stage III and sleep apnea. He has not been tolerant of a positive airway pressure device. He has chronic respiratory failure with hypoxemia and hypercapnia. He been admitted for an exacerbation of COPD and possible pneumonia little over a month ago. A CT pulmonary angiogram at that time did show evidence of some intrathoracic adenopathy for which a follow-up was recommended in about 2 months. He is readmitted this time because of problems with cough and dyspnea. He is not had any definite fever. He has had problems with fluid retention.   Past Medical History      Past Medical History:   Diagnosis Date    Arthritis     Bilateral hearing loss     CAD (coronary artery disease)     Chronic back pain     Colon polyps     COPD (chronic obstructive pulmonary disease) (HCC)     Depression     Hyperlipidemia     Hypertension     Low back pain 1/29/2016    Oxygen dependent     uses at night    Palliative care patient 12/28/2018     SurgicalHistory  Past Surgical History:   Procedure Laterality Date    BACK SURGERY  2010    COLONOSCOPY  ? 2005    Dr. Hemant Anaya COLONOSCOPY  01/2012    3 polyps, 2 adenomatous, 1 hyperplastic    COLONOSCOPY  01/2015    diverticulosis    FRACTURE SURGERY      KNEE ARTHROSCOPY  2014    AL EGD TRANSORAL BIOPSY SINGLE/MULTIPLE N/A 12/21/2016    Dr MARIA ELENA Rehman-thai/dilation over wire, [address]'s Ring, gastritis/gastropathy-prn    UPPER GASTROINTESTINAL ENDOSCOPY  4/24/2017    Dr MARIA ELENA Rehman-thai/dilation over wire, [address] Ring     UPPER GASTROINTESTINAL ENDOSCOPY  4/24/2017    Dr MARIA ELENA Rehman-w/dilation over wire, [address] Ring     WRIST FRACTURE SURGERY      [id number]     Allergies  Allergies   Allergen Reactions    Lyrica [Pregabalin] Other (See Comments)     He states \"it just made everything look blurry\"    Neurontin [Gabapentin] Other (See Comments)     States \"it made me want to shoot myself\"     Medications    ipratropium-albuterol, 1 ampule, Inhalation, Once    sodium chloride flush, 10 mL, Intravenous, 2 times per day    enoxaparin, 40 mg, Subcutaneous, Q24H    levofloxacin, 500 mg, Intravenous, Q24H    piperacillin-tazobactam, 3.375 g, Intravenous, Q8H    methylPREDNISolone, 60 mg, Intravenous, Q8H    ipratropium-albuterol, 1 ampule, Inhalation, Q4H WA    amitriptyline, 50 mg, Oral, Nightly    amLODIPine, 5 mg, Oral, Daily    aspirin, 81 mg, Oral, Daily    atorvastatin, 40 mg, Oral, Daily    busPIRone, 7.5 mg, Oral, BID    latanoprost, 1 drop, Both Eyes, Nightly    lisinopril, 40 mg, Oral, Daily    pantoprazole, 40 mg, Oral, QAM AC    timolol, 1 drop, Both Eyes, BID    insulin lispro, 0-12 Units, Subcutaneous, TID WC    insulin lispro, 0-6 Units, Subcutaneous, Nightly    guaiFENesin, 1,200 mg, Oral, BID   Social History   reports that he quit smoking about 4 years ago. His smoking use included cigarettes. He has a 92.00 pack-year smoking history. He has never used smokeless tobacco. He reports current alcohol use. He reports that he does not use drugs. Family History  family history includes Alcohol Abuse in his father; Saleem Doing in his son; Glaucoma in his mother; Heart Disease in his brother; High Blood Pressure in his mother; Kidney Disease in his mother; No Known Problems in his brother; Other in his sister. Review of Systems:  Review of Systems   Constitutional: Positive for fatigue and fever. HENT: Negative. Eyes: Negative. Respiratory: Positive for cough, shortness of breath and wheezing. 03/11/20  0451   WBC 24.1* 17.7*   RBC 4.31* 3.84*   HGB 11.9* 10.6*   HCT 38.6* 35.7*    326   MCV 89.6 93.0   MCH 27.6 27.6   MCHC 30.8* 29.7*   RDW 14.5 14.2      Recent Labs     03/10/20  1650 03/10/20  1715 03/11/20 0451   *  --  136   K 4.5 4.4 4.8   CL 90*  --  100   CO2 30*  --  27   BUN 8  --  7*   CREATININE 0.7  --  0.5   CALCIUM 9.2  --  8.6*   GLUCOSE 128*  --  173*      Recent Labs     03/10/20  1715   PHART 7.400   [id number] 58.0*   PO2ART 52.0*   DOH6JNZ 35.9*   V5HOEFZS 88.2*   BEART 9.3*     Recent Labs     03/10/20  1650  03/11/20  0336 03/11/20 0451   AST 20  --   --   --    ALT 19  --   --   --    ALKPHOS 192*  --   --   --    BILITOT 0.4  --   --   --    CALCIUM 9.2  --   --  8.6*   TROPONINI <0.01  --   --   --    LACTA  --    < > 1.3  --    INR 0.98  --   --   --     < > = values in this interval not displayed. No results for input(s): BC, LABGRAM, CULTRESP, BFCX in the last 72 hours. Radiograph: Xr Chest Standard (2 Vw)    Result Date: 3/10/2020  XR CHEST (2 VW) 3/10/2020 4:00 PM Two-view chest: History: Dyspnea Frontal and lateral projection chest radiograph obtained. Comparison:  2/4/2020, 5/6/2019 and 3/5/2019 Findings: COPD and chronic lung changes identified. In the left lung base there are vague increased airspace opacities posteriorly, mild developing acute infectious/inflammatory change considered. Correlate with patient presentation. The lungs are otherwise clear. The heart is generous, pulmonary circulation appropriate, without heart failure. The bony structures are intact. Radiographically, the chest is unchanged. Impression: 1. Question mild developing patchy airspace opacities in the left lung base laterally. Correlate with patient presentation.  2. COPD and chronic changes Signed by Dr Ej Juarez on 3/10/2020 6:26 PM    My radiograph interpretation/independent review of imaging: Chest x-ray reveals a very subtle left lower lobe infiltrate. A recent chest CT did show some intrathoracic adenopathy in particular subcarinal node. Other test results (not lab or imaging): His last pulmonary function studies in the office were consistent with a severe obstructive ventilatory defect. Independent review of ekg: Sinus tachycardia with nonspecific T wave changes. Problem list generated by [employer]:  Patient Active Problem List   Diagnosis    COPD with acute exacerbation ([address]    Acute on chronic respiratory failure with hypoxia and hypercapnia (HCC)    Neutrophilic leukocytosis    Hyperlipidemia    Chronic low back pain    Acute exacerbation of chronic obstructive pulmonary disease (COPD) ([address]    Uncontrolled hypertension    Pneumonia due to organism    Lower esophageal ring ([place])    Gastritis without bleeding    Chronic GERD    History of esophageal stricture    Status post dilation of esophageal narrowing    Right lower lobe pneumonia ([address]    Bilateral hearing loss    Palliative care patient    Hyperglycemia    Community acquired pneumonia of right lower lobe of lung ([address]    Grade I diastolic dysfunction    Pneumonia    Obesity due to excess calories    Personal history of noncompliance with medical treatment, presenting hazards to health    HCAP (healthcare-associated pneumonia)    Lymphopenia     Pulmonary Assessment:  New problem (to me), with additional workup planned:  1. COPD with exacerbation. 2.  Possible left lower lobe pneumonia. 3.  Intrathoracic adenopathy particularly subcarinal node noted on a recent chest CT. New problem (to me), no additional workup planned:  1. None. Other problems either stable, failing to improve or worsenin. Chronic respiratory failure with hypoxemia and hypercapnia. 2. Severe COPD  3. Obstructive sleep apnea with the patient intolerant of positive airway pressure device therapy.   4. Past history of tobacco use.    Recommend/plan:   · I discussed with him the possibility of trying CPAP again he states he is just not interested and feels there is no way he could tolerate. He actually would be a potential candidate for some sort of respiratory assist device based on his chronic CO2 retention but we he would have to at least try CPAP or BiPAP first before that could be considered and if he cannot tolerate either of those modalities then noninvasive positive pressure ventilation for outpatient use is not indicated. I agree with his current regimen. We can certainly get a follow-up CT to reassess his intrathoracic adenopathy but if that had progressed he is not a candidate for invasive work-up requiring general anesthesia such as bronchoscopy with endobronchial ultrasound or mediastinoscopy in my opinion because of his chronic CO2 retention in particular in the significant risk of morbidity and even mortality associated with these procedures. The adenopathy is most likely reactive. The other possibility would be an occult lymphoproliferative disorder and again in the absence of any other symptoms I would just follow these with serial imaging studies. He again in my opinion would not be a candidate for invasive work-up of these abnormalities in terms of bronchoscopy with endobronchial ultrasound or mediastinoscopy. I have ordered a respiratory virus panel. Thank you for this consult.   We will follow  Electronically signed by Ray Arrington on 03/11/20 at 11:48 AM

## 2020-03-11 NOTE — PROGRESS NOTES
4 Eyes Skin Assessment    Som Vogt is being assessed upon: Admission    I agree that I, Brayan Wilson, along with [address] [place] [address] RN (either 2 RN's or 1 LPN and 1 RN) have performed a thorough Head to Toe Skin Assessment on the patient. ALL assessment sites listed below have been assessed. Areas assessed by both nurses:     [x]   Head, Face, and Ears   [x]   Shoulders, Back, and Chest  [x]   Arms, Elbows, and Hands   [x]   Coccyx, Sacrum, and Ischium  [x]   Legs, Feet, and Heels    Does the Patient have Skin Breakdown?  No    Marcello Prevention initiated: No  Wound Care Orders initiated: No    [facility] nurse consulted for Pressure Injury (Stage 3,4, Unstageable, DTI, NWPT, and Complex wounds) and New or Established Ostomies: No        Primary Nurse eSignature: Brayan Wilson RN on 3/10/2020 at 9:39 PM      Co-Signer eSignature: Electronically signed by Arash Marie RN on 3/11/20 at 3:18 AM CDT

## 2020-03-12 ENCOUNTER — OUTSIDE FACILITY SERVICE (OUTPATIENT)
Dept: PULMONOLOGY | Facility: CLINIC | Age: 65
End: 2020-03-12

## 2020-03-12 LAB
ANION GAP SERPL CALCULATED.3IONS-SCNC: 11 MMOL/L (ref 7–19)
BUN BLDV-MCNC: 15 MG/DL (ref 8–23)
CALCIUM SERPL-MCNC: 8.5 MG/DL (ref 8.8–10.2)
CHLORIDE BLD-SCNC: 98 MMOL/L (ref 98–111)
CO2: 26 MMOL/L (ref 22–29)
CREAT SERPL-MCNC: 0.7 MG/DL (ref 0.5–1.2)
GFR NON-AFRICAN AMERICAN: >60
GLUCOSE BLD-MCNC: 148 MG/DL (ref 70–99)
GLUCOSE BLD-MCNC: 155 MG/DL (ref 74–109)
GLUCOSE BLD-MCNC: 187 MG/DL (ref 70–99)
GLUCOSE BLD-MCNC: 200 MG/DL (ref 70–99)
GLUCOSE BLD-MCNC: 253 MG/DL (ref 70–99)
HCT VFR BLD CALC: 32.3 % (ref 42–52)
HEMOGLOBIN: 10.2 G/DL (ref 14–18)
MCH RBC QN AUTO: 28.2 PG (ref 27–31)
MCHC RBC AUTO-ENTMCNC: 31.6 G/DL (ref 33–37)
MCV RBC AUTO: 89.2 FL (ref 80–94)
PDW BLD-RTO: 14.6 % (ref 11.5–14.5)
PERFORMED ON: ABNORMAL
PLATELET # BLD: 252 K/UL (ref 130–400)
PMV BLD AUTO: 8.5 FL (ref 9.4–12.4)
POTASSIUM SERPL-SCNC: 4.8 MMOL/L (ref 3.5–5)
RBC # BLD: 3.62 M/UL (ref 4.7–6.1)
SODIUM BLD-SCNC: 135 MMOL/L (ref 136–145)
URINE CULTURE, ROUTINE: NORMAL
WBC # BLD: 23.8 K/UL (ref 4.8–10.8)

## 2020-03-12 PROCEDURE — 1210000000 HC MED SURG R&B

## 2020-03-12 PROCEDURE — 2580000003 HC RX 258: Performed by: HOSPITALIST

## 2020-03-12 PROCEDURE — 99232 SBSQ HOSP IP/OBS MODERATE 35: CPT | Performed by: INTERNAL MEDICINE

## 2020-03-12 PROCEDURE — 6360000002 HC RX W HCPCS: Performed by: HOSPITALIST

## 2020-03-12 PROCEDURE — 94640 AIRWAY INHALATION TREATMENT: CPT

## 2020-03-12 PROCEDURE — 80048 BASIC METABOLIC PNL TOTAL CA: CPT

## 2020-03-12 PROCEDURE — 87632 RESP VIRUS 6-11 TARGETS: CPT

## 2020-03-12 PROCEDURE — 2700000000 HC OXYGEN THERAPY PER DAY

## 2020-03-12 PROCEDURE — 6370000000 HC RX 637 (ALT 250 FOR IP): Performed by: PHYSICIAN ASSISTANT

## 2020-03-12 PROCEDURE — 82947 ASSAY GLUCOSE BLOOD QUANT: CPT

## 2020-03-12 PROCEDURE — 36415 COLL VENOUS BLD VENIPUNCTURE: CPT

## 2020-03-12 PROCEDURE — 6370000000 HC RX 637 (ALT 250 FOR IP): Performed by: HOSPITALIST

## 2020-03-12 PROCEDURE — 85027 COMPLETE CBC AUTOMATED: CPT

## 2020-03-12 RX ORDER — HYDRALAZINE HYDROCHLORIDE 20 MG/ML
10 INJECTION INTRAMUSCULAR; INTRAVENOUS EVERY 6 HOURS PRN
Status: DISCONTINUED | OUTPATIENT
Start: 2020-03-12 | End: 2020-03-15 | Stop reason: HOSPADM

## 2020-03-12 RX ADMIN — TIMOLOL MALEATE 1 DROP: 5 SOLUTION/ DROPS OPHTHALMIC at 08:09

## 2020-03-12 RX ADMIN — GUAIFENESIN 1200 MG: 600 TABLET, EXTENDED RELEASE ORAL at 08:09

## 2020-03-12 RX ADMIN — IPRATROPIUM BROMIDE AND ALBUTEROL SULFATE 1 AMPULE: .5; 3 SOLUTION RESPIRATORY (INHALATION) at 11:22

## 2020-03-12 RX ADMIN — METHYLPREDNISOLONE SODIUM SUCCINATE 60 MG: 125 INJECTION, POWDER, FOR SOLUTION INTRAMUSCULAR; INTRAVENOUS at 11:50

## 2020-03-12 RX ADMIN — INSULIN LISPRO 6 UNITS: 100 INJECTION, SOLUTION INTRAVENOUS; SUBCUTANEOUS at 17:41

## 2020-03-12 RX ADMIN — PIPERACILLIN SODIUM AND TAZOBACTAM SODIUM 3.38 G: 3; .375 INJECTION, POWDER, LYOPHILIZED, FOR SOLUTION INTRAVENOUS at 11:50

## 2020-03-12 RX ADMIN — INSULIN LISPRO 2 UNITS: 100 INJECTION, SOLUTION INTRAVENOUS; SUBCUTANEOUS at 20:26

## 2020-03-12 RX ADMIN — BUSPIRONE HYDROCHLORIDE 7.5 MG: 5 TABLET ORAL at 20:25

## 2020-03-12 RX ADMIN — OXYCODONE 15 MG: 5 TABLET ORAL at 23:36

## 2020-03-12 RX ADMIN — METHYLPREDNISOLONE SODIUM SUCCINATE 60 MG: 125 INJECTION, POWDER, FOR SOLUTION INTRAMUSCULAR; INTRAVENOUS at 05:47

## 2020-03-12 RX ADMIN — PANTOPRAZOLE SODIUM 40 MG: 40 TABLET, DELAYED RELEASE ORAL at 05:47

## 2020-03-12 RX ADMIN — AMITRIPTYLINE HYDROCHLORIDE 50 MG: 50 TABLET, FILM COATED ORAL at 20:25

## 2020-03-12 RX ADMIN — LEVOFLOXACIN 500 MG: 5 INJECTION, SOLUTION INTRAVENOUS at 17:41

## 2020-03-12 RX ADMIN — OXYCODONE 15 MG: 5 TABLET ORAL at 11:49

## 2020-03-12 RX ADMIN — IPRATROPIUM BROMIDE AND ALBUTEROL SULFATE 1 AMPULE: .5; 3 SOLUTION RESPIRATORY (INHALATION) at 18:58

## 2020-03-12 RX ADMIN — METHYLPREDNISOLONE SODIUM SUCCINATE 60 MG: 125 INJECTION, POWDER, FOR SOLUTION INTRAMUSCULAR; INTRAVENOUS at 20:25

## 2020-03-12 RX ADMIN — TIMOLOL MALEATE 1 DROP: 5 SOLUTION/ DROPS OPHTHALMIC at 20:24

## 2020-03-12 RX ADMIN — SODIUM CHLORIDE, PRESERVATIVE FREE 10 ML: 5 INJECTION INTRAVENOUS at 20:26

## 2020-03-12 RX ADMIN — ATORVASTATIN CALCIUM 40 MG: 20 TABLET, FILM COATED ORAL at 08:08

## 2020-03-12 RX ADMIN — LATANOPROST 1 DROP: 50 SOLUTION OPHTHALMIC at 22:36

## 2020-03-12 RX ADMIN — PIPERACILLIN SODIUM AND TAZOBACTAM SODIUM 3.38 G: 3; .375 INJECTION, POWDER, LYOPHILIZED, FOR SOLUTION INTRAVENOUS at 05:48

## 2020-03-12 RX ADMIN — PIPERACILLIN SODIUM AND TAZOBACTAM SODIUM 3.38 G: 3; .375 INJECTION, POWDER, LYOPHILIZED, FOR SOLUTION INTRAVENOUS at 20:25

## 2020-03-12 RX ADMIN — ENOXAPARIN SODIUM 40 MG: 40 INJECTION SUBCUTANEOUS at 20:24

## 2020-03-12 RX ADMIN — LISINOPRIL 40 MG: 20 TABLET ORAL at 08:08

## 2020-03-12 RX ADMIN — ASPIRIN 81 MG: 81 TABLET, COATED ORAL at 08:09

## 2020-03-12 RX ADMIN — OXYCODONE 15 MG: 5 TABLET ORAL at 17:41

## 2020-03-12 RX ADMIN — INSULIN LISPRO 2 UNITS: 100 INJECTION, SOLUTION INTRAVENOUS; SUBCUTANEOUS at 13:17

## 2020-03-12 RX ADMIN — IPRATROPIUM BROMIDE AND ALBUTEROL SULFATE 1 AMPULE: .5; 3 SOLUTION RESPIRATORY (INHALATION) at 15:04

## 2020-03-12 RX ADMIN — TIZANIDINE 4 MG: 4 TABLET ORAL at 11:50

## 2020-03-12 RX ADMIN — BUSPIRONE HYDROCHLORIDE 7.5 MG: 5 TABLET ORAL at 08:08

## 2020-03-12 RX ADMIN — IPRATROPIUM BROMIDE AND ALBUTEROL SULFATE 1 AMPULE: .5; 3 SOLUTION RESPIRATORY (INHALATION) at 07:44

## 2020-03-12 RX ADMIN — AMLODIPINE BESYLATE 5 MG: 5 TABLET ORAL at 08:09

## 2020-03-12 RX ADMIN — GUAIFENESIN 1200 MG: 600 TABLET, EXTENDED RELEASE ORAL at 20:25

## 2020-03-12 RX ADMIN — OXYCODONE 15 MG: 5 TABLET ORAL at 05:47

## 2020-03-12 ASSESSMENT — PAIN SCALES - GENERAL
PAINLEVEL_OUTOF10: 8
PAINLEVEL_OUTOF10: 7
PAINLEVEL_OUTOF10: 6
PAINLEVEL_OUTOF10: 7
PAINLEVEL_OUTOF10: 7

## 2020-03-12 ASSESSMENT — ENCOUNTER SYMPTOMS
GASTROINTESTINAL NEGATIVE: 1
COUGH: 1
EYES NEGATIVE: 1
SHORTNESS OF BREATH: 1
WHEEZING: 1

## 2020-03-12 ASSESSMENT — PAIN DESCRIPTION - ORIENTATION: ORIENTATION: LOWER

## 2020-03-12 ASSESSMENT — PAIN DESCRIPTION - LOCATION: LOCATION: BACK

## 2020-03-12 ASSESSMENT — PAIN DESCRIPTION - PAIN TYPE: TYPE: CHRONIC PAIN

## 2020-03-12 NOTE — PROGRESS NOTES
Jamilah Amador MD        acetaminophen (TYLENOL) tablet 650 mg  650 mg Oral Q6H PRN Gilmer Freeman MD        Or    acetaminophen (TYLENOL) suppository 650 mg  650 mg Rectal Q6H PRN Gilmer Freeman MD        magnesium hydroxide (MILK OF MAGNESIA) 400 MG/5ML suspension 30 mL  30 mL Oral Daily PRN Gilmer Freeman MD        promethazine (PHENERGAN) tablet 12.5 mg  12.5 mg Oral Q6H PRN Gilmer Freeman MD        Or    ondansetron [facility]) injection 4 mg  4 mg Intravenous Q6H PRN Gilmer Freeman MD        enoxaparin (LOVENOX) injection 40 mg  40 mg Subcutaneous Q24H Gilmer Freeman MD   40 mg at 03/10/20 2240    levofloxacin (LEVAQUIN) 500 MG/100ML infusion 500 mg  500 mg Intravenous Q24H Gilmer Freeman MD   Stopped at 03/11/20 1906    piperacillin-tazobactam (ZOSYN) 3.375 g in dextrose 5 % 50 mL IVPB extended infusion (mini-bag)  3.375 g Intravenous Q8H Gilmer Freeman MD 12.5 mL/hr at 03/12/20 0548 3.375 g at 03/12/20 0548    methylPREDNISolone sodium (SOLU-MEDROL) injection 60 mg  60 mg Intravenous Q8H Gilmer Freeman MD   60 mg at 03/12/20 0547    ipratropium-albuterol (DUONEB) nebulizer solution 1 ampule  1 ampule Inhalation Q4H WA Gilmer Freeman MD   1 ampule at 03/12/20 0744    glucose (GLUTOSE) 40 % oral gel 15 g  15 g Oral PRN Gilmer Freeman MD        dextrose 50 % IV solution  12.5 g Intravenous PRN Gilmer Freeman MD        glucagon (rDNA) injection 1 mg  1 mg Intramuscular PRN Gilmer Freeman MD        dextrose 5 % solution  100 mL/hr Intravenous PRN Gilmer Freeman MD        amitriptyline (ELAVIL) tablet 50 mg  50 mg Oral Nightly Kamron Randhawa PA-C   50 mg at 03/11/20 2207    amLODIPine (NORVASC) tablet 5 mg  5 mg Oral Daily Kamron Randhawa PA-C   5 mg at 03/12/20 0809    aspirin EC tablet 81 mg  81 mg Oral Daily RISHABH Hill-C   81 mg at 03/12/20 0809    atorvastatin (LIPITOR) tablet 40 mg  40 mg Oral Daily OLVIN HillC   40 mg at 03/12/20 0808    busPIRone (BUSPAR) tablet 7.5 mg  7.5 mg Oral BID Alberto Khan PA-C   7.5 mg at 03/12/20 1894    latanoprost (XALATAN) 0.005 % ophthalmic solution 1 drop  1 drop Both Eyes Nightly Alberto Khan PA-C   1 drop at 03/11/20 2208    lisinopril (PRINIVIL;ZESTRIL) tablet 40 mg  40 mg Oral Daily Alberto Khan PA-C   40 mg at 03/12/20 9052    pantoprazole (PROTONIX) tablet 40 mg  40 mg Oral QAM AC Alberto Khan PA-C   40 mg at 03/12/20 0547    oxyCODONE (ROXICODONE) immediate release tablet 15 mg  15 mg Oral 4x Daily PRN Alberto Khan PA-C   15 mg at 03/12/20 0547    timolol (TIMOPTIC) 0.5 % ophthalmic solution 1 drop  1 drop Both Eyes BID Alberto Khan PA-C   1 drop at 03/12/20 0809    tiZANidine (ZANAFLEX) tablet 4 mg  4 mg Oral TID PRN Alberto Khan PA-C   4 mg at 03/11/20 2208    insulin lispro (HUMALOG) injection vial 0-12 Units  0-12 Units Subcutaneous TID WC Alberto Khan PA-C   6 Units at 03/11/20 1200    insulin lispro (HUMALOG) injection vial 0-6 Units  0-6 Units Subcutaneous Nightly Alberto Khan PA-C   3 Units at 03/10/20 2305    guaiFENesin [facility]) extended release tablet 1,200 mg  1,200 mg Oral BID Alberto Khan PA-C   1,200 mg at 03/12/20 0809    metoprolol (LOPRESSOR) injection 2.5 mg  2.5 mg Intravenous Q6H PRN Alberto Khan PA-C   2.5 mg at 03/10/20 2304        Labs:     Recent Labs     03/10/20  1650 03/11/20  0451 03/12/20  0440   WBC 24.1* 17.7* 23.8*   RBC 4.31* 3.84* 3.62*   HGB 11.9* 10.6* 10.2*   HCT 38.6* 35.7* 32.3*   MCV 89.6 93.0 89.2   MCH 27.6 27.6 28.2   MCHC 30.8* 29.7* 31.6*    326 252     Recent Labs     03/10/20  1650 03/10/20  1715 03/11/20  0451 03/12/20  0440   *  --  136 135*   K 4.5 4.4 4.8 4.8   ANIONGAP 12  --  9 11   CL 90*  --  100 98   CO2 30*  --  27 26   BUN 8  --  7* 15   CREATININE 0.7  --  0.5 0.7   GLUCOSE 128*  --  173* 155*   CALCIUM 9.2  --  8.6* 8.5*     No results for input(s): MG, PHOS in the last 72 hours.   Recent Labs     03/10/20  1650   AST 20 ALT 19   BILITOT 0.4   ALKPHOS 192*     ABGs:No results for input(s): PH, PO2, PCO2, HCO3, BE, O2SAT in the last 72 hours. Troponin T:   Recent Labs     03/10/20  1650   TROPONINI <0.01     INR:   Recent Labs     03/10/20  1650   INR 0.98     Lactic Acid:   Recent Labs     03/11/20  0336   LACTA 1.3       Objective:   Vitals: /67   Pulse 71   Temp 97.4 °F (36.3 °C) (Temporal)   Resp 20   Ht 5' 6\" (1.676 m)   Wt 229 lb (103.9 kg)   SpO2 95%   BMI 36.96 kg/m²   24HR INTAKE/OUTPUT:      Intake/Output Summary (Last 24 hours) at 3/12/2020 0830  Last data filed at 3/11/2020 1235  Gross per 24 hour   Intake 480 ml   Output --   Net 480 ml     General appearance: alert and cooperative with exam  HEENT: atraumatic, eyes with clear conjunctiva and normal lids, pupils and irises normal, external ears and nose are normal, lips normal  Neck without masses, lympadenopathy, bruit, thyroid normal  Lungs: no increased work of breathing, wheezes bilaterally and coarse breath sounds  Heart: regular rate and rhythm, S1, S2 normal, no murmur, click, rub or gallop  Abdomen: soft, non-tender; bowel sounds normal; no masses,  no organomegaly and obese  Extremities: edema trace bilaterally, modified Jaky's negative  Neurologic: no focal neurologic deficits, normal sensation, alert and oriented, affect and mood appropriate  Skin: no rashes, nodules    Assessment and Plan:   Principal Problem:    Acute on chronic respiratory failure with hypoxia and hypercapnia (HCC)  Active Problems:    COPD with acute exacerbation (HCC)    Neutrophilic leukocytosis    Hyperlipidemia    Chronic low back pain    Uncontrolled hypertension    Chronic GERD    Grade I diastolic dysfunction    HCAP (healthcare-associated pneumonia)    Lymphopenia  Resolved Problems:    * No resolved hospital problems.  *    Day #2 levofloxacin and piperacillin/tazobactam empiric therapy for healthcare-associated pneumonia with need to cover Gram negative and resistant organisms. Cultures pending to guide therapy. Pulmonology consulted, appreciate input. Patient appears to be on maximal medical therapy. Radiographic surveillance of lymphadenopathy per guidelines.     Advance Directive: Full Code    DVT prophylaxis: enoxaparin    Discharge planning: [place] Borden DO  [facility]

## 2020-03-12 NOTE — PROGRESS NOTES
Pulmonary and Critical Care Progress Note  [facility]    MR# [id number]    Acct# [de-identified]  3/12/2020   11:48 AM CDT    Referring Provider:Chencho Osman DO  Chief Complaint: Shortness of breath. HPI: Patient sitting on side of bed with nursing and attending at bedside. Her reports he is breathing better this am but continues to have SOB and occasional cough. He reports to attending that he just can not tolerate a CPAP. He is afebrile. Viral PCR was not drawn yesterday and nurse reports she caught it this morning and has sent it off. Blood cultures NGTD. He is on 4 L NC with sat of 95%.    Past Medical History      Past Medical History:   Diagnosis Date    Arthritis     Bilateral hearing loss     CAD (coronary artery disease)     Chronic back pain     Colon polyps     COPD (chronic obstructive pulmonary disease) (HCC)     Depression     Hyperlipidemia     Hypertension     Low back pain 1/29/2016    Oxygen dependent     uses at night    Palliative care patient 12/28/2018     SurgicalHistory  Past Surgical History:   Procedure Laterality Date    BACK SURGERY  2010    COLONOSCOPY  ? 2005    Dr. Katherine Billy    COLONOSCOPY  01/2012    3 polyps, 2 adenomatous, 1 hyperplastic    COLONOSCOPY  01/2015    diverticulosis    FRACTURE SURGERY      KNEE ARTHROSCOPY  2014    [place] EGD TRANSORAL BIOPSY SINGLE/MULTIPLE N/A 12/21/2016    Dr MARIA ELENA Gómez/dilation over wire, 50 [place]Schatz[place]'s Ring, gastritis/gastropathy-prn    UPPER GASTROINTESTINAL ENDOSCOPY  4/24/2017    Dr MARIA ELENA Gómez/dilation over wire, 46 [place] Ring     UPPER GASTROINTESTINAL ENDOSCOPY  4/24/2017    Dr MARIA ELENA Gómez/dilation over wire, 46 [place] Ring     WRIST FRACTURE SURGERY      [id number]     Allergies  Allergies   Allergen Reactions    Lyrica [Pregabalin] Other (See Comments)     He states \"it just made everything look blurry\"    Neurontin [Gabapentin] Other (See Comments)     States \"it made me want to shoot 4.8 4.8   CL 90*  --  100 98   CO2 30*  --  27 26   BUN 8  --  7* 15   CREATININE 0.7  --  0.5 0.7   CALCIUM 9.2  --  8.6* 8.5*   GLUCOSE 128*  --  173* 155*      Recent Labs     03/10/20  1715   PHART 7.400   NJM5UCE 58.0*   PO2ART 52.0*   QCD5PAG 35.9*   Y0UHICLX 88.2*   BEART 9.3*     Recent Labs     03/10/20  1650  03/11/20  0336  03/12/20  0440   AST 20  --   --   --   --    ALT 19  --   --   --   --    ALKPHOS 192*  --   --   --   --    BILITOT 0.4  --   --   --   --    CALCIUM 9.2  --   --    < > 8.5*   TROPONINI <0.01  --   --   --   --    LACTA  --    < > 1.3  --   --    INR 0.98  --   --   --   --     < > = values in this interval not displayed. Recent Labs     03/10/20  1715   BC No Growth to date. Any change in status will be called. Radiograph: Xr Chest Standard (2 Vw)    Result Date: 3/10/2020  XR CHEST (2 VW) 3/10/2020 4:00 PM Two-view chest: History: Dyspnea Frontal and lateral projection chest radiograph obtained. Comparison:  2/4/2020, 5/6/2019 and 3/5/2019 Findings: COPD and chronic lung changes identified. In the left lung base there are vague increased airspace opacities posteriorly, mild developing acute infectious/inflammatory change considered. Correlate with patient presentation. The lungs are otherwise clear. The heart is generous, pulmonary circulation appropriate, without heart failure. The bony structures are intact. Radiographically, the chest is unchanged. Impression: 1. Question mild developing patchy airspace opacities in the left lung base laterally. Correlate with patient presentation. 2. COPD and chronic changes Signed by Dr Benny Perez on 3/10/2020 6:26 PM    My radiograph interpretation/independent review of imaging: Chest x-ray reveals a very subtle left lower lobe infiltrate. A recent chest CT did show some intrathoracic adenopathy in particular subcarinal node.  No new imaging today, IV Solu medrol and taper as appropriate  · GI and DVT prophylaxis     Per Dr. Desirae Coley \"follow-up CT to reassess his intrathoracic adenopathy but if that had progressed he is not a candidate for invasive work-up requiring general anesthesia such as bronchoscopy with endobronchial ultrasound or mediastinoscopy in my opinion because of his chronic CO2 retention in particular in the significant risk of morbidity and even mortality associated with these procedures. The adenopathy is most likely reactive. The other possibility would be an occult lymphoproliferative disorder and again in the absence of any other symptoms I would just follow these with serial imaging studies. He again in my opinion would not be a candidate for invasive work-up of these abnormalities in terms of bronchoscopy with endobronchial ultrasound or mediastinoscopy. \"    Electronically signed by Arnie Khan on 03/12/20 at 9:46 AM  Physician's substantive portion:  Subjective: The patient is stable from a pulmonary standpoint. Viral PCR studies are pending. Objective: He has diminished breath sounds on chest exam.  Assessment/recommendation: If he remains stable he should be ready for oral medications soon. I have seen and examined patient personally, performing a face-to-face diagnostic evaluation with plan of care reviewed and developed with APRN. I have addended and/or modified the above history of present illness, physical examination, and assessment and plan to reflect my findings and impressions. Essential elements of the care plan were discussed with APRN above. Agree with findings and assessment/plan as documented above.     Electronically signed by Arvell Krabbe, MD on 3/12/20 at 2:55 PM CDT

## 2020-03-12 NOTE — PLAN OF CARE
Problem: Falls - Risk of:  Goal: Will remain free from falls  Description: Will remain free from falls  3/12/2020 0834 by Anthony Agrawal RN  Outcome: Ongoing  3/12/2020 0436 by Margaux Julio RN  Outcome: Ongoing  Goal: Absence of physical injury  Description: Absence of physical injury  3/12/2020 0834 by Anthony Agrawal RN  Outcome: Ongoing  3/12/2020 0436 by Margaux Julio RN  Outcome: Ongoing     Problem: Breathing Pattern - Ineffective:  Goal: Ability to achieve and maintain a regular respiratory rate will improve  Description: Ability to achieve and maintain a regular respiratory rate will improve  Outcome: Ongoing

## 2020-03-13 ENCOUNTER — TELEPHONE (OUTPATIENT)
Dept: PULMONOLOGY | Facility: CLINIC | Age: 65
End: 2020-03-13

## 2020-03-13 ENCOUNTER — OUTSIDE FACILITY SERVICE (OUTPATIENT)
Dept: PULMONOLOGY | Facility: CLINIC | Age: 65
End: 2020-03-13

## 2020-03-13 LAB
ANION GAP SERPL CALCULATED.3IONS-SCNC: 8 MMOL/L (ref 7–19)
BUN BLDV-MCNC: 19 MG/DL (ref 8–23)
CALCIUM SERPL-MCNC: 8.6 MG/DL (ref 8.8–10.2)
CHLORIDE BLD-SCNC: 100 MMOL/L (ref 98–111)
CO2: 31 MMOL/L (ref 22–29)
CREAT SERPL-MCNC: 0.7 MG/DL (ref 0.5–1.2)
GFR NON-AFRICAN AMERICAN: >60
GLUCOSE BLD-MCNC: 144 MG/DL (ref 74–109)
GLUCOSE BLD-MCNC: 159 MG/DL (ref 70–99)
GLUCOSE BLD-MCNC: 180 MG/DL (ref 70–99)
GLUCOSE BLD-MCNC: 183 MG/DL (ref 70–99)
GLUCOSE BLD-MCNC: 202 MG/DL (ref 70–99)
HCT VFR BLD CALC: 33.3 % (ref 42–52)
HEMOGLOBIN: 10.1 G/DL (ref 14–18)
MCH RBC QN AUTO: 28 PG (ref 27–31)
MCHC RBC AUTO-ENTMCNC: 30.3 G/DL (ref 33–37)
MCV RBC AUTO: 92.2 FL (ref 80–94)
PDW BLD-RTO: 14.3 % (ref 11.5–14.5)
PERFORMED ON: ABNORMAL
PLATELET # BLD: 308 K/UL (ref 130–400)
PMV BLD AUTO: 8.2 FL (ref 9.4–12.4)
POTASSIUM SERPL-SCNC: 4.2 MMOL/L (ref 3.5–5)
RBC # BLD: 3.61 M/UL (ref 4.7–6.1)
SODIUM BLD-SCNC: 139 MMOL/L (ref 136–145)
WBC # BLD: 21.7 K/UL (ref 4.8–10.8)

## 2020-03-13 PROCEDURE — 2700000000 HC OXYGEN THERAPY PER DAY

## 2020-03-13 PROCEDURE — 85027 COMPLETE CBC AUTOMATED: CPT

## 2020-03-13 PROCEDURE — 36415 COLL VENOUS BLD VENIPUNCTURE: CPT

## 2020-03-13 PROCEDURE — 94640 AIRWAY INHALATION TREATMENT: CPT

## 2020-03-13 PROCEDURE — 82947 ASSAY GLUCOSE BLOOD QUANT: CPT

## 2020-03-13 PROCEDURE — 80048 BASIC METABOLIC PNL TOTAL CA: CPT

## 2020-03-13 PROCEDURE — 6370000000 HC RX 637 (ALT 250 FOR IP): Performed by: PHYSICIAN ASSISTANT

## 2020-03-13 PROCEDURE — 6360000002 HC RX W HCPCS: Performed by: HOSPITALIST

## 2020-03-13 PROCEDURE — 99232 SBSQ HOSP IP/OBS MODERATE 35: CPT | Performed by: INTERNAL MEDICINE

## 2020-03-13 PROCEDURE — 2580000003 HC RX 258: Performed by: HOSPITALIST

## 2020-03-13 PROCEDURE — 6370000000 HC RX 637 (ALT 250 FOR IP): Performed by: HOSPITALIST

## 2020-03-13 PROCEDURE — 1210000000 HC MED SURG R&B

## 2020-03-13 RX ORDER — METHYLPREDNISOLONE SODIUM SUCCINATE 125 MG/2ML
60 INJECTION, POWDER, LYOPHILIZED, FOR SOLUTION INTRAMUSCULAR; INTRAVENOUS EVERY 12 HOURS
Status: DISCONTINUED | OUTPATIENT
Start: 2020-03-13 | End: 2020-03-14

## 2020-03-13 RX ORDER — IPRATROPIUM BROMIDE AND ALBUTEROL SULFATE 2.5; .5 MG/3ML; MG/3ML
1 SOLUTION RESPIRATORY (INHALATION) 4 TIMES DAILY
Status: DISCONTINUED | OUTPATIENT
Start: 2020-03-13 | End: 2020-03-15 | Stop reason: HOSPADM

## 2020-03-13 RX ADMIN — LEVOFLOXACIN 500 MG: 5 INJECTION, SOLUTION INTRAVENOUS at 16:58

## 2020-03-13 RX ADMIN — PIPERACILLIN SODIUM AND TAZOBACTAM SODIUM 3.38 G: 3; .375 INJECTION, POWDER, LYOPHILIZED, FOR SOLUTION INTRAVENOUS at 11:16

## 2020-03-13 RX ADMIN — TIMOLOL MALEATE 1 DROP: 5 SOLUTION/ DROPS OPHTHALMIC at 20:49

## 2020-03-13 RX ADMIN — GUAIFENESIN 1200 MG: 600 TABLET, EXTENDED RELEASE ORAL at 08:23

## 2020-03-13 RX ADMIN — ASPIRIN 81 MG: 81 TABLET, COATED ORAL at 08:24

## 2020-03-13 RX ADMIN — SODIUM CHLORIDE, PRESERVATIVE FREE 10 ML: 5 INJECTION INTRAVENOUS at 20:48

## 2020-03-13 RX ADMIN — INSULIN LISPRO 2 UNITS: 100 INJECTION, SOLUTION INTRAVENOUS; SUBCUTANEOUS at 08:27

## 2020-03-13 RX ADMIN — BUSPIRONE HYDROCHLORIDE 7.5 MG: 5 TABLET ORAL at 08:23

## 2020-03-13 RX ADMIN — IPRATROPIUM BROMIDE AND ALBUTEROL SULFATE 1 AMPULE: .5; 3 SOLUTION RESPIRATORY (INHALATION) at 14:31

## 2020-03-13 RX ADMIN — BUSPIRONE HYDROCHLORIDE 7.5 MG: 5 TABLET ORAL at 20:48

## 2020-03-13 RX ADMIN — METHYLPREDNISOLONE SODIUM SUCCINATE 60 MG: 125 INJECTION, POWDER, FOR SOLUTION INTRAMUSCULAR; INTRAVENOUS at 03:26

## 2020-03-13 RX ADMIN — METHYLPREDNISOLONE SODIUM SUCCINATE 60 MG: 125 INJECTION, POWDER, FOR SOLUTION INTRAMUSCULAR; INTRAVENOUS at 08:24

## 2020-03-13 RX ADMIN — AMITRIPTYLINE HYDROCHLORIDE 50 MG: 50 TABLET, FILM COATED ORAL at 20:48

## 2020-03-13 RX ADMIN — IPRATROPIUM BROMIDE AND ALBUTEROL SULFATE 1 AMPULE: .5; 3 SOLUTION RESPIRATORY (INHALATION) at 06:32

## 2020-03-13 RX ADMIN — SODIUM CHLORIDE, PRESERVATIVE FREE 10 ML: 5 INJECTION INTRAVENOUS at 08:25

## 2020-03-13 RX ADMIN — IPRATROPIUM BROMIDE AND ALBUTEROL SULFATE 1 AMPULE: .5; 3 SOLUTION RESPIRATORY (INHALATION) at 19:49

## 2020-03-13 RX ADMIN — PIPERACILLIN SODIUM AND TAZOBACTAM SODIUM 3.38 G: 3; .375 INJECTION, POWDER, LYOPHILIZED, FOR SOLUTION INTRAVENOUS at 19:07

## 2020-03-13 RX ADMIN — AMLODIPINE BESYLATE 5 MG: 5 TABLET ORAL at 08:24

## 2020-03-13 RX ADMIN — ENOXAPARIN SODIUM 40 MG: 40 INJECTION SUBCUTANEOUS at 18:55

## 2020-03-13 RX ADMIN — OXYCODONE 15 MG: 5 TABLET ORAL at 05:11

## 2020-03-13 RX ADMIN — LISINOPRIL 40 MG: 20 TABLET ORAL at 08:24

## 2020-03-13 RX ADMIN — INSULIN LISPRO 2 UNITS: 100 INJECTION, SOLUTION INTRAVENOUS; SUBCUTANEOUS at 20:50

## 2020-03-13 RX ADMIN — IPRATROPIUM BROMIDE AND ALBUTEROL SULFATE 1 AMPULE: .5; 3 SOLUTION RESPIRATORY (INHALATION) at 10:29

## 2020-03-13 RX ADMIN — INSULIN LISPRO 2 UNITS: 100 INJECTION, SOLUTION INTRAVENOUS; SUBCUTANEOUS at 13:01

## 2020-03-13 RX ADMIN — GUAIFENESIN 1200 MG: 600 TABLET, EXTENDED RELEASE ORAL at 20:48

## 2020-03-13 RX ADMIN — TIMOLOL MALEATE 1 DROP: 5 SOLUTION/ DROPS OPHTHALMIC at 08:23

## 2020-03-13 RX ADMIN — PIPERACILLIN SODIUM AND TAZOBACTAM SODIUM 3.38 G: 3; .375 INJECTION, POWDER, LYOPHILIZED, FOR SOLUTION INTRAVENOUS at 03:26

## 2020-03-13 RX ADMIN — OXYCODONE 15 MG: 5 TABLET ORAL at 11:16

## 2020-03-13 RX ADMIN — OXYCODONE 15 MG: 5 TABLET ORAL at 22:42

## 2020-03-13 RX ADMIN — OXYCODONE 15 MG: 5 TABLET ORAL at 17:05

## 2020-03-13 RX ADMIN — LATANOPROST 1 DROP: 50 SOLUTION OPHTHALMIC at 20:49

## 2020-03-13 RX ADMIN — ATORVASTATIN CALCIUM 40 MG: 20 TABLET, FILM COATED ORAL at 08:24

## 2020-03-13 RX ADMIN — PANTOPRAZOLE SODIUM 40 MG: 40 TABLET, DELAYED RELEASE ORAL at 05:37

## 2020-03-13 RX ADMIN — METHYLPREDNISOLONE SODIUM SUCCINATE 60 MG: 125 INJECTION, POWDER, FOR SOLUTION INTRAMUSCULAR; INTRAVENOUS at 18:55

## 2020-03-13 RX ADMIN — INSULIN LISPRO 2 UNITS: 100 INJECTION, SOLUTION INTRAVENOUS; SUBCUTANEOUS at 16:59

## 2020-03-13 ASSESSMENT — PAIN SCALES - GENERAL
PAINLEVEL_OUTOF10: 2
PAINLEVEL_OUTOF10: 6
PAINLEVEL_OUTOF10: 3
PAINLEVEL_OUTOF10: 6
PAINLEVEL_OUTOF10: 7
PAINLEVEL_OUTOF10: 6
PAINLEVEL_OUTOF10: 6

## 2020-03-13 ASSESSMENT — ENCOUNTER SYMPTOMS
GASTROINTESTINAL NEGATIVE: 1
EYES NEGATIVE: 1
SHORTNESS OF BREATH: 1
COUGH: 1
WHEEZING: 1

## 2020-03-13 ASSESSMENT — PAIN DESCRIPTION - PAIN TYPE: TYPE: CHRONIC PAIN

## 2020-03-13 ASSESSMENT — PAIN DESCRIPTION - ORIENTATION: ORIENTATION: LOWER

## 2020-03-13 ASSESSMENT — PAIN DESCRIPTION - LOCATION: LOCATION: BACK

## 2020-03-13 NOTE — PROGRESS NOTES
absence of any other symptoms I would just follow these with serial imaging studies. He again in my opinion would not be a candidate for invasive work-up of these abnormalities in terms of bronchoscopy with endobronchial ultrasound or mediastinoscopy. I have ordered a respiratory virus panel. \"    Scheduled Meds:   ipratropium-albuterol  1 ampule Inhalation 4x daily    methylPREDNISolone  60 mg Intravenous Q12H    sodium chloride flush  10 mL Intravenous 2 times per day    enoxaparin  40 mg Subcutaneous Q24H    levofloxacin  500 mg Intravenous Q24H    piperacillin-tazobactam  3.375 g Intravenous Q8H    amitriptyline  50 mg Oral Nightly    amLODIPine  5 mg Oral Daily    aspirin  81 mg Oral Daily    atorvastatin  40 mg Oral Daily    busPIRone  7.5 mg Oral BID    latanoprost  1 drop Both Eyes Nightly    lisinopril  40 mg Oral Daily    pantoprazole  40 mg Oral QAM AC    timolol  1 drop Both Eyes BID    insulin lispro  0-12 Units Subcutaneous TID WC    insulin lispro  0-6 Units Subcutaneous Nightly    guaiFENesin  1,200 mg Oral BID     Continuous Infusions:   dextrose       PRN Meds:hydrALAZINE, sodium chloride flush, acetaminophen **OR** acetaminophen, magnesium hydroxide, promethazine **OR** ondansetron, glucose, dextrose, glucagon (rDNA), dextrose, oxyCODONE, tiZANidine, metoprolol    Review of Systems  Pertinent items are noted in HPI. Objective:     Vitals reviewed. I/O last 3 completed shifts: In: 730 [P.O.:730]  Out: -   I/O this shift:  In: 120 [P.O.:120]  Out: -     /77   Pulse 82   Temp 97.3 °F (36.3 °C)   Resp 18   Ht 5' 6\" (1.676 m)   Wt 235 lb (106.6 kg)   SpO2 98%   BMI 37.93 kg/m²     Physical Exam  Vitals signs reviewed. Constitutional:       General: He is not in acute distress. Appearance: Normal appearance. He is obese. He is not ill-appearing or toxic-appearing. HENT:      Head: Normocephalic and atraumatic. Nose: No congestion or rhinorrhea.

## 2020-03-13 NOTE — CARE COORDINATION
Met with patient regarding discharge plans. Current Readmission Risk score is 20%. [facility]. He reports that [facility] has helped him manage his medications. Each episode for COPD lasts for 30 days. Patient agreed to participate in [facility] program again. [facility] Provided and explained Pneumonia Zone Tool. Patient verbalized understanding. Encouraged patient to reach out to Case Management, if he has additional discharge needs.   Electronically signed by Milagros Richardson RN on 3/13/2020 at 1:59 PM
Spoke with patient regarding any anticipated needs upon discharge. He states that he is able to afford his medications at this time. He is independent with his ADL's. Lives at home with his wife and adult son. The DME he has at this time: walker and cane. He does not anticipate any further DME needs currently. He does have home O2. I explained to him that if he found he was in need of assistance before discharge, to contact someone in  or care management.
Emergent/Urgent situations i.e. fire, crime, inclement weather or health crisis. :  Independent  Ability handle personal hygiene needs (bathing/dressing/grooming): Independent  Ability to manage medications: Independent  Ability to prepare food:  Independent  Ability to maintain home (clean home, laundry):  Needs Assistance  Ability to drive and/or has transportation:  Independent  Ability to do shopping:  Independent  Ability to manage finances: Independent  Is patient able to live independently?:  Yes  Hearing and Vision  Visual Impairment:  Visual impairment (Glasses/contacts)  Hearing Impairment:  Hard of hearing  Care Transitions Interventions         Follow Up : Met at bedside with Mr. Jacqueline Purdy, discussed BPCI-A process. Contact information given. Dang Chapa is agreeable with appropriate follow up after discharge. Patient is known to [facility] from previous admission. He says he finished his antibiotic and his steroids, but did not get better. He had home care ordered but since he was able to drive and get about he was not picked up by them. He says he continued to decline at home, cough, wheezing, fatigue. He is independent of ADL's, has home oxygen at 3 LMP. Lives at home with his wife, she is not currently in room. He sets his own medications up and takes them. Will follow as inpatient and upon discharge as indicated. No future appointments. Health Maintenance  There are no preventive care reminders to display for this patient.     Amarilys Sanchez RN

## 2020-03-13 NOTE — PROGRESS NOTES
Pulmonary and Critical Care Progress Note  [facility]    MR# [id number]    Acct# [de-identified]  3/13/2020   11:48 AM CDT    Referring Jenkins Moritz, MD  Chief Complaint: Shortness of breath. HPI: The patient's viral PCR chest is marked as completed with preliminary results. I cannot pull up  the results in EPIC however. He is feeling much better and is in no respiratory distress.   Past Medical History      Past Medical History:   Diagnosis Date    Arthritis     Bilateral hearing loss     CAD (coronary artery disease)     Chronic back pain     Colon polyps     COPD (chronic obstructive pulmonary disease) (HCC)     Depression     Hyperlipidemia     Hypertension     Low back pain 1/29/2016    Oxygen dependent     uses at night    Palliative care patient 12/28/2018     SurgicalHistory  Past Surgical History:   Procedure Laterality Date    BACK SURGERY  2010    COLONOSCOPY  ? 2005    Dr. Yuli Loo    COLONOSCOPY  01/2012    3 polyps, 2 adenomatous, 1 hyperplastic    COLONOSCOPY  01/2015    diverticulosis    FRACTURE SURGERY      KNEE ARTHROSCOPY  2014    [place] EGD TRANSORAL BIOPSY SINGLE/MULTIPLE N/A 12/21/2016    Dr MARIA ELENA Gómez/dilation over wire, 50 [place]'s Ring, gastritis/gastropathy-prn    UPPER GASTROINTESTINAL ENDOSCOPY  4/24/2017    Dr MAIRA ELENA Gómez/dilation over wire, 46 [place] Ring     UPPER GASTROINTESTINAL ENDOSCOPY  4/24/2017    Dr MARIA ELENA Gómez/dilation over wire, 46 [place] Ring     WRIST FRACTURE SURGERY      [id number]     Allergies  Allergies   Allergen Reactions    Lyrica [Pregabalin] Other (See Comments)     He states \"it just made everything look blurry\"    Neurontin [Gabapentin] Other (See Comments)     States \"it made me want to shoot myself\"     Medications    ipratropium-albuterol, 1 ampule, Inhalation, 4x daily    methylPREDNISolone, 60 mg, Intravenous, Q12H    sodium chloride flush, 10 mL, Intravenous, 2 times per day   enoxaparin, 40 mg, Subcutaneous, Q24H    levofloxacin, 500 mg, Intravenous, Q24H    piperacillin-tazobactam, 3.375 g, Intravenous, Q8H    amitriptyline, 50 mg, Oral, Nightly    amLODIPine, 5 mg, Oral, Daily    aspirin, 81 mg, Oral, Daily    atorvastatin, 40 mg, Oral, Daily    busPIRone, 7.5 mg, Oral, BID    latanoprost, 1 drop, Both Eyes, Nightly    lisinopril, 40 mg, Oral, Daily    pantoprazole, 40 mg, Oral, QAM AC    timolol, 1 drop, Both Eyes, BID    insulin lispro, 0-12 Units, Subcutaneous, TID WC    insulin lispro, 0-6 Units, Subcutaneous, Nightly    guaiFENesin, 1,200 mg, Oral, BID   Social History   reports that he quit smoking about 4 years ago. His smoking use included cigarettes. He has a 92.00 pack-year smoking history. He has never used smokeless tobacco. He reports current alcohol use. He reports that he does not use drugs. Family History  family history includes Alcohol Abuse in his father; Paulene Landau in his son; Glaucoma in his mother; Heart Disease in his brother; High Blood Pressure in his mother; Kidney Disease in his mother; No Known Problems in his brother; Other in his sister. Review of Systems:  Review of Systems   Constitutional: Positive for fatigue and fever. HENT: Negative. Eyes: Negative. Respiratory: Positive for cough, shortness of breath and wheezing. Cardiovascular: Positive for leg swelling. Gastrointestinal: Negative. Genitourinary: Negative. Musculoskeletal: Negative. Skin:        The patient does have some problems with mild chronic venous stasis changes of the calves. Neurological: Negative. Psychiatric/Behavioral: Positive for sleep disturbance. Physical Exam:   height is 5' 6\" (1.676 m) and weight is 235 lb (106.6 kg). His temperature is 97.3 °F (36.3 °C). His blood pressure is 133/77 and his pulse is 82. His respiration is 16 and oxygen saturation is 97%.      Intake/Output Summary (Last 24 hours) at 3/13/2020 0703  Last data filed at 3/13/2020 0010  Gross per 24 hour   Intake 850 ml   Output --   Net 850 ml     Physical Exam  Nursing note reviewed. Exam conducted with a chaperone present. Constitutional:       Comments: He is an obese white male who appears in no acute distress. HENT:      Head:      Comments: Nasal oxygen is in place. Nose:      Comments: Nasal oxygen is in place. Eyes:      Extraocular Movements: Extraocular movements intact. Pupils: Pupils are equal, round, and reactive to light. Neck:      Comments: His neck is thick but supple. Cardiovascular:      Rate and Rhythm: Normal rate and regular rhythm. Pulmonary:      Comments: Breath sounds are diminished throughout. Abdominal:      Comments: His abdomen is obese. Musculoskeletal:      Right lower leg: Edema present. Left lower leg: Edema present. Comments: He has 1+ edema of the calves with some thickening of the skin over the calves. Meek hose in place. Non slip socks on. Skin:     Comments: He appears to have some mild chronic venous stasis changes over the calves with thickening of the skin. Neurological:      General: No focal deficit present. Mental Status: He is alert and oriented to person, place, and time.    Psychiatric:         Mood and Affect: Mood normal.         Behavior: Behavior normal.       Recent Labs     03/11/20  0451 03/12/20  0440 03/13/20  0322   WBC 17.7* 23.8* 21.7*   RBC 3.84* 3.62* 3.61*   HGB 10.6* 10.2* 10.1*   HCT 35.7* 32.3* 33.3*    252 308   MCV 93.0 89.2 92.2   MCH 27.6 28.2 28.0   MCHC 29.7* 31.6* 30.3*   RDW 14.2 14.6* 14.3      Recent Labs     03/11/20  0451 03/12/20  0440 03/13/20  0322    135* 139   K 4.8 4.8 4.2    98 100   CO2 27 26 31*   BUN 7* 15 19   CREATININE 0.5 0.7 0.7   CALCIUM 8.6* 8.5* 8.6*   GLUCOSE 173* 155* 144*      Recent Labs     03/10/20  1715   PHART 7.400   KES2CYK 58.0*   PO2ART 52.0*   LUA7SCF 35.9*   V2OGMADQ 88.2*   BEART 9.3*     Recent Labs 03/10/20  1650  03/11/20  0336  03/13/20  0322   AST 20  --   --   --   --    ALT 19  --   --   --   --    ALKPHOS 192*  --   --   --   --    BILITOT 0.4  --   --   --   --    CALCIUM 9.2  --   --    < > 8.6*   TROPONINI <0.01  --   --   --   --    LACTA  --    < > 1.3  --   --    INR 0.98  --   --   --   --     < > = values in this interval not displayed. Recent Labs     03/10/20  1715   BC No Growth to date. Any change in status will be called. Radiograph: Xr Chest Standard (2 Vw)    Result Date: 3/10/2020  XR CHEST (2 VW) 3/10/2020 4:00 PM Two-view chest: History: Dyspnea Frontal and lateral projection chest radiograph obtained. Comparison:  2/4/2020, 5/6/2019 and 3/5/2019 Findings: COPD and chronic lung changes identified. In the left lung base there are vague increased airspace opacities posteriorly, mild developing acute infectious/inflammatory change considered. Correlate with patient presentation. The lungs are otherwise clear. The heart is generous, pulmonary circulation appropriate, without heart failure. The bony structures are intact. Radiographically, the chest is unchanged. Impression: 1. Question mild developing patchy airspace opacities in the left lung base laterally. Correlate with patient presentation. 2. COPD and chronic changes Signed by Dr Patricia Peguero on 3/10/2020 6:26 PM    My radiograph interpretation/independent review of imaging: Chest x-ray reveals a very subtle left lower lobe infiltrate. A recent chest CT did show some intrathoracic adenopathy in particular subcarinal node. No new imaging today, 3/12/20   Other test results (not lab or imaging): His last pulmonary function studies in the office were consistent with a severe obstructive ventilatory defect.     Problem list generated by [email or web address]:  Patient Active Problem List   Diagnosis    COPD with acute exacerbation ([address]    Acute on chronic respiratory failure with hypoxia and hypercapnia (HCC)    Neutrophilic leukocytosis    Hyperlipidemia    Chronic low back pain    Acute exacerbation of chronic obstructive pulmonary disease (COPD) (HCC)    Uncontrolled hypertension    Pneumonia due to organism    Lower esophageal ring ([place])    Gastritis without bleeding    Chronic GERD    History of esophageal stricture    Status post dilation of esophageal narrowing    Right lower lobe pneumonia (HCC)    Bilateral hearing loss    Palliative care patient    Hyperglycemia    Community acquired pneumonia of right lower lobe of lung (HCC)    Grade I diastolic dysfunction    Pneumonia    Obesity due to excess calories    Personal history of noncompliance with medical treatment, presenting hazards to health    HCAP (healthcare-associated pneumonia)    Lymphopenia     Pulmonary Assessment:  New problem (to me), with additional workup planned:  1. COPD with exacerbation. 2.  Possible left lower lobe pneumonia. 3.  Intrathoracic adenopathy particularly subcarinal node noted on a recent chest CT. New problem (to me), no additional workup planned:  1. None. Other problems either stable, failing to improve or worsenin. Chronic respiratory failure with hypoxemia and hypercapnia. 2. Severe COPD  3. Obstructive sleep apnea with the patient intolerant of positive airway pressure device therapy. 4. Past history of tobacco use. Recommend/plan:   · Continue supplemental oxygen and wean as tolerated (home dose is 3L Continuous)   · If he remains stable and viral PCR studies are negative he could probably be discharged home soon on oral medications and then can follow-up in the office as scheduled in early April and we can arrange for follow-up chest CT at that time as well.   · Continue Duonebs   · GI and DVT prophylaxis

## 2020-03-14 LAB
ADENOVIRUS PCR: NOT DETECTED
ANION GAP SERPL CALCULATED.3IONS-SCNC: 10 MMOL/L (ref 7–19)
BUN BLDV-MCNC: 24 MG/DL (ref 8–23)
CALCIUM SERPL-MCNC: 8.7 MG/DL (ref 8.8–10.2)
CHLORIDE BLD-SCNC: 96 MMOL/L (ref 98–111)
CO2: 32 MMOL/L (ref 22–29)
CREAT SERPL-MCNC: 0.6 MG/DL (ref 0.5–1.2)
GFR NON-AFRICAN AMERICAN: >60
GLUCOSE BLD-MCNC: 126 MG/DL (ref 70–99)
GLUCOSE BLD-MCNC: 131 MG/DL (ref 74–109)
GLUCOSE BLD-MCNC: 139 MG/DL (ref 70–99)
GLUCOSE BLD-MCNC: 159 MG/DL (ref 70–99)
HCT VFR BLD CALC: 36.1 % (ref 42–52)
HEMOGLOBIN: 11 G/DL (ref 14–18)
HUMAN METAPNEUMOVIRUS PCR: NOT DETECTED
INFLUENZA A: NOT DETECTED
INFLUENZA B: NOT DETECTED
MCH RBC QN AUTO: 27.8 PG (ref 27–31)
MCHC RBC AUTO-ENTMCNC: 30.5 G/DL (ref 33–37)
MCV RBC AUTO: 91.2 FL (ref 80–94)
PARAINFLUENZA 1 PCR: NOT DETECTED
PARAINFLUENZA 2 PCR: NOT DETECTED
PARAINFLUENZA 3 PCR: NOT DETECTED
PARAINFLUENZA 4 PCR: NOT DETECTED
PDW BLD-RTO: 14.3 % (ref 11.5–14.5)
PERFORMED ON: ABNORMAL
PLATELET # BLD: 385 K/UL (ref 130–400)
PMV BLD AUTO: 8.4 FL (ref 9.4–12.4)
POTASSIUM SERPL-SCNC: 4 MMOL/L (ref 3.5–5)
RBC # BLD: 3.96 M/UL (ref 4.7–6.1)
RHINO/ENTEROVIRUS PCR: NOT DETECTED
RSV BY PCR: NOT DETECTED
RSV SOURCE: NORMAL
SODIUM BLD-SCNC: 138 MMOL/L (ref 136–145)
WBC # BLD: 20.7 K/UL (ref 4.8–10.8)

## 2020-03-14 PROCEDURE — 6360000002 HC RX W HCPCS: Performed by: HOSPITALIST

## 2020-03-14 PROCEDURE — 80048 BASIC METABOLIC PNL TOTAL CA: CPT

## 2020-03-14 PROCEDURE — 1210000000 HC MED SURG R&B

## 2020-03-14 PROCEDURE — 6370000000 HC RX 637 (ALT 250 FOR IP): Performed by: PHYSICIAN ASSISTANT

## 2020-03-14 PROCEDURE — 82947 ASSAY GLUCOSE BLOOD QUANT: CPT

## 2020-03-14 PROCEDURE — 36415 COLL VENOUS BLD VENIPUNCTURE: CPT

## 2020-03-14 PROCEDURE — 2580000003 HC RX 258: Performed by: HOSPITALIST

## 2020-03-14 PROCEDURE — 2700000000 HC OXYGEN THERAPY PER DAY

## 2020-03-14 PROCEDURE — 85027 COMPLETE CBC AUTOMATED: CPT

## 2020-03-14 PROCEDURE — 94640 AIRWAY INHALATION TREATMENT: CPT

## 2020-03-14 PROCEDURE — 6370000000 HC RX 637 (ALT 250 FOR IP): Performed by: HOSPITALIST

## 2020-03-14 RX ORDER — METHYLPREDNISOLONE SODIUM SUCCINATE 125 MG/2ML
60 INJECTION, POWDER, LYOPHILIZED, FOR SOLUTION INTRAMUSCULAR; INTRAVENOUS DAILY
Status: DISCONTINUED | OUTPATIENT
Start: 2020-03-14 | End: 2020-03-14

## 2020-03-14 RX ORDER — BUDESONIDE 0.5 MG/2ML
0.5 INHALANT ORAL EVERY 12 HOURS
Status: DISCONTINUED | OUTPATIENT
Start: 2020-03-14 | End: 2020-03-15 | Stop reason: HOSPADM

## 2020-03-14 RX ORDER — METHYLPREDNISOLONE SODIUM SUCCINATE 40 MG/ML
40 INJECTION, POWDER, LYOPHILIZED, FOR SOLUTION INTRAMUSCULAR; INTRAVENOUS DAILY
Status: DISCONTINUED | OUTPATIENT
Start: 2020-03-15 | End: 2020-03-15 | Stop reason: HOSPADM

## 2020-03-14 RX ADMIN — PIPERACILLIN SODIUM AND TAZOBACTAM SODIUM 3.38 G: 3; .375 INJECTION, POWDER, LYOPHILIZED, FOR SOLUTION INTRAVENOUS at 11:18

## 2020-03-14 RX ADMIN — OXYCODONE 15 MG: 5 TABLET ORAL at 21:13

## 2020-03-14 RX ADMIN — BUDESONIDE 500 MCG: 0.5 INHALANT RESPIRATORY (INHALATION) at 19:37

## 2020-03-14 RX ADMIN — SODIUM CHLORIDE, PRESERVATIVE FREE 10 ML: 5 INJECTION INTRAVENOUS at 08:00

## 2020-03-14 RX ADMIN — GUAIFENESIN 1200 MG: 600 TABLET, EXTENDED RELEASE ORAL at 08:00

## 2020-03-14 RX ADMIN — TIMOLOL MALEATE 1 DROP: 5 SOLUTION/ DROPS OPHTHALMIC at 20:20

## 2020-03-14 RX ADMIN — ASPIRIN 81 MG: 81 TABLET, COATED ORAL at 07:59

## 2020-03-14 RX ADMIN — ENOXAPARIN SODIUM 40 MG: 40 INJECTION SUBCUTANEOUS at 20:20

## 2020-03-14 RX ADMIN — PANTOPRAZOLE SODIUM 40 MG: 40 TABLET, DELAYED RELEASE ORAL at 04:45

## 2020-03-14 RX ADMIN — PIPERACILLIN SODIUM AND TAZOBACTAM SODIUM 3.38 G: 3; .375 INJECTION, POWDER, LYOPHILIZED, FOR SOLUTION INTRAVENOUS at 20:19

## 2020-03-14 RX ADMIN — PIPERACILLIN SODIUM AND TAZOBACTAM SODIUM 3.38 G: 3; .375 INJECTION, POWDER, LYOPHILIZED, FOR SOLUTION INTRAVENOUS at 04:45

## 2020-03-14 RX ADMIN — AMLODIPINE BESYLATE 5 MG: 5 TABLET ORAL at 07:59

## 2020-03-14 RX ADMIN — AMITRIPTYLINE HYDROCHLORIDE 50 MG: 50 TABLET, FILM COATED ORAL at 20:20

## 2020-03-14 RX ADMIN — IPRATROPIUM BROMIDE AND ALBUTEROL SULFATE 1 AMPULE: .5; 3 SOLUTION RESPIRATORY (INHALATION) at 10:22

## 2020-03-14 RX ADMIN — IPRATROPIUM BROMIDE AND ALBUTEROL SULFATE 1 AMPULE: .5; 3 SOLUTION RESPIRATORY (INHALATION) at 19:37

## 2020-03-14 RX ADMIN — METHYLPREDNISOLONE SODIUM SUCCINATE 60 MG: 125 INJECTION, POWDER, FOR SOLUTION INTRAMUSCULAR; INTRAVENOUS at 07:59

## 2020-03-14 RX ADMIN — OXYCODONE 15 MG: 5 TABLET ORAL at 05:13

## 2020-03-14 RX ADMIN — SODIUM CHLORIDE, PRESERVATIVE FREE 10 ML: 5 INJECTION INTRAVENOUS at 20:19

## 2020-03-14 RX ADMIN — BUSPIRONE HYDROCHLORIDE 7.5 MG: 5 TABLET ORAL at 20:19

## 2020-03-14 RX ADMIN — LATANOPROST 1 DROP: 50 SOLUTION OPHTHALMIC at 20:20

## 2020-03-14 RX ADMIN — IPRATROPIUM BROMIDE AND ALBUTEROL SULFATE 1 AMPULE: .5; 3 SOLUTION RESPIRATORY (INHALATION) at 14:28

## 2020-03-14 RX ADMIN — LEVOFLOXACIN 500 MG: 5 INJECTION, SOLUTION INTRAVENOUS at 17:14

## 2020-03-14 RX ADMIN — GUAIFENESIN 1200 MG: 600 TABLET, EXTENDED RELEASE ORAL at 20:20

## 2020-03-14 RX ADMIN — OXYCODONE 15 MG: 5 TABLET ORAL at 17:14

## 2020-03-14 RX ADMIN — BUSPIRONE HYDROCHLORIDE 7.5 MG: 5 TABLET ORAL at 08:00

## 2020-03-14 RX ADMIN — ATORVASTATIN CALCIUM 40 MG: 20 TABLET, FILM COATED ORAL at 08:00

## 2020-03-14 RX ADMIN — OXYCODONE 15 MG: 5 TABLET ORAL at 11:18

## 2020-03-14 RX ADMIN — TIMOLOL MALEATE 1 DROP: 5 SOLUTION/ DROPS OPHTHALMIC at 08:00

## 2020-03-14 RX ADMIN — LISINOPRIL 40 MG: 20 TABLET ORAL at 07:59

## 2020-03-14 RX ADMIN — IPRATROPIUM BROMIDE AND ALBUTEROL SULFATE 1 AMPULE: .5; 3 SOLUTION RESPIRATORY (INHALATION) at 06:31

## 2020-03-14 ASSESSMENT — PAIN SCALES - GENERAL
PAINLEVEL_OUTOF10: 8
PAINLEVEL_OUTOF10: 7
PAINLEVEL_OUTOF10: 8
PAINLEVEL_OUTOF10: 8

## 2020-03-14 NOTE — PROGRESS NOTES
Subjective:   Critical Care Daily Progress Note: 3/14/2020 7:10 AM    Interval History:   [id number]:  Breathing is better, feels like his usual self. Still has cough, not productive anymore  Denies: fever, chills, weakness, chest pain, fatigue, sneezing, increase of wheezes compared to his baseline  Denies anything else is bothering him  [id number]:  Notes reviewed, needs adenopathy followed as OP - yet NOT CANDIDATE for any invasive workup also noted  Hx advanced COPD on home O2 of 3 LPM at baseline, Stated to be improving, also stated however to be unable to tolerate NIPPV. Mr. Marlo Sever is a 72year old gent well known to me from prior admission. He states that he is on his home O2 of 3LPM. He states that he has dyspnea worse than normal. His mucous production when asked is stated to be normalized. He states that he is otherwise feeling fine today. (copied from Dr. Ana Lilia Hubbard progress note dated 12MAR20)  \"3-10: Presents to ED with worsening shortness of breath, fatigue, edema since discharge 2-9. COPD exacerbation at that time, discharged on azithromycin and prednisone taper, worsened after prednisone taper ended. Some cough, wheezing, no fever. CXR showed LLL infiltrate, admitted to med/surg on levofloxacin, piperacillin/tazobactam for HCAP, steroids, DuoNebs. 3-11: Continued shortness of breath and wheezing, incentive spirometer ordered but not at bedside. Nursing informed. Guaifenesin/dextromethorphan for cough. 3-12: Lung sounds are a little clearer and stronger today but patient remains short of breath. Pulmonology consult noted, it appears that there is little to do for the patient other than current care including antibiotics, steroids, and NEBs. \"  (copied from Dr. Mohit Castro consult note dated 11MAR20)  \"HPI: We have been consulted to see this 59y.o. year old male born on 1955. The patient is well-known to myself from being seen in the office on a prior admission here to [facility] ([facility]).   He has COPD stage III and sleep apnea. He has not been tolerant of a positive airway pressure device. He has chronic respiratory failure with hypoxemia and hypercapnia. He been admitted for an exacerbation of COPD and possible pneumonia little over a month ago. A CT pulmonary angiogram at that time did show evidence of some intrathoracic adenopathy for which a follow-up was recommended in about 2 months. He is readmitted this time because of problems with cough and dyspnea. He is not had any definite fever. He has had problems with fluid retention. \"  \"Pulmonary Assessment:  New problem (to me), with additional workup planned:  1. COPD with exacerbation. 2.  Possible left lower lobe pneumonia. 3.  Intrathoracic adenopathy particularly subcarinal node noted on a recent chest CT. New problem (to me), no additional workup planned:  1. None. Other problems either stable, failing to improve or worsenin. Chronic respiratory failure with hypoxemia and hypercapnia. 2. Severe COPD  3. Obstructive sleep apnea with the patient intolerant of positive airway pressure device therapy. 4. Past history of tobacco use. \"   \"Recommend/plan:   · I discussed with him the possibility of trying CPAP again he states he is just not interested and feels there is no way he could tolerate. He actually would be a potential candidate for some sort of respiratory assist device based on his chronic CO2 retention but we he would have to at least try CPAP or BiPAP first before that could be considered and if he cannot tolerate either of those modalities then noninvasive positive pressure ventilation for outpatient use is not indicated. I agree with his current regimen.   We can certainly get a follow-up CT to reassess his intrathoracic adenopathy but if that had progressed he is not a candidate for invasive work-up requiring general anesthesia such as bronchoscopy with endobronchial ultrasound or mediastinoscopy in my opinion because of his chronic CO2 retention in particular in the significant risk of morbidity and even mortality associated with these procedures. The adenopathy is most likely reactive. The other possibility would be an occult lymphoproliferative disorder and again in the absence of any other symptoms I would just follow these with serial imaging studies. He again in my opinion would not be a candidate for invasive work-up of these abnormalities in terms of bronchoscopy with endobronchial ultrasound or mediastinoscopy. I have ordered a respiratory virus panel. \"    Scheduled Meds:   ipratropium-albuterol  1 ampule Inhalation 4x daily    methylPREDNISolone  60 mg Intravenous Q12H    sodium chloride flush  10 mL Intravenous 2 times per day    enoxaparin  40 mg Subcutaneous Q24H    levofloxacin  500 mg Intravenous Q24H    piperacillin-tazobactam  3.375 g Intravenous Q8H    amitriptyline  50 mg Oral Nightly    amLODIPine  5 mg Oral Daily    aspirin  81 mg Oral Daily    atorvastatin  40 mg Oral Daily    busPIRone  7.5 mg Oral BID    latanoprost  1 drop Both Eyes Nightly    lisinopril  40 mg Oral Daily    pantoprazole  40 mg Oral QAM AC    timolol  1 drop Both Eyes BID    insulin lispro  0-12 Units Subcutaneous TID WC    insulin lispro  0-6 Units Subcutaneous Nightly    guaiFENesin  1,200 mg Oral BID     Continuous Infusions:   dextrose       PRN Meds:hydrALAZINE, sodium chloride flush, acetaminophen **OR** acetaminophen, magnesium hydroxide, promethazine **OR** ondansetron, glucose, dextrose, glucagon (rDNA), dextrose, oxyCODONE, tiZANidine, metoprolol    Review of Systems  Pertinent items are noted in HPI. Objective:     Vitals reviewed. I/O last 3 completed shifts: In: 1100 [P.O.:1100]  Out: 500 [Urine:500]  No intake/output data recorded.     BP (!) 160/83   Pulse 86   Temp 97 °F (36.1 °C) (Temporal)   Resp 14   Ht 5' 6\" (1.676 m)   Wt 235 lb (106.6 kg)   SpO2 96%   BMI 37.93 kg/m²     Physical Exam  Vitals CBC  Clinically improving but still has high WBC count      Chronic Medical Problems:  Home meds continued already as indicated    Care time greater than >35 minutes, meds adjusted

## 2020-03-14 NOTE — DISCHARGE SUMMARY
that he is on his home O2 of 3LPM. He states that he has dyspnea worse than normal. His mucous production when asked is stated to be normalized. He states that he is otherwise feeling fine today. (copied from Dr. Ana Conner progress note dated 12MAR20)  \"3-10: Presents to ED with worsening shortness of breath, fatigue, edema since discharge 2-9. COPD exacerbation at that time, discharged on azithromycin and prednisone taper, worsened after prednisone taper ended. Some cough, wheezing, no fever. CXR showed LLL infiltrate, admitted to med/surg on levofloxacin, piperacillin/tazobactam for HCAP, steroids, DuoNebs. 3-11: Continued shortness of breath and wheezing, incentive spirometer ordered but not at bedside. Nursing informed. Guaifenesin/dextromethorphan for cough. 3-12: Lung sounds are a little clearer and stronger today but patient remains short of breath. Pulmonology consult noted, it appears that there is little to do for the patient other than current care including antibiotics, steroids, and NEBs. \"  (copied from Dr. Suzy Gibbons consult note dated [id number])  \"HPI: We have been consulted to see this 59 y. o. year old male born on 1955.  The patient is well-known to myself from being seen in the office on a prior admission here to [address] [place] [address] has COPD stage III and sleep apnea.  He has not been tolerant of a positive airway pressure device.  He has chronic respiratory failure with hypoxemia and hypercapnia.  He been admitted for an exacerbation of COPD and possible pneumonia little over a month ago.  A CT pulmonary angiogram at that time did show evidence of some intrathoracic adenopathy for which a follow-up was recommended in about 2 months. Carleen Polanco is readmitted this time because of problems with cough and dyspnea.  He is not had any definite fever.  He has had problems with fluid retention. \"  \"Pulmonary Assessment:  New problem (to me), with additional workup planned:  1.  COPD with exacerbation.   2. called. Rhino/Enterovirus PCR Unknown    Not Detected   Human Metapneumovirus PCR Unknown    Not Detected   INFLUENZA A Unknown    Not Detected   INFLUENZA B  Unknown    Not Detected   Rsv Source Unknown    Not Provided   Adenovirus PCR Unknown    Not Detected   Parainfluenza 1 PCR Unknown    Not Detected   Parainfluenza 2 PCR Unknown    Not Detected   Parainfluenza 3 PCR Unknown    Not Detected   Parainfluenza 4 PCR Unknown    Not Detected   RSV by PCR Unknown    Not Detected   Results for Zena Dumont (MRN [id number]) as of 3/15/2020 08:22   Ref. Range 3/10/2020 17:32   Color, UA Latest Ref Range: Straw/Yellow  YELLOW   Clarity, UA Latest Ref Range: Clear  Clear   Glucose, UA Latest Ref Range: Negative mg/dL Negative   Bilirubin, Urine Latest Ref Range: Negative  Negative   Ketones, Urine Latest Ref Range: Negative mg/dL Negative   Specific Gravity, UA Latest Ref Range: 1.005 - 1.030  1.007   Blood, Urine Latest Ref Range: Negative  Negative   pH, UA Latest Ref Range: 5.0 - 8.0  7.0   Protein, UA Latest Ref Range: Negative mg/dL Negative   Urobilinogen, Urine Latest Ref Range: <2.0 E.U./dL 1.0   Nitrite, Urine Latest Ref Range: Negative  Negative   Leukocyte Esterase, Urine Latest Ref Range: Negative  Negative   Urine Culture, Routine Unknown <10,000 CFU/ml mixed skin/urogenital juani. No further workup   Results for Zena Dumont (MRN [id number]) as of 3/15/2020 08:22   Ref.  Range 3/10/2020 17:15   O2 Therapy Unknown Unknown   Hemoglobin, Art, Extended Latest Ref Range: 14.0 - 18.0 g/dL 11.8 (L)   pH, Arterial Latest Ref Range: 7.350 - 7.450  7.400   pCO2, Arterial Latest Ref Range: 35.0 - 45.0 mmHg 58.0 (H)   pO2, Arterial Latest Ref Range: 80.0 - 100.0 mmHg 52.0 (L)   HCO3, Arterial Latest Ref Range: 22.0 - 26.0 mmol/L 35.9 (H)   Base Excess, Arterial Latest Ref Range: -2.0 - 2.0 mmol/L 9.3 (H)   O2 Sat, Arterial Latest Ref Range: >92 % 88.2 (L)   O2 Content, Arterial Latest Ref Range: Not Established mL/dL 14.6 family      DISCHARGE HOME MEDICATIONS LIST:  Current Discharge Medication List   START taking these medications   Medication Dose   guaiFENesin (MUCINEX) 600 MG extended release tablet 1,200 mg   Take 2 tablets by mouth 2 times daily for 7 days   Quantity: 28 tablet Refills: 0       amoxicillin-clavulanate (AUGMENTIN) 875-125 MG per tablet 1 tablet   Take 1 tablet by mouth every 12 hours for 7 days   Quantity: 14 tablet Refills: 0       levoFLOXacin (LEVAQUIN) 500 MG tablet 500 mg   Take 1 tablet by mouth nightly for 7 days   Quantity: 7 tablet Refills: 0       predniSONE (DELTASONE) 20 MG tablet    Take 2 tablets daily for first 4 days, and then 1 tablet daily for the next 4 days /// May sub generic medication and may use 10mg tabs if needed   Quantity: 12 tablet Refills: 0           CONTINUE these medications which have NOT CHANGED   Medication Dose   metFORMIN (GLUCOPHAGE) 500 MG tablet 500 mg   Take 500 mg by mouth 2 times daily (with meals)       busPIRone (BUSPAR) 7.5 MG tablet    1 tablet by Oral route 2 times per day for anxiety       Fluticasone-Umeclidin-Vilant (TRELEGY ELLIPTA) 100-62.5-25 MCG/INH AEPB 1 each   Inhale 1 each into the lungs       albuterol (PROVENTIL) (2.5 MG/3ML) 0.083% nebulizer solution 2.5 mg   Take 3 mLs by nebulization every 4 hours as needed for Wheezing or Shortness of Breath   Quantity: 120 each Refills: 1       oxyCODONE (OXY-IR) 15 MG immediate release tablet 15 mg   Take 1 tablet by mouth 4 times daily as needed for Pain .  Earliest Fill Date: 12/11/17   Quantity: 120 tablet Refills: 0       amitriptyline (ELAVIL) 25 MG tablet 25-50 mg   Take 1-2 tablets by mouth nightly as needed for Sleep   Quantity: 60 tablet Refills: 2       tiZANidine (ZANAFLEX) 4 MG tablet 4 mg   Take 1 tablet by mouth 3 times daily as needed (muscle spasms)   Quantity: 90 tablet Refills: 2       omeprazole (PRILOSEC) 40 MG delayed release capsule 40 mg   40 mg daily        latanoprost (XALATAN) 0.005 % ophthalmic solution 1 drop   Place 1 drop into both eyes nightly       timolol (TIMOPTIC-XE) 0.5 % ophthalmic gel-forming 1 drop   Place 1 drop into both eyes 2 times daily       albuterol sulfate HFA (PROAIR HFA) 108 (90 BASE) MCG/ACT inhaler 2 puffs   Inhale 2 puffs into the lungs every 6 hours as needed for Wheezing   Quantity: 1 Inhaler Refills: 0       atorvastatin (LIPITOR) 40 MG tablet 40 mg   Take 40 mg by mouth daily. Aspirin (ASPIR-81 PO)    Take by mouth daily        AMLODIPINE BESYLATE PO 5 mg   Take 5 mg by mouth daily. LISINOPRIL PO 20 mg   Take 20 mg by mouth daily. Patient Instructions:    Instructions were given to call if he has any questions or concerns about his health, and to return for ED Evaluation if he has trouble   Activity: activity as tolerated, and no driving for today  Diet: cardiac diet and diabetic diet  Wound Care: none needed    Follow-up with your PCP, Dr. Frankey Myrtle, in 5-12 days. (to check on how you are recovering)    Follow up with your lung doctor, Dr. Adán Charles, in early April as scheduled (he will check on your lung health and arrange for follow-up chest CT at that time)      Signed:  Courtney Kelly  3/15/2020  8:34 AM       >45 minutes spent in discharge of this gentleman

## 2020-03-14 NOTE — PLAN OF CARE
Problem: Falls - Risk of:  Goal: Will remain free from falls  Description: Will remain free from falls  3/14/2020 0418 by Candice Ryan RN  Outcome: Ongoing  3/13/2020 1439 by Patric Peter RN  Outcome: Ongoing  Goal: Absence of physical injury  Description: Absence of physical injury  3/14/2020 0418 by Candice Ryan RN  Outcome: Ongoing  3/13/2020 1439 by Patric Peter RN  Outcome: Ongoing     Problem: Breathing Pattern - Ineffective:  Goal: Ability to achieve and maintain a regular respiratory rate will improve  Description: Ability to achieve and maintain a regular respiratory rate will improve  3/14/2020 0418 by Candice Ryan RN  Outcome: Ongoing  3/13/2020 1439 by Patric Peter RN  Outcome: Ongoing

## 2020-03-15 VITALS
DIASTOLIC BLOOD PRESSURE: 85 MMHG | SYSTOLIC BLOOD PRESSURE: 144 MMHG | BODY MASS INDEX: 37.77 KG/M2 | RESPIRATION RATE: 18 BRPM | WEIGHT: 235 LBS | HEART RATE: 79 BPM | OXYGEN SATURATION: 97 % | HEIGHT: 66 IN | TEMPERATURE: 97.1 F

## 2020-03-15 PROBLEM — J18.9 RIGHT LOWER LOBE PNEUMONIA: Status: RESOLVED | Noted: 2018-12-27 | Resolved: 2020-03-15

## 2020-03-15 PROBLEM — D72.810 LYMPHOPENIA: Status: RESOLVED | Noted: 2020-03-10 | Resolved: 2020-03-15

## 2020-03-15 PROBLEM — J18.9 PNEUMONIA: Status: RESOLVED | Noted: 2020-02-04 | Resolved: 2020-03-15

## 2020-03-15 PROBLEM — J18.9 COMMUNITY ACQUIRED PNEUMONIA OF RIGHT LOWER LOBE OF LUNG: Status: RESOLVED | Noted: 2018-12-28 | Resolved: 2020-03-15

## 2020-03-15 LAB
ANION GAP SERPL CALCULATED.3IONS-SCNC: 9 MMOL/L (ref 7–19)
BASOPHILS ABSOLUTE: 0.1 K/UL (ref 0–0.2)
BASOPHILS RELATIVE PERCENT: 0.4 % (ref 0–1)
BLOOD CULTURE, ROUTINE: NORMAL
BUN BLDV-MCNC: 27 MG/DL (ref 8–23)
CALCIUM SERPL-MCNC: 8.3 MG/DL (ref 8.8–10.2)
CHLORIDE BLD-SCNC: 99 MMOL/L (ref 98–111)
CO2: 33 MMOL/L (ref 22–29)
CREAT SERPL-MCNC: 0.7 MG/DL (ref 0.5–1.2)
CULTURE, BLOOD 2: NORMAL
EOSINOPHILS ABSOLUTE: 0 K/UL (ref 0–0.6)
EOSINOPHILS RELATIVE PERCENT: 0.1 % (ref 0–5)
GFR NON-AFRICAN AMERICAN: >60
GLUCOSE BLD-MCNC: 95 MG/DL (ref 70–99)
GLUCOSE BLD-MCNC: 99 MG/DL (ref 74–109)
HCT VFR BLD CALC: 35.9 % (ref 42–52)
HEMOGLOBIN: 11.1 G/DL (ref 14–18)
IMMATURE GRANULOCYTES #: 0.8 K/UL
LYMPHOCYTES ABSOLUTE: 3 K/UL (ref 1.1–4.5)
LYMPHOCYTES RELATIVE PERCENT: 15.1 % (ref 20–40)
MAGNESIUM: 2.4 MG/DL (ref 1.6–2.4)
MCH RBC QN AUTO: 28.1 PG (ref 27–31)
MCHC RBC AUTO-ENTMCNC: 30.9 G/DL (ref 33–37)
MCV RBC AUTO: 90.9 FL (ref 80–94)
MONOCYTES ABSOLUTE: 1.6 K/UL (ref 0–0.9)
MONOCYTES RELATIVE PERCENT: 8 % (ref 0–10)
NEUTROPHILS ABSOLUTE: 14.1 K/UL (ref 1.5–7.5)
NEUTROPHILS RELATIVE PERCENT: 72.3 % (ref 50–65)
PDW BLD-RTO: 14.5 % (ref 11.5–14.5)
PERFORMED ON: NORMAL
PLATELET # BLD: 356 K/UL (ref 130–400)
PMV BLD AUTO: 8.3 FL (ref 9.4–12.4)
POTASSIUM REFLEX MAGNESIUM: 3.8 MMOL/L (ref 3.5–5)
RBC # BLD: 3.95 M/UL (ref 4.7–6.1)
SODIUM BLD-SCNC: 141 MMOL/L (ref 136–145)
WBC # BLD: 19.5 K/UL (ref 4.8–10.8)

## 2020-03-15 PROCEDURE — 80048 BASIC METABOLIC PNL TOTAL CA: CPT

## 2020-03-15 PROCEDURE — 6370000000 HC RX 637 (ALT 250 FOR IP): Performed by: PHYSICIAN ASSISTANT

## 2020-03-15 PROCEDURE — 6360000002 HC RX W HCPCS: Performed by: HOSPITALIST

## 2020-03-15 PROCEDURE — 6370000000 HC RX 637 (ALT 250 FOR IP): Performed by: HOSPITALIST

## 2020-03-15 PROCEDURE — 2700000000 HC OXYGEN THERAPY PER DAY

## 2020-03-15 PROCEDURE — 82947 ASSAY GLUCOSE BLOOD QUANT: CPT

## 2020-03-15 PROCEDURE — 83735 ASSAY OF MAGNESIUM: CPT

## 2020-03-15 PROCEDURE — 85025 COMPLETE CBC W/AUTO DIFF WBC: CPT

## 2020-03-15 PROCEDURE — 36415 COLL VENOUS BLD VENIPUNCTURE: CPT

## 2020-03-15 PROCEDURE — 94640 AIRWAY INHALATION TREATMENT: CPT

## 2020-03-15 PROCEDURE — 2580000003 HC RX 258: Performed by: HOSPITALIST

## 2020-03-15 RX ORDER — PREDNISONE 20 MG/1
TABLET ORAL
Qty: 12 TABLET | Refills: 0 | Status: SHIPPED | OUTPATIENT
Start: 2020-03-15 | End: 2020-04-04 | Stop reason: ALTCHOICE

## 2020-03-15 RX ORDER — AMOXICILLIN AND CLAVULANATE POTASSIUM 875; 125 MG/1; MG/1
1 TABLET, FILM COATED ORAL EVERY 12 HOURS SCHEDULED
Qty: 14 TABLET | Refills: 0 | Status: SHIPPED | OUTPATIENT
Start: 2020-03-15 | End: 2020-03-22

## 2020-03-15 RX ORDER — AMOXICILLIN AND CLAVULANATE POTASSIUM 875; 125 MG/1; MG/1
1 TABLET, FILM COATED ORAL EVERY 12 HOURS SCHEDULED
Status: DISCONTINUED | OUTPATIENT
Start: 2020-03-15 | End: 2020-03-15 | Stop reason: HOSPADM

## 2020-03-15 RX ORDER — LEVOFLOXACIN 500 MG/1
500 TABLET, FILM COATED ORAL NIGHTLY
Qty: 7 TABLET | Refills: 0 | Status: SHIPPED | OUTPATIENT
Start: 2020-03-15 | End: 2020-03-22

## 2020-03-15 RX ORDER — LEVOFLOXACIN 500 MG/1
500 TABLET, FILM COATED ORAL NIGHTLY
Status: DISCONTINUED | OUTPATIENT
Start: 2020-03-15 | End: 2020-03-15 | Stop reason: HOSPADM

## 2020-03-15 RX ORDER — GUAIFENESIN 600 MG/1
1200 TABLET, EXTENDED RELEASE ORAL 2 TIMES DAILY
Qty: 28 TABLET | Refills: 0 | Status: SHIPPED | OUTPATIENT
Start: 2020-03-15 | End: 2020-03-22

## 2020-03-15 RX ADMIN — LISINOPRIL 40 MG: 20 TABLET ORAL at 08:27

## 2020-03-15 RX ADMIN — TIMOLOL MALEATE 1 DROP: 5 SOLUTION/ DROPS OPHTHALMIC at 08:27

## 2020-03-15 RX ADMIN — ATORVASTATIN CALCIUM 40 MG: 20 TABLET, FILM COATED ORAL at 08:27

## 2020-03-15 RX ADMIN — PIPERACILLIN SODIUM AND TAZOBACTAM SODIUM 3.38 G: 3; .375 INJECTION, POWDER, LYOPHILIZED, FOR SOLUTION INTRAVENOUS at 04:10

## 2020-03-15 RX ADMIN — BUDESONIDE 500 MCG: 0.5 INHALANT RESPIRATORY (INHALATION) at 08:06

## 2020-03-15 RX ADMIN — SODIUM CHLORIDE, PRESERVATIVE FREE 10 ML: 5 INJECTION INTRAVENOUS at 08:28

## 2020-03-15 RX ADMIN — AMLODIPINE BESYLATE 5 MG: 5 TABLET ORAL at 08:27

## 2020-03-15 RX ADMIN — ASPIRIN 81 MG: 81 TABLET, COATED ORAL at 08:27

## 2020-03-15 RX ADMIN — OXYCODONE 15 MG: 5 TABLET ORAL at 04:49

## 2020-03-15 RX ADMIN — BUSPIRONE HYDROCHLORIDE 7.5 MG: 5 TABLET ORAL at 08:27

## 2020-03-15 RX ADMIN — GUAIFENESIN 1200 MG: 600 TABLET, EXTENDED RELEASE ORAL at 08:27

## 2020-03-15 RX ADMIN — IPRATROPIUM BROMIDE AND ALBUTEROL SULFATE 1 AMPULE: .5; 3 SOLUTION RESPIRATORY (INHALATION) at 08:07

## 2020-03-15 RX ADMIN — METHYLPREDNISOLONE SODIUM SUCCINATE 40 MG: 40 INJECTION, POWDER, FOR SOLUTION INTRAMUSCULAR; INTRAVENOUS at 08:27

## 2020-03-15 RX ADMIN — PANTOPRAZOLE SODIUM 40 MG: 40 TABLET, DELAYED RELEASE ORAL at 06:25

## 2020-03-15 ASSESSMENT — PAIN SCALES - GENERAL
PAINLEVEL_OUTOF10: 8
PAINLEVEL_OUTOF10: 2

## 2020-03-15 ASSESSMENT — PULMONARY FUNCTION TESTS: PEFR_L/MIN: 16

## 2020-03-16 ENCOUNTER — CARE COORDINATION (OUTPATIENT)
Dept: CASE MANAGEMENT | Age: 65
End: 2020-03-16

## 2020-03-18 ENCOUNTER — CARE COORDINATION (OUTPATIENT)
Dept: CASE MANAGEMENT | Age: 65
End: 2020-03-18

## 2020-03-26 ENCOUNTER — TELEPHONE (OUTPATIENT)
Dept: PULMONOLOGY | Facility: CLINIC | Age: 65
End: 2020-03-26

## 2020-03-26 DIAGNOSIS — J44.9 STAGE 3 SEVERE COPD BY GOLD CLASSIFICATION (HCC): Primary | ICD-10-CM

## 2020-03-26 DIAGNOSIS — R59.0 MEDIASTINAL ADENOPATHY: Primary | ICD-10-CM

## 2020-04-01 ENCOUNTER — CARE COORDINATION (OUTPATIENT)
Dept: CASE MANAGEMENT | Age: 65
End: 2020-04-01

## 2020-04-01 NOTE — CARE COORDINATION
[facility] Transitions Follow Up Call    2020    Patient: Conrad Azevedo  Patient : 1955   MRN: [id number]  Reason for Admission: COPD;PNA  Discharge Date: 3/15/20 RARS: Readmission Risk Score: 24         Spoke with: [facility] Transitions Subsequent and Final Call    Subsequent and Final Calls  Do you have any ongoing symptoms?:  Yes  Onset of Patient-reported symptoms: In the past 7 days  Patient-reported symptoms:  Shortness of Breath, Other  Interventions for patient-reported symptoms:  Other  Have your medications changed?:  No  Do you have any questions related to your medications?:  No  Do you currently have any active services?:  No  Are you currently active with any services?:  Home Health  Do you have any needs or concerns that I can assist you with?:  No  Identified Barriers:  None  Care Transitions Interventions  Other Interventions:          CTN spoke to Lazarus Nettles for BPCI-A call. Pt stated he had a \"rough\" day the other day, pulse oximeter was reading @ 85 and he was SOB. Pt is on 3 LPM supplemental 02 at home. Pt did not increase 02 liter flow, uses nebulizer twice a day, Trelogy Ellipta once a day and has rescue inhaler prn. Pt put pulse oximeter on while on phone with CTN and stated it is reading 86% at rest. Denies increased SOB, stated he put new batteries in the other day. Stated he feels \"pretty good\" today. Denies wheezing, cough, CP/pressure, palpitations, edema. Advised to increase nebulizer treatments, may be used q 4hrs and call pulmonologist office to report low sats. Pt stated Dr wanted him to do virtual OV but he would rather do F2F visit. Pt has BP cuff at home but not checking BP. Advised to monitor BP and notify office if not within normal range. Stated he is sleeping OK, appetite is good. No sick contacts at home. Advised to seek medical attention for sever SOB not improved with rest.    Reviewed CDC recommended COVID-19 precautions with patient.     We recommend that

## 2020-04-04 ENCOUNTER — HOSPITAL ENCOUNTER (INPATIENT)
Age: 65
LOS: 4 days | Discharge: HOME | DRG: 871 | End: 2020-04-08
Attending: PEDIATRICS | Admitting: INTERNAL MEDICINE
Payer: MEDICARE

## 2020-04-04 ENCOUNTER — LAB REQUISITION (OUTPATIENT)
Dept: LAB | Facility: HOSPITAL | Age: 65
End: 2020-04-04

## 2020-04-04 ENCOUNTER — APPOINTMENT (OUTPATIENT)
Dept: GENERAL RADIOLOGY | Age: 65
DRG: 871 | End: 2020-04-04
Payer: MEDICARE

## 2020-04-04 DIAGNOSIS — Z00.00 ROUTINE GENERAL MEDICAL EXAMINATION AT A HEALTH CARE FACILITY: ICD-10-CM

## 2020-04-04 PROBLEM — J18.9 RECURRENT PNEUMONIA: Status: ACTIVE | Noted: 2020-04-04

## 2020-04-04 LAB
ALBUMIN SERPL-MCNC: 3.3 G/DL (ref 3.5–5.2)
ALP BLD-CCNC: 194 U/L (ref 40–130)
ALT SERPL-CCNC: 19 U/L (ref 5–41)
ANION GAP SERPL CALCULATED.3IONS-SCNC: 14 MMOL/L (ref 7–19)
AST SERPL-CCNC: 16 U/L (ref 5–40)
BASE EXCESS ARTERIAL: 9 MMOL/L (ref -2–2)
BASOPHILS ABSOLUTE: 0.1 K/UL (ref 0–0.2)
BASOPHILS RELATIVE PERCENT: 0.4 % (ref 0–1)
BILIRUB SERPL-MCNC: 0.3 MG/DL (ref 0.2–1.2)
BILIRUBIN URINE: NEGATIVE
BLOOD, URINE: NEGATIVE
BUN BLDV-MCNC: 7 MG/DL (ref 8–23)
CALCIUM SERPL-MCNC: 9.1 MG/DL (ref 8.8–10.2)
CARBOXYHEMOGLOBIN ARTERIAL: 2.1 % (ref 0–5)
CHLORIDE BLD-SCNC: 92 MMOL/L (ref 98–111)
CLARITY: CLEAR
CO2: 26 MMOL/L (ref 22–29)
COLOR: YELLOW
CREAT SERPL-MCNC: 0.6 MG/DL (ref 0.5–1.2)
EOSINOPHILS ABSOLUTE: 0.2 K/UL (ref 0–0.6)
EOSINOPHILS RELATIVE PERCENT: 1.5 % (ref 0–5)
GFR NON-AFRICAN AMERICAN: >60
GLUCOSE BLD-MCNC: 129 MG/DL (ref 74–109)
GLUCOSE URINE: NEGATIVE MG/DL
HCO3 ARTERIAL: 36.1 MMOL/L (ref 22–26)
HCT VFR BLD CALC: 38.1 % (ref 42–52)
HEMOGLOBIN, ART, EXTENDED: 11.9 G/DL (ref 14–18)
HEMOGLOBIN: 12 G/DL (ref 14–18)
IMMATURE GRANULOCYTES #: 0.1 K/UL
KETONES, URINE: NEGATIVE MG/DL
LACTIC ACID: 2.1 MMOL/L (ref 0.5–1.9)
LEUKOCYTE ESTERASE, URINE: NEGATIVE
LIPASE: 12 U/L (ref 13–60)
LYMPHOCYTES ABSOLUTE: 1.6 K/UL (ref 1.1–4.5)
LYMPHOCYTES RELATIVE PERCENT: 11.7 % (ref 20–40)
MCH RBC QN AUTO: 28 PG (ref 27–31)
MCHC RBC AUTO-ENTMCNC: 31.5 G/DL (ref 33–37)
MCV RBC AUTO: 89 FL (ref 80–94)
METHEMOGLOBIN ARTERIAL: 1.2 %
MONOCYTES ABSOLUTE: 0.8 K/UL (ref 0–0.9)
MONOCYTES RELATIVE PERCENT: 5.8 % (ref 0–10)
NEUTROPHILS ABSOLUTE: 11.1 K/UL (ref 1.5–7.5)
NEUTROPHILS RELATIVE PERCENT: 79.9 % (ref 50–65)
NITRITE, URINE: NEGATIVE
O2 CONTENT ARTERIAL: 15.7 ML/DL
O2 SAT, ARTERIAL: 93.6 %
O2 THERAPY: ABNORMAL
PCO2 ARTERIAL: 61 MMHG (ref 35–45)
PDW BLD-RTO: 14.3 % (ref 11.5–14.5)
PH ARTERIAL: 7.38 (ref 7.35–7.45)
PH UA: 7 (ref 5–8)
PLATELET # BLD: 301 K/UL (ref 130–400)
PMV BLD AUTO: 8.1 FL (ref 9.4–12.4)
PO2 ARTERIAL: 72 MMHG (ref 80–100)
POTASSIUM REFLEX MAGNESIUM: 4.1 MMOL/L (ref 3.5–5)
POTASSIUM, WHOLE BLOOD: 4
PROTEIN UA: NEGATIVE MG/DL
RAPID INFLUENZA  B AGN: NEGATIVE
RAPID INFLUENZA A AGN: NEGATIVE
RBC # BLD: 4.28 M/UL (ref 4.7–6.1)
S PYO AG THROAT QL: NEGATIVE
SODIUM BLD-SCNC: 132 MMOL/L (ref 136–145)
SPECIFIC GRAVITY UA: 1.01 (ref 1–1.03)
TOTAL PROTEIN: 7.6 G/DL (ref 6.6–8.7)
TROPONIN: <0.01 NG/ML (ref 0–0.03)
URINE REFLEX TO CULTURE: NORMAL
UROBILINOGEN, URINE: 1 E.U./DL
WBC # BLD: 13.8 K/UL (ref 4.8–10.8)

## 2020-04-04 PROCEDURE — 84484 ASSAY OF TROPONIN QUANT: CPT

## 2020-04-04 PROCEDURE — 93005 ELECTROCARDIOGRAM TRACING: CPT | Performed by: PEDIATRICS

## 2020-04-04 PROCEDURE — 82803 BLOOD GASES ANY COMBINATION: CPT

## 2020-04-04 PROCEDURE — 80053 COMPREHEN METABOLIC PANEL: CPT

## 2020-04-04 PROCEDURE — 85025 COMPLETE CBC W/AUTO DIFF WBC: CPT

## 2020-04-04 PROCEDURE — 83605 ASSAY OF LACTIC ACID: CPT

## 2020-04-04 PROCEDURE — 83690 ASSAY OF LIPASE: CPT

## 2020-04-04 PROCEDURE — 71045 X-RAY EXAM CHEST 1 VIEW: CPT

## 2020-04-04 PROCEDURE — 6370000000 HC RX 637 (ALT 250 FOR IP): Performed by: PEDIATRICS

## 2020-04-04 PROCEDURE — 2700000000 HC OXYGEN THERAPY PER DAY

## 2020-04-04 PROCEDURE — 87804 INFLUENZA ASSAY W/OPTIC: CPT

## 2020-04-04 PROCEDURE — U0002 COVID-19 LAB TEST NON-CDC: HCPCS

## 2020-04-04 PROCEDURE — 87880 STREP A ASSAY W/OPTIC: CPT

## 2020-04-04 PROCEDURE — 81003 URINALYSIS AUTO W/O SCOPE: CPT

## 2020-04-04 PROCEDURE — 1210000000 HC MED SURG R&B

## 2020-04-04 PROCEDURE — 36415 COLL VENOUS BLD VENIPUNCTURE: CPT

## 2020-04-04 PROCEDURE — 2580000003 HC RX 258: Performed by: HOSPITALIST

## 2020-04-04 PROCEDURE — 87081 CULTURE SCREEN ONLY: CPT

## 2020-04-04 PROCEDURE — 36600 WITHDRAWAL OF ARTERIAL BLOOD: CPT

## 2020-04-04 PROCEDURE — 0100U HC RESPIRPTHGN MULT REV TRANS & AMP PRB TECH 21 TRGT: CPT

## 2020-04-04 PROCEDURE — 0100U HC BIOFIRE FILMARRAY RESP PANEL 2: CPT

## 2020-04-04 PROCEDURE — 2580000003 HC RX 258: Performed by: PEDIATRICS

## 2020-04-04 PROCEDURE — 84132 ASSAY OF SERUM POTASSIUM: CPT

## 2020-04-04 PROCEDURE — 6360000002 HC RX W HCPCS: Performed by: PEDIATRICS

## 2020-04-04 PROCEDURE — 99285 EMERGENCY DEPT VISIT HI MDM: CPT

## 2020-04-04 RX ORDER — ACETAMINOPHEN 500 MG
1000 TABLET ORAL ONCE
Status: COMPLETED | OUTPATIENT
Start: 2020-04-04 | End: 2020-04-04

## 2020-04-04 RX ORDER — SODIUM CHLORIDE 0.9 % (FLUSH) 0.9 %
10 SYRINGE (ML) INJECTION EVERY 12 HOURS SCHEDULED
Status: DISCONTINUED | OUTPATIENT
Start: 2020-04-04 | End: 2020-04-08 | Stop reason: HOSPADM

## 2020-04-04 RX ORDER — SODIUM CHLORIDE 9 MG/ML
1000 INJECTION, SOLUTION INTRAVENOUS CONTINUOUS
Status: DISCONTINUED | OUTPATIENT
Start: 2020-04-04 | End: 2020-04-06 | Stop reason: ALTCHOICE

## 2020-04-04 RX ORDER — POLYETHYLENE GLYCOL 3350 17 G/17G
17 POWDER, FOR SOLUTION ORAL DAILY PRN
Status: DISCONTINUED | OUTPATIENT
Start: 2020-04-04 | End: 2020-04-08 | Stop reason: HOSPADM

## 2020-04-04 RX ORDER — LANOLIN ALCOHOL/MO/W.PET/CERES
3 CREAM (GRAM) TOPICAL NIGHTLY PRN
Status: DISCONTINUED | OUTPATIENT
Start: 2020-04-04 | End: 2020-04-08 | Stop reason: HOSPADM

## 2020-04-04 RX ORDER — ACETAMINOPHEN 325 MG/1
650 TABLET ORAL EVERY 6 HOURS PRN
Status: DISCONTINUED | OUTPATIENT
Start: 2020-04-04 | End: 2020-04-08 | Stop reason: HOSPADM

## 2020-04-04 RX ORDER — LEVOFLOXACIN 5 MG/ML
750 INJECTION, SOLUTION INTRAVENOUS EVERY 24 HOURS
Status: DISCONTINUED | OUTPATIENT
Start: 2020-04-05 | End: 2020-04-06

## 2020-04-04 RX ORDER — ACETAMINOPHEN 650 MG/1
650 SUPPOSITORY RECTAL EVERY 6 HOURS PRN
Status: DISCONTINUED | OUTPATIENT
Start: 2020-04-04 | End: 2020-04-08 | Stop reason: HOSPADM

## 2020-04-04 RX ORDER — SODIUM CHLORIDE 0.9 % (FLUSH) 0.9 %
10 SYRINGE (ML) INJECTION PRN
Status: DISCONTINUED | OUTPATIENT
Start: 2020-04-04 | End: 2020-04-08 | Stop reason: HOSPADM

## 2020-04-04 RX ORDER — ONDANSETRON 2 MG/ML
4 INJECTION INTRAMUSCULAR; INTRAVENOUS EVERY 6 HOURS PRN
Status: DISCONTINUED | OUTPATIENT
Start: 2020-04-04 | End: 2020-04-08 | Stop reason: HOSPADM

## 2020-04-04 RX ORDER — ACETAMINOPHEN 650 MG/1
650 SUPPOSITORY RECTAL EVERY 6 HOURS PRN
Status: DISCONTINUED | OUTPATIENT
Start: 2020-04-04 | End: 2020-04-04 | Stop reason: SDUPTHER

## 2020-04-04 RX ORDER — ONDANSETRON 4 MG/1
4 TABLET, ORALLY DISINTEGRATING ORAL EVERY 8 HOURS PRN
Status: DISCONTINUED | OUTPATIENT
Start: 2020-04-04 | End: 2020-04-08 | Stop reason: HOSPADM

## 2020-04-04 RX ORDER — LEVOFLOXACIN 5 MG/ML
750 INJECTION, SOLUTION INTRAVENOUS ONCE
Status: COMPLETED | OUTPATIENT
Start: 2020-04-04 | End: 2020-04-04

## 2020-04-04 RX ORDER — ACETAMINOPHEN 325 MG/1
650 TABLET ORAL EVERY 6 HOURS PRN
Status: DISCONTINUED | OUTPATIENT
Start: 2020-04-04 | End: 2020-04-04 | Stop reason: SDUPTHER

## 2020-04-04 RX ADMIN — ACETAMINOPHEN 1000 MG: 500 TABLET, FILM COATED ORAL at 19:08

## 2020-04-04 RX ADMIN — SODIUM CHLORIDE 1000 ML: 9 INJECTION, SOLUTION INTRAVENOUS at 19:19

## 2020-04-04 RX ADMIN — SODIUM CHLORIDE, PRESERVATIVE FREE 10 ML: 5 INJECTION INTRAVENOUS at 22:31

## 2020-04-04 RX ADMIN — LEVOFLOXACIN 750 MG: 5 INJECTION, SOLUTION INTRAVENOUS at 21:23

## 2020-04-04 ASSESSMENT — ENCOUNTER SYMPTOMS
RHINORRHEA: 0
COLOR CHANGE: 0
BACK PAIN: 1
COUGH: 1
EYE DISCHARGE: 0
SORE THROAT: 0
NAUSEA: 0
ABDOMINAL PAIN: 0
VOMITING: 0
SINUS PRESSURE: 1
SHORTNESS OF BREATH: 1

## 2020-04-04 ASSESSMENT — PAIN SCALES - GENERAL: PAINLEVEL_OUTOF10: 0

## 2020-04-04 NOTE — ED NOTES
Bed: 05  Expected date:   Expected time:   Means of arrival:   Comments:  [facility] R/O     Ritesh Spencer RN  04/04/20 9889

## 2020-04-05 PROBLEM — E87.1 HYPONATREMIA: Status: ACTIVE | Noted: 2020-04-05

## 2020-04-05 PROBLEM — D64.9 NORMOCYTIC ANEMIA: Status: ACTIVE | Noted: 2020-04-05

## 2020-04-05 LAB
B PARAPERT DNA SPEC QL NAA+PROBE: NOT DETECTED
B PERT DNA SPEC QL NAA+PROBE: NOT DETECTED
BASOPHILS ABSOLUTE: 0 K/UL (ref 0–0.2)
BASOPHILS RELATIVE PERCENT: 0.2 % (ref 0–1)
C PNEUM DNA NPH QL NAA+NON-PROBE: NOT DETECTED
EOSINOPHILS ABSOLUTE: 0.2 K/UL (ref 0–0.6)
EOSINOPHILS RELATIVE PERCENT: 2 % (ref 0–5)
FLUAV H1 2009 PAND RNA NPH QL NAA+PROBE: NOT DETECTED
FLUAV H1 HA GENE NPH QL NAA+PROBE: NOT DETECTED
FLUAV H3 RNA NPH QL NAA+PROBE: NOT DETECTED
FLUAV SUBTYP SPEC NAA+PROBE: NOT DETECTED
FLUBV RNA ISLT QL NAA+PROBE: NOT DETECTED
GLUCOSE BLD-MCNC: 131 MG/DL (ref 70–99)
GLUCOSE BLD-MCNC: 138 MG/DL (ref 70–99)
GLUCOSE BLD-MCNC: 141 MG/DL (ref 70–99)
GLUCOSE BLD-MCNC: 150 MG/DL (ref 70–99)
HADV DNA SPEC NAA+PROBE: NOT DETECTED
HCOV 229E RNA SPEC QL NAA+PROBE: NOT DETECTED
HCOV HKU1 RNA SPEC QL NAA+PROBE: NOT DETECTED
HCOV NL63 RNA SPEC QL NAA+PROBE: NOT DETECTED
HCOV OC43 RNA SPEC QL NAA+PROBE: NOT DETECTED
HCT VFR BLD CALC: 34.5 % (ref 42–52)
HEMOGLOBIN: 10.8 G/DL (ref 14–18)
HMPV RNA NPH QL NAA+NON-PROBE: NOT DETECTED
HPIV1 RNA SPEC QL NAA+PROBE: NOT DETECTED
HPIV2 RNA SPEC QL NAA+PROBE: NOT DETECTED
HPIV3 RNA NPH QL NAA+PROBE: NOT DETECTED
HPIV4 P GENE NPH QL NAA+PROBE: NOT DETECTED
IMMATURE GRANULOCYTES #: 0.1 K/UL
LYMPHOCYTES ABSOLUTE: 1.8 K/UL (ref 1.1–4.5)
LYMPHOCYTES RELATIVE PERCENT: 17 % (ref 20–40)
Lab: NORMAL
M PNEUMO IGG SER IA-ACNC: NOT DETECTED
MCH RBC QN AUTO: 28.3 PG (ref 27–31)
MCHC RBC AUTO-ENTMCNC: 31.3 G/DL (ref 33–37)
MCV RBC AUTO: 90.3 FL (ref 80–94)
MONOCYTES ABSOLUTE: 0.8 K/UL (ref 0–0.9)
MONOCYTES RELATIVE PERCENT: 7.5 % (ref 0–10)
NEUTROPHILS ABSOLUTE: 7.5 K/UL (ref 1.5–7.5)
NEUTROPHILS RELATIVE PERCENT: 72.7 % (ref 50–65)
PDW BLD-RTO: 14.4 % (ref 11.5–14.5)
PERFORMED ON: ABNORMAL
PLATELET # BLD: 273 K/UL (ref 130–400)
PMV BLD AUTO: 8.2 FL (ref 9.4–12.4)
RBC # BLD: 3.82 M/UL (ref 4.7–6.1)
REPORT: NORMAL
RHINOVIRUS RNA SPEC NAA+PROBE: NOT DETECTED
RSV RNA NPH QL NAA+NON-PROBE: NOT DETECTED
STREP PNEUMONIAE ANTIGEN, URINE: NORMAL
THIS TEST SENT TO: NORMAL
WBC # BLD: 10.3 K/UL (ref 4.8–10.8)

## 2020-04-05 PROCEDURE — 1210000000 HC MED SURG R&B

## 2020-04-05 PROCEDURE — 82947 ASSAY GLUCOSE BLOOD QUANT: CPT

## 2020-04-05 PROCEDURE — 87449 NOS EACH ORGANISM AG IA: CPT

## 2020-04-05 PROCEDURE — 36415 COLL VENOUS BLD VENIPUNCTURE: CPT

## 2020-04-05 PROCEDURE — 6370000000 HC RX 637 (ALT 250 FOR IP): Performed by: HOSPITALIST

## 2020-04-05 PROCEDURE — 2700000000 HC OXYGEN THERAPY PER DAY

## 2020-04-05 PROCEDURE — 85025 COMPLETE CBC W/AUTO DIFF WBC: CPT

## 2020-04-05 PROCEDURE — 6360000002 HC RX W HCPCS: Performed by: HOSPITALIST

## 2020-04-05 PROCEDURE — 2580000003 HC RX 258: Performed by: HOSPITALIST

## 2020-04-05 RX ORDER — LISINOPRIL 20 MG/1
20 TABLET ORAL DAILY
Status: DISCONTINUED | OUTPATIENT
Start: 2020-04-05 | End: 2020-04-08 | Stop reason: HOSPADM

## 2020-04-05 RX ORDER — TIZANIDINE 4 MG/1
4 TABLET ORAL 3 TIMES DAILY PRN
Status: DISCONTINUED | OUTPATIENT
Start: 2020-04-05 | End: 2020-04-08 | Stop reason: HOSPADM

## 2020-04-05 RX ORDER — AMITRIPTYLINE HYDROCHLORIDE 50 MG/1
50 TABLET, FILM COATED ORAL NIGHTLY
Status: DISCONTINUED | OUTPATIENT
Start: 2020-04-05 | End: 2020-04-08 | Stop reason: HOSPADM

## 2020-04-05 RX ORDER — INSULIN GLARGINE 100 [IU]/ML
10 INJECTION, SOLUTION SUBCUTANEOUS ONCE
Status: DISCONTINUED | OUTPATIENT
Start: 2020-04-05 | End: 2020-04-08 | Stop reason: HOSPADM

## 2020-04-05 RX ORDER — OXYCODONE HYDROCHLORIDE 5 MG/1
15 TABLET ORAL 4 TIMES DAILY PRN
Status: DISCONTINUED | OUTPATIENT
Start: 2020-04-05 | End: 2020-04-08 | Stop reason: HOSPADM

## 2020-04-05 RX ORDER — PANTOPRAZOLE SODIUM 40 MG/1
40 TABLET, DELAYED RELEASE ORAL
Status: DISCONTINUED | OUTPATIENT
Start: 2020-04-05 | End: 2020-04-08 | Stop reason: HOSPADM

## 2020-04-05 RX ORDER — ASPIRIN 81 MG/1
81 TABLET ORAL DAILY
Status: DISCONTINUED | OUTPATIENT
Start: 2020-04-05 | End: 2020-04-08 | Stop reason: HOSPADM

## 2020-04-05 RX ORDER — NICOTINE POLACRILEX 4 MG
15 LOZENGE BUCCAL PRN
Status: DISCONTINUED | OUTPATIENT
Start: 2020-04-05 | End: 2020-04-08 | Stop reason: HOSPADM

## 2020-04-05 RX ORDER — BUSPIRONE HYDROCHLORIDE 5 MG/1
7.5 TABLET ORAL 2 TIMES DAILY PRN
Status: DISCONTINUED | OUTPATIENT
Start: 2020-04-05 | End: 2020-04-08 | Stop reason: HOSPADM

## 2020-04-05 RX ORDER — DEXTROSE MONOHYDRATE 25 G/50ML
12.5 INJECTION, SOLUTION INTRAVENOUS PRN
Status: DISCONTINUED | OUTPATIENT
Start: 2020-04-05 | End: 2020-04-08 | Stop reason: HOSPADM

## 2020-04-05 RX ORDER — DEXTROSE MONOHYDRATE 50 MG/ML
100 INJECTION, SOLUTION INTRAVENOUS PRN
Status: DISCONTINUED | OUTPATIENT
Start: 2020-04-05 | End: 2020-04-08 | Stop reason: HOSPADM

## 2020-04-05 RX ORDER — ALBUTEROL SULFATE 90 UG/1
2 AEROSOL, METERED RESPIRATORY (INHALATION) EVERY 6 HOURS PRN
Status: DISCONTINUED | OUTPATIENT
Start: 2020-04-05 | End: 2020-04-08 | Stop reason: HOSPADM

## 2020-04-05 RX ORDER — INSULIN GLARGINE 100 [IU]/ML
15 INJECTION, SOLUTION SUBCUTANEOUS NIGHTLY
Status: DISCONTINUED | OUTPATIENT
Start: 2020-04-05 | End: 2020-04-08 | Stop reason: HOSPADM

## 2020-04-05 RX ORDER — ATORVASTATIN CALCIUM 20 MG/1
40 TABLET, FILM COATED ORAL DAILY
Status: DISCONTINUED | OUTPATIENT
Start: 2020-04-05 | End: 2020-04-08 | Stop reason: HOSPADM

## 2020-04-05 RX ORDER — LATANOPROST 50 UG/ML
1 SOLUTION/ DROPS OPHTHALMIC NIGHTLY
Status: DISCONTINUED | OUTPATIENT
Start: 2020-04-05 | End: 2020-04-08 | Stop reason: HOSPADM

## 2020-04-05 RX ORDER — HYDRALAZINE HYDROCHLORIDE 20 MG/ML
10 INJECTION INTRAMUSCULAR; INTRAVENOUS EVERY 4 HOURS PRN
Status: DISCONTINUED | OUTPATIENT
Start: 2020-04-05 | End: 2020-04-08 | Stop reason: HOSPADM

## 2020-04-05 RX ORDER — TIMOLOL MALEATE 5 MG/ML
1 SOLUTION/ DROPS OPHTHALMIC 2 TIMES DAILY
Status: DISCONTINUED | OUTPATIENT
Start: 2020-04-05 | End: 2020-04-08 | Stop reason: HOSPADM

## 2020-04-05 RX ORDER — AMLODIPINE BESYLATE 5 MG/1
5 TABLET ORAL DAILY
Status: DISCONTINUED | OUTPATIENT
Start: 2020-04-05 | End: 2020-04-08 | Stop reason: HOSPADM

## 2020-04-05 RX ADMIN — OXYCODONE 15 MG: 5 TABLET ORAL at 14:37

## 2020-04-05 RX ADMIN — SODIUM CHLORIDE, PRESERVATIVE FREE 10 ML: 5 INJECTION INTRAVENOUS at 21:17

## 2020-04-05 RX ADMIN — TIMOLOL MALEATE 1 DROP: 5 SOLUTION/ DROPS OPHTHALMIC at 21:08

## 2020-04-05 RX ADMIN — ENOXAPARIN SODIUM 40 MG: 40 INJECTION SUBCUTANEOUS at 08:45

## 2020-04-05 RX ADMIN — AMLODIPINE BESYLATE 5 MG: 5 TABLET ORAL at 08:45

## 2020-04-05 RX ADMIN — ATORVASTATIN CALCIUM 40 MG: 20 TABLET, FILM COATED ORAL at 08:45

## 2020-04-05 RX ADMIN — AMITRIPTYLINE HYDROCHLORIDE 50 MG: 50 TABLET, FILM COATED ORAL at 21:17

## 2020-04-05 RX ADMIN — ASPIRIN 81 MG: 81 TABLET, COATED ORAL at 08:45

## 2020-04-05 RX ADMIN — TIZANIDINE 4 MG: 4 TABLET ORAL at 21:17

## 2020-04-05 RX ADMIN — LISINOPRIL 20 MG: 20 TABLET ORAL at 08:45

## 2020-04-05 RX ADMIN — PANTOPRAZOLE SODIUM 40 MG: 40 TABLET, DELAYED RELEASE ORAL at 08:45

## 2020-04-05 RX ADMIN — SODIUM CHLORIDE, PRESERVATIVE FREE 10 ML: 5 INJECTION INTRAVENOUS at 08:46

## 2020-04-05 RX ADMIN — MELATONIN 3 MG: at 21:17

## 2020-04-05 RX ADMIN — OXYCODONE 15 MG: 5 TABLET ORAL at 21:16

## 2020-04-05 RX ADMIN — OXYCODONE 15 MG: 5 TABLET ORAL at 09:47

## 2020-04-05 RX ADMIN — LATANOPROST 1 DROP: 50 SOLUTION OPHTHALMIC at 21:16

## 2020-04-05 RX ADMIN — TIMOLOL MALEATE 1 DROP: 5 SOLUTION/ DROPS OPHTHALMIC at 08:45

## 2020-04-05 ASSESSMENT — PAIN SCALES - GENERAL
PAINLEVEL_OUTOF10: 7
PAINLEVEL_OUTOF10: 0
PAINLEVEL_OUTOF10: 7
PAINLEVEL_OUTOF10: 7
PAINLEVEL_OUTOF10: 0
PAINLEVEL_OUTOF10: 2

## 2020-04-05 NOTE — PROGRESS NOTES
Hospitalist Progress Note  4/5/2020 12:23 PM  Subjective:   Admit Date: 4/4/2020  PCP: Susannah Camargo MD    Chief Complaint: fever, cough, shortness of breath    Subjective: Patient is feeling much better today, back to approximate baseline. He is saturating well on supplemental oxygen via nasal cannula. Cough is minimal, nonproductive. History is otherwise unchanged. Cumulative Hospital History:   4-4: Presents to ED with recurrent cough, shortness of breath, and fever for a few days. Recent admission for pneumonia and COPD exacerbation, initially improved. Concerned that he might have contracted COVID-19 when he was out in public. Nonpainful, nonproductive cough. Neutrophilic leukocytosis but no lymphopenia, infiltrates stable from previous imaging. Nevertheless, COVID-19 test ordered and patient admitted to COVID-19 jain on levofloxacin. 4-5: Patient back to approximate baseline. Will discharge to home on levofloxacin and steroid taper when COVID-19 test returns negative. ROS: 14 point review of systems is negative except as specifically addressed above. DIET GENERAL; Carb Control: 4 carb choices (60 gms)/meal; Low Sodium (2 GM);  Low Cholesterol    Intake/Output Summary (Last 24 hours) at 4/5/2020 1223  Last data filed at 4/5/2020 3175  Gross per 24 hour   Intake 10 ml   Output 700 ml   Net -690 ml     Medications:   dextrose      sodium chloride Stopped (04/04/20 2109)     Current Facility-Administered Medications   Medication Dose Route Frequency Provider Last Rate Last Dose    hydrALAZINE (APRESOLINE) injection 10 mg  10 mg Intravenous Q4H PRN Sam Vaughan MD        albuterol sulfate  (90 Base) MCG/ACT inhaler 2 puff  2 puff Inhalation Q6H PRN Sam Vaughan MD        amitriptyline (ELAVIL) tablet 50 mg  50 mg Oral Nightly Sam Vaughan MD        aspirin EC tablet 81 mg  81 mg Oral Daily Sam Vaughan MD   81 mg at 04/05/20 0845    amLODIPine (NORVASC) tablet 5 mg  5 mg Oral Daily Ramone Willson MD   Stopped at 04/04/20 2109    acetaminophen (TYLENOL) tablet 650 mg  650 mg Oral Q6H PRN Anders Busby MD        Or    acetaminophen (TYLENOL) suppository 650 mg  650 mg Rectal Q6H PRN Anders Busby MD        levofloxacin (LEVAQUIN) 750 MG/150ML infusion 750 mg  750 mg Intravenous Q24H Anders Busby MD        sodium chloride flush 0.9 % injection 10 mL  10 mL Intravenous 2 times per day Anders Busby MD   10 mL at 04/05/20 0846    sodium chloride flush 0.9 % injection 10 mL  10 mL Intravenous PRN Anders Busby MD        polyethylene glycol [facility]) packet 17 g  17 g Oral Daily PRN Anders Busby MD        melatonin tablet 3 mg  3 mg Oral Nightly PRN Anders Busby MD        ondansetron (ZOFRAN-ODT) disintegrating tablet 4 mg  4 mg Oral Q8H PRN Anders Busby MD        Or    ondansetron [facility] injection 4 mg  4 mg Intravenous Q6H PRN Anders Busby MD            Labs:     Recent Labs     04/04/20 1925 04/05/20  0545   WBC 13.8* 10.3   RBC 4.28* 3.82*   HGB 12.0* 10.8*   HCT 38.1* 34.5*   MCV 89.0 90.3   MCH 28.0 28.3   MCHC 31.5* 31.3*    273     Recent Labs     04/04/20  1914 04/04/20 1925   NA  --  132*   K 4.0 4.1   ANIONGAP  --  14   CL  --  92*   CO2  --  26   BUN  --  7*   CREATININE  --  0.6   GLUCOSE  --  129*   CALCIUM  --  9.1     No results for input(s): MG, PHOS in the last 72 hours. Recent Labs     04/04/20 1925   AST 16   ALT 19   BILITOT 0.3   ALKPHOS 194*     ABGs:No results for input(s): PH, PO2, PCO2, HCO3, BE, O2SAT in the last 72 hours. Troponin T:   Recent Labs     04/04/20 1925   TROPONINI <0.01     INR: No results for input(s): INR in the last 72 hours.   Lactic Acid:   Recent Labs     04/04/20 1925   LACTA 2.1*       Objective:   Vitals: BP (!) 148/89   Pulse 93   Temp 97.4 °F (36.3 °C) (Oral)   Resp 18   Ht 5' 8\" (1.727 m)   Wt 235 lb (106.6 kg)   SpO2 93%   BMI 35.73 kg/m²   24HR INTAKE/OUTPUT:      Intake/Output Summary (Last 24

## 2020-04-05 NOTE — H&P
respiratory distress. Breath sounds: No stridor. No wheezing, rhonchi or rales. Abdominal:      General: Bowel sounds are normal.      Tenderness: There is no abdominal tenderness. There is no guarding or rebound. Musculoskeletal:         General: No tenderness. Right lower leg: No edema. Left lower leg: No edema. Skin:     General: Skin is warm. Comments: nondiaphoretic   Neurological:      Mental Status: He is alert. Psychiatric:         Mood and Affect: Mood normal.         Behavior: Behavior normal.         LABORATORY DATA:    CBC:  Recent Labs     04/04/20 1925 04/05/20  0545   WBC 13.8* 10.3   HGB 12.0* 10.8*   HCT 38.1* 34.5*    273     CMP:  Recent Labs     04/04/20 1914 04/04/20 1925   NA  --  132*   K 4.0 4.1   CL  --  92*   CO2  --  26   BUN  --  7*   CREATININE  --  0.6   CALCIUM  --  9.1     Recent Labs     04/04/20 1925   AST 16   ALT 19   BILITOT 0.3   ALKPHOS 194*     Cardiac Enzymes:   Recent Labs     04/04/20 1925   TROPONINI <0.01     ABGs:  Lab Results   Component Value Date    PHART 7.380 04/04/2020    PO2ART 72.0 04/04/2020    FRY0BSU 61.0 04/04/2020     Urinalysis:  Lab Results   Component Value Date    NITRU Negative 04/04/2020    WBCUA 1 01/02/2016    BACTERIA NEGATIVE 01/02/2016    RBCUA 3 01/02/2016    BLOODU Negative 04/04/2020    SPECGRAV 1.006 04/04/2020    GLUCOSEU Negative 04/04/2020     ABG:  Recent Labs     04/04/20 1914   PHART 7.380   QMJ7GKM 61.0*   PO2ART 72.0*   ZRK7SJY 36.1*   BEART 9.0*   HGBAE 11.9*   T2IBBPUA 93.6   CARBOXHGBART 2.1       IMAGING:  Xr Chest Portable  Result Date: 4/4/2020  1. Stable bibasilar opacities are nonspecific and could be due to aspiration.  Signed by Dr Roger Zimmer on 4/4/2020 8:18 PM        ASESSMENTS & PLANS:    Acute on Chronic Hypoxemic Respiratory Failure possibly due to Recurrent Pneumoniae most likely due to Resistant Organism:  CXR was not impressive to me on standard definition monitor, however, while there is no fever there is a dry cough  Changed ABx class to Levaquin 750mg IV Q24h - will defer on canceling to the day provider as there is over 12h to the next dose and they might wish to consider a consult first in the current environment  Barely up as at 3. 5LPM now as opposed to 3LPM    COVID-19 Rule Out:  Must keep in [facility] until rule out returns as per hospital policy      Chronic Medical Problems:  Continue home meds as indicated  As per COVID-19 R/O, NO NEBS, only puffers  Continued home meds  As per DM: HOLD Metformin while inpatient, replacing with lantus and Medium dose ISS and POCT PRN and  TIDWM and QHS      Patient Active Problem List   Diagnosis    Chronic respiratory failure with hypoxia ([address] on 3LPM via NC 24/7 at home    Neutrophilic leukocytosis    Hyperlipidemia    Chronic low back pain    COPD (chronic obstructive pulmonary disease) ([address]    Hypertension    Lower esophageal ring ([place])    Gastritis without bleeding    Chronic GERD    History of esophageal stricture    Status post dilation of esophageal narrowing    Bilateral hearing loss    Palliative care patient    Hyperglycemia    Grade I diastolic dysfunction    Obesity due to excess calories    Personal history of noncompliance with medical treatment, presenting hazards to health    HCAP (healthcare-associated pneumonia)    Recurrent pneumonia        dextrose      sodium chloride Stopped (04/04/20 2109)      amitriptyline  50 mg Oral Nightly    aspirin  81 mg Oral Daily    amLODIPine  5 mg Oral Daily    timolol  1 drop Both Eyes BID    atorvastatin  40 mg Oral Daily    latanoprost  1 drop Both Eyes Nightly    fluticasone-umeclidin-vilant  1 puff Inhalation Daily    lisinopril  20 mg Oral Daily    pantoprazole  40 mg Oral QAM AC    insulin glargine  15 Units Subcutaneous Nightly    insulin glargine  10 Units Subcutaneous Once    insulin lispro  0-12 Units Subcutaneous TID WC    insulin lispro  0-6 Units Subcutaneous Nightly    levofloxacin  750 mg Intravenous Q24H    sodium chloride flush  10 mL Intravenous 2 times per day     hydrALAZINE, acetaminophen **OR** acetaminophen, sodium chloride flush, polyethylene glycol, melatonin, ondansetron **OR** ondansetron      Supoportive and Prophylactic Txx:  DVT Prophylaxis: Lovenox SQ  GI (PUD) Ppx: not indicated as such, but on PPI as per home regimen  PT consult for evalutation and Txx as indicated: indicated as per age >71 years  DIET GENERAL; Carb Control: 4 carb choices (60 gms)/meal; Low Sodium (2 GM); Low Cholesterol    Care time of >45 minutes  Pt seen/examined and Admitted to Inpatient with an expected LOS greater than two midnights due to medical therapy and or critical care needs, otherwise placed to OBServation status.     Signed:  Electronically signed by Jc Iraheta MD on 4/5/20 at 07:30 AM CDT

## 2020-04-05 NOTE — ED PROVIDER NOTES
[facility] EMERG[facility]Y DEPT  eMERGENCY dEPARTMENT eNCOUnter      Pt Name: Patrice Licona  MRN: [id number]  Ranjitgfgallo 1955  Date of evaluation: 4/4/2020  Provider: Robert Ruiz MD    [address] [place] [address]       Chief Complaint   Patient presents with    Fever     presents with c/o fever and cough          HISTORY OF PRESENT ILLNESS   (Location/Symptom, Timing/Onset,Context/Setting, Quality, Duration, Modifying Factors, Severity)  Note limiting factors. Patrice Licona is a 72 y.o. male who presents to the emergency department with shortness of breath and fever. Patient has been admitted twice for treatment for pneumonia within the last 2 months. Patient states he improved during last admission but within the last few days began to worsen again. He became more short of breath approximately 1 week ago and it is worsened in the last 2 days. Patient states that he began having fever today. Patient states that he was tested for Covid during his last admission and it was negative. Patient states that since last admission he felt well for a while and has been going out for his groceries and supplies. This week he began feeling worse. Patient states that he continues to have a productive cough \"when I can get things up. \"  Patient denies chest pain. Patient states that he normally has lower extremity edema. Patient denies calf or leg pain. Patient states that he has continued to have upper respiratory/sinus congestion. HPI    NursingNotes were reviewed. REVIEW OF SYSTEMS    (2-9 systems for level 4, 10 or more for level 5)     Review of Systems   Constitutional: Positive for fatigue and fever. Negative for chills. HENT: Positive for congestion and sinus pressure. Negative for rhinorrhea and sore throat. Eyes: Negative for discharge. Respiratory: Positive for cough and shortness of breath. Cardiovascular: Positive for leg swelling (Chronic per patient). Negative for chest pain and palpitations.  Transportation needs     Medical: None     Non-medical: None   Tobacco Use    Smoking status: Former Smoker     Packs/day: 2.00     Years: 46.00     Pack years: 92.00     Types: Cigarettes     Last attempt to quit: 2016     Years since quittin.2    Smokeless tobacco: Never Used    Tobacco comment: started at age 15, quit at age 61, smoked at 2 PPD   Substance and Sexual Activity    Alcohol use: Yes     Comment: quit in ~2013    Drug use: No    Sexual activity: None   Lifestyle    Physical activity     Days per week: None     Minutes per session: None    Stress: None   Relationships    Social connections     Talks on phone: None     Gets together: None     Attends Spiritism service: None     Active member of club or organization: None     Attends meetings of clubs or organizations: None     Relationship status: None    Intimate partner violence     Fear of current or ex partner: None     Emotionally abused: None     Physically abused: None     Forced sexual activity: None   Other Topics Concern    None   Social History Narrative    Domiciled: lives in a mobile home, has 3 steps in back, lives with his son and his wife, home is ramped in front    Ambulates: used a walker in the past, walks on his own    Code Status: Full Code    Health Care Proxy: Mrs. Reji Prater, [phone]       SCREENINGS             PHYSICAL EXAM    (up to 7 for level 4, 8 or more for level 5)     ED Triage Vitals   BP Temp Temp Source Pulse Resp SpO2 Height Weight   20 1810 20 1810 20 2124 20 1810 20 1810 20 1810 20 1810 20 1810   (!) 184/88 101.3 °F (38.5 °C) Oral 120 18 96 % 5' 8\" (1.727 m) 235 lb (106.6 kg)       Physical Exam  Vitals signs and nursing note reviewed. Constitutional:       General: He is not in acute distress. Appearance: He is obese. He is ill-appearing. Comments: Patient is mildly flushed and hot to touch.   He is tachypneic but speaks in completed with a voice recognition program.  Efforts were made to edit thedictations but occasionally words are mis-transcribed.)    Josh Cordero MD (electronically signed)  Attending Emergency Physician          Josh Cordero MD  04/04/20 2448

## 2020-04-06 ENCOUNTER — CARE COORDINATION (OUTPATIENT)
Dept: CASE MANAGEMENT | Age: 65
End: 2020-04-06

## 2020-04-06 LAB
EKG P AXIS: 72 DEGREES
EKG P AXIS: 74 DEGREES
EKG P-R INTERVAL: 156 MS
EKG P-R INTERVAL: 166 MS
EKG Q-T INTERVAL: 312 MS
EKG Q-T INTERVAL: 336 MS
EKG QRS DURATION: 100 MS
EKG QRS DURATION: 112 MS
EKG QTC CALCULATION (BAZETT): 406 MS
EKG QTC CALCULATION (BAZETT): 416 MS
EKG T AXIS: 71 DEGREES
EKG T AXIS: 72 DEGREES
GLUCOSE BLD-MCNC: 116 MG/DL (ref 70–99)
GLUCOSE BLD-MCNC: 140 MG/DL (ref 70–99)
PERFORMED ON: ABNORMAL
PERFORMED ON: ABNORMAL
S PYO THROAT QL CULT: NORMAL

## 2020-04-06 PROCEDURE — 2580000003 HC RX 258: Performed by: HOSPITALIST

## 2020-04-06 PROCEDURE — 2700000000 HC OXYGEN THERAPY PER DAY

## 2020-04-06 PROCEDURE — 6370000000 HC RX 637 (ALT 250 FOR IP): Performed by: HOSPITALIST

## 2020-04-06 PROCEDURE — 2100000000 HC CCU R&B

## 2020-04-06 PROCEDURE — 82947 ASSAY GLUCOSE BLOOD QUANT: CPT

## 2020-04-06 PROCEDURE — 93010 ELECTROCARDIOGRAM REPORT: CPT | Performed by: INTERNAL MEDICINE

## 2020-04-06 PROCEDURE — 6360000002 HC RX W HCPCS: Performed by: HOSPITALIST

## 2020-04-06 RX ORDER — LEVOFLOXACIN 750 MG/1
750 TABLET ORAL DAILY
Status: DISCONTINUED | OUTPATIENT
Start: 2020-04-07 | End: 2020-04-08 | Stop reason: HOSPADM

## 2020-04-06 RX ADMIN — LEVOFLOXACIN 750 MG: 5 INJECTION, SOLUTION INTRAVENOUS at 05:20

## 2020-04-06 RX ADMIN — LISINOPRIL 20 MG: 20 TABLET ORAL at 08:31

## 2020-04-06 RX ADMIN — OXYCODONE 15 MG: 5 TABLET ORAL at 17:01

## 2020-04-06 RX ADMIN — ENOXAPARIN SODIUM 40 MG: 40 INJECTION SUBCUTANEOUS at 08:31

## 2020-04-06 RX ADMIN — SODIUM CHLORIDE, PRESERVATIVE FREE 10 ML: 5 INJECTION INTRAVENOUS at 22:42

## 2020-04-06 RX ADMIN — OXYCODONE 15 MG: 5 TABLET ORAL at 22:41

## 2020-04-06 RX ADMIN — OXYCODONE 15 MG: 5 TABLET ORAL at 05:19

## 2020-04-06 RX ADMIN — AMLODIPINE BESYLATE 5 MG: 5 TABLET ORAL at 08:31

## 2020-04-06 RX ADMIN — LATANOPROST 1 DROP: 50 SOLUTION OPHTHALMIC at 22:40

## 2020-04-06 RX ADMIN — ASPIRIN 81 MG: 81 TABLET, COATED ORAL at 08:30

## 2020-04-06 RX ADMIN — AMITRIPTYLINE HYDROCHLORIDE 50 MG: 50 TABLET, FILM COATED ORAL at 22:41

## 2020-04-06 RX ADMIN — OXYCODONE 15 MG: 5 TABLET ORAL at 11:24

## 2020-04-06 RX ADMIN — TIMOLOL MALEATE 1 DROP: 5 SOLUTION/ DROPS OPHTHALMIC at 08:34

## 2020-04-06 RX ADMIN — SODIUM CHLORIDE, PRESERVATIVE FREE 10 ML: 5 INJECTION INTRAVENOUS at 08:31

## 2020-04-06 RX ADMIN — INSULIN GLARGINE 15 UNITS: 100 INJECTION, SOLUTION SUBCUTANEOUS at 22:41

## 2020-04-06 RX ADMIN — TIZANIDINE 4 MG: 4 TABLET ORAL at 22:41

## 2020-04-06 RX ADMIN — TIMOLOL MALEATE 1 DROP: 5 SOLUTION/ DROPS OPHTHALMIC at 22:40

## 2020-04-06 RX ADMIN — PANTOPRAZOLE SODIUM 40 MG: 40 TABLET, DELAYED RELEASE ORAL at 05:19

## 2020-04-06 RX ADMIN — ATORVASTATIN CALCIUM 40 MG: 20 TABLET, FILM COATED ORAL at 08:31

## 2020-04-06 RX ADMIN — MELATONIN 3 MG: at 22:41

## 2020-04-06 ASSESSMENT — PAIN DESCRIPTION - ORIENTATION: ORIENTATION: LOWER

## 2020-04-06 ASSESSMENT — PAIN DESCRIPTION - DESCRIPTORS: DESCRIPTORS: ACHING;DULL;CONSTANT

## 2020-04-06 ASSESSMENT — PAIN DESCRIPTION - PAIN TYPE
TYPE: CHRONIC PAIN
TYPE: CHRONIC PAIN

## 2020-04-06 ASSESSMENT — PAIN SCALES - GENERAL
PAINLEVEL_OUTOF10: 0
PAINLEVEL_OUTOF10: 7
PAINLEVEL_OUTOF10: 7
PAINLEVEL_OUTOF10: 6
PAINLEVEL_OUTOF10: 7
PAINLEVEL_OUTOF10: 3

## 2020-04-06 ASSESSMENT — PAIN DESCRIPTION - LOCATION
LOCATION: BACK
LOCATION: BACK

## 2020-04-06 ASSESSMENT — PAIN DESCRIPTION - FREQUENCY: FREQUENCY: CONTINUOUS

## 2020-04-06 ASSESSMENT — PAIN DESCRIPTION - ONSET: ONSET: GRADUAL

## 2020-04-06 NOTE — PROGRESS NOTES
Hospitalist Progress Note  4/6/2020 2:39 PM  Subjective:   Admit Date: 4/4/2020  PCP: Nataly Bartlett MD    Chief Complaint: fever, cough, shortness of breath    Subjective: Patient feels well without issues. Jerica Craig for discharge home but COVID testing is pending. History is otherwise unchanged. Cumulative Hospital History:   4-4: Presents to ED with recurrent cough, shortness of breath, and fever for a few days. Recent admission for pneumonia and COPD exacerbation, initially improved. Concerned that he might have contracted COVID-19 when he was out in public. Nonpainful, nonproductive cough. Neutrophilic leukocytosis but no lymphopenia, infiltrates stable from previous imaging. Nevertheless, COVID-19 test ordered and patient admitted to COVID-19 jain on levofloxacin. 4-5: Patient back to approximate baseline. Will discharge to home on levofloxacin and steroid taper when COVID-19 test returns negative. ROS: 14 point review of systems is negative except as specifically addressed above. DIET GENERAL; Carb Control: 4 carb choices (60 gms)/meal; Low Sodium (2 GM);  Low Cholesterol  No intake or output data in the 24 hours ending 04/06/20 7219  Medications:   dextrose       Current Facility-Administered Medications   Medication Dose Route Frequency Provider Last Rate Last Dose    hydrALAZINE (APRESOLINE) injection 10 mg  10 mg Intravenous Q4H PRN Humera Flores MD        albuterol sulfate  (90 Base) MCG/ACT inhaler 2 puff  2 puff Inhalation Q6H PRN Humera Flores MD        amitriptyline (ELAVIL) tablet 50 mg  50 mg Oral Nightly Humera Flores MD   50 mg at 04/05/20 2117    aspirin EC tablet 81 mg  81 mg Oral Daily Humera Flores MD   81 mg at 04/06/20 0830    amLODIPine (NORVASC) tablet 5 mg  5 mg Oral Daily Humera Flores MD   5 mg at 04/06/20 0831    tiZANidine (ZANAFLEX) tablet 4 mg  4 mg Oral TID PRN Humera Flores MD   4 mg at 04/05/20 2117    timolol (TIMOPTIC) 0.5 % ophthalmic solution 1 drop 1 drop Both Eyes BID Anders Busby MD   1 drop at 04/06/20 0834    oxyCODONE (ROXICODONE) immediate release tablet 15 mg  15 mg Oral 4x Daily PRN Anders Busby MD   15 mg at 04/06/20 1124    atorvastatin (LIPITOR) tablet 40 mg  40 mg Oral Daily Anders Busby MD   40 mg at 04/06/20 0831    busPIRone (BUSPAR) tablet 7.5 mg  7.5 mg Oral BID PRN Anders Busby MD        latanoprost (XALATAN) 0.005 % ophthalmic solution 1 drop  1 drop Both Eyes Nightly Anders Busby MD   1 drop at 04/05/20 2116    fluticasone-umeclidin-vilant (TRELEGY ELLIPTA) 100-62.5-25 MCG/INH inhaler 1 puff  1 puff Inhalation Daily Anders Busby MD   1 puff at 04/06/20 0833    lisinopril (PRINIVIL;ZESTRIL) tablet 20 mg  20 mg Oral Daily Anders Busby MD   20 mg at 04/06/20 0831    pantoprazole (PROTONIX) tablet 40 mg  40 mg Oral QAM AC Anders Busby MD   40 mg at 04/06/20 0519    insulin glargine (LANTUS) injection vial 15 Units  15 Units Subcutaneous Nightly Anders Busby MD        insulin glargine (LANTUS) injection vial 10 Units  10 Units Subcutaneous Once Anders Busby MD        insulin lispro (HUMALOG) injection vial 0-12 Units  0-12 Units Subcutaneous TID WC Anders Busby MD        insulin lispro (HUMALOG) injection vial 0-6 Units  0-6 Units Subcutaneous Nightly Anders Busby MD        glucose (GLUTOSE) 40 % oral gel 15 g  15 g Oral PRN Anders Busby MD        dextrose 50 % IV solution  12.5 g Intravenous PRN Anders Busby MD        glucagon (rDNA) injection 1 mg  1 mg Intramuscular PRN Anders Busby MD        dextrose 5 % solution  100 mL/hr Intravenous PRN Anders Busby MD        enoxaparin (LOVENOX) injection 40 mg  40 mg Subcutaneous Daily Anders Busby MD   40 mg at 04/06/20 0831    acetaminophen (TYLENOL) tablet 650 mg  650 mg Oral Q6H PRN Anders Busby MD        Or    acetaminophen (TYLENOL) suppository 650 mg  650 mg Rectal Q6H PRN Anders Busby MD        levofloxacin (LEVAQUIN) 750 MG/150ML infusion 750 mg  750 mg Intravenous Q24H Polo Lezama MD   Stopped at 04/06/20 0831    sodium chloride flush 0.9 % injection 10 mL  10 mL Intravenous 2 times per day Polo Lezama MD   10 mL at 04/06/20 0831    sodium chloride flush 0.9 % injection 10 mL  10 mL Intravenous PRN Polo Lezama MD        polyethylene glycol [facility]) packet 17 g  17 g Oral Daily PRN Polo Lezama MD        melatonin tablet 3 mg  3 mg Oral Nightly PRN Polo Lezama MD   3 mg at 04/05/20 2117    ondansetron (ZOFRAN-ODT) disintegrating tablet 4 mg  4 mg Oral Q8H PRN Polo Lezama MD        Or    ondansetron [facility]) injection 4 mg  4 mg Intravenous Q6H PRN Polo Lezama MD            Labs:     Recent Labs     04/04/20 1925 04/05/20  0545   WBC 13.8* 10.3   RBC 4.28* 3.82*   HGB 12.0* 10.8*   HCT 38.1* 34.5*   MCV 89.0 90.3   MCH 28.0 28.3   MCHC 31.5* 31.3*    273     Recent Labs     04/04/20 1914 04/04/20 1925   NA  --  132*   K 4.0 4.1   ANIONGAP  --  14   CL  --  92*   CO2  --  26   BUN  --  7*   CREATININE  --  0.6   GLUCOSE  --  129*   CALCIUM  --  9.1     No results for input(s): MG, PHOS in the last 72 hours. Recent Labs     04/04/20 1925   AST 16   ALT 19   BILITOT 0.3   ALKPHOS 194*     ABGs:No results for input(s): PH, PO2, PCO2, HCO3, BE, O2SAT in the last 72 hours. Troponin T:   Recent Labs     04/04/20 1925   TROPONINI <0.01     INR: No results for input(s): INR in the last 72 hours.   Lactic Acid:   Recent Labs     04/04/20 1925   LACTA 2.1*       Objective:   Vitals: /65   Pulse 84   Temp 97 °F (36.1 °C) (Oral)   Resp 18   Ht 5' 8\" (1.727 m)   Wt 235 lb (106.6 kg)   SpO2 95%   BMI 35.73 kg/m²   24HR INTAKE/OUTPUT:    No intake or output data in the 24 hours ending 04/06/20 4269  General appearance: alert and cooperative with exam  HEENT: atraumatic, eyes with clear conjunctiva and normal lids, pupils and irises normal, external ears and nose are normal, lips normal  Neck without masses, lympadenopathy, bruit, thyroid normal  Lungs: no increased work of breathing, wheezes bilaterally  Heart: regular rate and rhythm, S1, S2 normal, no murmur, click, rub or gallop  Abdomen: soft, non-tender; bowel sounds normal; no masses,  no organomegaly and obese  Extremities: extremities normal, atraumatic, no cyanosis or edema  Neurologic: no focal neurologic deficits, normal sensation, alert and oriented, affect and mood appropriate  Skin: no rashes, nodules    Assessment and Plan:   Principal Problem:    Pneumonia due to infectious organism  Active Problems:    COPD with acute exacerbation ([address]    Acute on chronic respiratory failure with hypoxia and hypercapnia (HCC)    Grade I diastolic dysfunction    Sepsis ([address]    Normocytic anemia    Hyponatremia  Resolved Problems:    * No resolved hospital problems. *  Sepsis was present on admission. Day #3 levofloxacin empiric therapy for pneumonia. Cultures pending to guide therapy but showing clinical response. COVID-19 test pending but no change in infiltrate and no lymphopenia to suggest this infection. Maintain on COVID-19 jain until test comes back negative.     Advance Directive: Full Code    DVT prophylaxis: enoxaparin    Discharge planning: to home      DO Chicho Toussaint Hospitalist

## 2020-04-07 LAB
ANION GAP SERPL CALCULATED.3IONS-SCNC: 11 MMOL/L (ref 7–19)
BASOPHILS ABSOLUTE: 0 K/UL (ref 0–0.2)
BASOPHILS RELATIVE PERCENT: 0.4 % (ref 0–1)
BUN BLDV-MCNC: 7 MG/DL (ref 8–23)
CALCIUM SERPL-MCNC: 8.8 MG/DL (ref 8.8–10.2)
CHLORIDE BLD-SCNC: 96 MMOL/L (ref 98–111)
CO2: 33 MMOL/L (ref 22–29)
CREAT SERPL-MCNC: 0.6 MG/DL (ref 0.5–1.2)
EOSINOPHILS ABSOLUTE: 0.3 K/UL (ref 0–0.6)
EOSINOPHILS RELATIVE PERCENT: 3.6 % (ref 0–5)
GFR NON-AFRICAN AMERICAN: >60
GLUCOSE BLD-MCNC: 118 MG/DL (ref 74–109)
HCT VFR BLD CALC: 31.3 % (ref 42–52)
HEMOGLOBIN: 10.1 G/DL (ref 14–18)
IMMATURE GRANULOCYTES #: 0.1 K/UL
LYMPHOCYTES ABSOLUTE: 2 K/UL (ref 1.1–4.5)
LYMPHOCYTES RELATIVE PERCENT: 25.6 % (ref 20–40)
MCH RBC QN AUTO: 28.4 PG (ref 27–31)
MCHC RBC AUTO-ENTMCNC: 32.3 G/DL (ref 33–37)
MCV RBC AUTO: 87.9 FL (ref 80–94)
MONOCYTES ABSOLUTE: 0.7 K/UL (ref 0–0.9)
MONOCYTES RELATIVE PERCENT: 8.4 % (ref 0–10)
NEUTROPHILS ABSOLUTE: 4.9 K/UL (ref 1.5–7.5)
NEUTROPHILS RELATIVE PERCENT: 61 % (ref 50–65)
PDW BLD-RTO: 14.5 % (ref 11.5–14.5)
PLATELET # BLD: 269 K/UL (ref 130–400)
PMV BLD AUTO: 8.2 FL (ref 9.4–12.4)
POTASSIUM REFLEX MAGNESIUM: 3.8 MMOL/L (ref 3.5–5)
RBC # BLD: 3.56 M/UL (ref 4.7–6.1)
SODIUM BLD-SCNC: 140 MMOL/L (ref 136–145)
WBC # BLD: 8 K/UL (ref 4.8–10.8)

## 2020-04-07 PROCEDURE — 6360000002 HC RX W HCPCS: Performed by: HOSPITALIST

## 2020-04-07 PROCEDURE — 6370000000 HC RX 637 (ALT 250 FOR IP): Performed by: HOSPITALIST

## 2020-04-07 PROCEDURE — 80048 BASIC METABOLIC PNL TOTAL CA: CPT

## 2020-04-07 PROCEDURE — 2580000003 HC RX 258: Performed by: HOSPITALIST

## 2020-04-07 PROCEDURE — 6370000000 HC RX 637 (ALT 250 FOR IP): Performed by: FAMILY MEDICINE

## 2020-04-07 PROCEDURE — 85025 COMPLETE CBC W/AUTO DIFF WBC: CPT

## 2020-04-07 PROCEDURE — 2700000000 HC OXYGEN THERAPY PER DAY

## 2020-04-07 PROCEDURE — 2100000000 HC CCU R&B

## 2020-04-07 RX ADMIN — LATANOPROST 1 DROP: 50 SOLUTION OPHTHALMIC at 20:38

## 2020-04-07 RX ADMIN — SODIUM CHLORIDE, PRESERVATIVE FREE 10 ML: 5 INJECTION INTRAVENOUS at 08:43

## 2020-04-07 RX ADMIN — ASPIRIN 81 MG: 81 TABLET, COATED ORAL at 08:44

## 2020-04-07 RX ADMIN — OXYCODONE 15 MG: 5 TABLET ORAL at 12:29

## 2020-04-07 RX ADMIN — TIMOLOL MALEATE 1 DROP: 5 SOLUTION/ DROPS OPHTHALMIC at 08:45

## 2020-04-07 RX ADMIN — ATORVASTATIN CALCIUM 40 MG: 20 TABLET, FILM COATED ORAL at 08:44

## 2020-04-07 RX ADMIN — SODIUM CHLORIDE, PRESERVATIVE FREE 10 ML: 5 INJECTION INTRAVENOUS at 20:36

## 2020-04-07 RX ADMIN — AMLODIPINE BESYLATE 5 MG: 5 TABLET ORAL at 08:44

## 2020-04-07 RX ADMIN — LISINOPRIL 20 MG: 20 TABLET ORAL at 08:44

## 2020-04-07 RX ADMIN — OXYCODONE 15 MG: 5 TABLET ORAL at 18:00

## 2020-04-07 RX ADMIN — PANTOPRAZOLE SODIUM 40 MG: 40 TABLET, DELAYED RELEASE ORAL at 05:52

## 2020-04-07 RX ADMIN — OXYCODONE 15 MG: 5 TABLET ORAL at 05:52

## 2020-04-07 RX ADMIN — LEVOFLOXACIN 750 MG: 750 TABLET, FILM COATED ORAL at 08:44

## 2020-04-07 RX ADMIN — ENOXAPARIN SODIUM 40 MG: 40 INJECTION SUBCUTANEOUS at 08:44

## 2020-04-07 RX ADMIN — AMITRIPTYLINE HYDROCHLORIDE 50 MG: 50 TABLET, FILM COATED ORAL at 20:36

## 2020-04-07 RX ADMIN — TIMOLOL MALEATE 1 DROP: 5 SOLUTION/ DROPS OPHTHALMIC at 20:38

## 2020-04-07 ASSESSMENT — PAIN DESCRIPTION - ONSET: ONSET: GRADUAL

## 2020-04-07 ASSESSMENT — PAIN DESCRIPTION - LOCATION
LOCATION: BACK

## 2020-04-07 ASSESSMENT — PAIN SCALES - GENERAL
PAINLEVEL_OUTOF10: 0
PAINLEVEL_OUTOF10: 0
PAINLEVEL_OUTOF10: 3
PAINLEVEL_OUTOF10: 4
PAINLEVEL_OUTOF10: 7
PAINLEVEL_OUTOF10: 0
PAINLEVEL_OUTOF10: 7
PAINLEVEL_OUTOF10: 8

## 2020-04-07 ASSESSMENT — PAIN DESCRIPTION - FREQUENCY: FREQUENCY: CONTINUOUS

## 2020-04-07 ASSESSMENT — PAIN DESCRIPTION - PAIN TYPE
TYPE: CHRONIC PAIN

## 2020-04-07 ASSESSMENT — PAIN DESCRIPTION - ORIENTATION: ORIENTATION: LOWER

## 2020-04-07 ASSESSMENT — PAIN DESCRIPTION - DESCRIPTORS: DESCRIPTORS: ACHING;DULL;CONSTANT

## 2020-04-07 NOTE — CARE COORDINATION
Pt has PCP appt w/ Dr. Toya Oakley on Wednesday 4/15/2020 at 9:30 AM    Electronically signed by Viry Yost on 4/7/2020 at 10:27 AM

## 2020-04-08 VITALS
SYSTOLIC BLOOD PRESSURE: 152 MMHG | TEMPERATURE: 96.6 F | BODY MASS INDEX: 34.49 KG/M2 | OXYGEN SATURATION: 78 % | HEIGHT: 68 IN | RESPIRATION RATE: 28 BRPM | WEIGHT: 227.6 LBS | HEART RATE: 83 BPM | DIASTOLIC BLOOD PRESSURE: 71 MMHG

## 2020-04-08 LAB
ANION GAP SERPL CALCULATED.3IONS-SCNC: 10 MMOL/L (ref 7–19)
BASOPHILS ABSOLUTE: 0 K/UL (ref 0–0.2)
BASOPHILS RELATIVE PERCENT: 0.3 % (ref 0–1)
BUN BLDV-MCNC: 7 MG/DL (ref 8–23)
CALCIUM SERPL-MCNC: 9.1 MG/DL (ref 8.8–10.2)
CHLORIDE BLD-SCNC: 95 MMOL/L (ref 98–111)
CO2: 32 MMOL/L (ref 22–29)
CREAT SERPL-MCNC: 0.6 MG/DL (ref 0.5–1.2)
EOSINOPHILS ABSOLUTE: 0.3 K/UL (ref 0–0.6)
EOSINOPHILS RELATIVE PERCENT: 3.5 % (ref 0–5)
GFR NON-AFRICAN AMERICAN: >60
GLUCOSE BLD-MCNC: 112 MG/DL (ref 74–109)
HCT VFR BLD CALC: 37.5 % (ref 42–52)
HEMOGLOBIN: 11.8 G/DL (ref 14–18)
IMMATURE GRANULOCYTES #: 0.1 K/UL
LYMPHOCYTES ABSOLUTE: 2.3 K/UL (ref 1.1–4.5)
LYMPHOCYTES RELATIVE PERCENT: 26.6 % (ref 20–40)
MCH RBC QN AUTO: 28 PG (ref 27–31)
MCHC RBC AUTO-ENTMCNC: 31.5 G/DL (ref 33–37)
MCV RBC AUTO: 88.9 FL (ref 80–94)
MONOCYTES ABSOLUTE: 0.7 K/UL (ref 0–0.9)
MONOCYTES RELATIVE PERCENT: 8.6 % (ref 0–10)
NEUTROPHILS ABSOLUTE: 5.2 K/UL (ref 1.5–7.5)
NEUTROPHILS RELATIVE PERCENT: 59.7 % (ref 50–65)
PDW BLD-RTO: 14.6 % (ref 11.5–14.5)
PLATELET # BLD: 314 K/UL (ref 130–400)
PMV BLD AUTO: 8.2 FL (ref 9.4–12.4)
POTASSIUM REFLEX MAGNESIUM: 3.8 MMOL/L (ref 3.5–5)
RBC # BLD: 4.22 M/UL (ref 4.7–6.1)
REPORT: NORMAL
SARS-COV-2: NOT DETECTED
SODIUM BLD-SCNC: 137 MMOL/L (ref 136–145)
THIS TEST SENT TO: NORMAL
WBC # BLD: 8.6 K/UL (ref 4.8–10.8)

## 2020-04-08 PROCEDURE — 2700000000 HC OXYGEN THERAPY PER DAY

## 2020-04-08 PROCEDURE — 6370000000 HC RX 637 (ALT 250 FOR IP): Performed by: HOSPITALIST

## 2020-04-08 PROCEDURE — 80048 BASIC METABOLIC PNL TOTAL CA: CPT

## 2020-04-08 PROCEDURE — 2580000003 HC RX 258: Performed by: HOSPITALIST

## 2020-04-08 PROCEDURE — 6370000000 HC RX 637 (ALT 250 FOR IP): Performed by: FAMILY MEDICINE

## 2020-04-08 PROCEDURE — 6360000002 HC RX W HCPCS: Performed by: HOSPITALIST

## 2020-04-08 PROCEDURE — 85025 COMPLETE CBC W/AUTO DIFF WBC: CPT

## 2020-04-08 RX ORDER — METHYLPREDNISOLONE 4 MG/1
TABLET ORAL
Qty: 1 KIT | Refills: 0 | Status: SHIPPED | OUTPATIENT
Start: 2020-04-08 | End: 2020-04-14

## 2020-04-08 RX ORDER — LEVOFLOXACIN 750 MG/1
750 TABLET ORAL DAILY
Qty: 10 TABLET | Refills: 0 | Status: SHIPPED | OUTPATIENT
Start: 2020-04-09 | End: 2020-04-19

## 2020-04-08 RX ADMIN — ASPIRIN 81 MG: 81 TABLET, COATED ORAL at 08:03

## 2020-04-08 RX ADMIN — OXYCODONE 15 MG: 5 TABLET ORAL at 05:02

## 2020-04-08 RX ADMIN — ENOXAPARIN SODIUM 40 MG: 40 INJECTION SUBCUTANEOUS at 08:01

## 2020-04-08 RX ADMIN — PANTOPRAZOLE SODIUM 40 MG: 40 TABLET, DELAYED RELEASE ORAL at 05:02

## 2020-04-08 RX ADMIN — LISINOPRIL 20 MG: 20 TABLET ORAL at 08:04

## 2020-04-08 RX ADMIN — OXYCODONE 15 MG: 5 TABLET ORAL at 00:02

## 2020-04-08 RX ADMIN — LEVOFLOXACIN 750 MG: 750 TABLET, FILM COATED ORAL at 08:02

## 2020-04-08 RX ADMIN — ATORVASTATIN CALCIUM 40 MG: 20 TABLET, FILM COATED ORAL at 08:02

## 2020-04-08 RX ADMIN — TIMOLOL MALEATE 1 DROP: 5 SOLUTION/ DROPS OPHTHALMIC at 08:04

## 2020-04-08 RX ADMIN — AMLODIPINE BESYLATE 5 MG: 5 TABLET ORAL at 08:02

## 2020-04-08 RX ADMIN — SODIUM CHLORIDE, PRESERVATIVE FREE 10 ML: 5 INJECTION INTRAVENOUS at 08:01

## 2020-04-08 ASSESSMENT — PAIN SCALES - GENERAL
PAINLEVEL_OUTOF10: 10
PAINLEVEL_OUTOF10: 10

## 2020-04-08 NOTE — DISCHARGE SUMMARY
Discharge Summary      Erica Taylor  :  1955  MRN:  [id number]    Admit date:  2020  Discharge date:      Admitting Physician:  Carolyn Garcia MD    Advance Directive: Full Code    Consults: none    Primary Care Physician:  La Robbins MD    Discharge Diagnoses:  Principal Problem:    Pneumonia due to infectious organism  Active Problems:    COPD with acute exacerbation ([address]    Acute on chronic respiratory failure with hypoxia and hypercapnia (HCC)    Grade I diastolic dysfunction    Sepsis ([address]    Normocytic anemia    Hyponatremia  Resolved Problems:    * No resolved hospital problems. *      Significant Diagnostic Studies:   Xr Chest Portable    Result Date: 2020  XR CHEST PORTABLE 2020 6:00 PM HISTORY: Shortness of breath COMPARISON: 3/10/2020. FINDINGS: Frontal view of the chest was obtained. Stable bilateral opacities are seen in the lung bases. The cardiomediastinal silhouette and pulmonary vascularity are unchanged. The osseous structures and surrounding soft tissues demonstrate no acute abnormality. 1. Stable bibasilar opacities are nonspecific and could be due to aspiration. Signed by Dr Reubin Spatz on 2020 8:18 PM      Pertinent Labs:   CBC:   Recent Labs     20  0530 20  0235   WBC 8.0 8.6   HGB 10.1* 11.8*    314     BMP:    Recent Labs     20  0530 20  0235    137   K 3.8 3.8   CL 96* 95*   CO2 33* 32*   BUN 7* 7*   CREATININE 0.6 0.6   GLUCOSE 118* 112*     INR: No results for input(s): INR in the last 72 hours. ABGs:No results for input(s): PH, PO2, PCO2, HCO3, BE, O2SAT in the last 72 hours. Lactic Acid:No results for input(s): LACTA in the last 72 hours. Procedures: None  Hospital Course: 80-year-old male with a history of COPD on 3 L of home oxygen presenting for shortness of breath and cough admitted for COPD exacerbation and possible COVID-19 rule out.   Patient was recently in our facility and treated for hospital-acquired sulfate HFA (PROAIR HFA) 108 (90 BASE) MCG/ACT inhaler  Inhale 2 puffs into the lungs every 6 hours as needed for Wheezing             amitriptyline (ELAVIL) 25 MG tablet  Take 1-2 tablets by mouth nightly as needed for Sleep             AMLODIPINE BESYLATE PO  Take 5 mg by mouth daily. Aspirin (ASPIR-81 PO)  Take by mouth daily              atorvastatin (LIPITOR) 40 MG tablet  Take 40 mg by mouth daily. busPIRone (BUSPAR) 7.5 MG tablet  1 tablet by Oral route 2 times per day for anxiety             Fluticasone-Umeclidin-Vilant (TRELEGY ELLIPTA) 100-62.5-25 MCG/INH AEPB  Inhale 1 each into the lungs             latanoprost (XALATAN) 0.005 % ophthalmic solution  Place 1 drop into both eyes nightly             levoFLOXacin (LEVAQUIN) 750 MG tablet  Take 1 tablet by mouth daily for 10 days             LISINOPRIL PO  Take 20 mg by mouth daily. metFORMIN (GLUCOPHAGE) 500 MG tablet  Take 500 mg by mouth 2 times daily (with meals)             methylPREDNISolone (MEDROL DOSEPACK) 4 MG tablet  Take by mouth. omeprazole (PRILOSEC) 40 MG delayed release capsule  40 mg daily              oxyCODONE (OXY-IR) 15 MG immediate release tablet  Take 1 tablet by mouth 4 times daily as needed for Pain . Earliest Fill Date: 12/11/17             timolol (TIMOPTIC-XE) 0.5 % ophthalmic gel-forming  Place 1 drop into both eyes 2 times daily             tiZANidine (ZANAFLEX) 4 MG tablet  Take 1 tablet by mouth 3 times daily as needed (muscle spasms)                 Discharge Instructions: Follow up with Cyrus Sharp MD in 7 days. Take medications as directed. Resume activity as tolerated. Diet: DIET GENERAL; Carb Control: 4 carb choices (60 gms)/meal; Low Sodium (2 GM); Low Cholesterol     Disposition: Patient is medically stable and will be discharged Home. Time spent on discharge 35 minutes.     Signed:  Elif Kan MD 4/8/2020 10:30 AM

## 2020-04-09 ENCOUNTER — CARE COORDINATION (OUTPATIENT)
Dept: CASE MANAGEMENT | Age: 65
End: 2020-04-09

## 2020-04-14 ENCOUNTER — CARE COORDINATION (OUTPATIENT)
Dept: CASE MANAGEMENT | Age: 65
End: 2020-04-14

## 2020-04-20 NOTE — PROGRESS NOTES
Hospitalist Progress Note  4/20/2020 6:35 PM  Subjective:   Admit Date: 4/4/2020  PCP: Thad Lucas MD    Chief Complaint: fever, cough, shortness of breath    Subjective: Patient feels well. COVID testing still pending. History is otherwise unchanged. Cumulative Hospital History:   4-4: Presents to ED with recurrent cough, shortness of breath, and fever for a few days. Recent admission for pneumonia and COPD exacerbation, initially improved. Concerned that he might have contracted COVID-19 when he was out in public. Nonpainful, nonproductive cough. Neutrophilic leukocytosis but no lymphopenia, infiltrates stable from previous imaging. Nevertheless, COVID-19 test ordered and patient admitted to COVID-19 jain on levofloxacin. 4-5: Patient back to approximate baseline. Will discharge to home on levofloxacin and steroid taper when COVID-19 test returns negative. ROS: 14 point review of systems is negative except as specifically addressed above. No diet orders on file  No intake or output data in the 24 hours ending 04/20/20 5952  Medications:    No current facility-administered medications for this encounter. Current Outpatient Medications   Medication Sig Dispense Refill    metFORMIN (GLUCOPHAGE) 500 MG tablet Take 500 mg by mouth 2 times daily (with meals)      busPIRone (BUSPAR) 7.5 MG tablet 1 tablet by Oral route 2 times per day for anxiety      Fluticasone-Umeclidin-Vilant (TRELEGY ELLIPTA) 100-62.5-25 MCG/INH AEPB Inhale 1 each into the lungs      albuterol (PROVENTIL) (2.5 MG/3ML) 0.083% nebulizer solution Take 3 mLs by nebulization every 4 hours as needed for Wheezing or Shortness of Breath 120 each 1    oxyCODONE (OXY-IR) 15 MG immediate release tablet Take 1 tablet by mouth 4 times daily as needed for Pain . Earliest Fill Date: 12/11/17 (Patient taking differently: Take 15 mg by mouth 4 times daily. Brenda Linn ) 120 tablet 0    amitriptyline (ELAVIL) 25 MG tablet Take 1-2 tablets by mouth masses, lympadenopathy, bruit, thyroid normal  Lungs: no increased work of breathing, wheezes bilaterally  Heart: regular rate and rhythm, S1, S2 normal, no murmur, click, rub or gallop  Abdomen: soft, non-tender; bowel sounds normal; no masses,  no organomegaly and obese  Extremities: extremities normal, atraumatic, no cyanosis or edema  Neurologic: no focal neurologic deficits, normal sensation, alert and oriented, affect and mood appropriate  Skin: no rashes, nodules    Assessment and Plan:   Principal Problem:    Pneumonia due to infectious organism  Active Problems:    COPD with acute exacerbation ([address]    Acute on chronic respiratory failure with hypoxia and hypercapnia (HCC)    Grade I diastolic dysfunction    Sepsis ([address]    Normocytic anemia    Hyponatremia  Resolved Problems:    * No resolved hospital problems. *  Sepsis was present on admission. Day #4 levofloxacin empiric therapy for pneumonia. Cultures pending to guide therapy but showing clinical response. COVID-19 test pending but no change in infiltrate and no lymphopenia to suggest this infection. Maintain on COVID-19 jain until test comes back negative. Delayed note entry due to computer error, addended previous note.     Advance Directive: Prior    DVT prophylaxis: enoxaparin    Discharge planning: to home      Levon Otero, DO  [facility] Hospitalist

## 2020-04-22 ENCOUNTER — CARE COORDINATION (OUTPATIENT)
Dept: CASE MANAGEMENT | Age: 65
End: 2020-04-22

## 2020-05-06 ENCOUNTER — CARE COORDINATION (OUTPATIENT)
Dept: CASE MANAGEMENT | Age: 65
End: 2020-05-06

## 2020-05-06 NOTE — CARE COORDINATION
[facility] Transitions Follow Up Call    2020    Patient: Tre Johnson  Patient : 1955   MRN: <[id number]>  Reason for Admission:   Discharge Date: 20 RARS: Readmission Risk Score: 25         Spoke with: Philippe Grey  Patient reports he is doing good. He denies cp, sob, wheeze, fever or cough. He wears oxygen at 3 lpm. Appetite good for meals. Patient notified this will be his last [facility]-A call. No questions or concerns at this time. Care Transitions Subsequent and Final Call    Subsequent and Final Calls  Care Transitions Interventions  Other Interventions: Follow Up  No future appointments.     Roseann Moore LPN

## 2020-05-07 DIAGNOSIS — J44.9 STAGE 3 SEVERE COPD BY GOLD CLASSIFICATION (HCC): ICD-10-CM

## 2020-05-20 ENCOUNTER — HOSPITAL ENCOUNTER (OUTPATIENT)
Dept: CT IMAGING | Age: 65
Discharge: HOME | End: 2020-05-20
Payer: MEDICARE

## 2020-05-20 PROCEDURE — 71260 CT THORAX DX C+: CPT

## 2020-05-20 PROCEDURE — 6360000004 HC RX CONTRAST MEDICATION: Performed by: INTERNAL MEDICINE

## 2020-05-20 RX ADMIN — IOPAMIDOL 60 ML: 755 INJECTION, SOLUTION INTRAVENOUS at 11:13

## 2020-05-21 DIAGNOSIS — R59.0 MEDIASTINAL ADENOPATHY: ICD-10-CM

## 2020-05-22 ENCOUNTER — OFFICE VISIT (OUTPATIENT)
Dept: PULMONOLOGY | Facility: CLINIC | Age: 65
End: 2020-05-22

## 2020-05-22 VITALS
HEART RATE: 98 BPM | SYSTOLIC BLOOD PRESSURE: 180 MMHG | OXYGEN SATURATION: 95 % | TEMPERATURE: 98.1 F | DIASTOLIC BLOOD PRESSURE: 90 MMHG | BODY MASS INDEX: 38.58 KG/M2 | WEIGHT: 239 LBS

## 2020-05-22 DIAGNOSIS — K21.9 GASTROESOPHAGEAL REFLUX DISEASE WITHOUT ESOPHAGITIS: ICD-10-CM

## 2020-05-22 DIAGNOSIS — E66.01 MORBID OBESITY WITH BMI OF 40.0-44.9, ADULT (HCC): ICD-10-CM

## 2020-05-22 DIAGNOSIS — J96.11 CHRONIC RESPIRATORY FAILURE WITH HYPOXIA (HCC): ICD-10-CM

## 2020-05-22 DIAGNOSIS — N28.9 KIDNEY LESION, NATIVE, LEFT: ICD-10-CM

## 2020-05-22 DIAGNOSIS — J44.9 STAGE 3 SEVERE COPD BY GOLD CLASSIFICATION (HCC): Primary | ICD-10-CM

## 2020-05-22 DIAGNOSIS — J43.2 CENTRILOBULAR EMPHYSEMA (HCC): ICD-10-CM

## 2020-05-22 DIAGNOSIS — G47.33 OBSTRUCTIVE SLEEP APNEA: ICD-10-CM

## 2020-05-22 DIAGNOSIS — Z87.891 PERSONAL HISTORY OF NICOTINE DEPENDENCE: ICD-10-CM

## 2020-05-22 PROCEDURE — 99214 OFFICE O/P EST MOD 30 MIN: CPT | Performed by: NURSE PRACTITIONER

## 2020-05-22 RX ORDER — ROFLUMILAST 500 UG/1
500 TABLET ORAL DAILY
Qty: 30 TABLET | Refills: 11 | Status: SHIPPED | OUTPATIENT
Start: 2020-05-22 | End: 2020-06-21

## 2020-05-22 RX ORDER — ROFLUMILAST 250 UG/1
1 TABLET ORAL DAILY
Qty: 28 TABLET | Refills: 0 | Status: SHIPPED | OUTPATIENT
Start: 2020-05-22 | End: 2021-03-08

## 2020-05-27 ENCOUNTER — TELEPHONE (OUTPATIENT)
Dept: PULMONOLOGY | Facility: CLINIC | Age: 65
End: 2020-05-27

## 2020-06-01 ENCOUNTER — HOSPITAL ENCOUNTER (OUTPATIENT)
Dept: ULTRASOUND IMAGING | Facility: HOSPITAL | Age: 65
Discharge: HOME | End: 2020-06-01
Admitting: NURSE PRACTITIONER

## 2020-06-01 DIAGNOSIS — N28.9 KIDNEY LESION, NATIVE, LEFT: ICD-10-CM

## 2020-06-01 PROCEDURE — 76775 US EXAM ABDO BACK WALL LIM: CPT

## 2020-06-25 DIAGNOSIS — J44.9 STAGE 3 SEVERE COPD BY GOLD CLASSIFICATION (HCC): ICD-10-CM

## 2020-07-22 DIAGNOSIS — J44.9 STAGE 3 SEVERE COPD BY GOLD CLASSIFICATION (HCC): ICD-10-CM

## 2020-07-22 RX ORDER — ALBUTEROL SULFATE 2.5 MG/3ML
SOLUTION RESPIRATORY (INHALATION)
Qty: 375 ML | Refills: 11 | Status: SHIPPED | OUTPATIENT
Start: 2020-07-22 | End: 2020-07-24

## 2020-07-24 RX ORDER — ALBUTEROL SULFATE 2.5 MG/3ML
2.5 SOLUTION RESPIRATORY (INHALATION)
Qty: 120 VIAL | Refills: 11 | Status: SHIPPED | OUTPATIENT
Start: 2020-07-24

## 2020-08-26 ENCOUNTER — HOSPITAL ENCOUNTER (EMERGENCY)
Age: 65
Discharge: HOME | End: 2020-08-26
Attending: EMERGENCY MEDICINE
Payer: MEDICARE

## 2020-08-26 ENCOUNTER — APPOINTMENT (OUTPATIENT)
Dept: CT IMAGING | Age: 65
End: 2020-08-26
Payer: MEDICARE

## 2020-08-26 ENCOUNTER — APPOINTMENT (OUTPATIENT)
Dept: GENERAL RADIOLOGY | Age: 65
End: 2020-08-26
Payer: MEDICARE

## 2020-08-26 VITALS
HEIGHT: 66 IN | RESPIRATION RATE: 17 BRPM | SYSTOLIC BLOOD PRESSURE: 114 MMHG | BODY MASS INDEX: 38.57 KG/M2 | HEART RATE: 81 BPM | TEMPERATURE: 98 F | DIASTOLIC BLOOD PRESSURE: 79 MMHG | WEIGHT: 240 LBS | OXYGEN SATURATION: 96 %

## 2020-08-26 PROCEDURE — 29125 APPL SHORT ARM SPLINT STATIC: CPT

## 2020-08-26 PROCEDURE — 73110 X-RAY EXAM OF WRIST: CPT

## 2020-08-26 PROCEDURE — 70450 CT HEAD/BRAIN W/O DYE: CPT

## 2020-08-26 PROCEDURE — 99999 PR OFFICE/OUTPT VISIT,PROCEDURE ONLY: CPT | Performed by: EMERGENCY MEDICINE

## 2020-08-26 PROCEDURE — 99283 EMERGENCY DEPT VISIT LOW MDM: CPT

## 2020-08-26 PROCEDURE — 6370000000 HC RX 637 (ALT 250 FOR IP): Performed by: EMERGENCY MEDICINE

## 2020-08-26 RX ORDER — OXYCODONE AND ACETAMINOPHEN 10; 325 MG/1; MG/1
1 TABLET ORAL ONCE
Status: COMPLETED | OUTPATIENT
Start: 2020-08-26 | End: 2020-08-26

## 2020-08-26 RX ADMIN — OXYCODONE HYDROCHLORIDE AND ACETAMINOPHEN 1 TABLET: 10; 325 TABLET ORAL at 04:13

## 2020-08-26 ASSESSMENT — PAIN SCALES - GENERAL
PAINLEVEL_OUTOF10: 7
PAINLEVEL_OUTOF10: 4

## 2020-08-26 ASSESSMENT — ENCOUNTER SYMPTOMS
EYE REDNESS: 0
RHINORRHEA: 0
EYE PAIN: 0
SHORTNESS OF BREATH: 0
COUGH: 0
VOMITING: 0
VOICE CHANGE: 0
DIARRHEA: 0
ABDOMINAL PAIN: 0

## 2020-08-26 ASSESSMENT — PAIN DESCRIPTION - LOCATION: LOCATION: WRIST

## 2020-08-26 ASSESSMENT — PAIN DESCRIPTION - DESCRIPTORS: DESCRIPTORS: ACHING

## 2020-08-26 ASSESSMENT — PAIN DESCRIPTION - ORIENTATION: ORIENTATION: RIGHT

## 2020-08-26 NOTE — ED PROVIDER NOTES
Bilateral hearing loss     CAD (coronary artery disease)     Chronic back pain     Colon polyps     COPD (chronic obstructive pulmonary disease) (HCC)     Depression     Hyperlipidemia     Hypertension     Low back pain 1/29/2016    Oxygen dependent     uses at night    Palliative care patient 12/28/2018         SURGICAL HISTORY       Past Surgical History:   Procedure Laterality Date    BACK SURGERY  2010    COLONOSCOPY  ? 2005    Dr. Eusebia Kunz COLONOSCOPY  01/2012    3 polyps, 2 adenomatous, 1 hyperplastic    COLONOSCOPY  01/2015    diverticulosis    FRACTURE SURGERY      KNEE ARTHROSCOPY  2014    [place] EGD TRANSORAL BIOPSY SINGLE/MULTIPLE N/A 12/21/2016    Dr MARIA ELENA Rehman-w/dilation over wire, 48 French-Schatzki's Ring, gastritis/gastropathy-prn    UPPER GASTROINTESTINAL ENDOSCOPY  4/24/2017    Dr MARIA ELENA Rehman-thai/dilation over wire, 51 French-Schatzki Ring     UPPER GASTROINTESTINAL ENDOSCOPY  4/24/2017    Dr MARIA ELENA Rehman-thai/dilation over wire, 51 French-Schatzki Ring     WRIST FRACTURE SURGERY      [id number]         CURRENT MEDICATIONS       Previous Medications    ALBUTEROL (PROVENTIL) (2.5 MG/3ML) 0.083% NEBULIZER SOLUTION    Take 3 mLs by nebulization every 4 hours as needed for Wheezing or Shortness of Breath    ALBUTEROL SULFATE HFA (PROAIR HFA) 108 (90 BASE) MCG/ACT INHALER    Inhale 2 puffs into the lungs every 6 hours as needed for Wheezing    AMITRIPTYLINE (ELAVIL) 25 MG TABLET    Take 1-2 tablets by mouth nightly as needed for Sleep    AMLODIPINE BESYLATE PO    Take 5 mg by mouth daily. ASPIRIN (ASPIR-81 PO)    Take by mouth daily     ATORVASTATIN (LIPITOR) 40 MG TABLET    Take 40 mg by mouth daily.     BUSPIRONE (BUSPAR) 7.5 MG TABLET    1 tablet by Oral route 2 times per day for anxiety    FLUTICASONE-UMECLIDIN-VILANT (TRELEGY ELLIPTA) 100-62.5-25 MCG/INH AEPB    Inhale 1 each into the lungs    LATANOPROST (XALATAN) 0.005 % OPHTHALMIC SOLUTION    Place 1 drop into both eyes nightly    LISINOPRIL PO Take 20 mg by mouth daily. METFORMIN (GLUCOPHAGE) 500 MG TABLET    Take 500 mg by mouth 2 times daily (with meals)    OMEPRAZOLE (PRILOSEC) 40 MG DELAYED RELEASE CAPSULE    40 mg daily     OXYCODONE (OXY-IR) 15 MG IMMEDIATE RELEASE TABLET    Take 1 tablet by mouth 4 times daily as needed for Pain .  Earliest Fill Date: 17    TIMOLOL (TIMOPTIC-XE) 0.5 % OPHTHALMIC GEL-FORMING    Place 1 drop into both eyes 2 times daily    TIZANIDINE (ZANAFLEX) 4 MG TABLET    Take 1 tablet by mouth 3 times daily as needed (muscle spasms)       ALLERGIES     Lyrica [pregabalin] and Neurontin [gabapentin]    FAMILY HISTORY       Family History   Problem Relation Age of Onset    Kidney Disease Mother     High Blood Pressure Mother     Glaucoma Mother     Alcohol Abuse Father     Heart Disease Brother     No Known Problems Brother     Other Sister         pancreatitis    Brain Cancer Son         also had brain clot, both legs were removed s/p MVA    Colon Cancer Neg Hx     Colon Polyps Neg Hx           SOCIAL HISTORY       Social History     Socioeconomic History    Marital status:      Spouse name: Not on file    Number of children: 1    Years of education: Not on file    Highest education level: Not on file   Occupational History    Occupation: retired , was a bansal of 6 years   Social Needs    Financial resource strain: Not on file    Food insecurity     Worry: Not on file     Inability: Not on file   [employer] needs     Medical: Not on file     Non-medical: Not on file   Tobacco Use    Smoking status: Former Smoker     Packs/day: 2.00     Years: 46.00     Pack years: 92.00     Types: Cigarettes     Last attempt to quit: 2016     Years since quittin.6    Smokeless tobacco: Never Used    Tobacco comment: started at age 15, quit at age 61, smoked at 2 PPD   Substance and Sexual Activity    Alcohol use: Yes     Comment: quit in ~2013    Drug use: No    Sexual activity: Not on file   Lifestyle    Physical activity     Days per week: Not on file     Minutes per session: Not on file    Stress: Not on file   Relationships    Social connections     Talks on phone: Not on file     Gets together: Not on file     Attends Gnosticist service: Not on file     Active member of club or organization: Not on file     Attends meetings of clubs or organizations: Not on file     Relationship status: Not on file    Intimate partner violence     Fear of current or ex partner: Not on file     Emotionally abused: Not on file     Physically abused: Not on file     Forced sexual activity: Not on file   Other Topics Concern    Not on file   Social History Narrative    Domiciled: lives in a mobile home, has 3 steps in back, lives with his son and his wife, home is ramped in front    Ambulates: used a walker in the past, walks on his own    Code Status: Full Code    Health Care Proxy: Mrs. Genaro Lemons, [phone]       SCREENINGS             PHYSICAL EXAM    (up to 7 for level 4, 8 or more for level 5)     ED Triage Vitals [08/26/20 0225]   BP Temp Temp src Pulse Resp SpO2 Height Weight   111/85 98 °F (36.7 °C) -- 84 20 97 % 5' 6\" (1.676 m) 240 lb (108.9 kg)       Physical Exam  Vitals signs and nursing note reviewed. Constitutional:       General: He is not in acute distress. Appearance: Normal appearance. He is well-developed. He is not toxic-appearing or diaphoretic. Interventions: Nasal cannula in place. HENT:      Head: Normocephalic. No Tiwari's sign, contusion, right periorbital erythema, left periorbital erythema or laceration. Comments: Right forehead hematoma with mild superficial abrasion     Right Ear: External ear normal.      Left Ear: External ear normal.      Nose: No nasal deformity, laceration, mucosal edema or rhinorrhea. Mouth/Throat:      Mouth: No oral lesions. Eyes:      General: No scleral icterus. Right eye: No discharge.          Left eye: No discharge. Conjunctiva/sclera: Conjunctivae normal.      Pupils: Pupils are equal, round, and reactive to light. Neck:      Musculoskeletal: Normal range of motion. No neck rigidity. Trachea: No tracheal deviation. Cardiovascular:      Rate and Rhythm: Normal rate and regular rhythm. Pulses:           Radial pulses are 2+ on the right side and 2+ on the left side. Dorsalis pedis pulses are 2+ on the right side and 2+ on the left side. Pulmonary:      Effort: Pulmonary effort is normal. No accessory muscle usage or respiratory distress. Breath sounds: Normal breath sounds. No stridor. No decreased breath sounds, rhonchi or rales. Abdominal:      General: There is no distension. Palpations: Abdomen is not rigid. Tenderness: There is no abdominal tenderness. There is no guarding. Musculoskeletal:         General: No deformity. Right shoulder: Normal.      Left shoulder: Normal.      Right wrist: He exhibits decreased range of motion, tenderness, bony tenderness and swelling. He exhibits no deformity. Right hip: Normal.      Left hip: Normal.      Right knee: Normal.      Left knee: Normal.      Cervical back: Normal. He exhibits normal range of motion, no tenderness, no bony tenderness and no deformity. Thoracic back: Normal. He exhibits no tenderness, no bony tenderness and no deformity. Lumbar back: Normal. He exhibits no tenderness, no bony tenderness and no deformity. Skin:     General: Skin is warm and dry. Findings: No laceration. Neurological:      Mental Status: He is alert and oriented to person, place, and time. GCS: GCS eye subscore is 4. GCS verbal subscore is 5. GCS motor subscore is 6. Cranial Nerves: No cranial nerve deficit. Sensory: No sensory deficit. Motor: No abnormal muscle tone. Psychiatric:         Speech: Speech normal.         Behavior: Behavior normal.         Thought Content:  Thought content normal.         Judgment: Judgment normal.         DIAGNOSTIC RESULTS     EKG: All EKG's are interpreted by the Emergency Department Physician who either signs or Co-signs this chart in the absence of a cardiologist.      RADIOLOGY:   Non-plain film images such as CT, Ultrasound and MRI are read by the radiologist. Wilmar Gonzalez images are visualized and preliminarily interpreted by the emergency physician with the below findings:        Interpretation per the Radiologist below, if available at the time of this note:    CT Head WO Contrast    (Results Pending)   XR WRIST RIGHT (MIN 3 VIEWS)    (Results Pending)         ED BEDSIDE ULTRASOUND:   Performed by ED Physician - none    LABS:  Labs Reviewed - No data to display    All other labs were within normal range or not returned as of this dictation. Medications   oxyCODONE-acetaminophen (PERCOCET)  MG per tablet 1 tablet (1 tablet Oral Given 8/26/20 0413)       EMERGENCY DEPARTMENT COURSE and DIFFERENTIALDIAGNOSIS/MDM:   Vitals:    Vitals:    08/26/20 0225   BP: 111/85   Pulse: 84   Resp: 20   Temp: 98 °F (36.7 °C)   SpO2: 97%   Weight: 240 lb (108.9 kg)   Height: 5' 6\" (1.676 m)       MDM     Nexus and [place] C-spine negative. No signs of additional injury. X-ray does show a nondisplaced distal right radius fracture most visible on the lateral film. Evaluation and work-up here revealed no signs of emergent or life-threatening pathology that would necessitate admission for further work-up or management at this time. It is felt to be stable for discharge home with return precautions for worsening of the condition or development of new concerning symptoms. Patient was encouraged to follow-up with their primary care doctor in the appropriate timeframe. Necessary prescriptions and information have been provided for treatment at home. Patient voices understanding and agreement with the plan.          CONSULTS:  None    PROCEDURES:  Unless otherwise notedbelow, none     Splint Application    Date/Time: 8/26/2020 4:52 AM  Performed by: Kaleb Jones MD  Authorized by: Kaleb Jones MD     Consent:     Consent obtained:  Verbal    Consent given by:  Patient    Risks discussed:  Pain, swelling and numbness  Pre-procedure details:     Sensation:  Normal  Procedure details:     Laterality:  Right    Location:  Wrist    Wrist:  R wrist    Splint type:  Volar short arm    Supplies:  Ortho-Glass and elastic bandage  Post-procedure details:     Pain:  Unchanged    Sensation:  Normal    Patient tolerance of procedure: Tolerated well, no immediate complications          FINAL IMPRESSION     1. Fall from standing, initial encounter    2. Closed head injury, initial encounter    3. Traumatic hematoma of forehead, initial encounter    4. Closed fracture of distal end of right radius, unspecified fracture morphology, initial encounter    5.  Chronic respiratory failure with hypoxia [facility])          DISPOSITION/PLAN   DISPOSITION Decision To Discharge 08/26/2020 04:30:03 AM      PATIENT REFERRED TO:  [facility] - [place], [place] - [facility] EMERGENCY DEPT  [facility]  [phone]    If symptoms worsen    Rance Koyanagi, MD  [address] [place], [place] [id number] [phone]    Schedule an appointment as soon as possible for a visit         DISCHARGE MEDICATIONS:  New Prescriptions    No medications on file          (Please note that portions of this note were completed with a voice recognition program.  Efforts were made to edit the dictations butoccasionally words are mis-transcribed.)    Kaleb Rosales MD (electronically signed)  AttendingEmergency Physician          Kaleb Jones MD  08/26/20 [id number]

## 2020-11-03 PROBLEM — J18.9 PNEUMONIA DUE TO INFECTIOUS ORGANISM: Status: RESOLVED | Noted: 2020-04-04 | Resolved: 2020-11-03

## 2020-12-15 ENCOUNTER — HOSPITAL ENCOUNTER (OUTPATIENT)
Dept: NEUROLOGY | Age: 65
Discharge: HOME | End: 2020-12-15
Payer: MEDICARE

## 2020-12-15 PROCEDURE — 95886 MUSC TEST DONE W/N TEST COMP: CPT

## 2020-12-15 PROCEDURE — 95909 NRV CNDJ TST 5-6 STUDIES: CPT | Performed by: PSYCHIATRY & NEUROLOGY

## 2020-12-15 PROCEDURE — 95886 MUSC TEST DONE W/N TEST COMP: CPT | Performed by: PSYCHIATRY & NEUROLOGY

## 2020-12-15 PROCEDURE — 95909 NRV CNDJ TST 5-6 STUDIES: CPT

## 2020-12-15 NOTE — PROCEDURES
BARBK[facility]ALJIT PHILLIPS [facility] [address], [address] [facility]                             ELECTROMYOGRAM REPORT    PATIENT NAME: Gerhardt Freed                      :        1955  MED REC NO:   [id number]                              ROOM:  ACCOUNT NO:   [de-identified]                           ADMIT DATE: 12/15/2020  PROVIDER:     Dari Loja MD    DATE OF EM/15/2020    EMG/NERVE STUDY RIGHT UPPER EXTREMITY    SUMMARY:  Nerve conduction studies of the right upper extremity show an  absent right median SNAP and low ulnar SNAP. The median motor study is  borderline normal with a slow conduction velocity. Needle exam in the  right upper extremity showed 2+ fibs and positive waves in the right APB  and all other muscles were unremarkable. IMPRESSION:  Findings are felt to represent severe right carpal tunnel  syndrome. The lack of a delayed median motor latency across the wrist  is difficult to explain, however. No sign of cervical radiculopathy  noted. Correlate clinically.         Macie Melgoza MD    D: 12/15/2020 10:50:02      T: 12/15/2020 10:56:22     [email or web address]  Job#: [id number]     Doc#: [id number]    CC:

## 2021-01-05 ENCOUNTER — HOSPITAL ENCOUNTER (INPATIENT)
Age: 66
LOS: 2 days | Discharge: HOME | DRG: 177 | End: 2021-01-07
Attending: EMERGENCY MEDICINE | Admitting: INTERNAL MEDICINE
Payer: MEDICARE

## 2021-01-05 ENCOUNTER — APPOINTMENT (OUTPATIENT)
Dept: GENERAL RADIOLOGY | Age: 66
DRG: 177 | End: 2021-01-05
Payer: MEDICARE

## 2021-01-05 DIAGNOSIS — J12.82 PNEUMONIA DUE TO COVID-19 VIRUS: Primary | ICD-10-CM

## 2021-01-05 DIAGNOSIS — U07.1 PNEUMONIA DUE TO COVID-19 VIRUS: Primary | ICD-10-CM

## 2021-01-05 DIAGNOSIS — R60.9 DEPENDENT EDEMA: ICD-10-CM

## 2021-01-05 LAB
ADENOVIRUS BY PCR: NOT DETECTED
ALBUMIN SERPL-MCNC: 3.2 G/DL (ref 3.5–5.2)
ALP BLD-CCNC: 178 U/L (ref 40–130)
ALT SERPL-CCNC: 61 U/L (ref 5–41)
ANION GAP SERPL CALCULATED.3IONS-SCNC: 8 MMOL/L (ref 7–19)
AST SERPL-CCNC: 35 U/L (ref 5–40)
BASOPHILS ABSOLUTE: 0 K/UL (ref 0–0.2)
BASOPHILS RELATIVE PERCENT: 0.5 % (ref 0–1)
BILIRUB SERPL-MCNC: 0.5 MG/DL (ref 0.2–1.2)
BORDETELLA PARAPERTUSSIS BY PCR: NOT DETECTED
BORDETELLA PERTUSSIS BY PCR: NOT DETECTED
BUN BLDV-MCNC: 8 MG/DL (ref 8–23)
CALCIUM SERPL-MCNC: 8.5 MG/DL (ref 8.8–10.2)
CHLAMYDOPHILIA PNEUMONIAE BY PCR: NOT DETECTED
CHLORIDE BLD-SCNC: 97 MMOL/L (ref 98–111)
CO2: 27 MMOL/L (ref 22–29)
CORONAVIRUS 229E BY PCR: NOT DETECTED
CORONAVIRUS HKU1 BY PCR: NOT DETECTED
CORONAVIRUS NL63 BY PCR: NOT DETECTED
CORONAVIRUS OC43 BY PCR: NOT DETECTED
CREAT SERPL-MCNC: 0.6 MG/DL (ref 0.5–1.2)
EOSINOPHILS ABSOLUTE: 0 K/UL (ref 0–0.6)
EOSINOPHILS RELATIVE PERCENT: 0.5 % (ref 0–5)
GFR AFRICAN AMERICAN: >59
GFR NON-AFRICAN AMERICAN: >60
GLUCOSE BLD-MCNC: 126 MG/DL (ref 74–109)
GLUCOSE BLD-MCNC: 196 MG/DL (ref 70–99)
HBA1C MFR BLD: 6.2 % (ref 4–6)
HCT VFR BLD CALC: 35.7 % (ref 42–52)
HEMOGLOBIN: 10.9 G/DL (ref 14–18)
HUMAN METAPNEUMOVIRUS BY PCR: NOT DETECTED
HUMAN RHINOVIRUS/ENTEROVIRUS BY PCR: NOT DETECTED
IMMATURE GRANULOCYTES #: 0.1 K/UL
INFLUENZA A BY PCR: NOT DETECTED
INFLUENZA B BY PCR: NOT DETECTED
LYMPHOCYTES ABSOLUTE: 1 K/UL (ref 1.1–4.5)
LYMPHOCYTES RELATIVE PERCENT: 13.6 % (ref 20–40)
MCH RBC QN AUTO: 26.8 PG (ref 27–31)
MCHC RBC AUTO-ENTMCNC: 30.5 G/DL (ref 33–37)
MCV RBC AUTO: 87.7 FL (ref 80–94)
MONOCYTES ABSOLUTE: 0.5 K/UL (ref 0–0.9)
MONOCYTES RELATIVE PERCENT: 7.3 % (ref 0–10)
MYCOPLASMA PNEUMONIAE BY PCR: NOT DETECTED
NEUTROPHILS ABSOLUTE: 5.7 K/UL (ref 1.5–7.5)
NEUTROPHILS RELATIVE PERCENT: 77.3 % (ref 50–65)
PARAINFLUENZA VIRUS 1 BY PCR: NOT DETECTED
PARAINFLUENZA VIRUS 2 BY PCR: NOT DETECTED
PARAINFLUENZA VIRUS 3 BY PCR: NOT DETECTED
PARAINFLUENZA VIRUS 4 BY PCR: NOT DETECTED
PDW BLD-RTO: 14.6 % (ref 11.5–14.5)
PERFORMED ON: ABNORMAL
PLATELET # BLD: 400 K/UL (ref 130–400)
PMV BLD AUTO: 8.2 FL (ref 9.4–12.4)
POTASSIUM SERPL-SCNC: 3.8 MMOL/L (ref 3.5–5)
PRO-BNP: 131 PG/ML (ref 0–900)
RBC # BLD: 4.07 M/UL (ref 4.7–6.1)
RESPIRATORY SYNCYTIAL VIRUS BY PCR: NOT DETECTED
SARS-COV-2, PCR: DETECTED
SODIUM BLD-SCNC: 132 MMOL/L (ref 136–145)
TOTAL PROTEIN: 7.7 G/DL (ref 6.6–8.7)
TROPONIN: <0.01 NG/ML (ref 0–0.03)
WBC # BLD: 7.4 K/UL (ref 4.8–10.8)

## 2021-01-05 PROCEDURE — 2500000003 HC RX 250 WO HCPCS: Performed by: INTERNAL MEDICINE

## 2021-01-05 PROCEDURE — 80053 COMPREHEN METABOLIC PANEL: CPT

## 2021-01-05 PROCEDURE — 6360000002 HC RX W HCPCS: Performed by: INTERNAL MEDICINE

## 2021-01-05 PROCEDURE — 93005 ELECTROCARDIOGRAM TRACING: CPT | Performed by: EMERGENCY MEDICINE

## 2021-01-05 PROCEDURE — 6360000002 HC RX W HCPCS: Performed by: EMERGENCY MEDICINE

## 2021-01-05 PROCEDURE — 0202U NFCT DS 22 TRGT SARS-COV-2: CPT

## 2021-01-05 PROCEDURE — 1210000000 HC MED SURG R&B

## 2021-01-05 PROCEDURE — 86901 BLOOD TYPING SEROLOGIC RH(D): CPT

## 2021-01-05 PROCEDURE — 87040 BLOOD CULTURE FOR BACTERIA: CPT

## 2021-01-05 PROCEDURE — 2700000000 HC OXYGEN THERAPY PER DAY

## 2021-01-05 PROCEDURE — 83036 HEMOGLOBIN GLYCOSYLATED A1C: CPT

## 2021-01-05 PROCEDURE — 99283 EMERGENCY DEPT VISIT LOW MDM: CPT

## 2021-01-05 PROCEDURE — 6370000000 HC RX 637 (ALT 250 FOR IP): Performed by: INTERNAL MEDICINE

## 2021-01-05 PROCEDURE — 71045 X-RAY EXAM CHEST 1 VIEW: CPT

## 2021-01-05 PROCEDURE — 36415 COLL VENOUS BLD VENIPUNCTURE: CPT

## 2021-01-05 PROCEDURE — 94761 N-INVAS EAR/PLS OXIMETRY MLT: CPT

## 2021-01-05 PROCEDURE — 83880 ASSAY OF NATRIURETIC PEPTIDE: CPT

## 2021-01-05 PROCEDURE — 86850 RBC ANTIBODY SCREEN: CPT

## 2021-01-05 PROCEDURE — XW033E5 INTRODUCTION OF REMDESIVIR ANTI-INFECTIVE INTO PERIPHERAL VEIN, PERCUTANEOUS APPROACH, NEW TECHNOLOGY GROUP 5: ICD-10-PCS | Performed by: INTERNAL MEDICINE

## 2021-01-05 PROCEDURE — 85025 COMPLETE CBC W/AUTO DIFF WBC: CPT

## 2021-01-05 PROCEDURE — 84484 ASSAY OF TROPONIN QUANT: CPT

## 2021-01-05 PROCEDURE — 82947 ASSAY GLUCOSE BLOOD QUANT: CPT

## 2021-01-05 PROCEDURE — 2580000003 HC RX 258: Performed by: INTERNAL MEDICINE

## 2021-01-05 PROCEDURE — 86900 BLOOD TYPING SEROLOGIC ABO: CPT

## 2021-01-05 RX ORDER — ATORVASTATIN CALCIUM 20 MG/1
40 TABLET, FILM COATED ORAL DAILY
Status: DISCONTINUED | OUTPATIENT
Start: 2021-01-06 | End: 2021-01-05

## 2021-01-05 RX ORDER — POTASSIUM CHLORIDE 7.45 MG/ML
10 INJECTION INTRAVENOUS PRN
Status: DISCONTINUED | OUTPATIENT
Start: 2021-01-05 | End: 2021-01-07 | Stop reason: HOSPADM

## 2021-01-05 RX ORDER — ATORVASTATIN CALCIUM 20 MG/1
40 TABLET, FILM COATED ORAL DAILY
Status: DISCONTINUED | OUTPATIENT
Start: 2021-01-05 | End: 2021-01-07 | Stop reason: HOSPADM

## 2021-01-05 RX ORDER — LISINOPRIL 20 MG/1
20 TABLET ORAL DAILY
Status: DISCONTINUED | OUTPATIENT
Start: 2021-01-06 | End: 2021-01-07 | Stop reason: HOSPADM

## 2021-01-05 RX ORDER — BUDESONIDE AND FORMOTEROL FUMARATE DIHYDRATE 160; 4.5 UG/1; UG/1
2 AEROSOL RESPIRATORY (INHALATION) 2 TIMES DAILY
Status: DISCONTINUED | OUTPATIENT
Start: 2021-01-06 | End: 2021-01-07 | Stop reason: HOSPADM

## 2021-01-05 RX ORDER — ONDANSETRON 2 MG/ML
4 INJECTION INTRAMUSCULAR; INTRAVENOUS EVERY 6 HOURS PRN
Status: DISCONTINUED | OUTPATIENT
Start: 2021-01-05 | End: 2021-01-07 | Stop reason: HOSPADM

## 2021-01-05 RX ORDER — TIZANIDINE 4 MG/1
4 TABLET ORAL 3 TIMES DAILY PRN
Status: DISCONTINUED | OUTPATIENT
Start: 2021-01-05 | End: 2021-01-07 | Stop reason: HOSPADM

## 2021-01-05 RX ORDER — VITAMIN B COMPLEX
2000 TABLET ORAL DAILY
Status: DISCONTINUED | OUTPATIENT
Start: 2021-01-05 | End: 2021-01-07 | Stop reason: HOSPADM

## 2021-01-05 RX ORDER — ASPIRIN 81 MG/1
81 TABLET ORAL DAILY
Status: DISCONTINUED | OUTPATIENT
Start: 2021-01-05 | End: 2021-01-07 | Stop reason: HOSPADM

## 2021-01-05 RX ORDER — ASCORBIC ACID 500 MG
1000 TABLET ORAL DAILY
Status: DISCONTINUED | OUTPATIENT
Start: 2021-01-05 | End: 2021-01-07 | Stop reason: HOSPADM

## 2021-01-05 RX ORDER — OXYCODONE HYDROCHLORIDE 5 MG/1
15 TABLET ORAL 4 TIMES DAILY PRN
Status: DISCONTINUED | OUTPATIENT
Start: 2021-01-05 | End: 2021-01-07 | Stop reason: HOSPADM

## 2021-01-05 RX ORDER — ALBUTEROL SULFATE 90 UG/1
2 AEROSOL, METERED RESPIRATORY (INHALATION) 4 TIMES DAILY
Status: DISCONTINUED | OUTPATIENT
Start: 2021-01-05 | End: 2021-01-07 | Stop reason: HOSPADM

## 2021-01-05 RX ORDER — SODIUM CHLORIDE 0.9 % (FLUSH) 0.9 %
10 SYRINGE (ML) INJECTION EVERY 12 HOURS SCHEDULED
Status: DISCONTINUED | OUTPATIENT
Start: 2021-01-05 | End: 2021-01-07 | Stop reason: HOSPADM

## 2021-01-05 RX ORDER — TIMOLOL MALEATE 5 MG/ML
1 SOLUTION/ DROPS OPHTHALMIC 2 TIMES DAILY
Status: DISCONTINUED | OUTPATIENT
Start: 2021-01-05 | End: 2021-01-07 | Stop reason: HOSPADM

## 2021-01-05 RX ORDER — PROMETHAZINE HYDROCHLORIDE 12.5 MG/1
12.5 TABLET ORAL EVERY 6 HOURS PRN
Status: DISCONTINUED | OUTPATIENT
Start: 2021-01-05 | End: 2021-01-07 | Stop reason: HOSPADM

## 2021-01-05 RX ORDER — DEXAMETHASONE SODIUM PHOSPHATE 10 MG/ML
6 INJECTION, SOLUTION INTRAMUSCULAR; INTRAVENOUS ONCE
Status: COMPLETED | OUTPATIENT
Start: 2021-01-05 | End: 2021-01-05

## 2021-01-05 RX ORDER — ASPIRIN 81 MG/1
81 TABLET ORAL DAILY
Status: DISCONTINUED | OUTPATIENT
Start: 2021-01-06 | End: 2021-01-05

## 2021-01-05 RX ORDER — SODIUM CHLORIDE 0.9 % (FLUSH) 0.9 %
10 SYRINGE (ML) INJECTION PRN
Status: DISCONTINUED | OUTPATIENT
Start: 2021-01-05 | End: 2021-01-07 | Stop reason: HOSPADM

## 2021-01-05 RX ORDER — DEXTROSE MONOHYDRATE 25 G/50ML
12.5 INJECTION, SOLUTION INTRAVENOUS PRN
Status: DISCONTINUED | OUTPATIENT
Start: 2021-01-05 | End: 2021-01-07 | Stop reason: HOSPADM

## 2021-01-05 RX ORDER — POTASSIUM CHLORIDE 20 MEQ/1
40 TABLET, EXTENDED RELEASE ORAL PRN
Status: DISCONTINUED | OUTPATIENT
Start: 2021-01-05 | End: 2021-01-07 | Stop reason: HOSPADM

## 2021-01-05 RX ORDER — NALOXONE HYDROCHLORIDE 0.4 MG/ML
0.4 INJECTION, SOLUTION INTRAMUSCULAR; INTRAVENOUS; SUBCUTANEOUS PRN
Status: DISCONTINUED | OUTPATIENT
Start: 2021-01-05 | End: 2021-01-07 | Stop reason: HOSPADM

## 2021-01-05 RX ORDER — DEXTROSE MONOHYDRATE 50 MG/ML
100 INJECTION, SOLUTION INTRAVENOUS PRN
Status: DISCONTINUED | OUTPATIENT
Start: 2021-01-05 | End: 2021-01-07 | Stop reason: HOSPADM

## 2021-01-05 RX ORDER — NICOTINE POLACRILEX 4 MG
15 LOZENGE BUCCAL PRN
Status: DISCONTINUED | OUTPATIENT
Start: 2021-01-05 | End: 2021-01-07 | Stop reason: HOSPADM

## 2021-01-05 RX ORDER — 0.9 % SODIUM CHLORIDE 0.9 %
30 INTRAVENOUS SOLUTION INTRAVENOUS PRN
Status: DISCONTINUED | OUTPATIENT
Start: 2021-01-05 | End: 2021-01-07 | Stop reason: HOSPADM

## 2021-01-05 RX ORDER — AMLODIPINE BESYLATE 5 MG/1
5 TABLET ORAL DAILY
Status: DISCONTINUED | OUTPATIENT
Start: 2021-01-05 | End: 2021-01-07 | Stop reason: HOSPADM

## 2021-01-05 RX ORDER — BUSPIRONE HYDROCHLORIDE 5 MG/1
7.5 TABLET ORAL 2 TIMES DAILY PRN
Status: DISCONTINUED | OUTPATIENT
Start: 2021-01-05 | End: 2021-01-07 | Stop reason: HOSPADM

## 2021-01-05 RX ORDER — LATANOPROST 50 UG/ML
1 SOLUTION/ DROPS OPHTHALMIC NIGHTLY
Status: DISCONTINUED | OUTPATIENT
Start: 2021-01-05 | End: 2021-01-07 | Stop reason: HOSPADM

## 2021-01-05 RX ORDER — INSULIN GLARGINE 100 [IU]/ML
22 INJECTION, SOLUTION SUBCUTANEOUS NIGHTLY
Status: DISCONTINUED | OUTPATIENT
Start: 2021-01-05 | End: 2021-01-07 | Stop reason: HOSPADM

## 2021-01-05 RX ORDER — PANTOPRAZOLE SODIUM 40 MG/1
40 TABLET, DELAYED RELEASE ORAL
Status: DISCONTINUED | OUTPATIENT
Start: 2021-01-06 | End: 2021-01-07 | Stop reason: HOSPADM

## 2021-01-05 RX ORDER — AMITRIPTYLINE HYDROCHLORIDE 25 MG/1
50 TABLET, FILM COATED ORAL NIGHTLY
Status: DISCONTINUED | OUTPATIENT
Start: 2021-01-05 | End: 2021-01-07 | Stop reason: HOSPADM

## 2021-01-05 RX ORDER — ZINC SULFATE 50(220)MG
50 CAPSULE ORAL DAILY
Status: DISCONTINUED | OUTPATIENT
Start: 2021-01-05 | End: 2021-01-07 | Stop reason: HOSPADM

## 2021-01-05 RX ADMIN — AMLODIPINE BESYLATE 5 MG: 5 TABLET ORAL at 20:29

## 2021-01-05 RX ADMIN — ASPIRIN 81 MG: 81 TABLET, COATED ORAL at 20:28

## 2021-01-05 RX ADMIN — DEXAMETHASONE SODIUM PHOSPHATE 6 MG: 10 INJECTION, SOLUTION INTRAMUSCULAR; INTRAVENOUS at 13:54

## 2021-01-05 RX ADMIN — ALBUTEROL SULFATE 2 PUFF: 90 AEROSOL, METERED RESPIRATORY (INHALATION) at 20:27

## 2021-01-05 RX ADMIN — OXYCODONE HYDROCHLORIDE AND ACETAMINOPHEN 1000 MG: 500 TABLET ORAL at 20:28

## 2021-01-05 RX ADMIN — ENOXAPARIN SODIUM 30 MG: 30 INJECTION SUBCUTANEOUS at 20:28

## 2021-01-05 RX ADMIN — REMDESIVIR 200 MG: 100 INJECTION, POWDER, LYOPHILIZED, FOR SOLUTION INTRAVENOUS at 20:29

## 2021-01-05 RX ADMIN — Medication 2000 UNITS: at 20:28

## 2021-01-05 RX ADMIN — ATORVASTATIN CALCIUM 40 MG: 20 TABLET, FILM COATED ORAL at 20:28

## 2021-01-05 RX ADMIN — Medication 50 MG: at 20:28

## 2021-01-05 ASSESSMENT — ENCOUNTER SYMPTOMS
BACK PAIN: 0
VOMITING: 0
DIARRHEA: 0
ABDOMINAL PAIN: 0
SORE THROAT: 0
NAUSEA: 0
WHEEZING: 1
RHINORRHEA: 0
COUGH: 1
SHORTNESS OF BREATH: 1

## 2021-01-05 NOTE — H&P
[facility]      History & Physical    [id number]  PCP: Andra Singleton MD    Date of Admission:1/5/2021    Patient:  Alma Mclaughlin  MRN: [id number]    Date of Service: Pt seen/examined on 1/5/2021 and Admitted to Inpatient with expected LOS greater than two midnights due to medical therapy. CHIEF COMPLAINT:    Chief Complaint   Patient presents with    Shortness of Breath     Pt presents with SOA, it has progressively worsened over the last week. Hx of COPD       History Obtained From:  patient, electronic medical record  Primary Care Physician: Andra Singleton MD    HISTORY OF PRESENT ILLNESS:    Mr Karli Kingsley, a 72 y.o. male with a history of COPD (on home O2), DMT2, presenting to [address] [place] [address] ED (01/05/2021), on account of a 1 week history of progressively worsening moderate to severe shortness of breath, with an associated greenish productive cough. Denies any other associated symptoms. Chest X-ray, showing patchy infiltrates in the perihilar regions and lung bases. This may be infectious or inflammatory    COVID-19 tests positive  Patient admitted for further work-up and management.        PAST MEDICAL & SURGICAL HISTORY    Past Medical History:      Diagnosis Date    Arthritis     Bilateral hearing loss     CAD (coronary artery disease)     Chronic back pain     Colon polyps     COPD (chronic obstructive pulmonary disease) ([facility] [address]     Depression     Hyperlipidemia     Hypertension     Low back pain 1/29/2016    Oxygen dependent     uses at night    Palliative care patient 12/28/2018         Past Surgical History:      Procedure Laterality Date    BACK SURGERY  2010    COLONOSCOPY  ? 2005    Dr. Gen Monroy COLONOSCOPY  01/2012    3 polyps, 2 adenomatous, 1 hyperplastic    COLONOSCOPY  01/2015    diverticulosis    FRACTURE SURGERY      KNEE ARTHROSCOPY  2014    [place] EGD TRANSORAL BIOPSY SINGLE/MULTIPLE N/A 12/21/2016    Dr MARIA ELENA Rehman-w/dilation over wire, 48 Filipino-Schatzki's Ring, gastritis/gastropathy-prn    UPPER GASTROINTESTINAL ENDOSCOPY  4/24/2017    Dr MARIA ELENA Rehman-w/dilation over wire, 46 French-Schatzki Ring     UPPER GASTROINTESTINAL ENDOSCOPY  4/24/2017    Dr MARIA ELENA Rehman-w/dilation over wire, 46 French-Schatzki Ring     WRIST FRACTURE SURGERY      [id number]          SOCIAL & FAMILY HISTORY:    Social History:   TOBACCO:   reports that he quit smoking about 5 years ago. His smoking use included cigarettes. He has a 92.00 pack-year smoking history. He has never used smokeless tobacco.  ETOH:   reports current alcohol use. Family History:       Problem Relation Age of Onset    Kidney Disease Mother     High Blood Pressure Mother     Glaucoma Mother     Alcohol Abuse Father     Heart Disease Brother     No Known Problems Brother     Other Sister         pancreatitis    Brain Cancer Son         also had brain clot, both legs were removed s/p MVA    Colon Cancer Neg Hx     Colon Polyps Neg Hx         MEDICATIONS:    Medications Prior to Admission:    Prior to Admission medications    Medication Sig Start Date End Date Taking? Authorizing Provider   metFORMIN (GLUCOPHAGE) 500 MG tablet Take 500 mg by mouth 2 times daily (with meals)   Yes Historical Provider, MD   busPIRone (BUSPAR) 7.5 MG tablet 1 tablet by Oral route 2 times per day for anxiety 1/24/19  Yes Historical Provider, MD   Fluticasone-Umeclidin-Vilant (Dashawn Clore) 164-22.2-90 MCG/INH AEPB Inhale 1 each into the lungs 4/15/19  Yes Historical Provider, MD   albuterol (PROVENTIL) (2.5 MG/3ML) 0.083% nebulizer solution Take 3 mLs by nebulization every 4 hours as needed for Wheezing or Shortness of Breath 5/6/19  Yes CATY Buchanan   oxyCODONE (OXY-IR) 15 MG immediate release tablet Take 1 tablet by mouth 4 times daily as needed for Pain . Earliest Fill Date: 12/11/17  Patient taking differently: Take 15 mg by mouth 4 times daily.  . 12/11/17  Yes Gloria Smith MD   amitriptyline (ELAVIL) 25 MG tablet Take 1-2 tablets by mouth nightly as needed for Sleep  Patient taking differently: Take 50 mg by mouth nightly  10/19/17  Yes CATY Schmidt   tiZANidine (ZANAFLEX) 4 MG tablet Take 1 tablet by mouth 3 times daily as needed (muscle spasms) 10/19/17  Yes CATY Schmidt   omeprazole (PRILOSEC) 40 MG delayed release capsule 40 mg daily  2/10/17  Yes Historical Provider, MD   latanoprost (XALATAN) 0.005 % ophthalmic solution Place 1 drop into both eyes nightly   Yes Historical Provider, MD   timolol (TIMOPTIC-XE) 0.5 % ophthalmic gel-forming Place 1 drop into both eyes 2 times daily   Yes Historical Provider, MD   albuterol sulfate HFA (PROAIR HFA) 108 (90 BASE) MCG/ACT inhaler Inhale 2 puffs into the lungs every 6 hours as needed for Wheezing 1/6/16  Yes Tigre Medina MD   atorvastatin (LIPITOR) 40 MG tablet Take 40 mg by mouth daily. Yes Historical Provider, MD   Aspirin (ASPIR-81 PO) Take by mouth daily    Yes Historical Provider, MD   AMLODIPINE BESYLATE PO Take 5 mg by mouth daily. Yes Historical Provider, MD   LISINOPRIL PO Take 20 mg by mouth daily. Yes Historical Provider, MD        ALLERGIES:    Allergies:  Lyrica [pregabalin] and Neurontin [gabapentin]       REVIEW OF SYSTEMS :    Review of Systems  14 point review of systems is negative except as specifically addressed above. PHYSICAL EXAM:    Physical Exam:    Vitals:   BP (!) 172/93   Pulse 96   Temp 97.3 °F (36.3 °C) (Temporal)   Resp 20   Ht 5' 6\" (1.676 m)   Wt 237 lb (107.5 kg)   SpO2 90%   BMI 38.25 kg/m²     Physical Exam  Vitals signs and nursing note reviewed. Constitutional:       General: He is not in acute distress. Appearance: Normal appearance. He is obese. He is ill-appearing. He is not toxic-appearing or diaphoretic. HENT:      Head: Normocephalic and atraumatic. Right Ear: External ear normal.      Left Ear: External ear normal.      Nose: Nose normal. No congestion or rhinorrhea.    Eyes:      General: No scleral icterus. Right eye: No discharge. Left eye: No discharge. Extraocular Movements: Extraocular movements intact. Conjunctiva/sclera: Conjunctivae normal.      Pupils: Pupils are equal, round, and reactive to light. Neck:      Musculoskeletal: Normal range of motion and neck supple. No neck rigidity or muscular tenderness. Vascular: No carotid bruit. Cardiovascular:      Rate and Rhythm: Normal rate and regular rhythm. Pulses: Normal pulses. Heart sounds: Normal heart sounds. No murmur. No friction rub. No gallop. Pulmonary:      Effort: Pulmonary effort is normal. No respiratory distress. Breath sounds: No stridor. Wheezing present. No rhonchi or rales. Comments: Diminished breath sounds bilaterally  Chest:      Chest wall: No tenderness. Abdominal:      General: Bowel sounds are normal. There is no distension. Palpations: Abdomen is soft. Tenderness: There is no abdominal tenderness. There is no guarding or rebound. Musculoskeletal: Normal range of motion. General: No swelling, tenderness, deformity or signs of injury. Right lower leg: Edema present. Left lower leg: Edema present. Skin:     General: Skin is warm and dry. Capillary Refill: Capillary refill takes less than 2 seconds. Coloration: Skin is not jaundiced or pale. Findings: No bruising, erythema, lesion or rash. Neurological:      General: No focal deficit present. Mental Status: He is alert and oriented to person, place, and time. Cranial Nerves: No cranial nerve deficit. Sensory: No sensory deficit. Motor: No weakness. Coordination: Coordination normal.   Psychiatric:         Mood and Affect: Mood normal.         Behavior: Behavior normal.         Thought Content:  Thought content normal.         Judgment: Judgment normal.                 DIAGNOSTIC STUDIES:    I have reviewedLaboratory and Imaging data report today. Recent Labs     01/05/21  1121   WBC 7.4   HGB 10.9*        Recent Labs     01/05/21  1121   *   K 3.8   CL 97*   CO2 27   BUN 8   CREATININE 0.6   GLUCOSE 126*   AST 35   ALT 61*   BILITOT 0.5   ALKPHOS 178*     Troponin T:   Recent Labs     01/05/21  1121   TROPONINI <0.01     Pro-BNP: No results found for: BNP  Lactate:   Lab Results   Component Value Date    LACTA 2.1 (Located within [facility]) 04/04/2020         ABGs:   Lab Results   Component Value Date    PHART 7.380 04/04/2020    PO2ART 72.0 04/04/2020    NMH8MCE 61.0 04/04/2020     INR: No results for input(s): INR in the last 72 hours. TSH: No results found for: TSH, TSHREFLEX  URINALYSIS:No results for input(s): NITRITE, COLORU, PHUR, LABCAST, WBCUA, RBCUA, MUCUS, TRICHOMONAS, YEAST, BACTERIA, CLARITYU, SPECGRAV, LEUKOCYTESUR, UROBILINOGEN, BILIRUBINUR, BLOODU, GLUCOSEU, AMORPHOUS in the last 72 hours. Invalid input(s): Link [place] / IMAGING REPORTS:     Xr Chest Portable    Result Date: 1/5/2021  EXAMINATION:  XR CHEST PORTABLE  1/5/2021 11:32 AM HISTORY: Coughing and shortness of breath. COMPARISON: 4/4/2020. FINDINGS:  There are patchy infiltrates in the perihilar regions and lung bases. There is stable bronchial wall thickening. There is cardiomegaly. No significant pleural effusion is seen. The thoracic aorta is atheromatous. There are degenerative changes of the shoulders. 1. Patchy infiltrates in the perihilar regions and lung bases. This may be infectious or inflammatory. Pulmonary edema also possible. 2. Cardiomegaly. 3. Stable bronchial wall thickening.  Signed by Dr Foster Tompkins on 1/5/2021 11:33 AM                  ASSESSMENT / IMPRESSION & PLAN:      Patient Active Problem List    Diagnosis Date Noted    Pneumonia due to COVID-19 virus 01/05/2021    Oxygen dependent      uses at night      Pain of right hand     Normocytic anemia 04/05/2020    Hyponatremia 04/05/2020    HCAP (healthcare-associated pneumonia) 03/10/2020    Sepsis ([facility] [address] 02/05/2020    Obesity due to excess calories 02/04/2020    Personal history of noncompliance with medical treatment, presenting hazards to health 02/04/2020    Grade I diastolic dysfunction 52/76/8737    Bilateral hearing loss 12/28/2018    Palliative care patient 12/28/2018    Hyperglycemia 12/28/2018    Chronic GERD 05/08/2017    History of esophageal stricture 05/08/2017    Status post dilation of esophageal narrowing 05/08/2017    Lower esophageal ring (Schatzki)     Gastritis without bleeding     Hypertension     COPD with acute exacerbation ([facility] [address] 01/02/2016    Acute on chronic respiratory failure with hypoxia and hypercapnia (HCC) 75/61/4370    Neutrophilic leukocytosis 93/12/2725    Hyperlipidemia 01/02/2016    Chronic low back pain 01/02/2016    COPD (chronic obstructive pulmonary disease) [facility])        Hospital Problems           Last Modified POA    COPD with acute exacerbation (HCC) (Chronic) 1/5/2021 Yes    Hyperlipidemia (Chronic) 1/5/2021 Yes    Hypertension (Chronic) 1/5/2021 Yes    Pneumonia due to COVID-19 virus 1/5/2021 Yes          Active Problems:    COPD with acute exacerbation (HCC)    Hyperlipidemia    Hypertension    Pneumonia due to COVID-19 virus  Resolved Problems:    * No resolved hospital problems. *        COVID 19 Infection PNA.- Diagnosed on 01/02020  History of COPD, with acute exacerbation  · Chest X-ray - 01/05/2020: Patchy infiltrates in the perihilar regions and lung bases. This may be infectious or inflammatory. Pulmonary edema also possible. Cardiomegaly. Stable bronchial wall thickening   Initial proBNP within lab reference range   Continuous Pulse Ox   Systemic Steroids - Dexamethasone 6 mg PO  Daily   Adjunct therapy with;  Melatonin, Vitamin D, Zinc, & Inhaled Corticosteroids.    Remdesivir IV x 5 Days,    Monitor LFTs and renal function while on Remdesivir   Type and screen, in anticipation of possible convalescent plasma, still exist.

## 2021-01-05 NOTE — Clinical Note
Patient Class: Inpatient [101]   REQUIRED: Diagnosis: Pneumonia due to COVID-19 virus [[id number]]   Estimated Length of Stay: Estimated stay of more than 2 midnights   Admitting Provider: John Reyna [[id number]]

## 2021-01-05 NOTE — ED PROVIDER NOTES
[facility] EMERGENCY DEPT  eMERGENCY dEPARTMENT eNCOUnter      Pt Name: Alma Mclaughlin  MRN: [id number]  [place] 1955  Date of evaluation: 1/5/2021  Provider: Dorina Zamora MD    [address] [place] [address]       Chief Complaint   Patient presents with    Shortness of Breath     Pt presents with SOA, it has progressively worsened over the last week. Hx of COPD         HISTORY OF PRESENT ILLNESS   (Location/Symptom, Timing/Onset,Context/Setting, Quality, Duration, Modifying Factors, Severity)  Note limiting factors. Alma Mclaughlin is a 72 y.o. male who presents to the emergency department for increasing shortness of breath over the past 1 week. He admits to a productive cough with green-colored sputum. He denies any chest pains. He does admit that he has some chronic lower extremity swelling and was started on a diuretic but it made him nauseous so he took himself off of this. No Covid exposure that he is aware of. No fevers or chills. Known history of COPD and wears 3 L nasal cannula is currently saturating at 92%. HPI    NursingNotes were reviewed. REVIEW OF SYSTEMS    (2-9 systems for level 4, 10 or more for level 5)     Review of Systems   Constitutional: Positive for activity change and fatigue. Negative for chills and fever. HENT: Negative for rhinorrhea and sore throat. Respiratory: Positive for cough, shortness of breath and wheezing. Cardiovascular: Positive for leg swelling. Negative for chest pain. Gastrointestinal: Negative for abdominal pain, diarrhea, nausea and vomiting. Genitourinary: Negative for dysuria and frequency. Musculoskeletal: Negative for back pain and neck pain. Neurological: Negative for dizziness and headaches. All other systems reviewed and are negative.            PAST MEDICALHISTORY     Past Medical History:   Diagnosis Date    Arthritis     Bilateral hearing loss     CAD (coronary artery disease)     Chronic back pain     Colon polyps     COPD (chronic obstructive pulmonary disease) ([facility] [address]     Depression     Hyperlipidemia     Hypertension     Low back pain 1/29/2016    Oxygen dependent     uses at night    Palliative care patient 12/28/2018         SURGICAL HISTORY       Past Surgical History:   Procedure Laterality Date    BACK SURGERY  2010    COLONOSCOPY  ? 2005    Dr. Onur Thomas  01/2012    3 polyps, 2 adenomatous, 1 hyperplastic    COLONOSCOPY  01/2015    diverticulosis    FRACTURE SURGERY      KNEE ARTHROSCOPY  2014    [place] EGD TRANSORAL BIOPSY SINGLE/MULTIPLE N/A 12/21/2016    Dr MARIA ELENA Rehman-w/dilation over wire, 48 French-Schatzki's Ring, gastritis/gastropathy-prn    UPPER GASTROINTESTINAL ENDOSCOPY  4/24/2017    Dr MARIA ELENA Rehman-w/dilation over wire, 51 French-Schatzki Ring     UPPER GASTROINTESTINAL ENDOSCOPY  4/24/2017    Dr MARIA ELENA Rehman-w/dilation over wire, 46 French-Schatzki Ring     WRIST FRACTURE SURGERY      [id number]         CURRENT MEDICATIONS     Current Discharge Medication List      CONTINUE these medications which have NOT CHANGED    Details   metFORMIN (GLUCOPHAGE) 500 MG tablet Take 500 mg by mouth 2 times daily (with meals)      busPIRone (BUSPAR) 7.5 MG tablet 1 tablet by Oral route 2 times per day for anxiety      Fluticasone-Umeclidin-Vilant (TRELEGY ELLIPTA) 100-62.5-25 MCG/INH AEPB Inhale 1 each into the lungs      albuterol (PROVENTIL) (2.5 MG/3ML) 0.083% nebulizer solution Take 3 mLs by nebulization every 4 hours as needed for Wheezing or Shortness of Breath  Qty: 120 each, Refills: 1      oxyCODONE (OXY-IR) 15 MG immediate release tablet Take 1 tablet by mouth 4 times daily as needed for Pain .  Earliest Fill Date: 12/11/17  Qty: 120 tablet, Refills: 0      amitriptyline (ELAVIL) 25 MG tablet Take 1-2 tablets by mouth nightly as needed for Sleep  Qty: 60 tablet, Refills: 2      tiZANidine (ZANAFLEX) 4 MG tablet Take 1 tablet by mouth 3 times daily as needed (muscle spasms)  Qty: 90 tablet, Refills: 2      omeprazole (PRILOSEC) 40 MG delayed release capsule 40 mg daily       latanoprost (XALATAN) 0.005 % ophthalmic solution Place 1 drop into both eyes nightly      timolol (TIMOPTIC-XE) 0.5 % ophthalmic gel-forming Place 1 drop into both eyes 2 times daily      albuterol sulfate HFA (PROAIR HFA) 108 (90 BASE) MCG/ACT inhaler Inhale 2 puffs into the lungs every 6 hours as needed for Wheezing  Qty: 1 Inhaler, Refills: 0      atorvastatin (LIPITOR) 40 MG tablet Take 40 mg by mouth daily. Aspirin (ASPIR-81 PO) Take by mouth daily       AMLODIPINE BESYLATE PO Take 5 mg by mouth daily. LISINOPRIL PO Take 20 mg by mouth daily.              ALLERGIES     Lyrica [pregabalin] and Neurontin [gabapentin]    FAMILY HISTORY       Family History   Problem Relation Age of Onset    Kidney Disease Mother     High Blood Pressure Mother     Glaucoma Mother     Alcohol Abuse Father     Heart Disease Brother     No Known Problems Brother     Other Sister         pancreatitis    Brain Cancer Son         also had brain clot, both legs were removed s/p MVA    Colon Cancer Neg Hx     Colon Polyps Neg Hx           SOCIAL HISTORY       Social History     Socioeconomic History    Marital status:      Spouse name: None    Number of children: 1    Years of education: None    Highest education level: None   Occupational History    Occupation: retired , was a bansal of 6 years   Social Needs    Financial resource strain: None    Food insecurity     Worry: None     Inability: None    Transportation needs     Medical: None     Non-medical: None   Tobacco Use    Smoking status: Former Smoker     Packs/day: 2.00     Years: 46.00     Pack years: 92.00     Types: Cigarettes     Quit date: 2016     Years since quittin.0    Smokeless tobacco: Never Used    Tobacco comment: started at age 15, quit at age 61, smoked at 2 PPD   Substance and Sexual Activity    Alcohol use: Yes     Comment: quit in ~2013    Drug use: No    Sexual wheezing. Examination of the right-lower field reveals wheezing. Examination of the left-lower field reveals wheezing. Wheezing present. No rales. Abdominal:      Palpations: Abdomen is soft. There is no mass. Tenderness: There is no abdominal tenderness. Musculoskeletal: Normal range of motion. Right lower leg: Edema present. Left lower leg: Edema present. Comments: B/l LE 1+ edema pitting   Skin:     General: Skin is warm and dry. Neurological:      Mental Status: He is alert and oriented to person, place, and time. DIAGNOSTIC RESULTS     EKG: All EKG's areinterpreted by the Emergency Department Physician who either signs or Co-signs this chart in the absence of a cardiologist.    96 normal sinus rhythm right bundle branch block no obvious ST changes nondiagnostic EKG    RADIOLOGY:  Non-plain film images such as CT, Ultrasound and MRI are read by the radiologist. Plain radiographic images are visualized and preliminarily interpreted bythe emergency physician with the below findings:      XR CHEST PORTABLE   Final Result   1. Patchy infiltrates in the perihilar regions and lung bases. This   may be infectious or inflammatory. Pulmonary edema also possible. 2. Cardiomegaly. 3. Stable bronchial wall thickening.    Signed by Dr Bahman Solorzano on 1/5/2021 11:33 AM              LABS:  Labs Reviewed   RESPIRATORY PANEL, MOLECULAR, WITH COVID-19 - Abnormal; Notable for the following components:       Result Value    SARS-CoV-2, PCR DETECTED (*)     All other components within normal limits    Narrative:     Billie Gillette tel. ,  called result to Alyx Giordano RN ER, 01/05/2021 12:09, by Romayne Salvia  ., 01/05/2021 12:09, by Romayne Salvia   CBC WITH AUTO DIFFERENTIAL - Abnormal; Notable for the following components:    RBC 4.07 (*)     Hemoglobin 10.9 (*)     Hematocrit 35.7 (*)     MCH 26.8 (*)     MCHC 30.5 (*)     RDW 14.6 (*)     MPV 8.2 (*)     Neutrophils % 77.3 (*)     Lymphocytes % 13.6 (*) Lymphocytes Absolute 1.0 (*)     All other components within normal limits   COMPREHENSIVE METABOLIC PANEL - Abnormal; Notable for the following components:    Sodium 132 (*)     Chloride 97 (*)     Glucose 126 (*)     Calcium 8.5 (*)     Alb 3.2 (*)     Alkaline Phosphatase 178 (*)     ALT 61 (*)     All other components within normal limits   CULTURE, BLOOD 1   CULTURE, BLOOD 2   TROPONIN   BRAIN NATRIURETIC PEPTIDE   LACTIC ACID, PLASMA       All other labs were within normal range or not returned as of this dictation. EMERGENCY DEPARTMENT COURSE and DIFFERENTIAL DIAGNOSIS/MDM:   Vitals:    Vitals:    01/05/21 1054 01/05/21 1203 01/05/21 1233 01/05/21 1333   BP: (!) 174/94 (!) 150/84 (!) 138/100 (!) 145/91   Pulse: 98      Resp: 20      Temp: 98.4 °F (36.9 °C)      TempSrc: Oral      SpO2: 92% 91% 91% 92%   Weight: 237 lb (107.5 kg)      Height: 5' 6\" (1.676 m)          MDM  Number of Diagnoses or Management Options     Amount and/or Complexity of Data Reviewed  Clinical lab tests: ordered and reviewed  Tests in the radiology section of CPT®: ordered and reviewed  Discuss the patient with other providers: yes  Independent visualization of images, tracings, or specimens: yes        VSS, has b/l pneumonia on xray and covid +, he is maintaining 02 sat on home 02 however he is quite high risk for deterioration, d/w Dr. Xiomy Lynn for admission    CONSULTS:  [address] [place] [address] Ave:  Unless otherwise noted below, none     Procedures    FINAL IMPRESSION      1. Pneumonia due to COVID-19 virus    2.  Dependent edema          DISPOSITION/PLAN   DISPOSITION        PATIENT REFERRED TO:  [facility] EMERGENCY DEPT  [address]  [phone]    As needed, If symptoms worsen      DISCHARGE MEDICATIONS:  Current Discharge Medication List             (Please note that portions of this note were completed with a voice recognition program.  Efforts were made to edit thedictations but occasionally words are mis-transcribed.)    Amor Carreon MD (electronically signed)  Attending Emergency Physician        Miguel Toledo MD  01/05/21 [phone]

## 2021-01-06 LAB
ABO/RH: NORMAL
ALBUMIN SERPL-MCNC: 3.5 G/DL (ref 3.5–5.2)
ALP BLD-CCNC: 181 U/L (ref 40–130)
ALT SERPL-CCNC: 54 U/L (ref 5–41)
ANION GAP SERPL CALCULATED.3IONS-SCNC: 9 MMOL/L (ref 7–19)
ANTIBODY SCREEN: NORMAL
AST SERPL-CCNC: 30 U/L (ref 5–40)
BASOPHILS ABSOLUTE: 0 K/UL (ref 0–0.2)
BASOPHILS RELATIVE PERCENT: 0.1 % (ref 0–1)
BILIRUB SERPL-MCNC: 0.5 MG/DL (ref 0.2–1.2)
BUN BLDV-MCNC: 10 MG/DL (ref 8–23)
C-REACTIVE PROTEIN: 7.17 MG/DL (ref 0–0.5)
CALCIUM SERPL-MCNC: 9.1 MG/DL (ref 8.8–10.2)
CHLORIDE BLD-SCNC: 101 MMOL/L (ref 98–111)
CO2: 29 MMOL/L (ref 22–29)
CREAT SERPL-MCNC: 0.5 MG/DL (ref 0.5–1.2)
EKG P AXIS: 70 DEGREES
EKG P-R INTERVAL: 158 MS
EKG Q-T INTERVAL: 362 MS
EKG QRS DURATION: 136 MS
EKG QTC CALCULATION (BAZETT): 425 MS
EKG T AXIS: 41 DEGREES
EOSINOPHILS ABSOLUTE: 0 K/UL (ref 0–0.6)
EOSINOPHILS RELATIVE PERCENT: 0 % (ref 0–5)
FERRITIN: 159.4 NG/ML (ref 30–400)
GFR AFRICAN AMERICAN: >59
GFR NON-AFRICAN AMERICAN: >60
GLUCOSE BLD-MCNC: 140 MG/DL (ref 74–109)
GLUCOSE BLD-MCNC: 146 MG/DL (ref 70–99)
GLUCOSE BLD-MCNC: 149 MG/DL (ref 70–99)
GLUCOSE BLD-MCNC: 158 MG/DL (ref 70–99)
GLUCOSE BLD-MCNC: 175 MG/DL (ref 70–99)
HCT VFR BLD CALC: 36 % (ref 42–52)
HEMOGLOBIN: 11 G/DL (ref 14–18)
IMMATURE GRANULOCYTES #: 0.1 K/UL
LYMPHOCYTES ABSOLUTE: 0.7 K/UL (ref 1.1–4.5)
LYMPHOCYTES RELATIVE PERCENT: 7.9 % (ref 20–40)
MCH RBC QN AUTO: 26.4 PG (ref 27–31)
MCHC RBC AUTO-ENTMCNC: 30.6 G/DL (ref 33–37)
MCV RBC AUTO: 86.5 FL (ref 80–94)
MONOCYTES ABSOLUTE: 0.7 K/UL (ref 0–0.9)
MONOCYTES RELATIVE PERCENT: 7.3 % (ref 0–10)
NEUTROPHILS ABSOLUTE: 7.7 K/UL (ref 1.5–7.5)
NEUTROPHILS RELATIVE PERCENT: 83.7 % (ref 50–65)
PDW BLD-RTO: 14.4 % (ref 11.5–14.5)
PERFORMED ON: ABNORMAL
PLATELET # BLD: 391 K/UL (ref 130–400)
PMV BLD AUTO: 8.5 FL (ref 9.4–12.4)
POTASSIUM REFLEX MAGNESIUM: 4.6 MMOL/L (ref 3.5–5)
PROCALCITONIN: 0.08 NG/ML (ref 0–0.09)
RBC # BLD: 4.16 M/UL (ref 4.7–6.1)
SODIUM BLD-SCNC: 139 MMOL/L (ref 136–145)
TOTAL PROTEIN: 8.5 G/DL (ref 6.6–8.7)
VITAMIN D 25-HYDROXY: 33.1 NG/ML
WBC # BLD: 9.2 K/UL (ref 4.8–10.8)

## 2021-01-06 PROCEDURE — 84145 PROCALCITONIN (PCT): CPT

## 2021-01-06 PROCEDURE — 85025 COMPLETE CBC W/AUTO DIFF WBC: CPT

## 2021-01-06 PROCEDURE — 2500000003 HC RX 250 WO HCPCS: Performed by: INTERNAL MEDICINE

## 2021-01-06 PROCEDURE — 1210000000 HC MED SURG R&B

## 2021-01-06 PROCEDURE — 6370000000 HC RX 637 (ALT 250 FOR IP): Performed by: INTERNAL MEDICINE

## 2021-01-06 PROCEDURE — 93010 ELECTROCARDIOGRAM REPORT: CPT | Performed by: INTERNAL MEDICINE

## 2021-01-06 PROCEDURE — 6370000000 HC RX 637 (ALT 250 FOR IP): Performed by: HOSPITALIST

## 2021-01-06 PROCEDURE — 82728 ASSAY OF FERRITIN: CPT

## 2021-01-06 PROCEDURE — 80053 COMPREHEN METABOLIC PANEL: CPT

## 2021-01-06 PROCEDURE — 2580000003 HC RX 258: Performed by: INTERNAL MEDICINE

## 2021-01-06 PROCEDURE — 94761 N-INVAS EAR/PLS OXIMETRY MLT: CPT

## 2021-01-06 PROCEDURE — 82306 VITAMIN D 25 HYDROXY: CPT

## 2021-01-06 PROCEDURE — 86140 C-REACTIVE PROTEIN: CPT

## 2021-01-06 PROCEDURE — 6360000002 HC RX W HCPCS: Performed by: INTERNAL MEDICINE

## 2021-01-06 PROCEDURE — 2700000000 HC OXYGEN THERAPY PER DAY

## 2021-01-06 PROCEDURE — 82947 ASSAY GLUCOSE BLOOD QUANT: CPT

## 2021-01-06 RX ADMIN — ENOXAPARIN SODIUM 30 MG: 30 INJECTION SUBCUTANEOUS at 09:06

## 2021-01-06 RX ADMIN — LISINOPRIL 20 MG: 20 TABLET ORAL at 09:05

## 2021-01-06 RX ADMIN — ENOXAPARIN SODIUM 30 MG: 30 INJECTION SUBCUTANEOUS at 20:32

## 2021-01-06 RX ADMIN — TIOTROPIUM BROMIDE INHALATION SPRAY 2 PUFF: 3.12 SPRAY, METERED RESPIRATORY (INHALATION) at 09:05

## 2021-01-06 RX ADMIN — SODIUM CHLORIDE, PRESERVATIVE FREE 10 ML: 5 INJECTION INTRAVENOUS at 20:33

## 2021-01-06 RX ADMIN — AMLODIPINE BESYLATE 5 MG: 5 TABLET ORAL at 09:05

## 2021-01-06 RX ADMIN — Medication 50 MG: at 09:05

## 2021-01-06 RX ADMIN — TIMOLOL MALEATE 1 DROP: 5 SOLUTION/ DROPS OPHTHALMIC at 20:31

## 2021-01-06 RX ADMIN — OXYCODONE HYDROCHLORIDE AND ACETAMINOPHEN 1000 MG: 500 TABLET ORAL at 09:05

## 2021-01-06 RX ADMIN — ASPIRIN 81 MG: 81 TABLET, COATED ORAL at 09:04

## 2021-01-06 RX ADMIN — DEXAMETHASONE 6 MG: 4 TABLET ORAL at 09:04

## 2021-01-06 RX ADMIN — OXYCODONE 15 MG: 5 TABLET ORAL at 09:05

## 2021-01-06 RX ADMIN — OXYCODONE 15 MG: 5 TABLET ORAL at 15:18

## 2021-01-06 RX ADMIN — SODIUM CHLORIDE, PRESERVATIVE FREE 10 ML: 5 INJECTION INTRAVENOUS at 09:06

## 2021-01-06 RX ADMIN — TIZANIDINE 4 MG: 4 TABLET ORAL at 20:32

## 2021-01-06 RX ADMIN — TIMOLOL MALEATE 1 DROP: 5 SOLUTION/ DROPS OPHTHALMIC at 09:15

## 2021-01-06 RX ADMIN — OXYCODONE 15 MG: 5 TABLET ORAL at 20:32

## 2021-01-06 RX ADMIN — ALBUTEROL SULFATE 2 PUFF: 90 AEROSOL, METERED RESPIRATORY (INHALATION) at 09:06

## 2021-01-06 RX ADMIN — ALBUTEROL SULFATE 2 PUFF: 90 AEROSOL, METERED RESPIRATORY (INHALATION) at 20:31

## 2021-01-06 RX ADMIN — LATANOPROST 1 DROP: 50 SOLUTION OPHTHALMIC at 20:31

## 2021-01-06 RX ADMIN — ATORVASTATIN CALCIUM 40 MG: 20 TABLET, FILM COATED ORAL at 09:05

## 2021-01-06 RX ADMIN — BUDESONIDE AND FORMOTEROL FUMARATE DIHYDRATE 2 PUFF: 160; 4.5 AEROSOL RESPIRATORY (INHALATION) at 09:06

## 2021-01-06 RX ADMIN — BUDESONIDE AND FORMOTEROL FUMARATE DIHYDRATE 2 PUFF: 160; 4.5 AEROSOL RESPIRATORY (INHALATION) at 20:32

## 2021-01-06 RX ADMIN — REMDESIVIR 100 MG: 100 INJECTION, POWDER, LYOPHILIZED, FOR SOLUTION INTRAVENOUS at 18:34

## 2021-01-06 RX ADMIN — ALBUTEROL SULFATE 2 PUFF: 90 AEROSOL, METERED RESPIRATORY (INHALATION) at 16:29

## 2021-01-06 RX ADMIN — ALBUTEROL SULFATE 2 PUFF: 90 AEROSOL, METERED RESPIRATORY (INHALATION) at 12:17

## 2021-01-06 RX ADMIN — AMITRIPTYLINE HYDROCHLORIDE 50 MG: 25 TABLET, FILM COATED ORAL at 20:32

## 2021-01-06 RX ADMIN — PANTOPRAZOLE SODIUM 40 MG: 40 TABLET, DELAYED RELEASE ORAL at 06:21

## 2021-01-06 RX ADMIN — Medication 2000 UNITS: at 09:05

## 2021-01-06 ASSESSMENT — PAIN SCALES - GENERAL
PAINLEVEL_OUTOF10: 8
PAINLEVEL_OUTOF10: 8
PAINLEVEL_OUTOF10: 3

## 2021-01-06 NOTE — CONSULTS
Palliative Care:      Pt known to Palliative Care. He has COPD and is oxygen dependent at home. He also uses a nebulizer. He has been active and still bowls on Tuesday evenings. He presented to ED with increasing SOA and cough. He is COVID-19 positive. Spoke with him via phone and he reports feeling OK, SOA improving. He is up independently. Past Medical History:        Past Medical History:   Diagnosis Date    Arthritis     Bilateral hearing loss     CAD (coronary artery disease)     Chronic back pain     Colon polyps     [facility] obstructive [facility]) ([facility])     Depression     Hyperlipidemia     Hypertension     Low back pain 1/29/2016    Oxygen dependent     uses at night    Palliative care patient 12/28/2018       Advance Directives:    Full code  No advance directive     Pain/Other Symptoms:    Pt denies acute pain, he has chronic back pain from multiple surgeries and takes percocet at home. Psychological/Spiritual:      [facility] and has good spiritual support. Plan:    Continue treatments for COVID pneumonia. Monitoring blood sugars and adjust insulin accordingly.     Patient/family discussion r/t goals: Pt will return home with his wife      Electronically signed by Art Mendez RN on 1/6/2021 at 11:58 AM

## 2021-01-06 NOTE — PROGRESS NOTES
[facility]    Patient:  Kellen Ontiveros  YOB: 1955  Date of Service: 1/6/2021  MRN: [id number]   Acct: [de-identified]   Primary Care Physician: Ximena Kaminski MD  Advance Directive: Full Code  Admit Date: 1/5/2021       Hospital Day: 1      CHIEF COMPLAINT:     Chief Complaint   Patient presents with    Shortness of Breath     Pt presents with SOA, it has progressively worsened over the last week. Hx of COPD       1/6/2021 11:42 AM  Subjective / Interval History:   01/06/2021  Patient seen this AM.  No new complaints. Sitting comfortably in chair no acute distress. Shortness of breath improved. No acute changes or acute overnight event reported. Brief History:   Mr Serjio Delgado, a 72 y.o. male with a history of COPD (on home O2), DMT2, presenting to [address] [place] [address] ED (01/05/2021), on account of a 1 week history of progressively worsening moderate to severe shortness of breath, with an associated greenish productive cough.     Denies any other associated symptoms.     Chest X-ray, showing patchy infiltrates in the perihilar regions and lung bases. This may be infectious or inflammatory     COVID-19 tests positive  Patient admitted for further work-up and management. Review of Systems:   Review of Systems  ROS: 14 point review of systems is negative except as specifically addressed above. DIET CARB CONTROL;     Intake/Output Summary (Last 24 hours) at 1/6/2021 1142  Last data filed at 1/5/2021 1948  Gross per 24 hour   Intake --   Output 450 ml   Net -450 ml       Medications:   dextrose       Current Facility-Administered Medications   Medication Dose Route Frequency Provider Last Rate Last Admin    albuterol sulfate  (90 Base) MCG/ACT inhaler 2 puff  2 puff Inhalation 4x daily Kavon Joradn MD   2 puff at 01/06/21 0906    amLODIPine (NORVASC) tablet 5 mg  5 mg Oral Daily Kavon Jordan MD   5 mg at 01/06/21 0905    glucose (GLUTOSE) 40 % oral gel 15 g  15 g at 01/06/21 0905    zinc sulfate (ZINCATE) capsule 50 mg  50 mg Oral Daily Edgardo Talley MD   50 mg at 01/06/21 4543    ascorbic acid (VITAMIN C) tablet 1,000 mg  1,000 mg Oral Daily Edgardo Talley MD   1,000 mg at 01/06/21 2341    remdesivir 100 mg in sodium chloride 0.9 % 250 mL IVPB  100 mg Intravenous Q24H Edgardo Talley MD        0.9 % sodium chloride bolus  30 mL Intravenous PRN Edgardo Talley MD        aspirin EC tablet 81 mg  81 mg Oral Daily Edgardo Talley MD   81 mg at 01/06/21 0734    atorvastatin (LIPITOR) tablet 40 mg  40 mg Oral Daily Edgardo Talley MD   40 mg at 01/06/21 2436    amitriptyline (ELAVIL) tablet 50 mg  50 mg Oral Nightly Nakia Mcdermott MD        latanoprost (XALATAN) 0.005 % ophthalmic solution 1 drop  1 drop Both Eyes Nightly Nakia Mcdermott MD        busPIRone (BUSPAR) tablet 7.5 mg  7.5 mg Oral BID PRN Nakia Mcdermott MD        timolol (TIMOPTIC) 0.5 % ophthalmic solution 1 drop  1 drop Both Eyes BID Nakia Mcdermott MD   1 drop at 01/06/21 0915    pantoprazole (PROTONIX) tablet 40 mg  40 mg Oral QAM AC Nakia Mcdermott MD   40 mg at 01/06/21 2526    lisinopril (PRINIVIL;ZESTRIL) tablet 20 mg  20 mg Oral Daily Nakia Mcdermott MD   20 mg at 01/06/21 0905    oxyCODONE (ROXICODONE) immediate release tablet 15 mg  15 mg Oral 4x Daily PRN Nakia Mcdermott MD   15 mg at 01/06/21 0905    tiZANidine (ZANAFLEX) tablet 4 mg  4 mg Oral TID PRN Nakia Mcdermott MD        naloxone [facility]) injection 0.4 mg  0.4 mg Intravenous PRN Nakia Mcdermott MD        budesonide-formoterol [facility]) 160-4.5 MCG/ACT inhaler 2 puff  2 puff Inhalation BID Nakia Mcdermott MD   2 puff at 01/06/21 0906    tiotropium (SPIRIVA RESPIMAT) 2.5 MCG/ACT inhaler 2 puff  2 puff Inhalation Daily Nakia Mdcermott MD   2 puff at 01/06/21 0905         dextrose        albuterol sulfate HFA  2 puff Inhalation 4x daily    amLODIPine  5 mg Oral Daily    sodium chloride flush  10 mL Intravenous 2 times per day    enoxaparin  30 mg Subcutaneous BID    insulin glargine  22 Units Subcutaneous Nightly    insulin lispro  6 Units Subcutaneous TID WC    insulin lispro  0-12 Units Subcutaneous TID WC    insulin lispro  0-6 Units Subcutaneous Nightly    dexamethasone  6 mg Oral Daily    Vitamin D  2,000 Units Oral Daily    zinc sulfate  50 mg Oral Daily    vitamin C  1,000 mg Oral Daily    remdesivir IVPB  100 mg Intravenous Q24H    aspirin  81 mg Oral Daily    atorvastatin  40 mg Oral Daily    amitriptyline  50 mg Oral Nightly    latanoprost  1 drop Both Eyes Nightly    timolol  1 drop Both Eyes BID    pantoprazole  40 mg Oral QAM AC    lisinopril  20 mg Oral Daily    budesonide-formoterol  2 puff Inhalation BID    tiotropium  2 puff Inhalation Daily     glucose, dextrose, glucagon (rDNA), dextrose, sodium chloride flush, potassium chloride **OR** potassium alternative oral replacement **OR** potassium chloride, promethazine **OR** ondansetron, magnesium hydroxide, sodium chloride, busPIRone, oxyCODONE, tiZANidine, naloxone  DIET CARB CONTROL;       Labs:   CBC with DIFF:  Recent Labs     01/05/21  1121   WBC 7.4   RBC 4.07*   HGB 10.9*   HCT 35.7*   MCV 87.7   MCH 26.8*   MCHC 30.5*   RDW 14.6*      MPV 8.2*   NEUTOPHILPCT 77.3*   LYMPHOPCT 13.6*   MONOPCT 7.3   EOSRELPCT 0.5   BASOPCT 0.5   NEUTROABS 5.7   LYMPHSABS 1.0*   MONOSABS 0.50   EOSABS 0.00   BASOSABS 0.00       CMP/BMP:  Recent Labs     01/05/21  1121 01/06/21  0535   * 139   K 3.8 4.6   CL 97* 101   CO2 27 29   ANIONGAP 8 9   GLUCOSE 126* 140*   BUN 8 10   CREATININE 0.6 0.5   LABGLOM >60 >60   CALCIUM 8.5* 9.1   PROT 7.7 8.5   LABALBU 3.2* 3.5   BILITOT 0.5 0.5   ALKPHOS 178* 181*   ALT 61* 54*   AST 35 30         CRP:  No results for input(s): CRP in the last 72 hours. Sed Rate:  No results for input(s): SEDRATE in the last 72 hours.       HgBA1c:  No components found for: HGBA1C  FLP:  No results found for: TRIG, HDL, LDLCALC, LDLDIRECT, LABVLDL  TSH:  No results found for: TSH  Troponin T:   Recent Labs     01/05/21  1121   TROPONINI <0.01     Pro-BNP: No results for input(s): BNP in the last 72 hours. INR: No results for input(s): INR in the last 72 hours. ABGs:   Lab Results   Component Value Date    PHART 7.380 04/04/2020    PO2ART 72.0 04/04/2020    RMS7SVZ 61.0 04/04/2020     UA:No results for input(s): NITRITE, COLORU, PHUR, LABCAST, WBCUA, RBCUA, MUCUS, TRICHOMONAS, YEAST, BACTERIA, CLARITYU, SPECGRAV, LEUKOCYTESUR, UROBILINOGEN, BILIRUBINUR, BLOODU, GLUCOSEU, AMORPHOUS in the last 72 hours. Invalid input(s): Jerrell Weaver      Culture Results:    No results for input(s): CXSURG in the last 720 hours. Blood Culture Recent: No results for input(s): BC in the last 720 hours. Cultures:   No results for input(s): CULTURE in the last 72 hours. No results for input(s): BC, Tiffanie Glory in the last 72 hours. No results for input(s): CXSURG in the last 72 hours. No results for input(s): MG, PHOS in the last 72 hours. Recent Labs     01/05/21  1121 01/06/21  0535   AST 35 30   ALT 61* 54*   BILITOT 0.5 0.5   ALKPHOS 178* 181*         RAD:   Xr Chest Portable    Result Date: 1/5/2021  EXAMINATION:  XR CHEST PORTABLE  1/5/2021 11:32 AM HISTORY: Coughing and shortness of breath. COMPARISON: 4/4/2020. FINDINGS:  There are patchy infiltrates in the perihilar regions and lung bases. There is stable bronchial wall thickening. There is cardiomegaly. No significant pleural effusion is seen. The thoracic aorta is atheromatous. There are degenerative changes of the shoulders. 1. Patchy infiltrates in the perihilar regions and lung bases. This may be infectious or inflammatory. Pulmonary edema also possible. 2. Cardiomegaly. 3. Stable bronchial wall thickening.  Signed by Dr Alesha Liao on 1/5/2021 11:33 AM        Objective:   Vitals:   /75   Pulse 82   Temp 97.5 °F (36.4 °C) (Temporal)   Resp 20   Ht 5' 6\" (1.676 m) Wt 224 lb 8 oz (101.8 kg)   SpO2 94%   BMI 36.24 kg/m²       Patient Vitals for the past 24 hrs:   BP Temp Temp src Pulse Resp SpO2 Weight   01/06/21 1130 139/75 97.5 °F (36.4 °C) Temporal 82 20 94 % --   01/06/21 0731 -- -- -- -- -- 95 % --   01/06/21 0653 (!) 159/90 97.6 °F (36.4 °C) Temporal 89 20 95 % --   01/06/21 0202 127/78 97.4 °F (36.3 °C) Temporal 78 16 94 % 224 lb 8 oz (101.8 kg)   01/05/21 2058 -- -- -- -- -- 94 % --   01/05/21 1821 (!) 160/89 97.1 °F (36.2 °C) Temporal 87 16 93 % --   01/05/21 1657 (!) 172/93 97.3 °F (36.3 °C) Temporal 96 20 90 % --   01/05/21 1611 (!) 150/88 98.3 °F (36.8 °C) -- 88 20 92 % --   01/05/21 1459 134/89 98.3 °F (36.8 °C) -- 90 21 93 % --   01/05/21 1358 (!) 140/90 98.3 °F (36.8 °C) -- 93 22 91 % --   01/05/21 1333 (!) 145/91 -- -- -- -- 92 % --   01/05/21 1233 (!) 138/100 -- -- -- -- 91 % --   01/05/21 1203 (!) 150/84 -- -- -- -- 91 % --       24HR INTAKE/OUTPUT:      Intake/Output Summary (Last 24 hours) at 1/6/2021 1142  Last data filed at 1/5/2021 1948  Gross per 24 hour   Intake --   Output 450 ml   Net -450 ml       Physical Exam  Vitals signs and nursing note reviewed. Constitutional:       General: He is not in acute distress. Appearance: Normal appearance. He is obese. He is not ill-appearing, toxic-appearing or diaphoretic. HENT:      Head: Normocephalic and atraumatic. Nose: Nose normal.   Neck:      Musculoskeletal: Normal range of motion and neck supple. Pulmonary:      Effort: Pulmonary effort is normal. No respiratory distress. Comments: Patient no acute respiratory distress. Speaking in full sentences and answering all questions appropriately. Nasal cannula oxygen in place. Musculoskeletal: Normal range of motion. Neurological:      Mental Status: He is alert and oriented to person, place, and time. Psychiatric:         Mood and Affect: Mood normal.         Behavior: Behavior normal.         Thought Content:  Thought content normal.         Judgment: Judgment normal.           Assessment/plan:         Hospital Problems           Last Modified POA    COPD with acute exacerbation ([address] (Chronic) 1/5/2021 Yes    Hyperlipidemia (Chronic) 1/5/2021 Yes    Hypertension (Chronic) 1/5/2021 Yes    Pneumonia due to COVID-19 virus 1/5/2021 Yes          Active Problems:    COPD with acute exacerbation ([address]    Hyperlipidemia    Hypertension    Pneumonia due to COVID-19 virus  Resolved Problems:    * No resolved hospital problems. *      COVID 19 Infection PNA.- Diagnosed on 01/02020  History of COPD, with acute exacerbation  · Chest X-ray - 01/05/2020: Patchy infiltrates in the perihilar regions and lung bases. This may be infectious or inflammatory. Pulmonary edema also possible. Cardiomegaly. Stable bronchial wall thickening  · Initial proBNP within lab reference range  · Continuous Pulse Ox  · Systemic Steroids - Dexamethasone 6 mg PO  Daily  · Adjunct therapy with;  Melatonin, Vitamin D, Zinc, & Inhaled Corticosteroids. · Remdesivir IV x 5 Days, (start date: 01/05/2021)  · Monitor LFTs and renal function while on Remdesivir  · Avoid Nebulizer treatments to avoid aerosolization.    · Encourage Prone positioning  · Enoxaparin 30 mg subcu twice daily  · Oxygen supplementation to keep SPO2 >89  · Bronchodilators scheduled and on as-needed basis. · Continue to monitor          History of Diabetes Mellitus II  · Uncontrolled  · Reported Home regimen include,   ? Metformin  · Holding All PO Meds while inpatient  · Inpatient Regimens to include;  ? - Insulin Glargine (Lantus) 22 units subcu nightly  ? - Insulin Lispro (Humalog) 6 units subcu pre-meal 3 times a day  ? - Insulin Lispro (Humalog) on a Medium dose sliding scale  · Monitor POC glucose, and adjust insulin regimen accordingly based on daily insulin requirement.           Continue management of other chronic medical conditions - see above and orders.         Advance Directive: Full Code    DIET CARB CONTROL;         Consults Made:   IP CONSULT TO PHARMACY  IP CONSULT TO SOCIAL WORK  PALLIATIVE CARE EVAL    DVT prophylaxis: Enoxaparin      Discharge planning: tbd    Time Spent is 25 mins in the examination, evaluation, counseling and review of medications, assessment and plan.      Electronically signed by   Nandini Suazo MD, MPH,   Internal Medicine Hospitalist   1/6/2021 11:42 AM

## 2021-01-07 VITALS
HEART RATE: 74 BPM | HEIGHT: 66 IN | DIASTOLIC BLOOD PRESSURE: 68 MMHG | SYSTOLIC BLOOD PRESSURE: 129 MMHG | RESPIRATION RATE: 20 BRPM | OXYGEN SATURATION: 97 % | BODY MASS INDEX: 36.08 KG/M2 | TEMPERATURE: 97 F | WEIGHT: 224.5 LBS

## 2021-01-07 LAB
ALBUMIN SERPL-MCNC: 3.2 G/DL (ref 3.5–5.2)
ALP BLD-CCNC: 152 U/L (ref 40–130)
ALT SERPL-CCNC: 36 U/L (ref 5–41)
ANION GAP SERPL CALCULATED.3IONS-SCNC: 9 MMOL/L (ref 7–19)
AST SERPL-CCNC: 16 U/L (ref 5–40)
BASOPHILS ABSOLUTE: 0 K/UL (ref 0–0.2)
BASOPHILS RELATIVE PERCENT: 0.1 % (ref 0–1)
BILIRUB SERPL-MCNC: 0.3 MG/DL (ref 0.2–1.2)
BUN BLDV-MCNC: 19 MG/DL (ref 8–23)
CALCIUM SERPL-MCNC: 8.4 MG/DL (ref 8.8–10.2)
CHLORIDE BLD-SCNC: 103 MMOL/L (ref 98–111)
CO2: 26 MMOL/L (ref 22–29)
CREAT SERPL-MCNC: 0.6 MG/DL (ref 0.5–1.2)
D DIMER: 0.58 UG/ML FEU (ref 0–0.48)
EOSINOPHILS ABSOLUTE: 0 K/UL (ref 0–0.6)
EOSINOPHILS RELATIVE PERCENT: 0 % (ref 0–5)
FIBRINOGEN: 603 MG/DL (ref 238–505)
GFR AFRICAN AMERICAN: >59
GFR NON-AFRICAN AMERICAN: >60
GLUCOSE BLD-MCNC: 111 MG/DL (ref 70–99)
GLUCOSE BLD-MCNC: 138 MG/DL (ref 70–99)
GLUCOSE BLD-MCNC: 139 MG/DL (ref 74–109)
GLUCOSE BLD-MCNC: 158 MG/DL (ref 70–99)
HCT VFR BLD CALC: 34.4 % (ref 42–52)
HEMOGLOBIN: 10.3 G/DL (ref 14–18)
IMMATURE GRANULOCYTES #: 0.1 K/UL
LYMPHOCYTES ABSOLUTE: 1.1 K/UL (ref 1.1–4.5)
LYMPHOCYTES RELATIVE PERCENT: 9 % (ref 20–40)
MCH RBC QN AUTO: 26.4 PG (ref 27–31)
MCHC RBC AUTO-ENTMCNC: 29.9 G/DL (ref 33–37)
MCV RBC AUTO: 88.2 FL (ref 80–94)
MONOCYTES ABSOLUTE: 0.8 K/UL (ref 0–0.9)
MONOCYTES RELATIVE PERCENT: 6.4 % (ref 0–10)
NEUTROPHILS ABSOLUTE: 9.7 K/UL (ref 1.5–7.5)
NEUTROPHILS RELATIVE PERCENT: 83.4 % (ref 50–65)
PDW BLD-RTO: 14.2 % (ref 11.5–14.5)
PERFORMED ON: ABNORMAL
PLATELET # BLD: 438 K/UL (ref 130–400)
PMV BLD AUTO: 8.5 FL (ref 9.4–12.4)
POTASSIUM REFLEX MAGNESIUM: 3.9 MMOL/L (ref 3.5–5)
RBC # BLD: 3.9 M/UL (ref 4.7–6.1)
SODIUM BLD-SCNC: 138 MMOL/L (ref 136–145)
TOTAL PROTEIN: 7.4 G/DL (ref 6.6–8.7)
WBC # BLD: 11.7 K/UL (ref 4.8–10.8)

## 2021-01-07 PROCEDURE — 6370000000 HC RX 637 (ALT 250 FOR IP): Performed by: INTERNAL MEDICINE

## 2021-01-07 PROCEDURE — 85025 COMPLETE CBC W/AUTO DIFF WBC: CPT

## 2021-01-07 PROCEDURE — 6360000002 HC RX W HCPCS: Performed by: INTERNAL MEDICINE

## 2021-01-07 PROCEDURE — 94761 N-INVAS EAR/PLS OXIMETRY MLT: CPT

## 2021-01-07 PROCEDURE — 2700000000 HC OXYGEN THERAPY PER DAY

## 2021-01-07 PROCEDURE — 6370000000 HC RX 637 (ALT 250 FOR IP): Performed by: HOSPITALIST

## 2021-01-07 PROCEDURE — 2580000003 HC RX 258: Performed by: INTERNAL MEDICINE

## 2021-01-07 PROCEDURE — 85384 FIBRINOGEN ACTIVITY: CPT

## 2021-01-07 PROCEDURE — 82947 ASSAY GLUCOSE BLOOD QUANT: CPT

## 2021-01-07 PROCEDURE — 80053 COMPREHEN METABOLIC PANEL: CPT

## 2021-01-07 PROCEDURE — 85379 FIBRIN DEGRADATION QUANT: CPT

## 2021-01-07 RX ORDER — CHOLECALCIFEROL (VITAMIN D3) 50 MCG
2000 TABLET ORAL DAILY
Qty: 60 TABLET | Refills: 0 | Status: SHIPPED | OUTPATIENT
Start: 2021-01-08

## 2021-01-07 RX ORDER — DEXAMETHASONE 6 MG/1
6 TABLET ORAL DAILY
Qty: 7 TABLET | Refills: 0 | Status: SHIPPED | OUTPATIENT
Start: 2021-01-08 | End: 2021-01-15

## 2021-01-07 RX ORDER — ZINC SULFATE 50(220)MG
50 CAPSULE ORAL DAILY
Qty: 30 CAPSULE | Refills: 0 | Status: SHIPPED | OUTPATIENT
Start: 2021-01-08

## 2021-01-07 RX ADMIN — ASPIRIN 81 MG: 81 TABLET, COATED ORAL at 08:30

## 2021-01-07 RX ADMIN — OXYCODONE 15 MG: 5 TABLET ORAL at 15:05

## 2021-01-07 RX ADMIN — DEXAMETHASONE 6 MG: 4 TABLET ORAL at 08:30

## 2021-01-07 RX ADMIN — TIOTROPIUM BROMIDE INHALATION SPRAY 2 PUFF: 3.12 SPRAY, METERED RESPIRATORY (INHALATION) at 08:31

## 2021-01-07 RX ADMIN — ALBUTEROL SULFATE 2 PUFF: 90 AEROSOL, METERED RESPIRATORY (INHALATION) at 12:40

## 2021-01-07 RX ADMIN — TIMOLOL MALEATE 1 DROP: 5 SOLUTION/ DROPS OPHTHALMIC at 08:31

## 2021-01-07 RX ADMIN — BUDESONIDE AND FORMOTEROL FUMARATE DIHYDRATE 2 PUFF: 160; 4.5 AEROSOL RESPIRATORY (INHALATION) at 08:31

## 2021-01-07 RX ADMIN — ALBUTEROL SULFATE 2 PUFF: 90 AEROSOL, METERED RESPIRATORY (INHALATION) at 08:31

## 2021-01-07 RX ADMIN — OXYCODONE HYDROCHLORIDE AND ACETAMINOPHEN 1000 MG: 500 TABLET ORAL at 08:30

## 2021-01-07 RX ADMIN — AMLODIPINE BESYLATE 5 MG: 5 TABLET ORAL at 08:30

## 2021-01-07 RX ADMIN — Medication 50 MG: at 08:30

## 2021-01-07 RX ADMIN — Medication 2000 UNITS: at 08:31

## 2021-01-07 RX ADMIN — LISINOPRIL 20 MG: 20 TABLET ORAL at 08:30

## 2021-01-07 RX ADMIN — OXYCODONE 15 MG: 5 TABLET ORAL at 08:30

## 2021-01-07 RX ADMIN — ENOXAPARIN SODIUM 30 MG: 30 INJECTION SUBCUTANEOUS at 08:31

## 2021-01-07 RX ADMIN — ATORVASTATIN CALCIUM 40 MG: 20 TABLET, FILM COATED ORAL at 08:31

## 2021-01-07 RX ADMIN — PANTOPRAZOLE SODIUM 40 MG: 40 TABLET, DELAYED RELEASE ORAL at 05:31

## 2021-01-07 RX ADMIN — SODIUM CHLORIDE, PRESERVATIVE FREE 10 ML: 5 INJECTION INTRAVENOUS at 08:31

## 2021-01-07 RX ADMIN — ALBUTEROL SULFATE 2 PUFF: 90 AEROSOL, METERED RESPIRATORY (INHALATION) at 15:05

## 2021-01-07 ASSESSMENT — PAIN DESCRIPTION - PROGRESSION: CLINICAL_PROGRESSION: GRADUALLY WORSENING

## 2021-01-07 ASSESSMENT — PAIN DESCRIPTION - FREQUENCY: FREQUENCY: CONTINUOUS

## 2021-01-07 ASSESSMENT — PAIN DESCRIPTION - LOCATION: LOCATION: BACK

## 2021-01-07 ASSESSMENT — PAIN DESCRIPTION - ONSET: ONSET: GRADUAL

## 2021-01-07 ASSESSMENT — PAIN SCALES - GENERAL: PAINLEVEL_OUTOF10: 8

## 2021-01-07 NOTE — PROGRESS NOTES
Discharge instructions given and explained. Eliquis coupon given as well. Denies any questions or concerns at this time.   Electronically signed by Errol Quiroz RN on 1/7/2021 at 4:34 PM

## 2021-01-07 NOTE — DISCHARGE SUMMARY
Alma Mclaughlin  :  1955  MRN:  [id number]    Admit date:  2021  Discharge date:  2021       Admitting Physician:  Shyam Leyva MD    Advance Directive: Full Code    Consults Made:   IP CONSULT TO PHARMACY  IP CONSULT TO SOCIAL WORK  PALLIATIVE CARE EVAL      Primary Care Physician:  nAdra Singleton MD    Discharge Diagnoses: Active Problems:    COPD with acute exacerbation ([address]    Hyperlipidemia    Hypertension    Pneumonia due to COVID-19 virus  Resolved Problems:    * No resolved hospital problems. *            Pertinent Labs:  CBC with DIFF:  Recent Labs     21  1121 21  1229 21  0213   WBC 7.4 9.2 11.7*   RBC 4.07* 4.16* 3.90*   HGB 10.9* 11.0* 10.3*   HCT 35.7* 36.0* 34.4*   MCV 87.7 86.5 88.2   MCH 26.8* 26.4* 26.4*   MCHC 30.5* 30.6* 29.9*   RDW 14.6* 14.4 14.2    391 438*   MPV 8.2* 8.5* 8.5*   NEUTOPHILPCT 77.3* 83.7* 83.4*   LYMPHOPCT 13.6* 7.9* 9.0*   MONOPCT 7.3 7.3 6.4   EOSRELPCT 0.5 0.0 0.0   BASOPCT 0.5 0.1 0.1   NEUTROABS 5.7 7.7* 9.7*   LYMPHSABS 1.0* 0.7* 1.1   MONOSABS 0.50 0.70 0.80   EOSABS 0.00 0.00 0.00   BASOSABS 0.00 0.00 0.00       CMP/BMP:  Recent Labs     21  1121 21  0535 21  0213   * 139 138   K 3.8 4.6 3.9   CL 97* 101 103   CO2 27 29 26   ANIONGAP 8 9 9   GLUCOSE 126* 140* 139*   BUN 8 10 19   CREATININE 0.6 0.5 0.6   LABGLOM >60 >60 >60   CALCIUM 8.5* 9.1 8.4*   PROT 7.7 8.5 7.4   LABALBU 3.2* 3.5 3.2*   BILITOT 0.5 0.5 0.3   ALKPHOS 178* 181* 152*   ALT 61* 54* 36   AST 35 30 16         CRP:    Recent Labs     21  1229   CRP 7.17*     Sed Rate:  No results for input(s): SEDRATE in the last 72 hours. HgBA1c:  No components found for: HGBA1C  FLP:  No results found for: TRIG, HDL, LDLCALC, LDLDIRECT, LABVLDL  TSH:  No results found for: TSH  Troponin T:   Recent Labs     21  1121   TROPONINI <0.01     Pro-BNP: No results for input(s): BNP in the last 72 hours.   INR: No results for input(s): INR in the last 72 hours. ABGs:   Lab Results   Component Value Date    PHART 7.380 04/04/2020    PO2ART 72.0 04/04/2020    TMC1PQL 61.0 04/04/2020     UA:No results for input(s): NITRITE, COLORU, PHUR, LABCAST, WBCUA, RBCUA, MUCUS, TRICHOMONAS, YEAST, BACTERIA, CLARITYU, SPECGRAV, LEUKOCYTESUR, UROBILINOGEN, BILIRUBINUR, BLOODU, GLUCOSEU, AMORPHOUS in the last 72 hours. Invalid input(s): Tsering Lyons      Culture Results:    No results for input(s): CXSURG in the last 720 hours. Blood Culture Recent:   Recent Labs     01/05/21  1101   BC No Growth to date. Any change in status will be called. Cultures:   No results for input(s): CULTURE in the last 72 hours. Recent Labs     01/05/21  1101 01/05/21  1124   BC No Growth to date. Any change in status will be called. --    BLOODCULT2  --  No Growth to date. Any change in status will be called. No results for input(s): CXSURG in the last 72 hours. No results for input(s): MG, PHOS in the last 72 hours. Recent Labs     01/05/21  1121 01/06/21  0535 01/07/21  0213   AST 35 30 16   ALT 61* 54* 36   BILITOT 0.5 0.5 0.3   ALKPHOS 178* 181* 152*           Significant Diagnostic Studies:   Xr Chest Portable    Result Date: 1/5/2021  EXAMINATION:  XR CHEST PORTABLE  1/5/2021 11:32 AM HISTORY: Coughing and shortness of breath. COMPARISON: 4/4/2020. FINDINGS:  There are patchy infiltrates in the perihilar regions and lung bases. There is stable bronchial wall thickening. There is cardiomegaly. No significant pleural effusion is seen. The thoracic aorta is atheromatous. There are degenerative changes of the shoulders. 1. Patchy infiltrates in the perihilar regions and lung bases. This may be infectious or inflammatory. Pulmonary edema also possible. 2. Cardiomegaly. 3. Stable bronchial wall thickening. Signed by Dr Luz Early on 1/5/2021 11:33 AM          Hospital Course:   Mr Amarilis Bedoya 72 y. o. male with a history of COPD (on home O2), DMT2, presenting to management of other chronic medical conditions       Physical Exam:  Vital Signs: /68   Pulse 74   Temp 97 °F (36.1 °C) (Temporal)   Resp 20   Ht 5' 6\" (1.676 m)   Wt 224 lb 8 oz (101.8 kg)   SpO2 96%   BMI 36.24 kg/m²   Physical Exam  Vitals signs and nursing note reviewed. Constitutional:       General: He is not in acute distress. Appearance: Normal appearance. He is obese. He is not ill-appearing, toxic-appearing or diaphoretic. HENT:      Head: Normocephalic and atraumatic. Neck:      Musculoskeletal: Normal range of motion and neck supple. Pulmonary:      Effort: Pulmonary effort is normal. No respiratory distress. Comments: No acute respiratory distress. Speaking in full sentences and answering all questions appropriately. Musculoskeletal: Normal range of motion. Neurological:      Mental Status: He is alert and oriented to person, place, and time. Psychiatric:         Mood and Affect: Mood normal.         Behavior: Behavior normal.         Thought Content: Thought content normal.         Discharge Medications:       [facility]   Home Medication Instructions [facility]:[id number]    Printed on:01/07/21 8177   Medication Information                      albuterol (PROVENTIL) (2.5 MG/3ML) 0.083% nebulizer solution  Take 3 mLs by nebulization every 4 hours as needed for Wheezing or Shortness of Breath             albuterol sulfate HFA (PROAIR HFA) 108 (90 BASE) MCG/ACT inhaler  Inhale 2 puffs into the lungs every 6 hours as needed for Wheezing             amitriptyline (ELAVIL) 25 MG tablet  Take 1-2 tablets by mouth nightly as needed for Sleep             AMLODIPINE BESYLATE PO  Take 5 mg by mouth daily.              apixaban (ELIQUIS) 2.5 MG TABS tablet  Take 1 tablet by mouth 2 times daily             ascorbic acid (VITAMIN C) 1000 MG tablet  Take 1 tablet by mouth daily             Aspirin (ASPIR-81 PO)  Take by mouth daily              atorvastatin (LIPITOR) 40 MG tablet  Take 40 mg by mouth daily. busPIRone (BUSPAR) 7.5 MG tablet  1 tablet by Oral route 2 times per day for anxiety             dexamethasone (DECADRON) 6 MG tablet  Take 1 tablet by mouth daily for 7 days             Fluticasone-Umeclidin-Vilant (TRELEGY ELLIPTA) 100-62.5-25 MCG/INH AEPB  Inhale 1 each into the lungs             latanoprost (XALATAN) 0.005 % ophthalmic solution  Place 1 drop into both eyes nightly             LISINOPRIL PO  Take 20 mg by mouth daily. metFORMIN (GLUCOPHAGE) 500 MG tablet  Take 500 mg by mouth 2 times daily (with meals)             omeprazole (PRILOSEC) 40 MG delayed release capsule  40 mg daily              oxyCODONE (OXY-IR) 15 MG immediate release tablet  Take 1 tablet by mouth 4 times daily as needed for Pain . Earliest Fill Date: 12/11/17             timolol (TIMOPTIC-XE) 0.5 % ophthalmic gel-forming  Place 1 drop into both eyes 2 times daily             tiZANidine (ZANAFLEX) 4 MG tablet  Take 1 tablet by mouth 3 times daily as needed (muscle spasms)             Vitamin D (CHOLECALCIFEROL) 50 MCG (2000 UT) TABS tablet  Take 1 tablet by mouth daily             zinc sulfate (ZINCATE) 220 (50 Zn) MG capsule  Take 1 capsule by mouth daily                 Discharge Instructions: Follow up with Rajiv Lee MD in 7 days. Take medications as directed. Resume activity as tolerated. Diet: DIET CARB CONTROL;        DISCHARGE STATUS:    Condition: Good  Disposition: Patient is medically stable and will be discharged home    Extended Emergency Contact Information  Primary Emergency Contact: [facility]  Address: [address] [place] [address], [address] Phone: [phone]  Mobile Phone: [phone]  Relation: Spouse  Hearing or visual needs: None  Other needs: None  Preferred language: English   needed?  No  Secondary Emergency Contact: Guadalupe Lincoln  Address: UNKNOWN   [place] [address] [employer] Phone: [phone]  Mobile Phone: [phone]  Relation: Brother/Sister       Time Spent on discharge is more than 34 mins in the examination, evaluation, counseling and review of medications and discharge plan. Electronically signed by   Chauncey Camacho MD, MPH,   Internal Medicine Hospitalist   1/7/2021 3:08 PM      Thank you Mayela Castrejon MD for the opportunity to be involved in this patient's care. If you have any questions or concerns please feel free to contact me at [phone]        EMR Dragon/Transcription disclaimer:   Much of this encounter note is an electronic transcription/translation of spoken language to printed text.  The electronic translation of spoken language may permit erroneous, or at times, nonsensical words or phrases to be inadvertently transcribed; although attempts have made to review the note for such errors, some may still exist.

## 2021-01-07 NOTE — PROGRESS NOTES
Spoke with pt to provide spiritual care. Pt says he is preparing to be discharged. Provided spiritual care with support and prayer. Pt expressed gratitude for spiritual care.     Electronically signed by Zenon Villela on 1/7/2021 at 3:27 PM

## 2021-01-07 NOTE — PROGRESS NOTES
[facility] Progress Note    Patient:  Felicity Dooley  YOB: 1955  Date of Service: 1/7/2021  MRN: [id number]   Acct: [de-identified]   Primary Care Physician: Darrel Johnson MD  Advance Directive: Full Code  Admit Date: 1/5/2021       Hospital Day: 2      CHIEF COMPLAINT:     Chief Complaint   Patient presents with    Shortness of Breath     Pt presents with SOA, it has progressively worsened over the last week. Hx of COPD       1/7/2021 10:54 AM  Subjective / Interval History:   ***      01/06/2021  Patient seen this AM.  No new complaints. Sitting comfortably in chair no acute distress. Shortness of breath improved. No acute changes or acute overnight event reported. Brief History:   Mr Saima Huerta, a 72 y.o. male with a history of COPD (on home O2), DMT2, presenting to [facility] ED (01/05/2021), on account of a 1 week history of progressively worsening moderate to severe shortness of breath, with an associated greenish productive cough.     Denies any other associated symptoms.     Chest X-ray, showing patchy infiltrates in the perihilar regions and lung bases. This may be infectious or inflammatory     COVID-19 tests positive  Patient admitted for further work-up and management. Review of Systems:   Review of Systems  ROS: 14 point review of systems is negative except as specifically addressed above. DIET CARB CONTROL;     Intake/Output Summary (Last 24 hours) at 1/7/2021 1054  Last data filed at 1/7/2021 0946  Gross per 24 hour   Intake 980 ml   Output 900 ml   Net 80 ml       Medications:   dextrose       Current Facility-Administered Medications   Medication Dose Route Frequency Provider Last Rate Last Admin    albuterol sulfate  (90 Base) MCG/ACT inhaler 2 puff  2 puff Inhalation 4x daily Jensen Almodovar MD   2 puff at 01/07/21 0831    amLODIPine (NORVASC) tablet 5 mg  5 mg Oral Daily Jensen Almodovar MD   5 mg at 01/07/21 0830  glucose (GLUTOSE) 40 % oral gel 15 g  15 g Oral PRN Edgardo Talley MD        dextrose 50 % IV solution  12.5 g Intravenous PRN Edgardo Talley MD        glucagon (rDNA) injection 1 mg  1 mg Intramuscular PRN Edgardo Talley MD        dextrose 5 % solution  100 mL/hr Intravenous JUNIORN Edgardo Talley MD        sodium chloride flush 0.9 % injection 10 mL  10 mL Intravenous 2 times per day Edgardo Talley MD   10 mL at 01/07/21 0831    sodium chloride flush 0.9 % injection 10 mL  10 mL Intravenous PRN Edgardo Talley MD        potassium chloride (KLOR-CON M) extended release tablet 40 mEq  40 mEq Oral PRN Edgardo Talley MD        Or    potassium bicarb-citric acid (EFFER-K) effervescent tablet 40 mEq  40 mEq Oral PRN Edgardo Talley MD        Or    potassium chloride 10 mEq/100 mL IVPB (Peripheral Line)  10 mEq Intravenous PRN Edgardo Talley MD        promethazine (PHENERGAN) tablet 12.5 mg  12.5 mg Oral Q6H PRN Edgardo Talley MD        Or    ondansetron [facility]) injection 4 mg  4 mg Intravenous Q6H PRN Edgardo Talley MD        magnesium hydroxide (MILK OF MAGNESIA) 400 MG/5ML suspension 30 mL  30 mL Oral Daily PRN Edgardo Talley MD        enoxaparin (LOVENOX) injection 30 mg  30 mg Subcutaneous BID Edgardo Talley MD   30 mg at 01/07/21 0831    insulin glargine (LANTUS) injection vial 22 Units  22 Units Subcutaneous Nightly Edgardo Talley MD        insulin lispro (HUMALOG) injection vial 6 Units  6 Units Subcutaneous TID ROXY Talley MD        insulin lispro (HUMALOG) injection vial 0-12 Units  0-12 Units Subcutaneous TID ROXY Talley MD        insulin lispro (HUMALOG) injection vial 0-6 Units  0-6 Units Subcutaneous Nightly Edgardo Talley MD        dexamethasone (DECADRON) tablet 6 mg  6 mg Oral Daily Edgardo Talley MD   6 mg at 01/07/21 0830  Vitamin D (CHOLECALCIFEROL) tablet 2,000 Units  2,000 Units Oral Daily Jensen Almodovar MD   2,000 Units at 01/07/21 0831    zinc sulfate (ZINCATE) capsule 50 mg  50 mg Oral Daily Jensen Almodovar MD   50 mg at 01/07/21 0830    ascorbic acid (VITAMIN C) tablet 1,000 mg  1,000 mg Oral Daily Jensen Almodovar MD   1,000 mg at 01/07/21 0830    remdesivir 100 mg in sodium chloride 0.9 % 250 mL IVPB  100 mg Intravenous Q24H Jensen Almodovar MD   Stopped at 01/06/21 2024    0.9 % sodium chloride bolus  30 mL Intravenous PRN Jensen Almodovar MD        aspirin EC tablet 81 mg  81 mg Oral Daily Jensen Almodovar MD   81 mg at 01/07/21 0830    atorvastatin (LIPITOR) tablet 40 mg  40 mg Oral Daily Jensen Almodovar MD   40 mg at 01/07/21 0831    amitriptyline (ELAVIL) tablet 50 mg  50 mg Oral Nightly Yeyo Saldivar MD   50 mg at 01/06/21 2032    latanoprost (XALATAN) 0.005 % ophthalmic solution 1 drop  1 drop Both Eyes Nightly Yeyo Saldivar MD   1 drop at 01/06/21 2031    busPIRone (BUSPAR) tablet 7.5 mg  7.5 mg Oral BID PRN Yeyo Saldivar MD        timolol (TIMOPTIC) 0.5 % ophthalmic solution 1 drop  1 drop Both Eyes BID Yeyo Saldivar MD   1 drop at 01/07/21 0831    pantoprazole (PROTONIX) tablet 40 mg  40 mg Oral QAM AC Yeyo Saldivar MD   40 mg at 01/07/21 0531    lisinopril (PRINIVIL;ZESTRIL) tablet 20 mg  20 mg Oral Daily Yeyo Saldivar MD   20 mg at 01/07/21 0830    oxyCODONE (ROXICODONE) immediate release tablet 15 mg  15 mg Oral 4x Daily PRN Yeyo Saldivar MD   15 mg at 01/07/21 0830    tiZANidine (ZANAFLEX) tablet 4 mg  4 mg Oral TID PRN Yeyo Saldivar MD   4 mg at 01/06/21 2032    naloxone [facility]) injection 0.4 mg  0.4 mg Intravenous PRN Yeyo Saldivar MD        budesonide-formoterol [facility]) 160-4.5 MCG/ACT inhaler 2 puff  2 puff Inhalation BID Yeyo Saldivar MD   2 puff at 01/07/21 1360  tiotropium (SPIRIVA RESPIMAT) 2.5 MCG/ACT inhaler 2 puff  2 puff Inhalation Daily Jeanette Taveras MD   2 puff at 01/07/21 0831         dextrose        albuterol sulfate HFA  2 puff Inhalation 4x daily    amLODIPine  5 mg Oral Daily    sodium chloride flush  10 mL Intravenous 2 times per day    enoxaparin  30 mg Subcutaneous BID    insulin glargine  22 Units Subcutaneous Nightly    insulin lispro  6 Units Subcutaneous TID WC    insulin lispro  0-12 Units Subcutaneous TID WC    insulin lispro  0-6 Units Subcutaneous Nightly    dexamethasone  6 mg Oral Daily    Vitamin D  2,000 Units Oral Daily    zinc sulfate  50 mg Oral Daily    vitamin C  1,000 mg Oral Daily    remdesivir IVPB  100 mg Intravenous Q24H    aspirin  81 mg Oral Daily    atorvastatin  40 mg Oral Daily    amitriptyline  50 mg Oral Nightly    latanoprost  1 drop Both Eyes Nightly    timolol  1 drop Both Eyes BID    pantoprazole  40 mg Oral QAM AC    lisinopril  20 mg Oral Daily    budesonide-formoterol  2 puff Inhalation BID    tiotropium  2 puff Inhalation Daily     glucose, dextrose, glucagon (rDNA), dextrose, sodium chloride flush, potassium chloride **OR** potassium alternative oral replacement **OR** potassium chloride, promethazine **OR** ondansetron, magnesium hydroxide, sodium chloride, busPIRone, oxyCODONE, tiZANidine, naloxone  DIET CARB CONTROL;       Labs:   CBC with DIFF:  Recent Labs     01/05/21  1121 01/06/21  1229 01/07/21  0213   WBC 7.4 9.2 11.7*   RBC 4.07* 4.16* 3.90*   HGB 10.9* 11.0* 10.3*   HCT 35.7* 36.0* 34.4*   MCV 87.7 86.5 88.2   MCH 26.8* 26.4* 26.4*   MCHC 30.5* 30.6* 29.9*   RDW 14.6* 14.4 14.2    391 438*   MPV 8.2* 8.5* 8.5*   NEUTOPHILPCT 77.3* 83.7* 83.4*   LYMPHOPCT 13.6* 7.9* 9.0*   MONOPCT 7.3 7.3 6.4   EOSRELPCT 0.5 0.0 0.0   BASOPCT 0.5 0.1 0.1   NEUTROABS 5.7 7.7* 9.7*   LYMPHSABS 1.0* 0.7* 1.1   MONOSABS 0.50 0.70 0.80   EOSABS 0.00 0.00 0.00   BASOSABS 0.00 0.00 0.00       CMP/BMP: Recent Labs     01/05/21  1121 01/06/21  0535 01/07/21  0213   * 139 138   K 3.8 4.6 3.9   CL 97* 101 103   CO2 27 29 26   ANIONGAP 8 9 9   GLUCOSE 126* 140* 139*   BUN 8 10 19   CREATININE 0.6 0.5 0.6   LABGLOM >60 >60 >60   CALCIUM 8.5* 9.1 8.4*   PROT 7.7 8.5 7.4   LABALBU 3.2* 3.5 3.2*   BILITOT 0.5 0.5 0.3   ALKPHOS 178* 181* 152*   ALT 61* 54* 36   AST 35 30 16         CRP:    Recent Labs     01/06/21  1229   CRP 7.17*     Sed Rate:  No results for input(s): SEDRATE in the last 72 hours. HgBA1c:  No components found for: HGBA1C  FLP:  No results found for: TRIG, HDL, LDLCALC, LDLDIRECT, LABVLDL  TSH:  No results found for: TSH  Troponin T:   Recent Labs     01/05/21 1121   TROPONINI <0.01     Pro-BNP: No results for input(s): BNP in the last 72 hours. INR: No results for input(s): INR in the last 72 hours. ABGs:   Lab Results   Component Value Date    PHART 7.380 04/04/2020    PO2ART 72.0 04/04/2020    WWC4ACJ 61.0 04/04/2020     UA:No results for input(s): NITRITE, COLORU, PHUR, LABCAST, WBCUA, RBCUA, MUCUS, TRICHOMONAS, YEAST, BACTERIA, CLARITYU, SPECGRAV, LEUKOCYTESUR, UROBILINOGEN, BILIRUBINUR, BLOODU, GLUCOSEU, AMORPHOUS in the last 72 hours. Invalid input(s): Arthor Nails      Culture Results:    No results for input(s): CXSURG in the last 720 hours. Blood Culture Recent:   Recent Labs     01/05/21  1101   BC No Growth to date. Any change in status will be called. Cultures:   No results for input(s): CULTURE in the last 72 hours. Recent Labs     01/05/21  1101 01/05/21  1124   BC No Growth to date. Any change in status will be called. --    BLOODCULT2  --  No Growth to date. Any change in status will be called. No results for input(s): CXSURG in the last 72 hours. No results for input(s): MG, PHOS in the last 72 hours.   Recent Labs     01/05/21  1121 01/06/21  0535 01/07/21  0213   AST 35 30 16   ALT 61* 54* 36   BILITOT 0.5 0.5 0.3   ALKPHOS 178* 181* 152* RAD:   Xr Chest Portable    Result Date: 1/5/2021  EXAMINATION:  XR CHEST PORTABLE  1/5/2021 11:32 AM HISTORY: Coughing and shortness of breath. COMPARISON: 4/4/2020. FINDINGS:  There are patchy infiltrates in the perihilar regions and lung bases. There is stable bronchial wall thickening. There is cardiomegaly. No significant pleural effusion is seen. The thoracic aorta is atheromatous. There are degenerative changes of the shoulders. 1. Patchy infiltrates in the perihilar regions and lung bases. This may be infectious or inflammatory. Pulmonary edema also possible. 2. Cardiomegaly. 3. Stable bronchial wall thickening. Signed by Dr Lynda Painter on 1/5/2021 11:33 AM        Objective:   Vitals:   /82   Pulse 67   Temp 97.1 °F (36.2 °C) (Temporal)   Resp 20   Ht 5' 6\" (1.676 m)   Wt 224 lb 8 oz (101.8 kg)   SpO2 97%   BMI 36.24 kg/m²       Patient Vitals for the past 24 hrs:   BP Temp Temp src Pulse Resp SpO2   01/07/21 0802      97 %   01/07/21 0706 136/82 97.1 °F (36.2 °C) Temporal 67 20 96 %   01/07/21 0008 135/79 96.1 °F (35.6 °C) Temporal 82 18 96 %   01/06/21 2126      95 %   01/06/21 1838 124/69 96.9 °F (36.1 °C) Temporal 81 18 94 %   01/06/21 1553      95 %   01/06/21 1130 139/75 97.5 °F (36.4 °C) Temporal 82 20 94 %       24HR INTAKE/OUTPUT:      Intake/Output Summary (Last 24 hours) at 1/7/2021 1054  Last data filed at 1/7/2021 0946  Gross per 24 hour   Intake 980 ml   Output 900 ml   Net 80 ml       Physical Exam  Vitals signs and nursing note reviewed. Constitutional:       General: He is not in acute distress. Appearance: Normal appearance. He is obese. He is not ill-appearing, toxic-appearing or diaphoretic. HENT:      Head: Normocephalic and atraumatic. Nose: Nose normal.   Neck:      Musculoskeletal: Normal range of motion and neck supple. Pulmonary:      Effort: Pulmonary effort is normal. No respiratory distress. Comments: Patient no acute respiratory distress. Speaking in full sentences and answering all questions appropriately. Nasal cannula oxygen in place. Musculoskeletal: Normal range of motion. Neurological:      Mental Status: He is alert and oriented to person, place, and time. Psychiatric:         Mood and Affect: Mood normal.         Behavior: Behavior normal.         Thought Content: Thought content normal.         Judgment: Judgment normal.           Assessment/plan:         Hospital Problems           Last Modified POA    COPD with acute exacerbation ([address] (Chronic) 1/5/2021 Yes    Hyperlipidemia (Chronic) 1/5/2021 Yes    Hypertension (Chronic) 1/5/2021 Yes    Pneumonia due to COVID-19 virus 1/5/2021 Yes          Active Problems:    COPD with acute exacerbation ([address]    Hyperlipidemia    Hypertension    Pneumonia due to COVID-19 virus  Resolved Problems:    * No resolved hospital problems. *      COVID 19 Infection PNA.- Diagnosed on 01/02020  History of COPD, with acute exacerbation  · Chest X-ray - 01/05/2020: Patchy infiltrates in the perihilar regions and lung bases. This may be infectious or inflammatory. Pulmonary edema also possible. Cardiomegaly. Stable bronchial wall thickening  · Initial proBNP within lab reference range  · Continuous Pulse Ox  · Systemic Steroids - Dexamethasone 6 mg PO  Daily  · Adjunct therapy with;  Melatonin, Vitamin D, Zinc, & Inhaled Corticosteroids. · Remdesivir IV x 5 Days, (start date: 01/05/2021)  · Monitor LFTs and renal function while on Remdesivir  · Avoid Nebulizer treatments to avoid aerosolization.    · Encourage Prone positioning  · Enoxaparin 30 mg subcu twice daily  · Oxygen supplementation to keep SPO2 >89  · Bronchodilators scheduled and on as-needed basis. · Continue to monitor          History of Diabetes Mellitus II  · Uncontrolled  · Reported Home regimen include,   ?  Metformin  · Holding All PO Meds while inpatient · Inpatient Regimens to include;  ? - Insulin Glargine (Lantus) 22 units subcu nightly  ? - Insulin Lispro (Humalog) 6 units subcu pre-meal 3 times a day  ? - Insulin Lispro (Humalog) on a Medium dose sliding scale  · Monitor POC glucose, and adjust insulin regimen accordingly based on daily insulin requirement.           Continue management of other chronic medical conditions - see above and orders. Advance Directive: Full Code    DIET CARB CONTROL;         Consults Made:   IP CONSULT TO PHARMACY  IP CONSULT TO SOCIAL WORK  PALLIATIVE CARE EVAL    DVT prophylaxis: Enoxaparin      Discharge planning: tbd    Time Spent is 25 mins in the examination, evaluation, counseling and review of medications, assessment and plan.      Electronically signed by   Jyoti Mulligan MD, MPH,   Internal Medicine Hospitalist   1/7/2021 10:54 AM

## 2021-01-08 ENCOUNTER — CARE COORDINATION (OUTPATIENT)
Dept: CASE MANAGEMENT | Age: 66
End: 2021-01-08

## 2021-01-08 NOTE — CARE COORDINATION
understanding of administration of home medications. Advised obtaining a 90-day supply of all daily and as-needed medications. Covid Risk Education    Patient has following risk factors of: COPD and diabetes. Education provided regarding infection prevention, and signs and symptoms of COVID-19 and when to seek medical attention with patient who verbalized understanding. Discussed exposure protocols and quarantine From CDC: Are you at higher risk for severe illness?   and given an opportunity for questions and concerns. The patient agrees to contact the COVID-19 hotline [phone] or PCP office for questions related to COVID-19. For more information on steps you can take to protect yourself, see CDC's How to Protect Yourself     Patient/family/caregiver given information for [employer] [facility] and agrees to enroll no  Patient's preferred e-mail: declines  Patient's preferred phone number: declines    Discussed follow-up appointments. If no appointment was previously scheduled, appointment scheduling offered: Yes. Is follow up appointment scheduled within 7 days of discharge? Yes  Non-[facility] follow up appointment(s):     Plan for follow-up call in 5-7 days based on severity of symptoms and risk factors. Plan for next call: respiratory status, HFU  CTN provided contact information for future needs.           Care Transitions 24 Hour Call    Do you have any ongoing symptoms?: No  Do you have a copy of your discharge instructions?: Yes  Do you have all of your prescriptions and are they filled?: Yes  Have you been contacted by a [employer] [address]?: No  Have you scheduled your follow up appointment?: Yes  How are you going to get to your appointment?: Car - family or friend to transport  Were you discharged with any Home Care or Post Acute Services: Yes  Post Acute Services: Home Health  Patient DME: Oliverio Gastelum  Patient Home Equipment: Oxygen  Do you have support at home?: Partner/Spouse/SO  Do you feel like you have everything you need to keep you well at home?: Yes  Are you an active caregiver in your home?: Yes  Care Transitions Interventions         Follow Up : Spoke with patient today for initial COVID call after discharge from [facility]. He says he is doing pretty good. He is wearing 3 LPM home oxygen and reading 94% currently. Says he is comfortable and not currently SOA. He says he is eating good, not problems with hydration. He has his HFU with PCP, did review medications with him. He says he is not on Trilegy but on Wixela BID. He says he could not afford the Trilegy. HE says that he does not check his blood sugars, has no glucometer, says he will discuss with DR. Eusebia Childress. He says he is quarantined til Tuesday next week. He has been contacted by [employer] and is scheduled for call on Monday at 11:30am. He lives at home with wife who is his caregiver. Discussed COVID calls and follow up and he is accepting. HE says he has no LW/AD, wife and family know what he wants he says. Encouraged to call with prn issues or problems. Will follow up at a later time. No future appointments.     Pati Mcgraw RN

## 2021-01-10 LAB
BLOOD CULTURE, ROUTINE: NORMAL
CULTURE, BLOOD 2: NORMAL

## 2021-01-15 ENCOUNTER — CARE COORDINATION (OUTPATIENT)
Dept: CASE MANAGEMENT | Age: 66
End: 2021-01-15

## 2021-01-15 NOTE — CARE COORDINATION
[facility] Transitions Follow Up Call    1/15/2021    Patient: Fatoumata Fuller  Patient : 1955   MRN: [id number]  Reason for Admission:   Discharge Date: 21 RARS: Readmission Risk Score: 29         Spoke with: N/ A    Care Transitions Subsequent and Final Call    Subsequent and Final Calls  Are you currently active with any services?: Home Health  Care Transitions Interventions  Other Interventions: Follow Up : Attempted to make contact with Ganesh Mendoza for Covid f/u call  without success. Unable to leave a message regarding intent of call and call back information. Will try again at a later time. No future appointments.     Selam Delacruz RN

## 2021-01-22 ENCOUNTER — CARE COORDINATION (OUTPATIENT)
Dept: CASE MANAGEMENT | Age: 66
End: 2021-01-22

## 2021-01-22 NOTE — CARE COORDINATION
scheduled with this CTN/ACM. Episode of Care resolved. Patient has this CTN/ACM contact information if future needs arise.     Ani Riuz RN

## 2021-03-01 ENCOUNTER — HOSPITAL ENCOUNTER (OUTPATIENT)
Dept: CT IMAGING | Age: 66
Discharge: HOME | End: 2021-03-01
Payer: MEDICARE

## 2021-03-01 DIAGNOSIS — R59.0 CERVICAL LYMPHADENOPATHY: ICD-10-CM

## 2021-03-01 DIAGNOSIS — R09.89 OTH SYMPTOMS AND SIGNS INVOLVING THE CIRC AND RESP SYSTEMS: ICD-10-CM

## 2021-03-01 LAB
BASE EXCESS VENOUS: 7
CALCIUM IONIZED: 1.17 MMOL/L (ref 1.1–1.3)
CO2: 35 MEQ/L (ref 21–32)
GFR AFRICAN AMERICAN: >60
GFR NON-AFRICAN AMERICAN: >60
GLUCOSE BLD-MCNC: 111 MG/DL (ref 70–99)
HEMOGLOBIN: 12 GM/DL (ref 12–18)
O2 SAT, VEN: 74 %
PCO2, VEN: 64.3 MM HG (ref 40–50)
PERFORMED ON: ABNORMAL
PH VENOUS: 7.34
PO2, VEN: 43.6 MM HG
POC ANION GAP: 8
POC CHLORIDE: 97 MEQ/L (ref 99–110)
POC CREATININE: 0.7 MG/DL (ref 0.3–1.3)
POC HEMATOCRIT: 35 % (ref 37–52)
POC POTASSIUM: 3.9 MEQ/L (ref 3.5–5.1)
POC SAMPLE TYPE: ABNORMAL
POC SODIUM: 140 MEQ/L (ref 136–145)
TCO2 CALC VENOUS: 36 MMOL/L

## 2021-03-01 PROCEDURE — 84295 ASSAY OF SERUM SODIUM: CPT

## 2021-03-01 PROCEDURE — 82435 ASSAY OF BLOOD CHLORIDE: CPT

## 2021-03-01 PROCEDURE — 84132 ASSAY OF SERUM POTASSIUM: CPT

## 2021-03-01 PROCEDURE — 71260 CT THORAX DX C+: CPT

## 2021-03-01 PROCEDURE — 82947 ASSAY GLUCOSE BLOOD QUANT: CPT

## 2021-03-01 PROCEDURE — 82565 ASSAY OF CREATININE: CPT

## 2021-03-01 PROCEDURE — 82810 BLOOD GASES O2 SAT ONLY: CPT

## 2021-03-01 PROCEDURE — 82800 BLOOD PH: CPT

## 2021-03-01 PROCEDURE — 85014 HEMATOCRIT: CPT

## 2021-03-01 PROCEDURE — 6360000004 HC RX CONTRAST MEDICATION: Performed by: FAMILY MEDICINE

## 2021-03-01 PROCEDURE — 82374 ASSAY BLOOD CARBON DIOXIDE: CPT

## 2021-03-01 PROCEDURE — 82330 ASSAY OF CALCIUM: CPT

## 2021-03-01 RX ADMIN — IOPAMIDOL 90 ML: 755 INJECTION, SOLUTION INTRAVENOUS at 09:09

## 2021-03-08 ENCOUNTER — OFFICE VISIT (OUTPATIENT)
Dept: PULMONOLOGY | Facility: CLINIC | Age: 66
End: 2021-03-08

## 2021-03-08 VITALS
DIASTOLIC BLOOD PRESSURE: 88 MMHG | OXYGEN SATURATION: 92 % | HEIGHT: 66 IN | HEART RATE: 101 BPM | WEIGHT: 226.8 LBS | BODY MASS INDEX: 36.45 KG/M2 | TEMPERATURE: 97.8 F | SYSTOLIC BLOOD PRESSURE: 168 MMHG

## 2021-03-08 DIAGNOSIS — J43.2 CENTRILOBULAR EMPHYSEMA (HCC): Chronic | ICD-10-CM

## 2021-03-08 DIAGNOSIS — J44.9 STAGE 3 SEVERE COPD BY GOLD CLASSIFICATION (HCC): Primary | ICD-10-CM

## 2021-03-08 DIAGNOSIS — R06.02 SHORTNESS OF BREATH: ICD-10-CM

## 2021-03-08 DIAGNOSIS — Z86.16 PERSONAL HISTORY OF COVID-19: ICD-10-CM

## 2021-03-08 DIAGNOSIS — K21.9 GASTROESOPHAGEAL REFLUX DISEASE WITHOUT ESOPHAGITIS: Chronic | ICD-10-CM

## 2021-03-08 DIAGNOSIS — R91.1 LUNG NODULE: ICD-10-CM

## 2021-03-08 DIAGNOSIS — Z87.891 PERSONAL HISTORY OF NICOTINE DEPENDENCE: Chronic | ICD-10-CM

## 2021-03-08 DIAGNOSIS — E66.9 OBESITY (BMI 30-39.9): ICD-10-CM

## 2021-03-08 DIAGNOSIS — G47.33 OBSTRUCTIVE SLEEP APNEA: Chronic | ICD-10-CM

## 2021-03-08 DIAGNOSIS — R59.1 LYMPHADENOPATHY: ICD-10-CM

## 2021-03-08 DIAGNOSIS — J96.11 CHRONIC RESPIRATORY FAILURE WITH HYPOXIA (HCC): Chronic | ICD-10-CM

## 2021-03-08 PROBLEM — J43.9 PULMONARY EMPHYSEMA: Chronic | Status: ACTIVE | Noted: 2018-11-27

## 2021-03-08 PROCEDURE — 99214 OFFICE O/P EST MOD 30 MIN: CPT | Performed by: NURSE PRACTITIONER

## 2021-03-08 PROCEDURE — 94664 DEMO&/EVAL PT USE INHALER: CPT | Performed by: NURSE PRACTITIONER

## 2021-03-08 RX ORDER — ROFLUMILAST 500 UG/1
500 TABLET ORAL DAILY
Qty: 30 TABLET | Refills: 11 | Status: SHIPPED | OUTPATIENT
Start: 2021-03-08 | End: 2022-09-30

## 2021-03-08 RX ORDER — CHOLECALCIFEROL (VITAMIN D3) 25 MCG
2000 CAPSULE ORAL DAILY
COMMUNITY
Start: 2021-01-07

## 2021-03-08 RX ORDER — TIOTROPIUM BROMIDE AND OLODATEROL 3.124; 2.736 UG/1; UG/1
2 SPRAY, METERED RESPIRATORY (INHALATION)
Qty: 1 EACH | Refills: 0 | COMMUNITY
Start: 2021-03-08 | End: 2021-04-07

## 2021-03-08 RX ORDER — ZINC SULFATE 50(220)MG
50 CAPSULE ORAL
COMMUNITY
Start: 2021-01-08

## 2021-03-18 NOTE — CARE COORDINATION
Spoke with pt to discuss [place] planning/needs. Pt lives at home with his spouse who is there for 24 hour care. At baseline, he is independently ambulatory. He does have a cane and walker in the home to use if needed. He was on home oxygen pta, this is supplied through [employer]. He also has a pulse ox at home that he uses to monitor his o2 levels. Eduardo  Ph: [phone]  Fax: [phone]  He is interested in [facility] services at [place]. He has previously had them but can not recall who his provider was. Pt stated that his medications are affordable and plans for his wife to transport him at [place]. Pt qualifies for the [facility] program with his diagnosis of COPD. After explaining their benefits, pt is interested in a referral to the program. AILYN will send. Left contact with pt for any further needs at [facility]. Electronically signed by Elisa Wright on 1/6/2021 at 12:36 PM       Eliquis coupon provided for pt at [facility].    Electronically signed by Elisa Wright on 1/6/2021 at 10:08 AM
Poor

## 2021-03-19 ENCOUNTER — TELEPHONE (OUTPATIENT)
Dept: PULMONOLOGY | Facility: CLINIC | Age: 66
End: 2021-03-19

## 2021-04-20 ENCOUNTER — APPOINTMENT (OUTPATIENT)
Dept: CT IMAGING | Facility: HOSPITAL | Age: 66
End: 2021-04-20

## 2021-04-20 ENCOUNTER — HOSPITAL ENCOUNTER (OUTPATIENT)
Dept: CARDIOLOGY | Facility: HOSPITAL | Age: 66
Discharge: HOME | End: 2021-04-20
Admitting: NURSE PRACTITIONER

## 2021-04-20 VITALS
WEIGHT: 226 LBS | HEIGHT: 66 IN | SYSTOLIC BLOOD PRESSURE: 156 MMHG | DIASTOLIC BLOOD PRESSURE: 83 MMHG | BODY MASS INDEX: 36.32 KG/M2

## 2021-04-20 DIAGNOSIS — R06.02 SHORTNESS OF BREATH: ICD-10-CM

## 2021-04-20 LAB
BH CV ECHO MEAS - AO MAX PG (FULL): 2.7 MMHG
BH CV ECHO MEAS - AO MAX PG: 9.5 MMHG
BH CV ECHO MEAS - AO MEAN PG (FULL): 1 MMHG
BH CV ECHO MEAS - AO MEAN PG: 5 MMHG
BH CV ECHO MEAS - AO ROOT AREA (BSA CORRECTED): 1.5
BH CV ECHO MEAS - AO ROOT AREA: 7.5 CM^2
BH CV ECHO MEAS - AO ROOT DIAM: 3.1 CM
BH CV ECHO MEAS - AO V2 MAX: 154 CM/SEC
BH CV ECHO MEAS - AO V2 MEAN: 109 CM/SEC
BH CV ECHO MEAS - AO V2 VTI: 30.2 CM
BH CV ECHO MEAS - AVA(I,A): 2.8 CM^2
BH CV ECHO MEAS - AVA(I,D): 2.8 CM^2
BH CV ECHO MEAS - AVA(V,A): 2.7 CM^2
BH CV ECHO MEAS - AVA(V,D): 2.7 CM^2
BH CV ECHO MEAS - BSA(HAYCOCK): 2.2 M^2
BH CV ECHO MEAS - BSA: 2.1 M^2
BH CV ECHO MEAS - BZI_BMI: 36.5 KILOGRAMS/M^2
BH CV ECHO MEAS - BZI_METRIC_HEIGHT: 167.6 CM
BH CV ECHO MEAS - BZI_METRIC_WEIGHT: 102.5 KG
BH CV ECHO MEAS - EDV(CUBED): 128 ML
BH CV ECHO MEAS - EDV(MOD-SP4): 74.2 ML
BH CV ECHO MEAS - EDV(TEICH): 120.5 ML
BH CV ECHO MEAS - EF(CUBED): 72.4 %
BH CV ECHO MEAS - EF(MOD-SP4): 62 %
BH CV ECHO MEAS - EF(TEICH): 63.9 %
BH CV ECHO MEAS - ESV(CUBED): 35.3 ML
BH CV ECHO MEAS - ESV(MOD-SP4): 28.2 ML
BH CV ECHO MEAS - ESV(TEICH): 43.5 ML
BH CV ECHO MEAS - FS: 34.9 %
BH CV ECHO MEAS - IVS/LVPW: 0.83
BH CV ECHO MEAS - IVSD: 0.84 CM
BH CV ECHO MEAS - LA DIMENSION: 3.6 CM
BH CV ECHO MEAS - LA/AO: 1.2
BH CV ECHO MEAS - LAT PEAK E' VEL: 8.8 CM/SEC
BH CV ECHO MEAS - LV DIASTOLIC VOL/BSA (35-75): 35.2 ML/M^2
BH CV ECHO MEAS - LV MASS(C)D: 166 GRAMS
BH CV ECHO MEAS - LV MASS(C)DI: 78.8 GRAMS/M^2
BH CV ECHO MEAS - LV MAX PG: 6.8 MMHG
BH CV ECHO MEAS - LV MEAN PG: 4 MMHG
BH CV ECHO MEAS - LV SYSTOLIC VOL/BSA (12-30): 13.4 ML/M^2
BH CV ECHO MEAS - LV V1 MAX: 130 CM/SEC
BH CV ECHO MEAS - LV V1 MEAN: 84.4 CM/SEC
BH CV ECHO MEAS - LV V1 VTI: 27 CM
BH CV ECHO MEAS - LVIDD: 5 CM
BH CV ECHO MEAS - LVIDS: 3.3 CM
BH CV ECHO MEAS - LVLD AP4: 7.7 CM
BH CV ECHO MEAS - LVLS AP4: 6.4 CM
BH CV ECHO MEAS - LVOT AREA (M): 3.1 CM^2
BH CV ECHO MEAS - LVOT AREA: 3.1 CM^2
BH CV ECHO MEAS - LVOT DIAM: 2 CM
BH CV ECHO MEAS - LVPWD: 1 CM
BH CV ECHO MEAS - MED PEAK E' VEL: 10 CM/SEC
BH CV ECHO MEAS - MV A MAX VEL: 77.8 CM/SEC
BH CV ECHO MEAS - MV DEC TIME: 0.2 SEC
BH CV ECHO MEAS - MV E MAX VEL: 104 CM/SEC
BH CV ECHO MEAS - MV E/A: 1.3
BH CV ECHO MEAS - RAP SYSTOLE: 5 MMHG
BH CV ECHO MEAS - RVSP: 13.8 MMHG
BH CV ECHO MEAS - SI(AO): 108.2 ML/M^2
BH CV ECHO MEAS - SI(CUBED): 44 ML/M^2
BH CV ECHO MEAS - SI(LVOT): 40.3 ML/M^2
BH CV ECHO MEAS - SI(MOD-SP4): 21.8 ML/M^2
BH CV ECHO MEAS - SI(TEICH): 36.5 ML/M^2
BH CV ECHO MEAS - SV(AO): 227.9 ML
BH CV ECHO MEAS - SV(CUBED): 92.7 ML
BH CV ECHO MEAS - SV(LVOT): 84.8 ML
BH CV ECHO MEAS - SV(MOD-SP4): 46 ML
BH CV ECHO MEAS - SV(TEICH): 77 ML
BH CV ECHO MEAS - TR MAX VEL: 148 CM/SEC
BH CV ECHO MEASUREMENTS AVERAGE E/E' RATIO: 11.06
LEFT ATRIUM VOLUME INDEX: 22.8 ML/M2
LEFT ATRIUM VOLUME: 48.2 CM3
MAXIMAL PREDICTED HEART RATE: 154 BPM
STRESS TARGET HR: 131 BPM

## 2021-04-20 PROCEDURE — 93306 TTE W/DOPPLER COMPLETE: CPT | Performed by: INTERNAL MEDICINE

## 2021-04-20 PROCEDURE — 93306 TTE W/DOPPLER COMPLETE: CPT

## 2021-04-26 DIAGNOSIS — J44.9 STAGE 3 SEVERE COPD BY GOLD CLASSIFICATION (HCC): Primary | ICD-10-CM

## 2021-05-20 ENCOUNTER — APPOINTMENT (OUTPATIENT)
Dept: CT IMAGING | Facility: HOSPITAL | Age: 66
End: 2021-05-20

## 2021-06-08 ENCOUNTER — HOSPITAL ENCOUNTER (OUTPATIENT)
Dept: CT IMAGING | Facility: HOSPITAL | Age: 66
Discharge: HOME | End: 2021-06-08
Admitting: NURSE PRACTITIONER

## 2021-06-08 DIAGNOSIS — R59.1 LYMPHADENOPATHY: ICD-10-CM

## 2021-06-08 LAB — CREAT BLDA-MCNC: 0.8 MG/DL (ref 0.6–1.3)

## 2021-06-08 PROCEDURE — 25010000002 IOPAMIDOL 61 % SOLUTION: Performed by: NURSE PRACTITIONER

## 2021-06-08 PROCEDURE — 82565 ASSAY OF CREATININE: CPT

## 2021-06-08 PROCEDURE — 71260 CT THORAX DX C+: CPT

## 2021-06-08 RX ADMIN — IOPAMIDOL 100 ML: 612 INJECTION, SOLUTION INTRAVENOUS at 08:34

## 2021-06-15 ENCOUNTER — OFFICE VISIT (OUTPATIENT)
Dept: PULMONOLOGY | Facility: CLINIC | Age: 66
End: 2021-06-15

## 2021-06-15 VITALS
HEIGHT: 66 IN | DIASTOLIC BLOOD PRESSURE: 84 MMHG | SYSTOLIC BLOOD PRESSURE: 176 MMHG | OXYGEN SATURATION: 98 % | WEIGHT: 230.8 LBS | BODY MASS INDEX: 37.09 KG/M2 | HEART RATE: 86 BPM

## 2021-06-15 DIAGNOSIS — K21.9 GASTROESOPHAGEAL REFLUX DISEASE WITHOUT ESOPHAGITIS: Chronic | ICD-10-CM

## 2021-06-15 DIAGNOSIS — J44.9 STAGE 3 SEVERE COPD BY GOLD CLASSIFICATION (HCC): Primary | Chronic | ICD-10-CM

## 2021-06-15 DIAGNOSIS — Z87.891 PERSONAL HISTORY OF NICOTINE DEPENDENCE: Chronic | ICD-10-CM

## 2021-06-15 DIAGNOSIS — R91.1 LUNG NODULE: Chronic | ICD-10-CM

## 2021-06-15 DIAGNOSIS — E66.9 OBESITY (BMI 30-39.9): Chronic | ICD-10-CM

## 2021-06-15 DIAGNOSIS — J96.11 CHRONIC RESPIRATORY FAILURE WITH HYPOXIA (HCC): Chronic | ICD-10-CM

## 2021-06-15 DIAGNOSIS — Z01.811 PREOPERATIVE RESPIRATORY EXAMINATION: ICD-10-CM

## 2021-06-15 DIAGNOSIS — G47.33 OBSTRUCTIVE SLEEP APNEA: Chronic | ICD-10-CM

## 2021-06-15 DIAGNOSIS — J43.2 CENTRILOBULAR EMPHYSEMA (HCC): Chronic | ICD-10-CM

## 2021-06-15 PROCEDURE — 99214 OFFICE O/P EST MOD 30 MIN: CPT | Performed by: NURSE PRACTITIONER

## 2021-06-15 RX ORDER — BRINZOLAMIDE/BRIMONIDINE TARTRATE 10; 2 MG/ML; MG/ML
SUSPENSION/ DROPS OPHTHALMIC
COMMUNITY
Start: 2021-06-10 | End: 2022-03-31 | Stop reason: CLARIF

## 2021-06-16 PROBLEM — R91.1 LUNG NODULE: Chronic | Status: ACTIVE | Noted: 2021-03-08

## 2021-06-16 PROBLEM — J98.4 SCARRING OF LUNG: Chronic | Status: ACTIVE | Noted: 2018-11-27

## 2021-09-21 ENCOUNTER — TRANSCRIBE ORDERS (OUTPATIENT)
Dept: LAB | Facility: HOSPITAL | Age: 66
End: 2021-09-21

## 2021-09-21 DIAGNOSIS — Z01.818 PREOP TESTING: Primary | ICD-10-CM

## 2021-09-23 ENCOUNTER — TELEPHONE (OUTPATIENT)
Dept: PULMONOLOGY | Facility: CLINIC | Age: 66
End: 2021-09-23

## 2021-09-27 ENCOUNTER — HOSPITAL ENCOUNTER (OUTPATIENT)
Dept: GENERAL RADIOLOGY | Facility: HOSPITAL | Age: 66
Discharge: HOME | End: 2021-09-27

## 2021-09-27 ENCOUNTER — LAB (OUTPATIENT)
Dept: LAB | Facility: HOSPITAL | Age: 66
End: 2021-09-27

## 2021-09-27 DIAGNOSIS — Z01.811 PREOPERATIVE RESPIRATORY EXAMINATION: ICD-10-CM

## 2021-09-27 LAB — SARS-COV-2 ORF1AB RESP QL NAA+PROBE: NOT DETECTED

## 2021-09-27 PROCEDURE — 71046 X-RAY EXAM CHEST 2 VIEWS: CPT

## 2021-09-27 PROCEDURE — C9803 HOPD COVID-19 SPEC COLLECT: HCPCS | Performed by: NURSE PRACTITIONER

## 2021-09-27 PROCEDURE — U0004 COV-19 TEST NON-CDC HGH THRU: HCPCS | Performed by: NURSE PRACTITIONER

## 2021-09-29 ENCOUNTER — HOSPITAL ENCOUNTER (OUTPATIENT)
Dept: PREADMISSION TESTING | Age: 66
Discharge: HOME | End: 2021-10-03
Payer: MEDICARE

## 2021-09-29 VITALS — WEIGHT: 226 LBS | HEIGHT: 66 IN | BODY MASS INDEX: 36.32 KG/M2

## 2021-09-29 LAB
ABO/RH: NORMAL
ALBUMIN SERPL-MCNC: 4 G/DL (ref 3.5–5.2)
ALP BLD-CCNC: 196 U/L (ref 40–130)
ALT SERPL-CCNC: 20 U/L (ref 5–41)
ANION GAP SERPL CALCULATED.3IONS-SCNC: 10 MMOL/L (ref 7–19)
ANTIBODY SCREEN: NORMAL
APTT: 29.8 SEC (ref 26–36.2)
AST SERPL-CCNC: 19 U/L (ref 5–40)
BACTERIA: ABNORMAL /HPF
BASE EXCESS ARTERIAL: 6.5 MMOL/L (ref -2–2)
BASOPHILS ABSOLUTE: 0.1 K/UL (ref 0–0.2)
BASOPHILS RELATIVE PERCENT: 0.5 % (ref 0–1)
BILIRUB SERPL-MCNC: 0.3 MG/DL (ref 0.2–1.2)
BILIRUBIN URINE: NEGATIVE
BLOOD, URINE: NEGATIVE
BUN BLDV-MCNC: 9 MG/DL (ref 8–23)
CALCIUM SERPL-MCNC: 9.6 MG/DL (ref 8.8–10.2)
CARBOXYHEMOGLOBIN ARTERIAL: 1.7 % (ref 0–5)
CHLORIDE BLD-SCNC: 97 MMOL/L (ref 98–111)
CLARITY: CLEAR
CO2: 31 MMOL/L (ref 22–29)
COLOR: YELLOW
CREAT SERPL-MCNC: 0.7 MG/DL (ref 0.5–1.2)
EKG P AXIS: 55 DEGREES
EKG P-R INTERVAL: 166 MS
EKG Q-T INTERVAL: 390 MS
EKG QRS DURATION: 144 MS
EKG QTC CALCULATION (BAZETT): 426 MS
EKG T AXIS: 65 DEGREES
EOSINOPHILS ABSOLUTE: 0.3 K/UL (ref 0–0.6)
EOSINOPHILS RELATIVE PERCENT: 2.3 % (ref 0–5)
EPITHELIAL CELLS, UA: 1 /HPF (ref 0–5)
GFR AFRICAN AMERICAN: >59
GFR NON-AFRICAN AMERICAN: >60
GLUCOSE BLD-MCNC: 122 MG/DL (ref 74–109)
GLUCOSE URINE: NEGATIVE MG/DL
HBA1C MFR BLD: 5.5 % (ref 4–6)
HCO3 ARTERIAL: 33.1 MMOL/L (ref 22–26)
HCT VFR BLD CALC: 38.4 % (ref 42–52)
HEMOGLOBIN, ART, EXTENDED: 11.6 G/DL (ref 14–18)
HEMOGLOBIN: 11.5 G/DL (ref 14–18)
HYALINE CASTS: 0 /HPF (ref 0–8)
IMMATURE GRANULOCYTES #: 0 K/UL
INR BLD: 1.01 (ref 0.88–1.18)
KETONES, URINE: NEGATIVE MG/DL
LEUKOCYTE ESTERASE, URINE: ABNORMAL
LYMPHOCYTES ABSOLUTE: 2.3 K/UL (ref 1.1–4.5)
LYMPHOCYTES RELATIVE PERCENT: 21.4 % (ref 20–40)
MCH RBC QN AUTO: 29.1 PG (ref 27–31)
MCHC RBC AUTO-ENTMCNC: 29.9 G/DL (ref 33–37)
MCV RBC AUTO: 97.2 FL (ref 80–94)
METHEMOGLOBIN ARTERIAL: 1.1 %
MONOCYTES ABSOLUTE: 0.7 K/UL (ref 0–0.9)
MONOCYTES RELATIVE PERCENT: 6.1 % (ref 0–10)
NEUTROPHILS ABSOLUTE: 7.6 K/UL (ref 1.5–7.5)
NEUTROPHILS RELATIVE PERCENT: 69.3 % (ref 50–65)
NITRITE, URINE: NEGATIVE
O2 CONTENT ARTERIAL: 15.8 ML/DL
O2 SAT, ARTERIAL: 96.1 %
O2 THERAPY: ABNORMAL
PCO2 ARTERIAL: 56 MMHG (ref 35–45)
PDW BLD-RTO: 13.3 % (ref 11.5–14.5)
PH ARTERIAL: 7.38 (ref 7.35–7.45)
PH UA: 7 (ref 5–8)
PLATELET # BLD: 338 K/UL (ref 130–400)
PMV BLD AUTO: 9 FL (ref 9.4–12.4)
PO2 ARTERIAL: 99 MMHG (ref 80–100)
POTASSIUM SERPL-SCNC: 4.2 MMOL/L (ref 3.5–5)
POTASSIUM, WHOLE BLOOD: 4.1
PROTEIN UA: NEGATIVE MG/DL
PROTHROMBIN TIME: 13.5 SEC (ref 12–14.6)
RBC # BLD: 3.95 M/UL (ref 4.7–6.1)
RBC UA: 1 /HPF (ref 0–4)
SODIUM BLD-SCNC: 138 MMOL/L (ref 136–145)
SPECIFIC GRAVITY UA: 1.01 (ref 1–1.03)
TOTAL PROTEIN: 8.4 G/DL (ref 6.6–8.7)
UROBILINOGEN, URINE: 1 E.U./DL
WBC # BLD: 11 K/UL (ref 4.8–10.8)
WBC UA: 2 /HPF (ref 0–5)

## 2021-09-29 PROCEDURE — 84132 ASSAY OF SERUM POTASSIUM: CPT

## 2021-09-29 PROCEDURE — 85025 COMPLETE CBC W/AUTO DIFF WBC: CPT

## 2021-09-29 PROCEDURE — 85730 THROMBOPLASTIN TIME PARTIAL: CPT

## 2021-09-29 PROCEDURE — 83036 HEMOGLOBIN GLYCOSYLATED A1C: CPT

## 2021-09-29 PROCEDURE — 86901 BLOOD TYPING SEROLOGIC RH(D): CPT

## 2021-09-29 PROCEDURE — 85610 PROTHROMBIN TIME: CPT

## 2021-09-29 PROCEDURE — 93005 ELECTROCARDIOGRAM TRACING: CPT | Performed by: ORTHOPAEDIC SURGERY

## 2021-09-29 PROCEDURE — 87081 CULTURE SCREEN ONLY: CPT

## 2021-09-29 PROCEDURE — 80053 COMPREHEN METABOLIC PANEL: CPT

## 2021-09-29 PROCEDURE — 36600 WITHDRAWAL OF ARTERIAL BLOOD: CPT

## 2021-09-29 PROCEDURE — 86850 RBC ANTIBODY SCREEN: CPT

## 2021-09-29 PROCEDURE — 82803 BLOOD GASES ANY COMBINATION: CPT

## 2021-09-29 PROCEDURE — 81001 URINALYSIS AUTO W/SCOPE: CPT

## 2021-09-29 PROCEDURE — 86900 BLOOD TYPING SEROLOGIC ABO: CPT

## 2021-09-29 RX ORDER — ACETAMINOPHEN 500 MG
1000 TABLET ORAL ONCE
Status: CANCELLED | OUTPATIENT
Start: 2021-10-20

## 2021-09-29 RX ORDER — DEXAMETHASONE SODIUM PHOSPHATE 10 MG/ML
10 INJECTION, SOLUTION INTRAMUSCULAR; INTRAVENOUS ONCE
Status: CANCELLED | OUTPATIENT
Start: 2021-10-20

## 2021-09-29 RX ORDER — CELECOXIB 200 MG/1
200 CAPSULE ORAL ONCE
Status: CANCELLED | OUTPATIENT
Start: 2021-10-20

## 2021-09-29 RX ORDER — AMITRIPTYLINE HYDROCHLORIDE 25 MG/1
50 TABLET, FILM COATED ORAL NIGHTLY
COMMUNITY

## 2021-09-29 RX ORDER — OXYCODONE HCL 10 MG/1
10 TABLET, FILM COATED, EXTENDED RELEASE ORAL ONCE
Status: CANCELLED | OUTPATIENT
Start: 2021-10-20

## 2021-09-29 RX ORDER — OXYCODONE HYDROCHLORIDE 15 MG/1
15 TABLET ORAL 4 TIMES DAILY
Status: ON HOLD | COMMUNITY
End: 2021-10-21 | Stop reason: HOSPADM

## 2021-09-29 NOTE — PROGRESS NOTES
Results for Alexandra Petersing (MRN [id number]) as of 9/29/2021 09:56   Ref.  Range 9/29/2021 09:53   Hemoglobin, Art, Extended Latest Ref Range: 14.0 - 18.0 g/dL 11.6 (L)   pH, Arterial Latest Ref Range: 7.350 - 7.450  7.380   pCO2, Arterial Latest Ref Range: 35.0 - 45.0 mmHg 56.0 (H)   pO2, Arterial Latest Ref Range: 80.0 - 100.0 mmHg 99.0   HCO3, Arterial Latest Ref Range: 22.0 - 26.0 mmol/L 33.1 (H)   Base Excess, Arterial Latest Ref Range: -2.0 - 2.0 mmol/L 6.5 (H)   O2 Sat, Arterial Latest Ref Range: >92 % 96.1   O2 Content, Arterial Latest Ref Range: Not Established mL/dL 15.8   Methemoglobin, Arterial Latest Ref Range: <1.5 % 1.1   Carboxyhgb, Arterial Latest Ref Range: 0.0 - 5.0 % 1.7   Pt on 3 lpm nc, RR, AR+

## 2021-09-30 ENCOUNTER — PROCEDURE VISIT (OUTPATIENT)
Dept: PULMONOLOGY | Facility: CLINIC | Age: 66
End: 2021-09-30

## 2021-09-30 ENCOUNTER — OFFICE VISIT (OUTPATIENT)
Dept: PULMONOLOGY | Facility: CLINIC | Age: 66
End: 2021-09-30

## 2021-09-30 ENCOUNTER — TELEPHONE (OUTPATIENT)
Dept: PULMONOLOGY | Facility: CLINIC | Age: 66
End: 2021-09-30

## 2021-09-30 VITALS
BODY MASS INDEX: 38.93 KG/M2 | WEIGHT: 228 LBS | DIASTOLIC BLOOD PRESSURE: 90 MMHG | SYSTOLIC BLOOD PRESSURE: 160 MMHG | OXYGEN SATURATION: 98 % | HEIGHT: 64 IN | HEART RATE: 113 BPM

## 2021-09-30 DIAGNOSIS — G47.33 OBSTRUCTIVE SLEEP APNEA: Chronic | ICD-10-CM

## 2021-09-30 DIAGNOSIS — J96.11 CHRONIC RESPIRATORY FAILURE WITH HYPOXIA (HCC): Chronic | ICD-10-CM

## 2021-09-30 DIAGNOSIS — Z01.811 PREOP PULMONARY/RESPIRATORY EXAM: Chronic | ICD-10-CM

## 2021-09-30 DIAGNOSIS — J44.9 STAGE 3 SEVERE COPD BY GOLD CLASSIFICATION (HCC): Primary | ICD-10-CM

## 2021-09-30 DIAGNOSIS — E66.9 OBESITY (BMI 30-39.9): Chronic | ICD-10-CM

## 2021-09-30 DIAGNOSIS — J43.2 CENTRILOBULAR EMPHYSEMA (HCC): Chronic | ICD-10-CM

## 2021-09-30 DIAGNOSIS — Z01.811 PREOPERATIVE RESPIRATORY EXAMINATION: ICD-10-CM

## 2021-09-30 DIAGNOSIS — Z87.891 PERSONAL HISTORY OF NICOTINE DEPENDENCE: Chronic | ICD-10-CM

## 2021-09-30 DIAGNOSIS — R91.1 LUNG NODULE: Chronic | ICD-10-CM

## 2021-09-30 DIAGNOSIS — K21.9 GASTROESOPHAGEAL REFLUX DISEASE WITHOUT ESOPHAGITIS: Chronic | ICD-10-CM

## 2021-09-30 PROCEDURE — 99214 OFFICE O/P EST MOD 30 MIN: CPT | Performed by: NURSE PRACTITIONER

## 2021-09-30 PROCEDURE — 94010 BREATHING CAPACITY TEST: CPT | Performed by: NURSE PRACTITIONER

## 2021-09-30 PROCEDURE — 94729 DIFFUSING CAPACITY: CPT | Performed by: NURSE PRACTITIONER

## 2021-10-01 LAB — MRSA CULTURE ONLY: NORMAL

## 2021-10-18 ENCOUNTER — OFFICE VISIT (OUTPATIENT)
Age: 66
End: 2021-10-18

## 2021-10-18 DIAGNOSIS — Z11.59 SCREENING FOR VIRAL DISEASE: Primary | ICD-10-CM

## 2021-10-18 LAB — SARS-COV-2, PCR: NOT DETECTED

## 2021-10-18 PROCEDURE — 99999 PR OFFICE/OUTPT VISIT,PROCEDURE ONLY: CPT | Performed by: NURSE PRACTITIONER

## 2021-10-20 ENCOUNTER — HOSPITAL ENCOUNTER (INPATIENT)
Age: 66
LOS: 1 days | Discharge: HOME HEALTH | DRG: 470 | End: 2021-10-21
Attending: ORTHOPAEDIC SURGERY | Admitting: ORTHOPAEDIC SURGERY
Payer: MEDICARE

## 2021-10-20 ENCOUNTER — APPOINTMENT (OUTPATIENT)
Dept: GENERAL RADIOLOGY | Age: 66
DRG: 470 | End: 2021-10-20
Attending: ORTHOPAEDIC SURGERY
Payer: MEDICARE

## 2021-10-20 ENCOUNTER — ANESTHESIA EVENT (OUTPATIENT)
Dept: OPERATING ROOM | Age: 66
DRG: 470 | End: 2021-10-20
Payer: MEDICARE

## 2021-10-20 ENCOUNTER — ANESTHESIA (OUTPATIENT)
Dept: OPERATING ROOM | Age: 66
DRG: 470 | End: 2021-10-20
Payer: MEDICARE

## 2021-10-20 VITALS — OXYGEN SATURATION: 90 % | DIASTOLIC BLOOD PRESSURE: 50 MMHG | TEMPERATURE: 97 F | SYSTOLIC BLOOD PRESSURE: 95 MMHG

## 2021-10-20 DIAGNOSIS — M16.12 PRIMARY OSTEOARTHRITIS OF LEFT HIP: Primary | ICD-10-CM

## 2021-10-20 PROBLEM — M16.9 DEGENERATIVE JOINT DISEASE (DJD) OF HIP: Status: ACTIVE | Noted: 2021-10-20

## 2021-10-20 LAB
ABO/RH: NORMAL
ANTIBODY SCREEN: NORMAL
GLUCOSE BLD-MCNC: 121 MG/DL (ref 70–99)
GLUCOSE BLD-MCNC: 146 MG/DL (ref 70–99)
GLUCOSE BLD-MCNC: 150 MG/DL (ref 70–99)
GLUCOSE BLD-MCNC: 194 MG/DL (ref 70–99)
HBA1C MFR BLD: 5.6 % (ref 4–6)
PERFORMED ON: ABNORMAL

## 2021-10-20 PROCEDURE — 36415 COLL VENOUS BLD VENIPUNCTURE: CPT

## 2021-10-20 PROCEDURE — C9290 INJ, BUPIVACAINE LIPOSOME: HCPCS | Performed by: ORTHOPAEDIC SURGERY

## 2021-10-20 PROCEDURE — 3700000000 HC ANESTHESIA ATTENDED CARE: Performed by: ORTHOPAEDIC SURGERY

## 2021-10-20 PROCEDURE — 6360000002 HC RX W HCPCS: Performed by: ORTHOPAEDIC SURGERY

## 2021-10-20 PROCEDURE — 6360000002 HC RX W HCPCS

## 2021-10-20 PROCEDURE — 6370000000 HC RX 637 (ALT 250 FOR IP): Performed by: ORTHOPAEDIC SURGERY

## 2021-10-20 PROCEDURE — 2709999900 HC NON-CHARGEABLE SUPPLY: Performed by: ORTHOPAEDIC SURGERY

## 2021-10-20 PROCEDURE — C1776 JOINT DEVICE (IMPLANTABLE): HCPCS | Performed by: ORTHOPAEDIC SURGERY

## 2021-10-20 PROCEDURE — 2500000003 HC RX 250 WO HCPCS: Performed by: ORTHOPAEDIC SURGERY

## 2021-10-20 PROCEDURE — 88311 DECALCIFY TISSUE: CPT

## 2021-10-20 PROCEDURE — 82947 ASSAY GLUCOSE BLOOD QUANT: CPT

## 2021-10-20 PROCEDURE — 2580000003 HC RX 258: Performed by: ANESTHESIOLOGY

## 2021-10-20 PROCEDURE — 3700000001 HC ADD 15 MINUTES (ANESTHESIA): Performed by: ORTHOPAEDIC SURGERY

## 2021-10-20 PROCEDURE — P9045 ALBUMIN (HUMAN), 5%, 250 ML: HCPCS

## 2021-10-20 PROCEDURE — 3600000015 HC SURGERY LEVEL 5 ADDTL 15MIN: Performed by: ORTHOPAEDIC SURGERY

## 2021-10-20 PROCEDURE — 2720000010 HC SURG SUPPLY STERILE: Performed by: ORTHOPAEDIC SURGERY

## 2021-10-20 PROCEDURE — 3209999900 FLUORO FOR SURGICAL PROCEDURES

## 2021-10-20 PROCEDURE — 0SRB04A REPLACEMENT OF LEFT HIP JOINT WITH CERAMIC ON POLYETHYLENE SYNTHETIC SUBSTITUTE, UNCEMENTED, OPEN APPROACH: ICD-10-PCS | Performed by: ORTHOPAEDIC SURGERY

## 2021-10-20 PROCEDURE — 83036 HEMOGLOBIN GLYCOSYLATED A1C: CPT

## 2021-10-20 PROCEDURE — 2500000003 HC RX 250 WO HCPCS

## 2021-10-20 PROCEDURE — 73502 X-RAY EXAM HIP UNI 2-3 VIEWS: CPT

## 2021-10-20 PROCEDURE — 3600000005 HC SURGERY LEVEL 5 BASE: Performed by: ORTHOPAEDIC SURGERY

## 2021-10-20 PROCEDURE — 86901 BLOOD TYPING SEROLOGIC RH(D): CPT

## 2021-10-20 PROCEDURE — 7100000000 HC PACU RECOVERY - FIRST 15 MIN: Performed by: ORTHOPAEDIC SURGERY

## 2021-10-20 PROCEDURE — 2580000003 HC RX 258: Performed by: ORTHOPAEDIC SURGERY

## 2021-10-20 PROCEDURE — C1713 ANCHOR/SCREW BN/BN,TIS/BN: HCPCS | Performed by: ORTHOPAEDIC SURGERY

## 2021-10-20 PROCEDURE — 1210000000 HC MED SURG R&B

## 2021-10-20 PROCEDURE — 7100000001 HC PACU RECOVERY - ADDTL 15 MIN: Performed by: ORTHOPAEDIC SURGERY

## 2021-10-20 PROCEDURE — 88304 TISSUE EXAM BY PATHOLOGIST: CPT

## 2021-10-20 PROCEDURE — 86850 RBC ANTIBODY SCREEN: CPT

## 2021-10-20 PROCEDURE — 86900 BLOOD TYPING SEROLOGIC ABO: CPT

## 2021-10-20 DEVICE — VIVACIT-E DM BEARING 28X44MM: Type: IMPLANTABLE DEVICE | Site: HIP | Status: FUNCTIONAL

## 2021-10-20 DEVICE — SHELL ACET SZ F DIA56MM MH OSSEOTI 2 MOBILITY G7: Type: IMPLANTABLE DEVICE | Site: HIP | Status: FUNCTIONAL

## 2021-10-20 DEVICE — STEM FEM SZ 8 L111MM 12/14 TAPR STD OFFSET HIP DUOFIX CLLRD: Type: IMPLANTABLE DEVICE | Site: HIP | Status: FUNCTIONAL

## 2021-10-20 DEVICE — BONE SCREW 6.5X30 SELF-TAP: Type: IMPLANTABLE DEVICE | Site: HIP | Status: FUNCTIONAL

## 2021-10-20 DEVICE — BONE SCREW 6.5X35 SELF-TAP: Type: IMPLANTABLE DEVICE | Site: HIP | Status: FUNCTIONAL

## 2021-10-20 DEVICE — HEAD FEM DIA28MM +5MM OFFSET 12/14 TAPR HIP CERAMIC BIOLOX: Type: IMPLANTABLE DEVICE | Site: HIP | Status: FUNCTIONAL

## 2021-10-20 DEVICE — G7 DUAL MOBILITY LINER 44MM F: Type: IMPLANTABLE DEVICE | Site: HIP | Status: FUNCTIONAL

## 2021-10-20 RX ORDER — OXYCODONE HCL 10 MG/1
10 TABLET, FILM COATED, EXTENDED RELEASE ORAL ONCE
Status: COMPLETED | OUTPATIENT
Start: 2021-10-20 | End: 2021-10-20

## 2021-10-20 RX ORDER — LABETALOL HYDROCHLORIDE 5 MG/ML
5 INJECTION, SOLUTION INTRAVENOUS EVERY 10 MIN PRN
Status: DISCONTINUED | OUTPATIENT
Start: 2021-10-20 | End: 2021-10-20 | Stop reason: HOSPADM

## 2021-10-20 RX ORDER — CELECOXIB 200 MG/1
200 CAPSULE ORAL ONCE
Status: COMPLETED | OUTPATIENT
Start: 2021-10-20 | End: 2021-10-20

## 2021-10-20 RX ORDER — DIPHENHYDRAMINE HYDROCHLORIDE 50 MG/ML
12.5 INJECTION INTRAMUSCULAR; INTRAVENOUS
Status: DISCONTINUED | OUTPATIENT
Start: 2021-10-20 | End: 2021-10-20 | Stop reason: HOSPADM

## 2021-10-20 RX ORDER — OXYCODONE HCL 10 MG/1
10 TABLET, FILM COATED, EXTENDED RELEASE ORAL EVERY 12 HOURS SCHEDULED
Status: DISCONTINUED | OUTPATIENT
Start: 2021-10-20 | End: 2021-10-21 | Stop reason: HOSPADM

## 2021-10-20 RX ORDER — DIPHENHYDRAMINE HCL 25 MG
25 TABLET ORAL EVERY 6 HOURS PRN
Status: DISCONTINUED | OUTPATIENT
Start: 2021-10-20 | End: 2021-10-21 | Stop reason: HOSPADM

## 2021-10-20 RX ORDER — ACETAMINOPHEN 500 MG
1000 TABLET ORAL ONCE
Status: COMPLETED | OUTPATIENT
Start: 2021-10-20 | End: 2021-10-20

## 2021-10-20 RX ORDER — BUSPIRONE HYDROCHLORIDE 5 MG/1
7.5 TABLET ORAL 2 TIMES DAILY
Status: DISCONTINUED | OUTPATIENT
Start: 2021-10-20 | End: 2021-10-21 | Stop reason: HOSPADM

## 2021-10-20 RX ORDER — HYDROMORPHONE HYDROCHLORIDE 1 MG/ML
0.25 INJECTION, SOLUTION INTRAMUSCULAR; INTRAVENOUS; SUBCUTANEOUS EVERY 5 MIN PRN
Status: DISCONTINUED | OUTPATIENT
Start: 2021-10-20 | End: 2021-10-20 | Stop reason: HOSPADM

## 2021-10-20 RX ORDER — PROMETHAZINE HYDROCHLORIDE 25 MG/ML
6.25 INJECTION, SOLUTION INTRAMUSCULAR; INTRAVENOUS
Status: DISCONTINUED | OUTPATIENT
Start: 2021-10-20 | End: 2021-10-20 | Stop reason: HOSPADM

## 2021-10-20 RX ORDER — LIDOCAINE HYDROCHLORIDE 10 MG/ML
INJECTION, SOLUTION EPIDURAL; INFILTRATION; INTRACAUDAL; PERINEURAL PRN
Status: DISCONTINUED | OUTPATIENT
Start: 2021-10-20 | End: 2021-10-20 | Stop reason: SDUPTHER

## 2021-10-20 RX ORDER — OXYCODONE HYDROCHLORIDE 5 MG/1
10 TABLET ORAL EVERY 4 HOURS PRN
Status: DISCONTINUED | OUTPATIENT
Start: 2021-10-20 | End: 2021-10-21 | Stop reason: HOSPADM

## 2021-10-20 RX ORDER — MIDAZOLAM HYDROCHLORIDE 1 MG/ML
2 INJECTION INTRAMUSCULAR; INTRAVENOUS
Status: DISCONTINUED | OUTPATIENT
Start: 2021-10-20 | End: 2021-10-20 | Stop reason: HOSPADM

## 2021-10-20 RX ORDER — AMITRIPTYLINE HYDROCHLORIDE 25 MG/1
25 TABLET, FILM COATED ORAL NIGHTLY
Status: DISCONTINUED | OUTPATIENT
Start: 2021-10-20 | End: 2021-10-21 | Stop reason: HOSPADM

## 2021-10-20 RX ORDER — TIZANIDINE 4 MG/1
4 TABLET ORAL 3 TIMES DAILY PRN
Status: DISCONTINUED | OUTPATIENT
Start: 2021-10-20 | End: 2021-10-21 | Stop reason: HOSPADM

## 2021-10-20 RX ORDER — ACETAMINOPHEN 325 MG/1
650 TABLET ORAL EVERY 6 HOURS
Status: DISCONTINUED | OUTPATIENT
Start: 2021-10-20 | End: 2021-10-21 | Stop reason: HOSPADM

## 2021-10-20 RX ORDER — ONDANSETRON 2 MG/ML
INJECTION INTRAMUSCULAR; INTRAVENOUS PRN
Status: DISCONTINUED | OUTPATIENT
Start: 2021-10-20 | End: 2021-10-20 | Stop reason: SDUPTHER

## 2021-10-20 RX ORDER — HYDROMORPHONE HYDROCHLORIDE 1 MG/ML
0.5 INJECTION, SOLUTION INTRAMUSCULAR; INTRAVENOUS; SUBCUTANEOUS
Status: DISCONTINUED | OUTPATIENT
Start: 2021-10-20 | End: 2021-10-21 | Stop reason: HOSPADM

## 2021-10-20 RX ORDER — TRAMADOL HYDROCHLORIDE 50 MG/1
50 TABLET ORAL EVERY 6 HOURS
Status: DISCONTINUED | OUTPATIENT
Start: 2021-10-20 | End: 2021-10-21 | Stop reason: HOSPADM

## 2021-10-20 RX ORDER — MORPHINE SULFATE 4 MG/ML
4 INJECTION, SOLUTION INTRAMUSCULAR; INTRAVENOUS EVERY 5 MIN PRN
Status: DISCONTINUED | OUTPATIENT
Start: 2021-10-20 | End: 2021-10-20 | Stop reason: HOSPADM

## 2021-10-20 RX ORDER — TRANEXAMIC ACID 100 MG/ML
INJECTION, SOLUTION INTRAVENOUS PRN
Status: DISCONTINUED | OUTPATIENT
Start: 2021-10-20 | End: 2021-10-20 | Stop reason: SDUPTHER

## 2021-10-20 RX ORDER — HYDROMORPHONE HYDROCHLORIDE 1 MG/ML
1 INJECTION, SOLUTION INTRAMUSCULAR; INTRAVENOUS; SUBCUTANEOUS
Status: DISCONTINUED | OUTPATIENT
Start: 2021-10-20 | End: 2021-10-21 | Stop reason: HOSPADM

## 2021-10-20 RX ORDER — SODIUM CHLORIDE 0.9 % (FLUSH) 0.9 %
10 SYRINGE (ML) INJECTION PRN
Status: DISCONTINUED | OUTPATIENT
Start: 2021-10-20 | End: 2021-10-21 | Stop reason: HOSPADM

## 2021-10-20 RX ORDER — SODIUM CHLORIDE, SODIUM LACTATE, POTASSIUM CHLORIDE, CALCIUM CHLORIDE 600; 310; 30; 20 MG/100ML; MG/100ML; MG/100ML; MG/100ML
INJECTION, SOLUTION INTRAVENOUS CONTINUOUS
Status: DISCONTINUED | OUTPATIENT
Start: 2021-10-20 | End: 2021-10-20

## 2021-10-20 RX ORDER — OXYCODONE HYDROCHLORIDE 5 MG/1
5 TABLET ORAL EVERY 4 HOURS PRN
Status: DISCONTINUED | OUTPATIENT
Start: 2021-10-20 | End: 2021-10-21 | Stop reason: HOSPADM

## 2021-10-20 RX ORDER — TIMOLOL MALEATE 5 MG/ML
1 SOLUTION/ DROPS OPHTHALMIC 2 TIMES DAILY
Status: DISCONTINUED | OUTPATIENT
Start: 2021-10-20 | End: 2021-10-21 | Stop reason: HOSPADM

## 2021-10-20 RX ORDER — OMEPRAZOLE 20 MG/1
40 CAPSULE, DELAYED RELEASE ORAL DAILY
Status: DISCONTINUED | OUTPATIENT
Start: 2021-10-20 | End: 2021-10-21 | Stop reason: HOSPADM

## 2021-10-20 RX ORDER — ATORVASTATIN CALCIUM 40 MG/1
40 TABLET, FILM COATED ORAL DAILY
Status: DISCONTINUED | OUTPATIENT
Start: 2021-10-20 | End: 2021-10-21 | Stop reason: HOSPADM

## 2021-10-20 RX ORDER — MORPHINE SULFATE 4 MG/ML
4 INJECTION, SOLUTION INTRAMUSCULAR; INTRAVENOUS
Status: DISCONTINUED | OUTPATIENT
Start: 2021-10-20 | End: 2021-10-20 | Stop reason: HOSPADM

## 2021-10-20 RX ORDER — EPHEDRINE SULFATE 50 MG/ML
INJECTION, SOLUTION INTRAVENOUS PRN
Status: DISCONTINUED | OUTPATIENT
Start: 2021-10-20 | End: 2021-10-20 | Stop reason: SDUPTHER

## 2021-10-20 RX ORDER — NICOTINE POLACRILEX 4 MG
15 LOZENGE BUCCAL PRN
Status: DISCONTINUED | OUTPATIENT
Start: 2021-10-20 | End: 2021-10-21 | Stop reason: HOSPADM

## 2021-10-20 RX ORDER — ENALAPRILAT 2.5 MG/2ML
1.25 INJECTION INTRAVENOUS
Status: DISCONTINUED | OUTPATIENT
Start: 2021-10-20 | End: 2021-10-20 | Stop reason: HOSPADM

## 2021-10-20 RX ORDER — SODIUM CHLORIDE 0.9 % (FLUSH) 0.9 %
5-40 SYRINGE (ML) INJECTION PRN
Status: DISCONTINUED | OUTPATIENT
Start: 2021-10-20 | End: 2021-10-20 | Stop reason: HOSPADM

## 2021-10-20 RX ORDER — SCOLOPAMINE TRANSDERMAL SYSTEM 1 MG/1
1 PATCH, EXTENDED RELEASE TRANSDERMAL ONCE
Status: DISCONTINUED | OUTPATIENT
Start: 2021-10-20 | End: 2021-10-20 | Stop reason: HOSPADM

## 2021-10-20 RX ORDER — SODIUM CHLORIDE 9 MG/ML
25 INJECTION, SOLUTION INTRAVENOUS PRN
Status: DISCONTINUED | OUTPATIENT
Start: 2021-10-20 | End: 2021-10-21 | Stop reason: HOSPADM

## 2021-10-20 RX ORDER — DEXAMETHASONE SODIUM PHOSPHATE 10 MG/ML
INJECTION, SOLUTION INTRAMUSCULAR; INTRAVENOUS PRN
Status: DISCONTINUED | OUTPATIENT
Start: 2021-10-20 | End: 2021-10-20 | Stop reason: SDUPTHER

## 2021-10-20 RX ORDER — SODIUM CHLORIDE 0.9 % (FLUSH) 0.9 %
10 SYRINGE (ML) INJECTION EVERY 12 HOURS SCHEDULED
Status: DISCONTINUED | OUTPATIENT
Start: 2021-10-20 | End: 2021-10-21 | Stop reason: HOSPADM

## 2021-10-20 RX ORDER — CALCIUM CHLORIDE 100 MG/ML
INJECTION INTRAVENOUS; INTRAVENTRICULAR PRN
Status: DISCONTINUED | OUTPATIENT
Start: 2021-10-20 | End: 2021-10-20 | Stop reason: SDUPTHER

## 2021-10-20 RX ORDER — SODIUM CHLORIDE 9 MG/ML
INJECTION, SOLUTION INTRAVENOUS CONTINUOUS
Status: DISCONTINUED | OUTPATIENT
Start: 2021-10-20 | End: 2021-10-21 | Stop reason: HOSPADM

## 2021-10-20 RX ORDER — FENTANYL CITRATE 50 UG/ML
INJECTION, SOLUTION INTRAMUSCULAR; INTRAVENOUS PRN
Status: DISCONTINUED | OUTPATIENT
Start: 2021-10-20 | End: 2021-10-20 | Stop reason: SDUPTHER

## 2021-10-20 RX ORDER — VITAMIN B COMPLEX
2000 TABLET ORAL DAILY
Status: DISCONTINUED | OUTPATIENT
Start: 2021-10-20 | End: 2021-10-21 | Stop reason: HOSPADM

## 2021-10-20 RX ORDER — HYDROMORPHONE HYDROCHLORIDE 1 MG/ML
2 INJECTION, SOLUTION INTRAMUSCULAR; INTRAVENOUS; SUBCUTANEOUS
Status: DISCONTINUED | OUTPATIENT
Start: 2021-10-20 | End: 2021-10-21 | Stop reason: HOSPADM

## 2021-10-20 RX ORDER — ALBUTEROL SULFATE 2.5 MG/3ML
2.5 SOLUTION RESPIRATORY (INHALATION) EVERY 6 HOURS PRN
Status: DISCONTINUED | OUTPATIENT
Start: 2021-10-20 | End: 2021-10-21 | Stop reason: HOSPADM

## 2021-10-20 RX ORDER — OXYCODONE HYDROCHLORIDE 5 MG/1
20 TABLET ORAL EVERY 4 HOURS PRN
Status: DISCONTINUED | OUTPATIENT
Start: 2021-10-20 | End: 2021-10-21 | Stop reason: HOSPADM

## 2021-10-20 RX ORDER — MEPERIDINE HYDROCHLORIDE 25 MG/ML
12.5 INJECTION INTRAMUSCULAR; INTRAVENOUS; SUBCUTANEOUS EVERY 5 MIN PRN
Status: DISCONTINUED | OUTPATIENT
Start: 2021-10-20 | End: 2021-10-20 | Stop reason: HOSPADM

## 2021-10-20 RX ORDER — HYDROMORPHONE HYDROCHLORIDE 1 MG/ML
0.5 INJECTION, SOLUTION INTRAMUSCULAR; INTRAVENOUS; SUBCUTANEOUS EVERY 5 MIN PRN
Status: DISCONTINUED | OUTPATIENT
Start: 2021-10-20 | End: 2021-10-20 | Stop reason: HOSPADM

## 2021-10-20 RX ORDER — TIMOLOL MALEATE 5 MG/ML
1 SOLUTION OPHTHALMIC 2 TIMES DAILY
Status: DISCONTINUED | OUTPATIENT
Start: 2021-10-20 | End: 2021-10-20

## 2021-10-20 RX ORDER — FENTANYL CITRATE 50 UG/ML
50 INJECTION, SOLUTION INTRAMUSCULAR; INTRAVENOUS
Status: DISCONTINUED | OUTPATIENT
Start: 2021-10-20 | End: 2021-10-20 | Stop reason: HOSPADM

## 2021-10-20 RX ORDER — ALBUTEROL SULFATE 90 UG/1
2 AEROSOL, METERED RESPIRATORY (INHALATION) EVERY 6 HOURS PRN
Status: DISCONTINUED | OUTPATIENT
Start: 2021-10-20 | End: 2021-10-20

## 2021-10-20 RX ORDER — SODIUM CHLORIDE 0.9 % (FLUSH) 0.9 %
5-40 SYRINGE (ML) INJECTION EVERY 12 HOURS SCHEDULED
Status: DISCONTINUED | OUTPATIENT
Start: 2021-10-20 | End: 2021-10-20 | Stop reason: HOSPADM

## 2021-10-20 RX ORDER — LIDOCAINE HYDROCHLORIDE 10 MG/ML
1 INJECTION, SOLUTION EPIDURAL; INFILTRATION; INTRACAUDAL; PERINEURAL
Status: DISCONTINUED | OUTPATIENT
Start: 2021-10-20 | End: 2021-10-20 | Stop reason: HOSPADM

## 2021-10-20 RX ORDER — HYDRALAZINE HYDROCHLORIDE 20 MG/ML
5 INJECTION INTRAMUSCULAR; INTRAVENOUS EVERY 10 MIN PRN
Status: DISCONTINUED | OUTPATIENT
Start: 2021-10-20 | End: 2021-10-20 | Stop reason: HOSPADM

## 2021-10-20 RX ORDER — METOCLOPRAMIDE HYDROCHLORIDE 5 MG/ML
10 INJECTION INTRAMUSCULAR; INTRAVENOUS
Status: DISCONTINUED | OUTPATIENT
Start: 2021-10-20 | End: 2021-10-20 | Stop reason: HOSPADM

## 2021-10-20 RX ORDER — ZINC SULFATE 50(220)MG
50 CAPSULE ORAL DAILY
Status: DISCONTINUED | OUTPATIENT
Start: 2021-10-20 | End: 2021-10-21 | Stop reason: HOSPADM

## 2021-10-20 RX ORDER — BUPIVACAINE HYDROCHLORIDE 7.5 MG/ML
INJECTION, SOLUTION INTRASPINAL PRN
Status: DISCONTINUED | OUTPATIENT
Start: 2021-10-20 | End: 2021-10-20 | Stop reason: SDUPTHER

## 2021-10-20 RX ORDER — ASCORBIC ACID 500 MG
1000 TABLET ORAL DAILY
Status: DISCONTINUED | OUTPATIENT
Start: 2021-10-20 | End: 2021-10-21 | Stop reason: HOSPADM

## 2021-10-20 RX ORDER — ALBUMIN, HUMAN INJ 5% 5 %
SOLUTION INTRAVENOUS PRN
Status: DISCONTINUED | OUTPATIENT
Start: 2021-10-20 | End: 2021-10-20 | Stop reason: SDUPTHER

## 2021-10-20 RX ORDER — MORPHINE SULFATE 2 MG/ML
2 INJECTION, SOLUTION INTRAMUSCULAR; INTRAVENOUS EVERY 5 MIN PRN
Status: DISCONTINUED | OUTPATIENT
Start: 2021-10-20 | End: 2021-10-20 | Stop reason: HOSPADM

## 2021-10-20 RX ORDER — DEXTROSE MONOHYDRATE 25 G/50ML
12.5 INJECTION, SOLUTION INTRAVENOUS PRN
Status: DISCONTINUED | OUTPATIENT
Start: 2021-10-20 | End: 2021-10-21 | Stop reason: HOSPADM

## 2021-10-20 RX ORDER — ONDANSETRON 4 MG/1
4 TABLET, ORALLY DISINTEGRATING ORAL EVERY 8 HOURS PRN
Status: DISCONTINUED | OUTPATIENT
Start: 2021-10-20 | End: 2021-10-21 | Stop reason: HOSPADM

## 2021-10-20 RX ORDER — LATANOPROST 50 UG/ML
1 SOLUTION/ DROPS OPHTHALMIC NIGHTLY
Status: DISCONTINUED | OUTPATIENT
Start: 2021-10-20 | End: 2021-10-21 | Stop reason: HOSPADM

## 2021-10-20 RX ORDER — SODIUM CHLORIDE 9 MG/ML
25 INJECTION, SOLUTION INTRAVENOUS PRN
Status: DISCONTINUED | OUTPATIENT
Start: 2021-10-20 | End: 2021-10-20 | Stop reason: HOSPADM

## 2021-10-20 RX ORDER — 0.9 % SODIUM CHLORIDE 0.9 %
500 INTRAVENOUS SOLUTION INTRAVENOUS PRN
Status: DISCONTINUED | OUTPATIENT
Start: 2021-10-20 | End: 2021-10-21 | Stop reason: HOSPADM

## 2021-10-20 RX ORDER — CLINDAMYCIN PHOSPHATE 900 MG/50ML
900 INJECTION INTRAVENOUS EVERY 8 HOURS
Status: DISCONTINUED | OUTPATIENT
Start: 2021-10-20 | End: 2021-10-21 | Stop reason: HOSPADM

## 2021-10-20 RX ORDER — ONDANSETRON 2 MG/ML
4 INJECTION INTRAMUSCULAR; INTRAVENOUS EVERY 6 HOURS PRN
Status: DISCONTINUED | OUTPATIENT
Start: 2021-10-20 | End: 2021-10-21 | Stop reason: HOSPADM

## 2021-10-20 RX ORDER — DEXAMETHASONE SODIUM PHOSPHATE 10 MG/ML
10 INJECTION, SOLUTION INTRAMUSCULAR; INTRAVENOUS ONCE
Status: DISCONTINUED | OUTPATIENT
Start: 2021-10-20 | End: 2021-10-20 | Stop reason: HOSPADM

## 2021-10-20 RX ORDER — PROPOFOL 10 MG/ML
INJECTION, EMULSION INTRAVENOUS CONTINUOUS PRN
Status: DISCONTINUED | OUTPATIENT
Start: 2021-10-20 | End: 2021-10-20 | Stop reason: SDUPTHER

## 2021-10-20 RX ORDER — DEXTROSE MONOHYDRATE 50 MG/ML
100 INJECTION, SOLUTION INTRAVENOUS PRN
Status: DISCONTINUED | OUTPATIENT
Start: 2021-10-20 | End: 2021-10-21 | Stop reason: HOSPADM

## 2021-10-20 RX ADMIN — CALCIUM CHLORIDE 0.5 G: 100 INJECTION, SOLUTION INTRAVENOUS; INTRAVENTRICULAR at 13:40

## 2021-10-20 RX ADMIN — CALCIUM CHLORIDE 0.5 G: 100 INJECTION, SOLUTION INTRAVENOUS; INTRAVENTRICULAR at 13:29

## 2021-10-20 RX ADMIN — HYDROMORPHONE HYDROCHLORIDE 0.5 MG: 1 INJECTION, SOLUTION INTRAMUSCULAR; INTRAVENOUS; SUBCUTANEOUS at 15:31

## 2021-10-20 RX ADMIN — EPHEDRINE SULFATE 10 MG: 50 INJECTION INTRAMUSCULAR; INTRAVENOUS; SUBCUTANEOUS at 13:40

## 2021-10-20 RX ADMIN — Medication 2000 MG: at 12:15

## 2021-10-20 RX ADMIN — OXYCODONE 10 MG: 5 TABLET ORAL at 20:07

## 2021-10-20 RX ADMIN — INSULIN LISPRO 1 UNITS: 100 INJECTION, SOLUTION INTRAVENOUS; SUBCUTANEOUS at 17:32

## 2021-10-20 RX ADMIN — FENTANYL CITRATE 50 MCG: 50 INJECTION, SOLUTION INTRAMUSCULAR; INTRAVENOUS at 12:12

## 2021-10-20 RX ADMIN — OXYCODONE HYDROCHLORIDE AND ACETAMINOPHEN 1000 MG: 500 TABLET ORAL at 16:35

## 2021-10-20 RX ADMIN — RIVAROXABAN 10 MG: 10 TABLET, FILM COATED ORAL at 22:57

## 2021-10-20 RX ADMIN — Medication 2000 MG: at 20:07

## 2021-10-20 RX ADMIN — SODIUM CHLORIDE: 9 INJECTION, SOLUTION INTRAVENOUS at 16:45

## 2021-10-20 RX ADMIN — HYDROMORPHONE HYDROCHLORIDE 0.5 MG: 1 INJECTION, SOLUTION INTRAMUSCULAR; INTRAVENOUS; SUBCUTANEOUS at 15:41

## 2021-10-20 RX ADMIN — TRAMADOL HYDROCHLORIDE 50 MG: 50 TABLET, FILM COATED ORAL at 22:57

## 2021-10-20 RX ADMIN — BUSPIRONE HYDROCHLORIDE 7.5 MG: 5 TABLET ORAL at 20:08

## 2021-10-20 RX ADMIN — ACETAMINOPHEN 650 MG: 325 TABLET ORAL at 22:57

## 2021-10-20 RX ADMIN — AMITRIPTYLINE HYDROCHLORIDE 25 MG: 25 TABLET, FILM COATED ORAL at 20:08

## 2021-10-20 RX ADMIN — DEXAMETHASONE SODIUM PHOSPHATE 10 MG: 10 INJECTION, SOLUTION INTRAMUSCULAR; INTRAVENOUS at 12:18

## 2021-10-20 RX ADMIN — EPHEDRINE SULFATE 15 MG: 50 INJECTION INTRAMUSCULAR; INTRAVENOUS; SUBCUTANEOUS at 12:45

## 2021-10-20 RX ADMIN — ACETAMINOPHEN 650 MG: 325 TABLET ORAL at 17:32

## 2021-10-20 RX ADMIN — ONDANSETRON HYDROCHLORIDE 4 MG: 2 INJECTION, SOLUTION INTRAMUSCULAR; INTRAVENOUS at 12:18

## 2021-10-20 RX ADMIN — OXYCODONE HYDROCHLORIDE 10 MG: 10 TABLET, FILM COATED, EXTENDED RELEASE ORAL at 20:08

## 2021-10-20 RX ADMIN — OMEPRAZOLE 40 MG: 20 CAPSULE, DELAYED RELEASE ORAL at 16:35

## 2021-10-20 RX ADMIN — ALBUMIN (HUMAN) 12.5 G: 2.5 SOLUTION INTRAVENOUS at 13:27

## 2021-10-20 RX ADMIN — SODIUM CHLORIDE, POTASSIUM CHLORIDE, SODIUM LACTATE AND CALCIUM CHLORIDE: 600; 310; 30; 20 INJECTION, SOLUTION INTRAVENOUS at 10:21

## 2021-10-20 RX ADMIN — PHENYLEPHRINE HYDROCHLORIDE 80 MCG: 10 INJECTION INTRAVENOUS at 13:40

## 2021-10-20 RX ADMIN — EPHEDRINE SULFATE 10 MG: 50 INJECTION INTRAMUSCULAR; INTRAVENOUS; SUBCUTANEOUS at 13:24

## 2021-10-20 RX ADMIN — TRANEXAMIC ACID 1000 MG: 1 INJECTION, SOLUTION INTRAVENOUS at 12:15

## 2021-10-20 RX ADMIN — PHENYLEPHRINE HYDROCHLORIDE 80 MCG: 10 INJECTION INTRAVENOUS at 13:24

## 2021-10-20 RX ADMIN — TRAMADOL HYDROCHLORIDE 50 MG: 50 TABLET, FILM COATED ORAL at 16:35

## 2021-10-20 RX ADMIN — PROPOFOL 120 MCG/KG/MIN: 10 INJECTION, EMULSION INTRAVENOUS at 12:12

## 2021-10-20 RX ADMIN — BISACODYL 5 MG: 5 TABLET, COATED ORAL at 16:35

## 2021-10-20 RX ADMIN — EPHEDRINE SULFATE 15 MG: 50 INJECTION INTRAMUSCULAR; INTRAVENOUS; SUBCUTANEOUS at 12:37

## 2021-10-20 RX ADMIN — PHENYLEPHRINE HYDROCHLORIDE 80 MCG: 10 INJECTION INTRAVENOUS at 13:19

## 2021-10-20 RX ADMIN — PHENYLEPHRINE HYDROCHLORIDE 80 MCG: 10 INJECTION INTRAVENOUS at 12:55

## 2021-10-20 RX ADMIN — TIMOLOL MALEATE 1 DROP: 5 SOLUTION OPHTHALMIC at 20:09

## 2021-10-20 RX ADMIN — BUPIVACAINE HYDROCHLORIDE IN DEXTROSE 1.8 ML: 7.5 INJECTION, SOLUTION SUBARACHNOID at 12:10

## 2021-10-20 RX ADMIN — OXYCODONE HYDROCHLORIDE 10 MG: 10 TABLET, FILM COATED, EXTENDED RELEASE ORAL at 10:21

## 2021-10-20 RX ADMIN — Medication 2000 UNITS: at 16:35

## 2021-10-20 RX ADMIN — PHENYLEPHRINE HYDROCHLORIDE 80 MCG: 10 INJECTION INTRAVENOUS at 13:02

## 2021-10-20 RX ADMIN — ACETAMINOPHEN 1000 MG: 500 TABLET ORAL at 10:20

## 2021-10-20 RX ADMIN — ZINC SULFATE 220 MG (50 MG) CAPSULE 50 MG: CAPSULE at 16:35

## 2021-10-20 RX ADMIN — LIDOCAINE HYDROCHLORIDE 3 ML: 10 INJECTION, SOLUTION EPIDURAL; INFILTRATION; INTRACAUDAL; PERINEURAL at 12:10

## 2021-10-20 RX ADMIN — LATANOPROST 1 DROP: 50 SOLUTION OPHTHALMIC at 20:09

## 2021-10-20 RX ADMIN — CELECOXIB 200 MG: 200 CAPSULE ORAL at 10:21

## 2021-10-20 RX ADMIN — FENTANYL CITRATE 50 MCG: 50 INJECTION, SOLUTION INTRAMUSCULAR; INTRAVENOUS at 12:49

## 2021-10-20 RX ADMIN — ATORVASTATIN CALCIUM 40 MG: 40 TABLET, FILM COATED ORAL at 16:35

## 2021-10-20 RX ADMIN — METFORMIN HYDROCHLORIDE 500 MG: 500 TABLET ORAL at 16:35

## 2021-10-20 RX ADMIN — CLINDAMYCIN PHOSPHATE 900 MG: 900 INJECTION, SOLUTION INTRAVENOUS at 17:32

## 2021-10-20 RX ADMIN — TRANEXAMIC ACID 1000 MG: 1 INJECTION, SOLUTION INTRAVENOUS at 14:21

## 2021-10-20 RX ADMIN — PHENYLEPHRINE HYDROCHLORIDE 80 MCG: 10 INJECTION INTRAVENOUS at 13:18

## 2021-10-20 ASSESSMENT — PAIN DESCRIPTION - DESCRIPTORS
DESCRIPTORS: SORE;ACHING
DESCRIPTORS: SORE

## 2021-10-20 ASSESSMENT — PAIN DESCRIPTION - PAIN TYPE
TYPE: SURGICAL PAIN
TYPE: SURGICAL PAIN

## 2021-10-20 ASSESSMENT — PAIN DESCRIPTION - ORIENTATION
ORIENTATION: LEFT
ORIENTATION: LEFT

## 2021-10-20 ASSESSMENT — PAIN DESCRIPTION - FREQUENCY
FREQUENCY: CONTINUOUS
FREQUENCY: CONTINUOUS

## 2021-10-20 ASSESSMENT — PAIN SCALES - GENERAL
PAINLEVEL_OUTOF10: 2
PAINLEVEL_OUTOF10: 5
PAINLEVEL_OUTOF10: 9
PAINLEVEL_OUTOF10: 8
PAINLEVEL_OUTOF10: 8
PAINLEVEL_OUTOF10: 10
PAINLEVEL_OUTOF10: 5

## 2021-10-20 ASSESSMENT — PAIN DESCRIPTION - PROGRESSION
CLINICAL_PROGRESSION: GRADUALLY WORSENING
CLINICAL_PROGRESSION: GRADUALLY WORSENING

## 2021-10-20 ASSESSMENT — PAIN DESCRIPTION - LOCATION
LOCATION: HIP
LOCATION: HIP

## 2021-10-20 ASSESSMENT — LIFESTYLE VARIABLES: SMOKING_STATUS: 0

## 2021-10-20 ASSESSMENT — PAIN - FUNCTIONAL ASSESSMENT
PAIN_FUNCTIONAL_ASSESSMENT: PREVENTS OR INTERFERES WITH MANY ACTIVE NOT PASSIVE ACTIVITIES
PAIN_FUNCTIONAL_ASSESSMENT: PREVENTS OR INTERFERES WITH MANY ACTIVE NOT PASSIVE ACTIVITIES

## 2021-10-20 ASSESSMENT — COPD QUESTIONNAIRES: CAT_SEVERITY: SEVERE

## 2021-10-20 ASSESSMENT — PAIN DESCRIPTION - ONSET
ONSET: ON-GOING
ONSET: ON-GOING

## 2021-10-20 NOTE — ANESTHESIA PRE PROCEDURE
Department of Anesthesiology  Preprocedure Note       Name:  Bhargavi De La Paz   Age:  77 y.o.  :  1955                                          MRN:  [id number]         Date:  10/20/2021      Surgeon: Chelsy Amaral):  Selam Hernandez MD    Procedure: Procedure(s):  LEFT TOTAL HIP REPLACEMENT    Medications prior to admission:   Prior to Admission medications    Medication Sig Start Date End Date Taking? Authorizing Provider   oxyCODONE (OXY-IR) 15 MG immediate release tablet Take 15 mg by mouth 4 times daily.  Indications: Pain    Historical Provider, MD   amitriptyline (ELAVIL) 25 MG tablet Take 25 mg by mouth nightly    Historical Provider, MD   apixaban (ELIQUIS) 2.5 MG TABS tablet Take 1 tablet by mouth 2 times daily 21, MD   zinc sulfate (ZINCATE) 220 (50 Zn) MG capsule Take 1 capsule by mouth daily 21, MD   ascorbic acid (VITAMIN C) 1000 MG tablet Take 1 tablet by mouth daily 21, MD   Vitamin D (CHOLECALCIFEROL) 50 MCG (2000 UT) TABS tablet Take 1 tablet by mouth daily 21, MD   metFORMIN (GLUCOPHAGE) 500 MG tablet Take 500 mg by mouth 2 times daily (with meals)    Historical Provider, MD   busPIRone (BUSPAR) 7.5 MG tablet 1 tablet by Oral route 2 times per day for anxiety 19   Historical Provider, MD   tiZANidine (ZANAFLEX) 4 MG tablet Take 1 tablet by mouth 3 times daily as needed (muscle spasms) 10/19/17   CATY Spear   omeprazole (PRILOSEC) 40 MG delayed release capsule Take 40 mg by mouth daily  2/10/17   Historical Provider, MD   latanoprost (XALATAN) 0.005 % ophthalmic solution Place 1 drop into both eyes nightly    Historical Provider, MD   timolol (TIMOPTIC-XE) 0.5 % ophthalmic gel-forming Place 1 drop into both eyes 2 times daily    Historical Provider, MD   albuterol sulfate HFA (PROAIR HFA) 108 (90 BASE) MCG/ACT inhaler Inhale 2 puffs into the lungs every 6 hours as needed for Wheezing 16 Kanu Rubio MD   atorvastatin (LIPITOR) 40 MG tablet Take 40 mg by mouth daily. Historical Provider, MD   Aspirin (ASPIR-81 PO) Take 81 mg by mouth daily     Historical Provider, MD   AMLODIPINE BESYLATE PO Take 5 mg by mouth daily. Historical Provider, MD   LISINOPRIL PO Take 20 mg by mouth daily. Historical Provider, MD       Current medications:    Current Facility-Administered Medications   Medication Dose Route Frequency Provider Last Rate Last Admin    acetaminophen (TYLENOL) tablet 1,000 mg  1,000 mg Oral Once Miryam Mercado MD        ceFAZolin (ANCEF) 2,000 mg in sterile water 20 mL IV syringe  2,000 mg IntraVENous Once Miryam Mercado MD        celecoxib (CELEBREX) capsule 200 mg  200 mg Oral Once Miryam Mercado MD        dexamethasone (PF) (DECADRON) injection 10 mg  10 mg IntraVENous Once Miryam Mercado MD        oxyCODONE Sarahi Harts) extended release tablet 10 mg  10 mg Oral Once Miryam Mercado MD           Allergies:     Allergies   Allergen Reactions    Lyrica [Pregabalin] Other (See Comments)     He states \"it just made everything look blurry\"    Neurontin [Gabapentin] Other (See Comments)     States \"it made me want to shoot myself\"       Problem List:    Patient Active Problem List   Diagnosis Code    COPD with acute exacerbation ([facility] [address] J44.1    Acute on chronic respiratory failure with hypoxia and hypercapnia (HCC) J96.21, S38.64    Neutrophilic leukocytosis V03.6    Hyperlipidemia E78.5    Chronic low back pain M54.50, G89.29    COPD (chronic obstructive pulmonary disease) ([facility] [address] J44.9    Hypertension I10    Lower esophageal ring ([place]) K22.2    Gastritis without bleeding K29.70    Chronic GERD K21.9    History of esophageal stricture Z87.19    Status post dilation of esophageal narrowing Z98.890, Z87.19    Bilateral hearing loss H91.93    Palliative care patient Z51.5    Hyperglycemia R73.9    Grade I diastolic dysfunction S92.01    Obesity due to excess calories E66.09    Personal history of noncompliance with medical treatment, presenting hazards to health Z91.19    Sepsis ([facility] [address] A41.9    HCAP (healthcare-associated pneumonia) J18.9    Normocytic anemia D64.9    Hyponatremia E87.1    Pain of right hand M79.641    Oxygen dependent Z99.81    Pneumonia due to COVID-19 virus U07.1, J12.82       Past Medical History:        Diagnosis Date    Arthritis     Bilateral hearing loss     Colon polyps     COPD (chronic obstructive pulmonary disease) ([facility] [address]     sees dr. Rakel hoffman at [facility]    COVID-19 2021    low oxygen; pneumonia; received monoclonal infusion     Depression     Diabetes mellitus ([facility] [address]     Hip pain     Hyperlipidemia     Hypertension     Low back pain 2016    Oxygen dependent     4l/m nc     Palliative care patient 2018    Pneumonia 2021    inpt       Past Surgical History:        Procedure Laterality Date    BACK SURGERY     Shamar Milling  ?     Dr. Gaurav Means    COLONOSCOPY  2012    3 polyps, 2 adenomatous, 1 hyperplastic    COLONOSCOPY  2015    diverticulosis    ENDOSCOPY, COLON, DIAGNOSTIC      FRACTURE SURGERY      KNEE ARTHROSCOPY  2014    [place] EGD TRANSORAL BIOPSY SINGLE/MULTIPLE N/A 2016    Dr MARIA ELENA Gómez/dilation over wire, 50 [place]-Schatzki's Ring, gastritis/gastropathy-prn    UPPER GASTROINTESTINAL ENDOSCOPY  2017    Dr MARIA ELNEA Gómez/dilation over wire, 46 [place]-Schatzki Ring     UPPER GASTROINTESTINAL ENDOSCOPY  2017    Dr MARIA ELENA Gómez/dilation over wire, 46 [place]-Schatzki Ring     WRIST FRACTURE SURGERY      [id number]       Social History:    Social History     Tobacco Use    Smoking status: Former Smoker     Packs/day: 2.00     Years: 46.00     Pack years: 92.00     Types: Cigarettes     Quit date: 2016     Years since quittin.8    Smokeless tobacco: Never Used    Tobacco comment: started at age 15, quit at age 61, smoked at 2 PPD   Substance Use Topics    Alcohol use: Not Currently     Comment: quit in ~2013                                Counseling given: Not Answered  Comment: started at age 15, quit at age 61, smoked at 2 PPD      Vital Signs (Current): There were no vitals filed for this visit. BP Readings from Last 3 Encounters:   01/07/21 129/68   08/26/20 114/79   04/08/20 (!) 152/71       NPO Status:                                                                                 BMI:   Wt Readings from Last 3 Encounters:   09/29/21 226 lb (102.5 kg)   01/06/21 224 lb 8 oz (101.8 kg)   08/26/20 240 lb (108.9 kg)     There is no height or weight on file to calculate BMI.    CBC:   Lab Results   Component Value Date    WBC 11.0 09/29/2021    RBC 3.95 09/29/2021    HGB 11.5 09/29/2021    HCT 38.4 09/29/2021    MCV 97.2 09/29/2021    RDW 13.3 09/29/2021     09/29/2021       CMP:   Lab Results   Component Value Date     09/29/2021    K 4.1 09/29/2021    K 4.2 09/29/2021    K 3.9 01/07/2021    CL 97 09/29/2021    CO2 31 09/29/2021    BUN 9 09/29/2021    CREATININE 0.7 09/29/2021    GFRAA >59 09/29/2021    LABGLOM >60 09/29/2021    GLUCOSE 122 09/29/2021    PROT 8.4 09/29/2021    PROT 6.8 03/01/2013    CALCIUM 9.6 09/29/2021    BILITOT 0.3 09/29/2021    ALKPHOS 196 09/29/2021    AST 19 09/29/2021    ALT 20 09/29/2021       POC Tests: No results for input(s): POCGLU, POCNA, POCK, POCCL, POCBUN, POCHEMO, POCHCT in the last 72 hours.     Coags:   Lab Results   Component Value Date    PROTIME 13.5 09/29/2021    INR 1.01 09/29/2021    APTT 29.8 09/29/2021       HCG (If Applicable): No results found for: PREGTESTUR, PREGSERUM, HCG, HCGQUANT     ABGs:   Lab Results   Component Value Date    PHART 7.380 09/29/2021    PO2ART 99.0 09/29/2021    KOE2YDJ 56.0 09/29/2021    QRP6TUC 33.1 09/29/2021    BEART 6.5 09/29/2021    J5CHSTBY 96.1 09/29/2021        Type & Screen (If Applicable):  No results found for: LABABO, [address]    Drug/Infectious Status (If Applicable):  No results found for: HIV, HEPCAB    COVID-19 Screening (If Applicable):   Lab Results   Component Value Date    COVID19 Not Detected 10/18/2021           Anesthesia Evaluation  Patient summary reviewed and Nursing notes reviewed no history of anesthetic complications:   Airway: Mallampati: II  TM distance: >3 FB   Neck ROM: full  Mouth opening: > = 3 FB Dental:    (+) upper dentures and lower dentures      Pulmonary:   (+) COPD: severe,      (-) not a current smoker                          ROS comment: 4 L oxygen    Cardiovascular:    (+) hypertension:, hyperlipidemia    (-) CABG/stent    ECG reviewed               Beta Blocker:  Not on Beta Blocker      ROS comment: 81 BPM  Sinus rhythm  Right bundle branch block  Comparison Summary: No significant change     Neuro/Psych:   (+) psychiatric history: stable with treatment   (-) seizures and CVA           GI/Hepatic/Renal:   (+) GERD: well controlled,      (-) liver disease and no renal disease       Endo/Other:    (+) DiabetesType II DM, , : arthritis:., .                 Abdominal:             Vascular: negative vascular ROS. Other Findings:           Anesthesia Plan      spinal     ASA 3       Induction: intravenous. MIPS: Postoperative opioids intended and Prophylactic antiemetics administered. Anesthetic plan and risks discussed with patient and spouse. Plan discussed with CRNA.                   Louisa Rodas MD   10/20/2021

## 2021-10-20 NOTE — ANESTHESIA PROCEDURE NOTES
Spinal Block    Patient location during procedure: OR  Start time: 10/20/2021 12:08 PM  End time: 10/20/2021 12:10 PM  Reason for block: primary anesthetic  Staffing  Performed: resident/CRNA   Resident/CRNA: CATY Clark - CRNA  Preanesthetic Checklist  Completed: patient identified, IV checked, site marked, risks and benefits discussed, surgical consent, monitors and equipment checked, pre-op evaluation, timeout performed, anesthesia consent given, oxygen available and patient being monitored  Spinal Block  Patient position: sitting  Prep: Betadine  Patient monitoring: continuous pulse ox and frequent blood pressure checks  Approach: midline  Location: L2/L3  Provider prep: mask and sterile gloves  Local infiltration: lidocaine  Agent: bupivacaine  Dose: 1.8  Dose: 1.8  Needle  Needle type: Pencan   Needle gauge: 24 G  Needle length: 4 in  Needle insertion depth: 6 cm  Assessment  Sensory level: T8  Swirl obtained: Yes  CSF: clear  Attempts: 1  Hemodynamics: stable  Additional Notes  CRNA at pt bedside. Time out performed with pt, CRNA, and RN for placement of spinal block. Risks & benefits explained to the pt, history and physical has been reviewed. Labs WNL. Pt in proper sitting position on edge of hospital bed. Monitors in place, VSS. Back area prepped sterile technique with Betadine x 3. Area allowed to dry thoroughly. Remaining Betadine wiped clean with sterile gauze. Sterile drape placed on pt's back with sterile technique maintained throughout entire procedure. Intradermal Lidocaine 1% injection 3 ml performed with no complications noted. Spinal needle successfully placed with ease, clear CSF noted, no blood aspirated. Bupivacaine 0.75% w/dextrose 1.8 mL injected. Pt laid back into supine position in bed. No complications noted, pt's VSS and pt resting comfortably at this time. Will continue to monitor the pt throughout procedure.

## 2021-10-20 NOTE — ANESTHESIA POSTPROCEDURE EVALUATION
Department of Anesthesiology  Postprocedure Note    Patient: Lui Hummel  MRN: [id number]  [place]: 1955  Date of evaluation: 10/20/2021  Time:  2:34 PM     Procedure Summary     Date: 10/20/21 Room / Location: [address]    Anesthesia Start: 1200 Anesthesia Stop: 7710    Procedure: LEFT TOTAL HIP REPLACEMENT (Left ) Diagnosis: (M16.12)    Surgeons: Benjamín Wilks MD Responsible Provider: CATY Wilkerson CRNA    Anesthesia Type: spinal ASA Status: 3          Anesthesia Type: spinal    Santosh Phase I: Santosh Score: 10    Santosh Phase II:      Last vitals: Reviewed and per EMR flowsheets.        Anesthesia Post Evaluation    Patient location during evaluation: PACU  Patient participation: waiting for patient participation  Level of consciousness: sleepy but conscious  Pain score: 0  Airway patency: patent  Nausea & Vomiting: no nausea and no vomiting  Complications: no  Cardiovascular status: hemodynamically stable and blood pressure returned to baseline  Respiratory status: acceptable and face mask  Hydration status: stable

## 2021-10-20 NOTE — OP NOTE
[facility] [address], [address] [facility]                                OPERATIVE REPORT    PATIENT NAME: Jossue Ibarra                      :        1955  MED REC NO:   [id number]                              ROOM:       [facility]  ACCOUNT NO:   [de-identified]                           ADMIT DATE: 10/20/2021  PROVIDER:     Analia Jones MD    DATE OF PROCEDURE:  10/20/2021    PREOPERATIVE DIAGNOSIS:  Primary osteoarthritis, left  hip. POSTOPERATIVE DIAGNOSIS:  Primary osteoarthritis, left  hip. PROCEDURE:  Left total hip arthroplasty. SURGEON:  Analia Jones MD    FIRST ASSISTANT:  Ariadne Cain PA-C. Please note, he is a critical  assistant in exposure and placement of the implants. ANESTHESIA:  Spinal.    EBL:  750 mL. FLUIDS:  1500 mL of crystalloids, 250 mL of albumin. URINE OUTPUT:  _____ mL. COMPONENTS USED:  For the acetabulum size 56 G7 cup with 30 and 35 mm  screws, 44 metal liner, stem is a size 8 standard offset ACTIS stem with  a 28 mm +5 ceramic head combined with a 44 mm dual mobility polyethylene  liner. SPECIMENS:  Left femoral head sent in formalin for Pathology. INDICATIONS:  This is a 60-year-old with severe arthritis of the left  hip, failing conservative care. Because of this, he elected for above  procedure. OPERATIVE PROCEDURE:  After informed consent, he was 2 gm of Ancef, 1 gm  of tranexamic acid, underwent spinal anesthesia. A De Souza catheter was  inserted. He was placed on [place] table. A combined 20-degree internal  rotation view was taken of the left hip. Left hip was then prepped and  draped in the usual sterile fashion. A 10 cm incision was made lateral  to the ASIS extending distally after incising through the skin. TFL  fascia was incised. TFL was taken laterally, sartorius and rectus  medially. Ascending branch of the lateral femoral circumflex vessels  were identified and cauterized. Anterior capsulotomy was performed. A  neck resection was made at the saddle of the neck and equator of the  femoral head. The neck fragment and femoral head were removed. We then  placed the femoral head in formalin and sent it for Pathology. The  femur was exposed using a trochanteric elevator. The leg was externally  rotated and extended. Soft tissue in the trochanteric fossa was  released. We repeated this sequence twice. We had very good exposure  of the proximal femur. We used curved retractors medially and  posteriorly. We started with our ACTIS system and broached up to a 7  broach and used our calcar planer. This was removed. Acetabulum was  exposed. Curved retractors were placed anteriorly and posteriorly. Labrum excised. We started reaming with a 51 mm reamer and reamed up to  a 55 mm reamer. Trial 55 shell fit very snugly and nicely. We  irrigated with 1 liter of irrigation. The final size 56 G7 cup was  press-fit in about 40 degrees of abduction, 20 degrees of anteversion,  and had excellent purchase. We drilled, measured, and placed a 35 mm  screw which had moderate purchase and a 30 mm screw which had excellent  purchase. A trial liner was placed and several trial reductions were  done and our navigation system used. Navigation system indicated that  we would be better off with a standard offset stem and a +5 head and  neck component. We then placed the final 44 mm metal liner into the  acetabulum shell. We sized up to a size 8 broach using our calcar  planer. The final size 8 standard offset ACTIS stem was selected and  press-fit down the canal.  We then combined the final 28 mm +5 ceramic  head with a 44 mm dual-mobility liner and then locked this on to the  Zulay Throckmorton. The hip was reduced. We had excellent stability to  anterior maneuvers including hyperextension with external rotation and  adduction, and no evidence of any anterior instability.   Fluoroscopic  views revealed excellent alignment of the femoral and acetabular  components. Please note that even prior to putting the dual-mobility  system in, we were stable with just a standard +5 head and neck  component and had excellent stability. The reason why we went with a  dual mobility that he has refused fusion from the lumbar spine to the  sacrum. Our final [employer] numbers revealed that we lengthened the leg by  about 4 mm, added 4 mm to the femoral offset and 2 mm to the total  offset. This was well within our templating goals. We then irrigated  with a total of 3 liters of irrigation. We mixed 20 mL of Exparel with  30 mL of saline and 50 mL of 0.25% Marcaine. We injected the TFL and  rectus muscle with this solution and subcutaneous tissue with this  solution. TFL fascia was closed with 0 Vicryl suture, 2-0 Vicryl suture  for subcutaneous tissues, and 3-0 Vicryl for subcuticular stitch. Prineo Dermabond and soft dressings were applied. The patient was taken  to the recovery room in stable condition. POSTOPERATIVE PLANS:  1. The patient will be on typical total hip arthroplasty protocol. 2.  He will be on 2 doses of Ancef and 6 doses of clindamycin. 3.  He will be on Xarelto 10 mg a day for 21 days followed by a baby  aspirin 81 mg twice a day for 3 weeks. Please note, he has COPD. We will leave him in the hospital overnight  and probably discharge him either postoperative day 1 or 2.         Jourdan Ashraf MD    D: 10/20/2021 14:59:46      T: 10/20/2021 17:12:20     [email or web address]  Job#: [id number]     Doc#: [id number]    CC:

## 2021-10-20 NOTE — H&P
I have reviewed the history and physical for planned procedure. I have re-examined the patient and there are no changes to the history and physical unless noted below.     Electronically signed by Kelly Hoffman MD on 10/20/2021 at 10:23 AM

## 2021-10-20 NOTE — BRIEF OP NOTE
Brief Postoperative Note      Patient: Ed Cueva  YOB: 1955  MRN: [id number]    Date of Procedure: 10/20/2021    Pre-Op Diagnosis: M16.12    Post-Op Diagnosis: Same       Procedure(s):  LEFT TOTAL HIP REPLACEMENT    Surgeon(s):  Glendia Snellen, MD    Assistant:  First Assistant: Cliff Alfred; Page Liz PA-C    Anesthesia: Choice    Estimated Blood Loss (mL): 584AC    Complications: None    Specimens:   ID Type Source Tests Collected by Time Destination   A : LEFT FEMORAL HEAD Tissue Leg SURGICAL PATHOLOGY Glendia Snellen, MD 10/20/2021 1145        Implants:  Implant Name Type Inv.  Item Serial No.  Lot No. LRB No. Used Action   SHELL ACET SZ F [id number] OSSEOTI 2 MOBILITY G7  SHELL ACET SZ F [id number] OSSEOTI 2 MOBILITY G7  [employer] [id number] Left 1 Implanted   BONE SCREW 6.5X30 SELF-TAP  BONE SCREW 6.5X30 SELF-TAP  [employer] [id number] Left 1 Implanted   BONE SCREW 6.5X35 SELF-TAP  BONE SCREW 6.5X35 SELF-TAP  [employer]ault [id number] Left 1 Implanted   G7 DUAL MOBILITY LINER 44MM F  G7 DUAL MOBILITY LINER 44MM F  [employer] [id number] Left 1 Implanted   STEM FEM SZ 8 L111MM 12/14 TAPR STD OFFSET HIP DUOFIX CLLRD  STEM FEM SZ 8 L111MM 12/14 TAPR STD OFFSET HIP DUOFIX CLLRD  [facility] [employer] [id number] Left 1 Implanted   VIVACIT-E DM BEARING [id number]  VIVACIT-E DM BEARING [id number]  [employer] [id number] Left 1 Implanted   HEAD FEM [id number] +5MM OFFSET 12/14 TAPR HIP CERAMIC BIOLOX  HEAD FEM [id number] +5MM OFFSET 12/14 TAPR HIP CERAMIC BIOLOX  [facility] [employer] [id number] Left 1 Implanted         Drains:   Urethral Catheter Double-lumen 16 fr (Active)       Findings:     Electronically signed by Glendia Snellen, MD on 10/20/2021 at 2:11 PM

## 2021-10-21 VITALS
SYSTOLIC BLOOD PRESSURE: 122 MMHG | OXYGEN SATURATION: 97 % | DIASTOLIC BLOOD PRESSURE: 73 MMHG | HEART RATE: 77 BPM | WEIGHT: 226 LBS | TEMPERATURE: 98.1 F | BODY MASS INDEX: 36.32 KG/M2 | HEIGHT: 66 IN | RESPIRATION RATE: 16 BRPM

## 2021-10-21 PROBLEM — M16.12 PRIMARY OSTEOARTHRITIS OF LEFT HIP: Status: ACTIVE | Noted: 2021-10-20

## 2021-10-21 LAB
ANION GAP SERPL CALCULATED.3IONS-SCNC: 8 MMOL/L (ref 7–19)
BUN BLDV-MCNC: 11 MG/DL (ref 8–23)
CALCIUM SERPL-MCNC: 8.6 MG/DL (ref 8.8–10.2)
CHLORIDE BLD-SCNC: 101 MMOL/L (ref 98–111)
CO2: 28 MMOL/L (ref 22–29)
CREAT SERPL-MCNC: 0.6 MG/DL (ref 0.5–1.2)
GFR AFRICAN AMERICAN: >59
GFR NON-AFRICAN AMERICAN: >60
GLUCOSE BLD-MCNC: 108 MG/DL (ref 70–99)
GLUCOSE BLD-MCNC: 118 MG/DL (ref 74–109)
GLUCOSE BLD-MCNC: 128 MG/DL (ref 70–99)
HCT VFR BLD CALC: 27.9 % (ref 42–52)
HEMOGLOBIN: 8.9 G/DL (ref 14–18)
PERFORMED ON: ABNORMAL
PERFORMED ON: ABNORMAL
POTASSIUM REFLEX MAGNESIUM: 5.2 MMOL/L (ref 3.5–5)
SODIUM BLD-SCNC: 137 MMOL/L (ref 136–145)

## 2021-10-21 PROCEDURE — 85014 HEMATOCRIT: CPT

## 2021-10-21 PROCEDURE — 85018 HEMOGLOBIN: CPT

## 2021-10-21 PROCEDURE — 6370000000 HC RX 637 (ALT 250 FOR IP): Performed by: ORTHOPAEDIC SURGERY

## 2021-10-21 PROCEDURE — 2580000003 HC RX 258: Performed by: ORTHOPAEDIC SURGERY

## 2021-10-21 PROCEDURE — 2500000003 HC RX 250 WO HCPCS: Performed by: ORTHOPAEDIC SURGERY

## 2021-10-21 PROCEDURE — 97166 OT EVAL MOD COMPLEX 45 MIN: CPT

## 2021-10-21 PROCEDURE — 97161 PT EVAL LOW COMPLEX 20 MIN: CPT

## 2021-10-21 PROCEDURE — 6360000002 HC RX W HCPCS: Performed by: ORTHOPAEDIC SURGERY

## 2021-10-21 PROCEDURE — 97530 THERAPEUTIC ACTIVITIES: CPT

## 2021-10-21 PROCEDURE — 97535 SELF CARE MNGMENT TRAINING: CPT

## 2021-10-21 PROCEDURE — 82947 ASSAY GLUCOSE BLOOD QUANT: CPT

## 2021-10-21 PROCEDURE — 97116 GAIT TRAINING THERAPY: CPT

## 2021-10-21 PROCEDURE — 80048 BASIC METABOLIC PNL TOTAL CA: CPT

## 2021-10-21 RX ORDER — OXYCODONE HYDROCHLORIDE 10 MG/1
10 TABLET ORAL EVERY 4 HOURS PRN
Qty: 60 TABLET | Refills: 0 | Status: SHIPPED | OUTPATIENT
Start: 2021-10-21 | End: 2021-11-20

## 2021-10-21 RX ADMIN — OXYCODONE HYDROCHLORIDE AND ACETAMINOPHEN 1000 MG: 500 TABLET ORAL at 08:30

## 2021-10-21 RX ADMIN — CLINDAMYCIN PHOSPHATE 900 MG: 900 INJECTION, SOLUTION INTRAVENOUS at 10:37

## 2021-10-21 RX ADMIN — Medication 2000 MG: at 04:25

## 2021-10-21 RX ADMIN — METFORMIN HYDROCHLORIDE 500 MG: 500 TABLET ORAL at 08:30

## 2021-10-21 RX ADMIN — OMEPRAZOLE 40 MG: 20 CAPSULE, DELAYED RELEASE ORAL at 08:30

## 2021-10-21 RX ADMIN — CLINDAMYCIN PHOSPHATE 900 MG: 900 INJECTION, SOLUTION INTRAVENOUS at 01:29

## 2021-10-21 RX ADMIN — TRAMADOL HYDROCHLORIDE 50 MG: 50 TABLET, FILM COATED ORAL at 10:37

## 2021-10-21 RX ADMIN — BUSPIRONE HYDROCHLORIDE 7.5 MG: 5 TABLET ORAL at 08:29

## 2021-10-21 RX ADMIN — ATORVASTATIN CALCIUM 40 MG: 40 TABLET, FILM COATED ORAL at 08:30

## 2021-10-21 RX ADMIN — Medication 2000 UNITS: at 08:28

## 2021-10-21 RX ADMIN — OXYCODONE HYDROCHLORIDE 10 MG: 10 TABLET, FILM COATED, EXTENDED RELEASE ORAL at 08:31

## 2021-10-21 RX ADMIN — OXYCODONE 10 MG: 5 TABLET ORAL at 04:39

## 2021-10-21 RX ADMIN — SODIUM CHLORIDE: 9 INJECTION, SOLUTION INTRAVENOUS at 01:29

## 2021-10-21 RX ADMIN — TRAMADOL HYDROCHLORIDE 50 MG: 50 TABLET, FILM COATED ORAL at 04:25

## 2021-10-21 RX ADMIN — ACETAMINOPHEN 650 MG: 325 TABLET ORAL at 04:40

## 2021-10-21 RX ADMIN — TIMOLOL MALEATE 1 DROP: 5 SOLUTION OPHTHALMIC at 08:30

## 2021-10-21 RX ADMIN — ZINC SULFATE 220 MG (50 MG) CAPSULE 50 MG: CAPSULE at 08:29

## 2021-10-21 RX ADMIN — BISACODYL 5 MG: 5 TABLET, COATED ORAL at 08:28

## 2021-10-21 ASSESSMENT — PAIN SCALES - GENERAL
PAINLEVEL_OUTOF10: 8
PAINLEVEL_OUTOF10: 9
PAINLEVEL_OUTOF10: 10
PAINLEVEL_OUTOF10: 6
PAINLEVEL_OUTOF10: 3

## 2021-10-21 ASSESSMENT — PAIN DESCRIPTION - ONSET: ONSET: ON-GOING

## 2021-10-21 ASSESSMENT — PAIN SCALES - WONG BAKER
WONGBAKER_NUMERICALRESPONSE: 4
WONGBAKER_NUMERICALRESPONSE: 6

## 2021-10-21 ASSESSMENT — PAIN DESCRIPTION - ORIENTATION
ORIENTATION: LEFT

## 2021-10-21 ASSESSMENT — PAIN DESCRIPTION - FREQUENCY
FREQUENCY: CONTINUOUS
FREQUENCY: CONTINUOUS

## 2021-10-21 ASSESSMENT — PAIN DESCRIPTION - PAIN TYPE
TYPE: ACUTE PAIN
TYPE: ACUTE PAIN
TYPE: SURGICAL PAIN
TYPE: ACUTE PAIN;SURGICAL PAIN

## 2021-10-21 ASSESSMENT — PAIN - FUNCTIONAL ASSESSMENT
PAIN_FUNCTIONAL_ASSESSMENT: PREVENTS OR INTERFERES WITH MANY ACTIVE NOT PASSIVE ACTIVITIES
PAIN_FUNCTIONAL_ASSESSMENT: PREVENTS OR INTERFERES SOME ACTIVE ACTIVITIES AND ADLS

## 2021-10-21 ASSESSMENT — PAIN DESCRIPTION - LOCATION
LOCATION: HIP

## 2021-10-21 ASSESSMENT — PAIN DESCRIPTION - DESCRIPTORS
DESCRIPTORS: ACHING;DISCOMFORT
DESCRIPTORS: ACHING;DISCOMFORT
DESCRIPTORS: ACHING
DESCRIPTORS: SORE;ACHING

## 2021-10-21 ASSESSMENT — PAIN DESCRIPTION - PROGRESSION: CLINICAL_PROGRESSION: GRADUALLY WORSENING

## 2021-10-21 NOTE — DISCHARGE INSTR - DIET

## 2021-10-21 NOTE — PROGRESS NOTES
Patient discharged home today with [facility]. Went over all discharge instructions and new medications with patient and provided a copy of all new medications to take home. Patient verbalized understanding. Patient was stable upon discharge.   Electronically signed by Maverick Cantrell RN on 10/21/2021 at 2:27 PM

## 2021-10-21 NOTE — PROGRESS NOTES
CLINICAL PHARMACY NOTE: MEDS TO BEDS    Total # of Prescriptions Filled: 2   The following medications were delivered to the patient:  · Oxycodone 5mg  · Xarelto 10mg    Additional Documentation:    Delivered to patient and family member, paid cash

## 2021-10-21 NOTE — PROGRESS NOTES
Occupational Therapy Initial Assessment  Date: 10/21/2021   Patient Name: Som Rivas  MRN: [id number]     : 1955    Date of Service: 10/21/2021    Discharge Recommendations:  Patient would benefit from continued therapy after discharge, 24 hour supervision or assist  OT Equipment Recommendations  Equipment Needed: Yes  Mobility Devices: ADL Assistive Devices  ADL Assistive Devices: Toileting - 3-in-1 Commode    Assessment   Assessment: OT evaluation completed and tx initiated. Pt would likely benefit from continued skilled services to address functional deficits. Pt's PLOF and desire to participate indicate potential for functional progression. Per pt/chart review, tentative plan is to D/C home with familial support and [facility] services. OT does not anticipate any barriers to this. OT Education: Energy Conservation;IADL Safety;Home Exercise Program;Precautions; Equipment;ADL Adaptive Strategies;Transfer Training  Patient Education: AE demo and \"teach-back\" completed to sufficient level. REQUIRES OT FOLLOW UP: Yes  Activity Tolerance  Activity Tolerance: Patient Tolerated treatment well  Safety Devices  Safety Devices in place: Yes  Type of devices: Call light within reach; Chair alarm in place; Left in chair           Patient Diagnosis(es): The encounter diagnosis was Primary osteoarthritis of left hip.   has a past medical history of Arthritis, Bilateral hearing loss, Colon polyps, COPD (chronic obstructive pulmonary disease) ([address] COVID-19, Depression, Diabetes mellitus ([address] Hip pain, Hyperlipidemia, Hypertension, Low back pain, Oxygen dependent, Palliative care patient, and Pneumonia. has a past surgical history that includes Wrist fracture surgery; fracture surgery; Colonoscopy (? ); Knee arthroscopy (); Colonoscopy (2012); Colonoscopy (2015); pr egd transoral biopsy single/multiple (N/A, 2016); Upper gastrointestinal endoscopy (2017);  Upper gastrointestinal endoscopy (2017); Endoscopy, colon, diagnostic; and back surgery (2010). Restrictions  Restrictions/Precautions  Restrictions/Precautions: Weight Bearing, Fall Risk (4 L/min O2 per NC baseline)  Required Braces or Orthoses?: No  Lower Extremity Weight Bearing Restrictions  Left Lower Extremity Weight Bearing: Weight Bearing As Tolerated  Position Activity Restriction  Other position/activity restrictions: ANTERIOR KATALINA PRECAUTIONS    Subjective   General  Chart Reviewed: Yes  Patient assessed for rehabilitation services?: Yes  Additional Pertinent Hx: Covid-19 (jan 2021); DM; HTN; chronic low back pain; pneumonia; (B) hearing loss; COPD; wrist fx surgery  Family / Caregiver Present: No  Diagnosis: (L) KATALINA Anterior Approach; OA of (L) hip  Patient Currently in Pain: Yes  Pain Assessment  Pain Assessment: 0-10  Pain Level: 8  Pain Type: Acute pain;Surgical pain  Pain Location: Hip  Pain Orientation: Left  Pain Descriptors: Aching;Discomfort  Functional Pain Assessment: Prevents or interferes with many active not passive activities  Non-Pharmaceutical Pain Intervention(s): Ambulation/Increased Activity; Elevation;Repositioned;Cold applied  Response to Pain Intervention: Patient Satisfied  Vital Signs  Level of Consciousness: Alert (0)  Patient Currently in Pain: Yes    Social/Functional History  Social/Functional History  Lives With: Spouse (SISTER AND SISTER IN LAW)  Type of Home: Trailer  Home Layout: One level  Home Access: Ramped entrance  Bathroom Shower/Tub: Tub/Shower unit  Bathroom Equipment: Tub transfer bench, Grab bars in shower  Home Equipment: Rolling walker, Cane (electric recliner)  ADL Assistance: Needs assistance (bathing and dressing)  Ambulation Assistance: Independent (with DME)  Transfer Assistance: Independent (with DME)  Additional Comments: Pt had issues with bending activities due to hip pain and COPD. Family completed these ADLs for him (related to AE need/interest).        Objective   Vision: Within Functional Limits  Vision Exceptions: Wears glasses for reading  Hearing: Within functional limits    Orientation  Overall Orientation Status: Within Functional Limits     Balance  Sitting Balance: Supervision (in unsupported sitting)  Standing Balance: Contact guard assistance  Functional Mobility  Functional - Mobility Device: Rolling Walker  Assist Level: Contact guard assistance  Functional Mobility Comments: within room; extra time for pain management  Toilet Transfers  Toilet - Technique: Stand step  Equipment Used: Extra wide bedside commode  Toilet Transfer: Contact guard assistance  Toilet Transfers Comments: with r/w; could ambulate to BR if pain is managed and toileting is not urgent  ADL  Feeding: Independent;Setup  Grooming: Independent;Setup  UE Bathing: Modified independent   LE Bathing: Minimal assistance  UE Dressing: Modified independent   LE Dressing: Minimal assistance  Toileting: Contact guard assistance (with LE dressing waist up)  Additional Comments: Graded upon NOT using AE (as pt plans upon D/C). Coordination  Movements Are Fluid And Coordinated: Yes     Bed mobility  Scooting: Supervision (within chair)  Comment: Up in chair prior to eval. Pt does state that he sleeps in recliner due to COPD, (which is easier).   Transfers  Stand Step Transfers: Contact guard assistance  Sit to stand: Contact guard assistance  Stand to sit: Contact guard assistance  Transfer Comments: with r/w     Cognition  Overall Cognitive Status: WFL        Sensation  Overall Sensation Status: Impaired (WFL for BUE; LLE numbness and tingling (pre-procedure))        LUE AROM (degrees)  LUE AROM : WFL  RUE AROM (degrees)  RUE AROM : WFL  LUE Strength  Gross LUE Strength: WFL  L Hand General: 4+/5  RUE Strength  Gross RUE Strength: WFL  R Hand General: 4+/5           Included Treatment  Tx consisted of: bed mobility; pt. education; demo of BADLs with AE to address ANT KATALINA;  transfer training; DME training/discussion; activity tolerance/balance challenges; functional ambulation; dressing simulation; and toileting skills. (Total therapy time: 40 min)           Plan   Plan  Times per week: 2-3  Current Treatment Recommendations: Strengthening, Pain Management, Positioning, ROM, Safety Education & Training, Balance Training, Patient/Caregiver Education & Training, Self-Care / ADL, Functional Mobility Training, Equipment Evaluation, Education, & procurement, Home Management Training, Endurance Training    Goals  Short term goals  Time Frame for Short term goals: 1 week  Short term goal 1: Complete transfers with supervision and DME. Short term goal 2: Complete toileting skills with supervision and DME. Short term goal 3: Perform light, functional ambulatory ADLs with supervision and DME for 5 min. Short term goal 4: Pt/family will verbalize/demo: AE/DME options; ANT KATALINA precautions; recommended therapeutic activities; homemaking/IADL strategies; energy conservation techniques; and fall prevention strategies (GOAL MET). Long term goals  Long term goal 1: Return home and PLOF.                JACQUES Mancini/L  Electronically signed by Leandro HANNA/L on 10/21/2021 at 10:07 AM.

## 2021-10-21 NOTE — DISCHARGE SUMMARY
[facility] [address] [place] [address]  Dr. Alexandrea Warner  Discharge Summary       Merlin Vogt is a 77 y.o. male underwent left total hip replacement procedure without complication. Merlin Vogt was admitted to the floor following their recovery in the PACU.      Discharge Diagnosis  left Hip Replacement    Current Inpatient Medications    Current Facility-Administered Medications: atorvastatin (LIPITOR) tablet 40 mg, 40 mg, Oral, Daily  latanoprost (XALATAN) 0.005 % ophthalmic solution 1 drop, 1 drop, Both Eyes, Nightly  omeprazole (PRILOSEC) delayed release capsule 40 mg, 40 mg, Oral, Daily  tiZANidine (ZANAFLEX) tablet 4 mg, 4 mg, Oral, TID PRN  busPIRone (BUSPAR) tablet 7.5 mg, 7.5 mg, Oral, BID  metFORMIN (GLUCOPHAGE) tablet 500 mg, 500 mg, Oral, BID WC  zinc sulfate (ZINCATE) capsule 50 mg, 50 mg, Oral, Daily  ascorbic acid (VITAMIN C) tablet 1,000 mg, 1,000 mg, Oral, Daily  vitamin D (CHOLECALCIFEROL) tablet 2,000 Units, 2,000 Units, Oral, Daily  amitriptyline (ELAVIL) tablet 25 mg, 25 mg, Oral, Nightly  0.9 % sodium chloride infusion, , IntraVENous, Continuous  0.9 % sodium chloride bolus, 500 mL, IntraVENous, PRN  sodium chloride flush 0.9 % injection 10 mL, 10 mL, IntraVENous, 2 times per day  sodium chloride flush 0.9 % injection 10 mL, 10 mL, IntraVENous, PRN  0.9 % sodium chloride infusion, 25 mL, IntraVENous, PRN  acetaminophen (TYLENOL) tablet 650 mg, 650 mg, Oral, Q6H  oxyCODONE (ROXICODONE) immediate release tablet 5 mg, 5 mg, Oral, Q4H PRN **OR** oxyCODONE (ROXICODONE) immediate release tablet 10 mg, 10 mg, Oral, Q4H PRN  bisacodyl (DULCOLAX) EC tablet 5 mg, 5 mg, Oral, Daily  ondansetron (ZOFRAN-ODT) disintegrating tablet 4 mg, 4 mg, Oral, Q8H PRN **OR** ondansetron (ZOFRAN) injection 4 mg, 4 mg, IntraVENous, Q6H PRN  glucose (GLUTOSE) 40 % oral gel 15 g, 15 g, Oral, PRN  dextrose 50 % IV solution, 12.5 g, IntraVENous, PRN  glucagon (rDNA) injection 1 mg, 1 mg, IntraMUSCular, PRN  dextrose 5 % solution, 100 mL/hr, IntraVENous, PRN  insulin lispro (HUMALOG) injection vial 0-6 Units, 0-6 Units, SubCUTAneous, TID WC  insulin lispro (HUMALOG) injection vial 0-3 Units, 0-3 Units, SubCUTAneous, Nightly  oxyCODONE (OXYCONTIN) extended release tablet 10 mg, 10 mg, Oral, 2 times per day  oxyCODONE (ROXICODONE) immediate release tablet 10 mg, 10 mg, Oral, Q4H PRN **OR** oxyCODONE (ROXICODONE) immediate release tablet 20 mg, 20 mg, Oral, Q4H PRN  traMADol (ULTRAM) tablet 50 mg, 50 mg, Oral, Q6H  HYDROmorphone HCl PF (DILAUDID) injection 0.5 mg, 0.5 mg, IntraVENous, Q3H PRN **OR** HYDROmorphone HCl PF (DILAUDID) injection 1 mg, 1 mg, IntraVENous, Q3H PRN  HYDROmorphone HCl PF (DILAUDID) injection 1 mg, 1 mg, IntraVENous, Q3H PRN **OR** HYDROmorphone HCl PF (DILAUDID) injection 2 mg, 2 mg, IntraVENous, Q3H PRN  diphenhydrAMINE (BENADRYL) tablet 25 mg, 25 mg, Oral, Q6H PRN  clindamycin (CLEOCIN) 900 mg in dextrose 5 % 50 mL IVPB, 900 mg, IntraVENous, Q8H  rivaroxaban (XARELTO) tablet 10 mg, 10 mg, Oral, Daily  albuterol (PROVENTIL) nebulizer solution 2.5 mg, 2.5 mg, Nebulization, Q6H PRN  timolol (TIMOPTIC) 0.5 % ophthalmic solution 1 drop, 1 drop, Both Eyes, BID    Post-operatively the patients diet was advanced as tolerated and their incision was checked on POD #1. The incision is was clean, dry and intact with no signs of infection. The patient remained neurovascularly intact in the lower extremity and had intact pulses distally. Patients calf remained soft and showed no evidence of DVT. The patient was able to move his left leg and ankle/foot without any problems post-operatively. Physical therapy and occupational therapy were consulted and began working with the patient post-operatively. The patient progressed with PT/OT as would be expected and continued to improve through their stay.   The patients pain was initially controlled with IV medications but we were able to transition to oral pain medications soon after arrival to the floor and their pain remained under good control through their hospital stay. From a medical standpoint the patient remained stable and continued to have the medicine team follow throughout their stay. Acute postoperative blood loss anemia after joint replacement being monitored with daily hemoglobin/hematocrit. The patients dressing was changed/incision was checked on day of d/c. The patient will be discharged at this time to home with home health as per anterior hip protocol with their current diet restrictions and will continue to follow the hip precautions outlined to them by us and PT/OT. Condition on Discharge: Stable    Plan  Followup at scheduled appointment time (1 month post-op). Patient was instructed on the use of pain medications, the signs and symptoms of infection, and was given our number to call should they have any questions or concerns following discharge.

## 2021-10-21 NOTE — PROGRESS NOTES
Physical Therapy    Facility/Department: [facility]  Initial Assessment    NAME: Cathy Carbone  : 1955  MRN: [id number]    Date of Service: 10/21/2021    Discharge Recommendations:  Continue to assess pending progress, 24 hour supervision or assist        Assessment   Body structures, Functions, Activity limitations: Decreased functional mobility ; Decreased ADL status; Decreased ROM; Decreased strength;Decreased endurance;Decreased balance; Increased pain  Assessment: Pt ABLE TO STAND AND STEP TO RECLINER. MINIMAL DYSPNEA AS Pt IS O2 DEPENDENT. WILL PROGRESS AS TOLERATED. PT Education: PT Role;Plan of Care;Precautions  REQUIRES PT FOLLOW UP: Yes  Activity Tolerance  Activity Tolerance: Patient Tolerated treatment well       Patient Diagnosis(es): The encounter diagnosis was Primary osteoarthritis of left hip.     has a past medical history of Arthritis, Bilateral hearing loss, Colon polyps, COPD (chronic obstructive pulmonary disease) ([facility] [address] COVID-19, Depression, Diabetes mellitus ([facility] [address] Hip pain, Hyperlipidemia, Hypertension, Low back pain, Oxygen dependent, Palliative care patient, and Pneumonia. has a past surgical history that includes Wrist fracture surgery; fracture surgery; Colonoscopy (? ); Knee arthroscopy (); Colonoscopy (2012); Colonoscopy (2015); pr egd transoral biopsy single/multiple (N/A, 2016); Upper gastrointestinal endoscopy (2017); Upper gastrointestinal endoscopy (2017); Endoscopy, colon, diagnostic; and back surgery ().     Restrictions  Restrictions/Precautions  Restrictions/Precautions: Weight Bearing, Fall Risk  Required Braces or Orthoses?: No  Lower Extremity Weight Bearing Restrictions  Left Lower Extremity Weight Bearing: Weight Bearing As Tolerated  Position Activity Restriction  Other position/activity restrictions: ANTERIOR KATALINA PRECAUTIONS  Vision/Hearing        Subjective  General  Patient assessed for rehabilitation services?: Recommendations: Balance Training, Functional Mobility Training, Transfer Training, Gait Training, Safety Education & Training, Patient/Caregiver Education & Training  Safety Devices  Type of devices: Call light within reach, Chair alarm in place    G-Code       OutComes Score                                                  AM-PAC Score             Goals  Short term goals  Time Frame for Short term goals: 14 days  Short term goal 1: BED MOB MOD IND  Short term goal 2: TRANSFERS SUPERVISION  Short term goal 3: ' RW SUPERVISION       Therapy Time   Individual Concurrent Group Co-treatment   Time In           Time Out           Minutes                   Vance Vanessa, PT

## 2021-10-21 NOTE — CARE COORDINATION
Date / Time of Evaluation:   10/21/2021    11:37 AM  Assessment Completed by:   Urbano Spaulding RN, BSN      Patient Name:   Shashi Garvey  MRN:   [id number]  [place]:   1955    Patient Admission Status:   Inpatient [101]    Patient Contact Information:    [address] [facility]  [phone] (home)   Telephone Information:   Mobile [phone]     Above information verified? [x]   Yes  []   No    (Best Practice:   Have patient/caregiver verify above address and phone number by stating out loud their current address and reachable phone number. Initial Assessment Completed at bedside with:      [x]   Patient  []   Family/Caregiver/Guardian   []   Other:      Current PCP:    Severiano Hose, MD    PCP verified? [x]   Yes  []   No    Emergency Contacts:    Extended Emergency Contact Information  Primary Emergency Contact: [facility]  Address: [address] [place] [address] [address], [address] Phone: [phone]  Mobile Phone: [phone]  Relation: Spouse  Hearing or visual needs: None  Other needs: None  Preferred language: English   needed? No  Secondary Emergency Contact: Guadalupe Lincoln  Address: [address] Phone: [phone]  Mobile Phone: [phone]  Relation: Brother/Sister    Advance Directives:    Does Mr. Carlos Stringer have an advance directive in his electronic medical record? []   Yes  [x]   No    Code Status:   Full Code      Have you been vaccinated for COVID-19 (SARS-CoV-2)?     [x]   Yes  []   No                   If so, when?   02/12/2021  Which :         []   Pfizer-BioNTech  [x]   Moderna  []   [employer]  []   Other:       Do you have any of the following unmet social needs that would keep you from returning home safely:    []   Yes  [x]   No                    Unmet Social Needs:           []   Living Situation/Housing  []   Food  []   Stroke Education   [] Utilities  []   Personal Safety  []   Financial Strain  []   Employment  []   Mental Health  []   Substance Abuse  []   Transportation Barriers    Additional Unmet Social Needs Notes:    NA    Financial:    Payor: Max Lozoya / Plan: Zayda Stoner ESSENTIAL/PLUS / Product Type: *No Product type* /     Pre-Cert required for SNF:     [x]   Yes  []   No    Have Long Term Care Insurance:      []   Yes  [x]   No      Pharmacy:    Luda Pyle [address], [facility] [address]  [address] [place] [address] [facility] [address]  Phone: [phone] Fax: [phone]    [facility] [address] [address] [phone] [place] [place] [phone]  [address] [address]  Phone: [phone] Fax: [phone]    Potential assistance purchasing medications?        []   Yes  [x]   No      ADLS:       Support System:   Family    Current Home Environment:       Steps:       []   Yes  [x]   No    Plans to RETURN to current housing:     []   Yes  [x]   No    Barriers to RETURNING to current housing:   NA    Currently ACTIVE with Home Health CARE:      [x]   Yes  []   No    [facility]:  [address]    DME Provider:   NA      Had HOME OXYGEN prior to admission:      [x]   Yes  []   No    Oxygen Company:    [employer]der [employer]    Has a pulse oximetry unit at home:     []   Yes  [x]   No    Informed of need to bring portable home O2 tank to hospital on day of DISCHARGE:      [x]   Yes  []   No    Name of person committed to bringing portable tank at discharge:    spouse    Transition Plan:  Home with [facility]    Transportation PLAN for Discharge:  Spouse    Patient Deficits:    [x]   Yes   []   No    If yes:    []   Confusion/Memory  []   Visual  []   Motor/Sensory         []   Right arm         []   Right leg         []   Left arm         [x]   Left leg  []   Language/Speech         []   Aphasia         []   Dysarthria         []   Swallow      Michelle Coma Scale  Eye Opening: Spontaneous  Best Verbal Response: Oriented  Best Motor Response: Obeys commands  [place] Coma Scale Score: 15    Patient Deficit Notes:   L Total Hip Replacement, PT/OT working w/pt and he will have [facility] with PT/OT    Additional CM/SW Notes:   Patient lives at home with spouse. He states that he has everything that he needs at home. No needs identified. Will continue to follow.     Alfie Diamond and/or his family were provided with choice of provider:    [x]   Yes   []   No      Theron Levy RN, BSN  [address]  Phone:  [phone]   Fax: [phone]    Electronically signed by Theron Levy RN, BSN on 10/21/2021 at 11:42 AM

## 2021-10-21 NOTE — PROGRESS NOTES
Physical Therapy  Name: Radha Stephen  MRN:  [id number]  Date of service:  10/21/2021     10/21/21 1321   Restrictions/Precautions   Restrictions/Precautions Weight Bearing; Fall Risk   Required Braces or Orthoses? No   Lower Extremity Weight Bearing Restrictions   Left Lower Extremity Weight Bearing Weight Bearing As Tolerated   Position Activity Restriction   Other position/activity restrictions ANTERIOR KATALINA PRECAUTIONS   Pain Screening   Patient Currently in Pain Yes   Pain Assessment   Pain Assessment Faces   Pain Type Acute pain   Pain Location Hip   Pain Orientation Left   Pain Descriptors Aching;Discomfort   Pain Frequency Continuous   Functional Pain Assessment Prevents or interferes some active activities and ADLs   Non-Pharmaceutical Pain Intervention(s) Ambulation/Increased Activity;Repositioned;Rest;Cold applied   Response to Pain Intervention Patient Satisfied   Dueñas-Baker Pain Rating 4   Bed Mobility   Supine to Sit Stand by assistance   Sit to Supine Stand by assistance   Transfers   Sit to Stand Contact guard assistance   Stand to sit Contact guard assistance   Ambulation   Ambulation? Yes   Ambulation 1   Surface level tile   Device Rolling Walker   Other Apparatus O2   Assistance Contact guard assistance   Quality of Gait steady    Gait Deviations Slow Natalie;Decreased step length;Decreased step height   Distance 15' x2   Comments pt required one standing rest break due to SOA, but reports this is noramal for him at home   Other Activities   Comment Pt stated he has a ramp at home and declined step training   Conditions Requiring Skilled Therapeutic Intervention   Body structures, Functions, Activity limitations Decreased functional mobility ; Decreased ADL status; Decreased ROM; Decreased strength;Decreased endurance;Decreased balance; Increased pain   Assessment Pt able to amb into bathroom with steady gait, required one standing rest break due to SOA but not in distress.  Pt able to amb back to bed and positioned for comfort. Activity Tolerance   Activity Tolerance Patient Tolerated treatment well   Safety Devices   Type of devices Bed alarm in place;Call light within reach; Left in bed         Electronically signed by Johnnie Morton PTA on 10/21/2021 at 1:29 PM

## 2021-10-21 NOTE — PROGRESS NOTES
Subjective:     Post-Operative Day: 1 Status Post left KATALINA. Pt is awake and alert. Ox3. Doing well this am.  He has yet to work with PT. He has no other issues.   Systemic or Specific Complaints:No Complaints  no nausea and no vomiting Pain 6    Objective:     Patient Vitals for the past 24 hrs:   BP Temp Temp src Pulse Resp SpO2 Height Weight   10/21/21 0638 132/77 97.2 °F (36.2 °C) Temporal 82 16 96 % -- --   10/21/21 0404 139/68 97.2 °F (36.2 °C) -- 79 -- 92 % -- --   10/21/21 0026 115/62 97.3 °F (36.3 °C) Temporal 88 16 93 % -- --   10/20/21 2134 131/60 96.8 °F (36 °C) Temporal 93 18 93 % -- --   10/20/21 1738 (!) 151/74 97.3 °F (36.3 °C) Temporal 95 18 100 % -- --   10/20/21 1635 137/79 97.5 °F (36.4 °C) Temporal 85 18 95 % -- --   10/20/21 1600 (!) 102/56 97.7 °F (36.5 °C) -- 85 9 96 % -- --   10/20/21 1555 107/60 -- -- 84 13 98 % -- --   10/20/21 1550 (!) 97/56 -- -- 82 14 98 % -- --   10/20/21 1545 (!) 103/56 -- -- 79 14 97 % -- --   10/20/21 1540 102/61 -- -- 81 11 98 % -- --   10/20/21 1535 108/77 -- -- 83 12 98 % -- --   10/20/21 1530 110/71 -- -- 85 11 100 % -- --   10/20/21 1525 132/68 -- -- 85 16 98 % -- --   10/20/21 1520 105/76 -- -- 86 13 98 % -- --   10/20/21 1515 128/68 -- -- 81 12 100 % -- --   10/20/21 1510 113/73 -- -- 86 15 95 % -- --   10/20/21 1505 -- -- -- 84 12 90 % -- --   10/20/21 1500 132/73 -- -- 86 15 90 % -- --   10/20/21 1455 -- -- -- 92 17 90 % -- --   10/20/21 1450 (!) 141/76 -- -- 88 11 93 % -- --   10/20/21 1445 137/73 -- -- 88 15 96 % -- --   10/20/21 1440 (!) 115/98 -- -- 81 (!) 7 96 % -- --   10/20/21 1435 120/68 -- -- 84 (!) 7 95 % -- --   10/20/21 1433 -- 97.7 °F (36.5 °C) -- -- -- -- -- --   10/20/21 1430 110/71 97.7 °F (36.5 °C) Temporal 82 12 95 % -- --   10/20/21 0957 (!) 148/78 98 °F (36.7 °C) Tympanic 101 22 97 % 5' 6\" (1.676 m) 226 lb (102.5 kg)       General: alert, appears stated age, cooperative, no distress and morbidly obese   Exam: Good active motion to left lower extremity   Wound: Wound clean and dry no evidence of infection. , No Drainage and Wound Intact   Neurovascular: Exam normal     DVT Exam: No evidence of DVT seen on physical exam.   Xray:  left Hip implants in good position       Data Review:  Recent Labs     10/21/21  0348   HGB 8.9*     Recent Labs     10/21/21  0348      K 5.2*   CREATININE 0.6     Recent Labs     10/21/21  0348   LABGLOM >60     No results for input(s): INR in the last 72 hours. Assessment:     Status Post left KATALINA. Doing well postoperatively. Plan:     Pt is doing well this am.  We will see how he tolerates PT this am and afternoon. If he does well then okay to discharge to home with home health per anterior hip protocol. He may f/u in office in 6 weeks.       Electronically signed by Becca Mishra PA-C on 10/21/2021 at 7:15 AM

## 2021-10-22 ENCOUNTER — TELEPHONE (OUTPATIENT)
Dept: INPATIENT UNIT | Age: 66
End: 2021-10-22

## 2021-10-22 NOTE — CONSULTS
Consult Note      CHIEF COMPLAINT:  Left Hip Pain    Reason for Admission:  Left KATALINA    History Obtained From:  Patient, chart    HISTORY OF PRESENT ILLNESS:      The patient is a 77 y.o. male who was admitted to Dr. Evelyn Cobb service and underwent a left KATALINA. His pain is controlled. No CP. No worsening SOA. He has chronic SOA with COPD. No abdominal pain or N/V. He is taking PO. No dysuria. No HA or dizziness. No recent illnesses or fevers. Past Medical History:        Diagnosis Date    Arthritis     Bilateral hearing loss     Colon polyps     COPD (chronic obstructive pulmonary disease) ([facility]     se[facility] dr[facility] Priscila hoffman [facility]    COVID-19 01/2021    low oxygen; pneumonia; received monoclonal infusion     Depression     Diabetes mellitus ([facility] [address]     Hip pain     Hyperlipidemia     Hypertension     Low back pain 1/29/2016    Oxygen dependent     4l/m nc     Palliative care patient 12/28/2018    Pneumonia 01/2021    inpt     Past Surgical History:        Procedure Laterality Date    BACK SURGERY  2010    COLONOSCOPY  ? 2005    Dr. Capri Resendiz  01/2012    3 polyps, 2 adenomatous, 1 hyperplastic    COLONOSCOPY  01/2015    diverticulosis    ENDOSCOPY, COLON, DIAGNOSTIC      FRACTURE SURGERY      KNEE ARTHROSCOPY  2014    [place] EGD TRANSORAL BIOPSY SINGLE/MULTIPLE N/A 12/21/2016    Dr MARIA ELENA Gómez/dilation over wire, 50 [place]atz[address]'s Ring, gastritis/gastropathy-prn    UPPER GASTROINTESTINAL ENDOSCOPY  4/24/2017    Dr MARIA ELENA Gómez/dilation over wire, 46 [address]     UPPER GASTROINTESTINAL ENDOSCOPY  4/24/2017    Dr MARIA ELENA Gómez/dilation over wire, 46 [place] Ring     WRIST FRACTURE SURGERY      1400-3072         Medications Prior to Admission:    No medications prior to admission. Allergies:  Lyrica [pregabalin] and Neurontin [gabapentin]    Social History:   TOBACCO:   reports that he quit smoking about 5 years ago. His smoking use included cigarettes.  He has a 92.00 pack-year smoking history. He has never used smokeless tobacco.  ETOH:   reports previous alcohol use. DRUGS:   reports no history of drug use. MARITAL STATUS:    OCCUPATION:  retired  Patient currently lives with family       Family History:       Problem Relation Age of Onset    Kidney Disease Mother     High Blood Pressure Mother     Glaucoma Mother     Alcohol Abuse Father     Heart Disease Brother     No Known Problems Brother     Other Sister         pancreatitis    Brain Cancer Son         also had brain clot, both legs were removed s/p MVA    Colon Cancer Neg Hx     Colon Polyps Neg Hx      REVIEW OF SYSTEMS:  Constitutional: neg  CV: neg  Pulmonary: neg  GI: neg  : neg  Psych: neg  Neuro: neg  Skin: neg  MusculoSkeletal: neg  HEENT: neg  Joints: left hip pain  Vitals:  /73   Pulse 77   Temp 98.1 °F (36.7 °C) (Temporal)   Resp 16   Ht 5' 6\" (1.676 m)   Wt 226 lb (102.5 kg)   SpO2 97%   BMI 36.48 kg/m²     PHYSICAL EXAM:  Gen: NAD, alert, pleasant, on O2  HEENT: WNL  Lymph: no LAD  Neck: no JVD or masses  Chest: CTA bilat  CV: RRR  Abdomen: NT/ND  Extrem: no C/C/E  Neuro: non focal  Skin: no rashes  Joints: no redness  DATA:  I have reviewed the admission labs and imaging tests.     ASSESSMENT AND PLAN:      Principal Problem:    Primary osteoarthritis of left hip, S/P Left KATALINA---follow with Orthopedics, pain treatment and therapy    ABL Anemia---follow labs    COPD, CRF---stable    HTN---follow with BP    DM2---glucose stable        Neyda Orozco MD  8:12 AM 10/22/2021

## 2021-10-27 LAB
ANION GAP SERPL CALCULATED.3IONS-SCNC: 11 MMOL/L (ref 7–19)
BUN BLDV-MCNC: 8 MG/DL (ref 8–23)
CALCIUM SERPL-MCNC: 9.1 MG/DL (ref 8.8–10.2)
CHLORIDE BLD-SCNC: 98 MMOL/L (ref 98–111)
CO2: 30 MMOL/L (ref 22–29)
CREAT SERPL-MCNC: 0.6 MG/DL (ref 0.5–1.2)
GFR AFRICAN AMERICAN: >59
GFR NON-AFRICAN AMERICAN: >60
GLUCOSE BLD-MCNC: 123 MG/DL (ref 74–109)
POTASSIUM SERPL-SCNC: 4.2 MMOL/L (ref 3.5–5)
SODIUM BLD-SCNC: 139 MMOL/L (ref 136–145)

## 2022-03-29 PROBLEM — R06.02 SHORTNESS OF BREATH: Status: RESOLVED | Noted: 2018-11-27 | Resolved: 2022-03-29

## 2022-03-31 ENCOUNTER — OFFICE VISIT (OUTPATIENT)
Dept: PULMONOLOGY | Facility: CLINIC | Age: 67
End: 2022-03-31

## 2022-03-31 VITALS
HEIGHT: 64 IN | HEART RATE: 106 BPM | WEIGHT: 228 LBS | BODY MASS INDEX: 38.93 KG/M2 | DIASTOLIC BLOOD PRESSURE: 78 MMHG | OXYGEN SATURATION: 91 % | SYSTOLIC BLOOD PRESSURE: 168 MMHG

## 2022-03-31 DIAGNOSIS — K21.9 GASTROESOPHAGEAL REFLUX DISEASE WITHOUT ESOPHAGITIS: Chronic | ICD-10-CM

## 2022-03-31 DIAGNOSIS — J43.2 CENTRILOBULAR EMPHYSEMA: Chronic | ICD-10-CM

## 2022-03-31 DIAGNOSIS — J44.9 STAGE 3 SEVERE COPD BY GOLD CLASSIFICATION: Primary | Chronic | ICD-10-CM

## 2022-03-31 DIAGNOSIS — Z87.891 PERSONAL HISTORY OF NICOTINE DEPENDENCE: Chronic | ICD-10-CM

## 2022-03-31 DIAGNOSIS — G47.33 OBSTRUCTIVE SLEEP APNEA: Chronic | ICD-10-CM

## 2022-03-31 DIAGNOSIS — J96.11 CHRONIC RESPIRATORY FAILURE WITH HYPOXIA: Chronic | ICD-10-CM

## 2022-03-31 DIAGNOSIS — E66.01 SEVERE OBESITY (BMI 35.0-39.9) WITH COMORBIDITY: ICD-10-CM

## 2022-03-31 DIAGNOSIS — R91.1 LUNG NODULE: Chronic | ICD-10-CM

## 2022-03-31 PROCEDURE — 99214 OFFICE O/P EST MOD 30 MIN: CPT | Performed by: NURSE PRACTITIONER

## 2022-03-31 RX ORDER — ERYTHROMYCIN 5 MG/G
OINTMENT OPHTHALMIC
COMMUNITY
Start: 2022-03-21

## 2022-03-31 RX ORDER — DORZOLAMIDE HCL 20 MG/ML
SOLUTION/ DROPS OPHTHALMIC
COMMUNITY
Start: 2022-02-28 | End: 2022-09-30

## 2022-09-22 ENCOUNTER — TELEPHONE (OUTPATIENT)
Dept: PULMONOLOGY | Facility: CLINIC | Age: 67
End: 2022-09-22

## 2022-09-30 ENCOUNTER — OFFICE VISIT (OUTPATIENT)
Dept: PULMONOLOGY | Facility: CLINIC | Age: 67
End: 2022-09-30

## 2022-09-30 ENCOUNTER — PROCEDURE VISIT (OUTPATIENT)
Dept: PULMONOLOGY | Facility: CLINIC | Age: 67
End: 2022-09-30

## 2022-09-30 ENCOUNTER — HOSPITAL ENCOUNTER (OUTPATIENT)
Dept: PULMONOLOGY | Facility: HOSPITAL | Age: 67
Discharge: HOME | End: 2022-09-30
Admitting: NURSE PRACTITIONER

## 2022-09-30 VITALS
HEART RATE: 93 BPM | HEIGHT: 64 IN | SYSTOLIC BLOOD PRESSURE: 142 MMHG | BODY MASS INDEX: 39.5 KG/M2 | OXYGEN SATURATION: 98 % | DIASTOLIC BLOOD PRESSURE: 80 MMHG | WEIGHT: 231.4 LBS

## 2022-09-30 DIAGNOSIS — J96.11 CHRONIC RESPIRATORY FAILURE WITH HYPOXIA: ICD-10-CM

## 2022-09-30 DIAGNOSIS — Z01.811 PREOPERATIVE RESPIRATORY EXAMINATION: ICD-10-CM

## 2022-09-30 DIAGNOSIS — G47.33 OBSTRUCTIVE SLEEP APNEA: Chronic | ICD-10-CM

## 2022-09-30 DIAGNOSIS — K21.9 GASTROESOPHAGEAL REFLUX DISEASE WITHOUT ESOPHAGITIS: Chronic | ICD-10-CM

## 2022-09-30 DIAGNOSIS — J43.2 CENTRILOBULAR EMPHYSEMA: Chronic | ICD-10-CM

## 2022-09-30 DIAGNOSIS — R91.1 LUNG NODULE: Chronic | ICD-10-CM

## 2022-09-30 DIAGNOSIS — J44.9 STAGE 3 SEVERE COPD BY GOLD CLASSIFICATION: Primary | ICD-10-CM

## 2022-09-30 DIAGNOSIS — Z87.891 PERSONAL HISTORY OF NICOTINE DEPENDENCE: Chronic | ICD-10-CM

## 2022-09-30 DIAGNOSIS — E66.01 MORBID OBESITY WITH BMI OF 40.0-44.9, ADULT: ICD-10-CM

## 2022-09-30 LAB
A-A DO2: ABNORMAL
ARTERIAL PATENCY WRIST A: POSITIVE
ATMOSPHERIC PRESS: 754 MMHG
BASE EXCESS BLDA CALC-SCNC: 10.2 MMOL/L (ref 0–2)
BDY SITE: ABNORMAL
BODY TEMPERATURE: 37 C
CA-I BLD-MCNC: 4.81 MG/DL (ref 4.6–5.4)
COHGB MFR BLD: 1.9 % (ref 0–5)
GAS FLOW AIRWAY: 3 LPM
HCO3 BLDA-SCNC: 37.3 MMOL/L (ref 20–26)
HCT VFR BLD CALC: 32 % (ref 38–51)
HGB BLDA-MCNC: 10.5 G/DL (ref 14–18)
Lab: ABNORMAL
METHGB BLD QL: 0.4 % (ref 0–3)
MODALITY: ABNORMAL
NOTE: ABNORMAL
OXYHGB MFR BLDV: 94.3 % (ref 94–99)
PCO2 BLDA: 64.2 MM HG (ref 35–45)
PCO2 TEMP ADJ BLD: 64.2 MM HG (ref 35–45)
PH BLDA: 7.37 PH UNITS (ref 7.35–7.45)
PH, TEMP CORRECTED: 7.37 PH UNITS (ref 7.35–7.45)
PO2 BLDA: 82.3 MM HG (ref 83–108)
PO2 TEMP ADJ BLD: 82.3 MM HG (ref 83–108)
POTASSIUM BLDA-SCNC: 4.2 MMOL/L (ref 3.5–5.2)
SAO2 % BLDCOA: 96.6 % (ref 94–99)
SODIUM BLDA-SCNC: 143 MMOL/L (ref 136–145)
VENTILATOR MODE: ABNORMAL

## 2022-09-30 PROCEDURE — 36600 WITHDRAWAL OF ARTERIAL BLOOD: CPT

## 2022-09-30 PROCEDURE — 94729 DIFFUSING CAPACITY: CPT | Performed by: NURSE PRACTITIONER

## 2022-09-30 PROCEDURE — 82805 BLOOD GASES W/O2 SATURATION: CPT

## 2022-09-30 PROCEDURE — 83050 HGB METHEMOGLOBIN QUAN: CPT

## 2022-09-30 PROCEDURE — 94010 BREATHING CAPACITY TEST: CPT | Performed by: NURSE PRACTITIONER

## 2022-09-30 PROCEDURE — 82375 ASSAY CARBOXYHB QUANT: CPT

## 2022-09-30 PROCEDURE — 99214 OFFICE O/P EST MOD 30 MIN: CPT | Performed by: NURSE PRACTITIONER

## 2022-09-30 RX ORDER — LATANOPROST 50 UG/ML
SOLUTION/ DROPS OPHTHALMIC
COMMUNITY
Start: 2022-09-20

## 2022-10-04 ENCOUNTER — HOSPITAL ENCOUNTER (OUTPATIENT)
Dept: GENERAL RADIOLOGY | Facility: HOSPITAL | Age: 67
Discharge: HOME | End: 2022-10-04
Admitting: NURSE PRACTITIONER

## 2022-10-04 DIAGNOSIS — Z01.811 PREOPERATIVE RESPIRATORY EXAMINATION: ICD-10-CM

## 2022-10-04 PROBLEM — M16.12 PRIMARY OSTEOARTHRITIS OF LEFT HIP: Status: ACTIVE | Noted: 2021-10-20

## 2022-10-04 PROCEDURE — 71046 X-RAY EXAM CHEST 2 VIEWS: CPT

## 2022-10-05 ENCOUNTER — HOSPITAL ENCOUNTER (OUTPATIENT)
Dept: PREADMISSION TESTING | Age: 67
Discharge: HOME | End: 2022-10-09
Payer: MEDICARE

## 2022-10-05 VITALS — BODY MASS INDEX: 36.48 KG/M2 | WEIGHT: 227 LBS | HEIGHT: 66 IN

## 2022-10-05 PROBLEM — Z87.891 FORMER SMOKER: Status: ACTIVE | Noted: 2022-10-05

## 2022-10-05 LAB
ABO/RH: NORMAL
ANTIBODY SCREEN: NORMAL
APTT: 29.4 SEC (ref 26–36.2)
BILIRUBIN URINE: NEGATIVE
BLOOD, URINE: NEGATIVE
CLARITY: CLEAR
COLOR: YELLOW
EKG P AXIS: 51 DEGREES
EKG P-R INTERVAL: 163 MS
EKG Q-T INTERVAL: 384 MS
EKG QRS DURATION: 141 MS
EKG QTC CALCULATION (BAZETT): 443 MS
EKG T AXIS: 61 DEGREES
GLUCOSE URINE: NEGATIVE MG/DL
INR BLD: 1.01 (ref 0.88–1.18)
KETONES, URINE: NEGATIVE MG/DL
LEUKOCYTE ESTERASE, URINE: NEGATIVE
MRSA SCREEN RT-PCR: NOT DETECTED
NITRITE, URINE: NEGATIVE
PH UA: 6.5 (ref 5–8)
PROTEIN UA: NEGATIVE MG/DL
PROTHROMBIN TIME: 13.2 SEC (ref 12–14.6)
SPECIFIC GRAVITY UA: <=1.005 (ref 1–1.03)
UROBILINOGEN, URINE: 0.2 E.U./DL

## 2022-10-05 PROCEDURE — 85730 THROMBOPLASTIN TIME PARTIAL: CPT

## 2022-10-05 PROCEDURE — 93005 ELECTROCARDIOGRAM TRACING: CPT | Performed by: ORTHOPAEDIC SURGERY

## 2022-10-05 PROCEDURE — 86900 BLOOD TYPING SEROLOGIC ABO: CPT

## 2022-10-05 PROCEDURE — 81003 URINALYSIS AUTO W/O SCOPE: CPT

## 2022-10-05 PROCEDURE — 86901 BLOOD TYPING SEROLOGIC RH(D): CPT

## 2022-10-05 PROCEDURE — 85610 PROTHROMBIN TIME: CPT

## 2022-10-05 PROCEDURE — 87641 MR-STAPH DNA AMP PROBE: CPT

## 2022-10-05 PROCEDURE — 86850 RBC ANTIBODY SCREEN: CPT

## 2022-10-05 RX ORDER — ACETAMINOPHEN 500 MG
1000 TABLET ORAL ONCE
Status: CANCELLED | OUTPATIENT
Start: 2022-10-24

## 2022-10-05 RX ORDER — KETOTIFEN FUMARATE 0.35 MG/ML
1 SOLUTION/ DROPS OPHTHALMIC 2 TIMES DAILY
COMMUNITY

## 2022-10-05 RX ORDER — ASPIRIN 81 MG/1
81 TABLET ORAL DAILY
Status: ON HOLD | COMMUNITY
End: 2022-10-25 | Stop reason: HOSPADM

## 2022-10-05 RX ORDER — OXYCODONE HYDROCHLORIDE 20 MG/1
20 TABLET ORAL EVERY 6 HOURS
COMMUNITY

## 2022-10-05 RX ORDER — CELECOXIB 200 MG/1
200 CAPSULE ORAL ONCE
Status: CANCELLED | OUTPATIENT
Start: 2022-10-24

## 2022-10-05 RX ORDER — ERYTHROMYCIN 5 MG/G
0.25 OINTMENT OPHTHALMIC NIGHTLY
COMMUNITY

## 2022-10-05 RX ORDER — OXYCODONE HCL 10 MG/1
10 TABLET, FILM COATED, EXTENDED RELEASE ORAL ONCE
Status: CANCELLED | OUTPATIENT
Start: 2022-10-24

## 2022-10-05 RX ORDER — DEXAMETHASONE SODIUM PHOSPHATE 10 MG/ML
10 INJECTION, SOLUTION INTRAMUSCULAR; INTRAVENOUS ONCE
Status: CANCELLED | OUTPATIENT
Start: 2022-10-24

## 2022-10-05 NOTE — DISCHARGE INSTRUCTIONS
PREOPERATIVE GUIDELINES WHEN RECEIVING ANESTHESIA    Do not eat or drink anything after midnight, the night before your surgery. This is extremely important for your safety. Take a bath (or shower) the night before your surgery and you may brush your teeth the morning of your surgery. You will be scheduled to arrive at the hospital 2 hours before your surgery, or follow your surgeon's instructions. Dress comfortably. Wear loose clothing that will be easy to remove and comfortable for your trip home. You may wear eyeglasses or contacts but bring your cases with you as they must be remove before your surgery. Hearing aids and dentures will need to be removed before your surgery. Do not wear any jewelry, including body jewelry. All jewelry will need to be removed prior to your surgery. Do not wear fingernail polish or make-up. It is best not to bring any valuables with you. If you are to stay in the hospital overnight, bring your robe, slippers and personal toiletries that you may need. POSTOPERATIVE GUIDELINES AFTER RECEIVING ANESTHESIA    If you are to go home after your surgery, you will need a responsible adult to drive you home. You will not be able to take public transportation after your discharge from the Operative Care Unit unless you are accompanied by a        responsible adult. On returning home, be sure to follow your physician's orders regarding diet, activity and medications. Remember, surgery with general anesthesia or sedation may leave you sleepy, very tired and with a decreased appetite for 12 to 24 hours. If you develop any post-surgical complications or problems, call your surgeon or [facility] Emergency Department ([phone]). The day before your surgery, you will receive a phone call from the surgery nurse, to let you know what time to arrive on the day of surgery.   This call will usually be between 2-4 PM.  If you do not receive a phone call by 4 PM the day before your surgery, please call [phone] and let them know you have not received an arrival time. If your surgery is on Monday, your call will be on the Friday before your Monday surgery. MEDICATION INSTRUCTIONS PRIOR TO YOUR SURGERY    Night before surgery:      ________Do not take Metformin (you will not be eating or drinking after midnight)      The morning of surgery:  do not take lisinopril    You can take all your usual prescribed medications with a small sip of water. DO NOT TAKE ANY DIABETIC MEDICATIONS the morning of your surgery. DO NOT TAKE ANY SUPPLEMENTS or over the counter medications the morning of  surgery. Greyson Azevedo for the [facility]    A script for Bactroban ointment has been call to your pharmacy or was given to you in written form by your surgeon. The guidelines for the ointment use are as follows:    1)  Start using the ointment 7 days before your surgery date    2)  Use the ointment two times a day - morning and night    3)  Place the ointment on a Q-tip and swirl up in your nose making sure you cover completely       the skin just inside of each nostril. Use one end of the Q-tip for each nostril. CHLORHEXIDINE GLUCONATE 4% SHOWERING    Patient should shower with this soap a minimum of 3 consecutive showers (2 nights before surgery, the night before surgery and the morning of surgery) washing from the neck down (avoiding contact with genitalia). DO NOT [address] YOUR HAIR OR FACE WITH THIS SOAP. When washing with this soap, apply enough to suds up the body thoroughly, turn the water away from your body and allow the soap suds to remain on the body for 2 full minutes, then rinse body completely. After using this soap on the body, please do not apply powders or lotions to your body. After the shower the night before surgery, please dry off with a new towel, sleep in new freshly laundered pj's, and change your bed linen before going to sleep. You are scheduled to return to the [facility] on Thursday 10/20/22 at 0930am  for your COVID test.  Enter at the main level of the [facility], where the granite ball is floating on water, go past the ball and when you come to the information board on the wall across from the elevators, you will see a door with a keypad. The sign next to it will read \"Staff Only\" and will have a red [place] [employer] [place] on the sign. You will knock on that door and we will come get you for your test.  We will be expecting you. You must have your COVID test on this day or surgery will be canceled. After having your COVID 19 test, you will need to self isolate until the day of surgery. This means no public gatherings such as Yazidism attendance, weddings, showers, funerals, etc.  If you need something from the store, you will need someone to pick it up for you. It is very important that you are not around other people prior to your surgery or you could contract COVID 19 between the time you are tested and your surgery date. [address] [place] [address] for Surgery Patients-Revised 6-    Visitors for surgery patients are essential for the patient's emotional well-being and care       post operatively. 2.   Visitor Expectations and Limitations        3. One visitor allowed with patients in the preop/postop rooms. 4.  A second visitor may sit in the waiting area. 5.  No children under 13 allowed in the pre-post op areas unless they are the patient. 6.  Two people may be with an underage surgical/procedural patient in preop/postop        room. 7.  If you are admitted to the hospital post operatively, there are NO RESTRICTIONS on       the floor at this time. 8.  If you are admitted to ICU postoperatively, you may have one visitor in the room from        7A-7P. A second visitor may sit in the ICU waiting room.   There can be no overnight

## 2022-10-06 ENCOUNTER — TELEPHONE (OUTPATIENT)
Dept: PULMONOLOGY | Facility: CLINIC | Age: 67
End: 2022-10-06

## 2022-10-07 ENCOUNTER — HOSPITAL ENCOUNTER (OUTPATIENT)
Dept: RADIATION ONCOLOGY | Facility: HOSPITAL | Age: 67
Setting detail: RADIATION/ONCOLOGY SERIES
End: 2022-10-07

## 2022-10-07 ENCOUNTER — CONSULT (OUTPATIENT)
Dept: RADIATION ONCOLOGY | Facility: HOSPITAL | Age: 67
End: 2022-10-07

## 2022-10-07 VITALS
BODY MASS INDEX: 40.22 KG/M2 | SYSTOLIC BLOOD PRESSURE: 177 MMHG | WEIGHT: 227 LBS | OXYGEN SATURATION: 93 % | HEIGHT: 63 IN | HEART RATE: 106 BPM | DIASTOLIC BLOOD PRESSURE: 73 MMHG

## 2022-10-07 DIAGNOSIS — M16.12 PRIMARY OSTEOARTHRITIS OF LEFT HIP: Primary | ICD-10-CM

## 2022-10-07 DIAGNOSIS — Z87.891 FORMER SMOKER: ICD-10-CM

## 2022-10-07 PROCEDURE — 77290 THER RAD SIMULAJ FIELD CPLX: CPT | Performed by: RADIOLOGY

## 2022-10-12 PROCEDURE — 77307 TELETHX ISODOSE PLAN CPLX: CPT | Performed by: RADIOLOGY

## 2022-10-12 PROCEDURE — 77334 RADIATION TREATMENT AID(S): CPT | Performed by: RADIOLOGY

## 2022-10-13 ENCOUNTER — TELEPHONE (OUTPATIENT)
Dept: PULMONOLOGY | Facility: CLINIC | Age: 67
End: 2022-10-13

## 2022-10-20 ENCOUNTER — HOSPITAL ENCOUNTER (OUTPATIENT)
Dept: PREADMISSION TESTING | Age: 67
Discharge: HOME | End: 2022-10-24
Payer: MEDICARE

## 2022-10-20 LAB — SARS-COV-2, PCR: NOT DETECTED

## 2022-10-20 PROCEDURE — U0005 INFEC AGEN DETEC AMPLI PROBE: HCPCS

## 2022-10-20 PROCEDURE — U0003 INFECTIOUS AGENT DETECTION BY NUCLEIC ACID (DNA OR RNA); SEVERE ACUTE RESPIRATORY SYNDROME CORONAVIRUS 2 (SARS-COV-2) (CORONAVIRUS DISEASE [COVID-19]), AMPLIFIED PROBE TECHNIQUE, MAKING USE OF HIGH THROUGHPUT TECHNOLOGIES AS DESCRIBED BY CMS-2020-01-R: HCPCS

## 2022-10-24 ENCOUNTER — HOSPITAL ENCOUNTER (OUTPATIENT)
Dept: RADIATION ONCOLOGY | Facility: HOSPITAL | Age: 67
Setting detail: RADIATION/ONCOLOGY SERIES
Discharge: HOME | End: 2022-10-24

## 2022-10-24 ENCOUNTER — APPOINTMENT (OUTPATIENT)
Dept: GENERAL RADIOLOGY | Age: 67
End: 2022-10-24
Attending: ORTHOPAEDIC SURGERY
Payer: MEDICARE

## 2022-10-24 ENCOUNTER — ANESTHESIA EVENT (OUTPATIENT)
Dept: OPERATING ROOM | Age: 67
End: 2022-10-24
Payer: MEDICARE

## 2022-10-24 ENCOUNTER — HOSPITAL ENCOUNTER (OUTPATIENT)
Age: 67
Setting detail: OBSERVATION
Discharge: HOME HEALTH | End: 2022-10-26
Attending: ORTHOPAEDIC SURGERY | Admitting: ORTHOPAEDIC SURGERY
Payer: MEDICARE

## 2022-10-24 ENCOUNTER — ANESTHESIA (OUTPATIENT)
Dept: OPERATING ROOM | Age: 67
End: 2022-10-24
Payer: MEDICARE

## 2022-10-24 DIAGNOSIS — M16.11 PRIMARY OSTEOARTHRITIS OF RIGHT HIP: ICD-10-CM

## 2022-10-24 DIAGNOSIS — M16.0 PRIMARY OSTEOARTHRITIS OF BOTH HIPS: Primary | ICD-10-CM

## 2022-10-24 PROBLEM — M16.9 DEGENERATIVE JOINT DISEASE (DJD) OF HIP: Status: ACTIVE | Noted: 2022-10-24

## 2022-10-24 LAB
ABO/RH: NORMAL
ANTIBODY SCREEN: NORMAL
GLUCOSE BLD-MCNC: 117 MG/DL (ref 70–99)
GLUCOSE BLD-MCNC: 168 MG/DL (ref 70–99)
GLUCOSE BLD-MCNC: 205 MG/DL (ref 70–99)
GLUCOSE BLD-MCNC: 253 MG/DL (ref 70–99)
HBA1C MFR BLD: 5.8 % (ref 4–6)
PERFORMED ON: ABNORMAL
RAD ONC ARIA COURSE ID: NORMAL
RAD ONC ARIA COURSE LAST TREATMENT DATE: NORMAL
RAD ONC ARIA COURSE START DATE: NORMAL
RAD ONC ARIA COURSE TREATMENT ELAPSED DAYS: 0
RAD ONC ARIA FIRST TREATMENT DATE: NORMAL
RAD ONC ARIA PLAN FRACTIONS TREATED TO DATE: 1
RAD ONC ARIA PLAN ID: NORMAL
RAD ONC ARIA PLAN PRESCRIBED DOSE PER FRACTION: 7.5 GY
RAD ONC ARIA PLAN PRIMARY REFERENCE POINT: NORMAL
RAD ONC ARIA PLAN TOTAL FRACTIONS PRESCRIBED: 1
RAD ONC ARIA PLAN TOTAL PRESCRIBED DOSE: 750 CGY
RAD ONC ARIA REFERENCE POINT DOSAGE GIVEN TO DATE: 7.5 GY
RAD ONC ARIA REFERENCE POINT ID: NORMAL
RAD ONC ARIA REFERENCE POINT SESSION DOSAGE GIVEN: 7.5 GY

## 2022-10-24 PROCEDURE — C1776 JOINT DEVICE (IMPLANTABLE): HCPCS | Performed by: ORTHOPAEDIC SURGERY

## 2022-10-24 PROCEDURE — 86901 BLOOD TYPING SEROLOGIC RH(D): CPT

## 2022-10-24 PROCEDURE — 7100000000 HC PACU RECOVERY - FIRST 15 MIN: Performed by: ORTHOPAEDIC SURGERY

## 2022-10-24 PROCEDURE — 3600000015 HC SURGERY LEVEL 5 ADDTL 15MIN: Performed by: ORTHOPAEDIC SURGERY

## 2022-10-24 PROCEDURE — 3600000005 HC SURGERY LEVEL 5 BASE: Performed by: ORTHOPAEDIC SURGERY

## 2022-10-24 PROCEDURE — C9290 INJ, BUPIVACAINE LIPOSOME: HCPCS | Performed by: ORTHOPAEDIC SURGERY

## 2022-10-24 PROCEDURE — 86850 RBC ANTIBODY SCREEN: CPT

## 2022-10-24 PROCEDURE — 2500000003 HC RX 250 WO HCPCS: Performed by: ORTHOPAEDIC SURGERY

## 2022-10-24 PROCEDURE — 6360000002 HC RX W HCPCS: Performed by: ORTHOPAEDIC SURGERY

## 2022-10-24 PROCEDURE — 77412 RADIATION TX DELIVERY LVL 3: CPT | Performed by: RADIOLOGY

## 2022-10-24 PROCEDURE — 2580000003 HC RX 258: Performed by: ORTHOPAEDIC SURGERY

## 2022-10-24 PROCEDURE — 77417 THER RADIOLOGY PORT IMAGE(S): CPT | Performed by: RADIOLOGY

## 2022-10-24 PROCEDURE — 3209999900 FLUORO FOR SURGICAL PROCEDURES

## 2022-10-24 PROCEDURE — 2500000003 HC RX 250 WO HCPCS: Performed by: ANESTHESIOLOGY

## 2022-10-24 PROCEDURE — 6360000002 HC RX W HCPCS: Performed by: ANESTHESIOLOGY

## 2022-10-24 PROCEDURE — 2709999900 HC NON-CHARGEABLE SUPPLY: Performed by: ORTHOPAEDIC SURGERY

## 2022-10-24 PROCEDURE — 3700000000 HC ANESTHESIA ATTENDED CARE: Performed by: ORTHOPAEDIC SURGERY

## 2022-10-24 PROCEDURE — 2500000003 HC RX 250 WO HCPCS: Performed by: NURSE ANESTHETIST, CERTIFIED REGISTERED

## 2022-10-24 PROCEDURE — G0378 HOSPITAL OBSERVATION PER HR: HCPCS

## 2022-10-24 PROCEDURE — 82947 ASSAY GLUCOSE BLOOD QUANT: CPT

## 2022-10-24 PROCEDURE — 73502 X-RAY EXAM HIP UNI 2-3 VIEWS: CPT

## 2022-10-24 PROCEDURE — 6370000000 HC RX 637 (ALT 250 FOR IP): Performed by: ORTHOPAEDIC SURGERY

## 2022-10-24 PROCEDURE — 7100000001 HC PACU RECOVERY - ADDTL 15 MIN: Performed by: ORTHOPAEDIC SURGERY

## 2022-10-24 PROCEDURE — 83036 HEMOGLOBIN GLYCOSYLATED A1C: CPT

## 2022-10-24 PROCEDURE — 2700000000 HC OXYGEN THERAPY PER DAY

## 2022-10-24 PROCEDURE — 6360000002 HC RX W HCPCS: Performed by: NURSE ANESTHETIST, CERTIFIED REGISTERED

## 2022-10-24 PROCEDURE — 3700000001 HC ADD 15 MINUTES (ANESTHESIA): Performed by: ORTHOPAEDIC SURGERY

## 2022-10-24 PROCEDURE — 6370000000 HC RX 637 (ALT 250 FOR IP): Performed by: ANESTHESIOLOGY

## 2022-10-24 PROCEDURE — C1713 ANCHOR/SCREW BN/BN,TIS/BN: HCPCS | Performed by: ORTHOPAEDIC SURGERY

## 2022-10-24 PROCEDURE — 88304 TISSUE EXAM BY PATHOLOGIST: CPT

## 2022-10-24 PROCEDURE — 2720000010 HC SURG SUPPLY STERILE: Performed by: ORTHOPAEDIC SURGERY

## 2022-10-24 PROCEDURE — 2580000003 HC RX 258: Performed by: ANESTHESIOLOGY

## 2022-10-24 PROCEDURE — 88311 DECALCIFY TISSUE: CPT

## 2022-10-24 PROCEDURE — 86900 BLOOD TYPING SEROLOGIC ABO: CPT

## 2022-10-24 PROCEDURE — 36415 COLL VENOUS BLD VENIPUNCTURE: CPT

## 2022-10-24 PROCEDURE — 77336 RADIATION PHYSICS CONSULT: CPT | Performed by: RADIOLOGY

## 2022-10-24 DEVICE — IMPLANTABLE DEVICE
Type: IMPLANTABLE DEVICE | Site: HIP | Status: FUNCTIONAL
Brand: VIVACIT-E®

## 2022-10-24 DEVICE — IMPLANTABLE DEVICE
Type: IMPLANTABLE DEVICE | Site: HIP | Status: FUNCTIONAL
Brand: G7® DUAL MOBILITY ACETABULAR SYSTEM

## 2022-10-24 DEVICE — BONE SCREW 6.5X35 SELF-TAP: Type: IMPLANTABLE DEVICE | Site: HIP | Status: FUNCTIONAL

## 2022-10-24 DEVICE — BIOLOX DELTA CERAMIC FEMORAL HEAD 28MM DIA +5.0 12/14 TAPER
Type: IMPLANTABLE DEVICE | Site: HIP | Status: FUNCTIONAL
Brand: BIOLOX DELTA

## 2022-10-24 DEVICE — SHELL ACET SZ F DIA56MM MH OSSEOTI 2 MOBILITY G7: Type: IMPLANTABLE DEVICE | Site: HIP | Status: FUNCTIONAL

## 2022-10-24 DEVICE — ACTIS DUOFIX HIP PROSTHESIS (FEMORAL STEM 12/14 TAPER CEMENTLESS SIZE 8 STD COLLAR)  CE
Type: IMPLANTABLE DEVICE | Site: HIP | Status: FUNCTIONAL
Brand: ACTIS

## 2022-10-24 RX ORDER — SODIUM CHLORIDE, SODIUM LACTATE, POTASSIUM CHLORIDE, CALCIUM CHLORIDE 600; 310; 30; 20 MG/100ML; MG/100ML; MG/100ML; MG/100ML
INJECTION, SOLUTION INTRAVENOUS CONTINUOUS
Status: DISCONTINUED | OUTPATIENT
Start: 2022-10-24 | End: 2022-10-24 | Stop reason: HOSPADM

## 2022-10-24 RX ORDER — PROPOFOL 10 MG/ML
INJECTION, EMULSION INTRAVENOUS PRN
Status: DISCONTINUED | OUTPATIENT
Start: 2022-10-24 | End: 2022-10-24 | Stop reason: SDUPTHER

## 2022-10-24 RX ORDER — ONDANSETRON 2 MG/ML
INJECTION INTRAMUSCULAR; INTRAVENOUS PRN
Status: DISCONTINUED | OUTPATIENT
Start: 2022-10-24 | End: 2022-10-24 | Stop reason: SDUPTHER

## 2022-10-24 RX ORDER — 0.9 % SODIUM CHLORIDE 0.9 %
500 INTRAVENOUS SOLUTION INTRAVENOUS PRN
Status: DISCONTINUED | OUTPATIENT
Start: 2022-10-24 | End: 2022-10-26 | Stop reason: HOSPADM

## 2022-10-24 RX ORDER — DIPHENHYDRAMINE HCL 25 MG
25 TABLET ORAL EVERY 6 HOURS PRN
Status: DISCONTINUED | OUTPATIENT
Start: 2022-10-24 | End: 2022-10-26 | Stop reason: HOSPADM

## 2022-10-24 RX ORDER — DIPHENHYDRAMINE HYDROCHLORIDE 50 MG/ML
12.5 INJECTION INTRAMUSCULAR; INTRAVENOUS
Status: DISCONTINUED | OUTPATIENT
Start: 2022-10-24 | End: 2022-10-24 | Stop reason: HOSPADM

## 2022-10-24 RX ORDER — LIDOCAINE HYDROCHLORIDE 10 MG/ML
INJECTION, SOLUTION EPIDURAL; INFILTRATION; INTRACAUDAL; PERINEURAL PRN
Status: DISCONTINUED | OUTPATIENT
Start: 2022-10-24 | End: 2022-10-24 | Stop reason: SDUPTHER

## 2022-10-24 RX ORDER — MORPHINE SULFATE 4 MG/ML
4 INJECTION, SOLUTION INTRAMUSCULAR; INTRAVENOUS EVERY 5 MIN PRN
Status: DISCONTINUED | OUTPATIENT
Start: 2022-10-24 | End: 2022-10-24 | Stop reason: HOSPADM

## 2022-10-24 RX ORDER — OXYCODONE HCL 10 MG/1
10 TABLET, FILM COATED, EXTENDED RELEASE ORAL ONCE
Status: COMPLETED | OUTPATIENT
Start: 2022-10-24 | End: 2022-10-24

## 2022-10-24 RX ORDER — SODIUM CHLORIDE 0.9 % (FLUSH) 0.9 %
5-40 SYRINGE (ML) INJECTION PRN
Status: DISCONTINUED | OUTPATIENT
Start: 2022-10-24 | End: 2022-10-26 | Stop reason: HOSPADM

## 2022-10-24 RX ORDER — HYDROMORPHONE HYDROCHLORIDE 1 MG/ML
1 INJECTION, SOLUTION INTRAMUSCULAR; INTRAVENOUS; SUBCUTANEOUS
Status: DISCONTINUED | OUTPATIENT
Start: 2022-10-24 | End: 2022-10-26 | Stop reason: HOSPADM

## 2022-10-24 RX ORDER — HYDROMORPHONE HYDROCHLORIDE 1 MG/ML
0.5 INJECTION, SOLUTION INTRAMUSCULAR; INTRAVENOUS; SUBCUTANEOUS
Status: DISCONTINUED | OUTPATIENT
Start: 2022-10-24 | End: 2022-10-26 | Stop reason: HOSPADM

## 2022-10-24 RX ORDER — ALBUTEROL SULFATE 2.5 MG/3ML
2.5 SOLUTION RESPIRATORY (INHALATION) EVERY 6 HOURS PRN
Status: DISCONTINUED | OUTPATIENT
Start: 2022-10-24 | End: 2022-10-26 | Stop reason: HOSPADM

## 2022-10-24 RX ORDER — BUSPIRONE HYDROCHLORIDE 5 MG/1
7.5 TABLET ORAL 2 TIMES DAILY
Status: DISCONTINUED | OUTPATIENT
Start: 2022-10-24 | End: 2022-10-26 | Stop reason: HOSPADM

## 2022-10-24 RX ORDER — INSULIN LISPRO 100 [IU]/ML
0-4 INJECTION, SOLUTION INTRAVENOUS; SUBCUTANEOUS NIGHTLY
Status: DISCONTINUED | OUTPATIENT
Start: 2022-10-24 | End: 2022-10-26 | Stop reason: HOSPADM

## 2022-10-24 RX ORDER — ROCURONIUM BROMIDE 10 MG/ML
INJECTION, SOLUTION INTRAVENOUS PRN
Status: DISCONTINUED | OUTPATIENT
Start: 2022-10-24 | End: 2022-10-24 | Stop reason: SDUPTHER

## 2022-10-24 RX ORDER — LATANOPROST 50 UG/ML
1 SOLUTION/ DROPS OPHTHALMIC NIGHTLY
Status: DISCONTINUED | OUTPATIENT
Start: 2022-10-24 | End: 2022-10-26 | Stop reason: HOSPADM

## 2022-10-24 RX ORDER — MIDAZOLAM HYDROCHLORIDE 2 MG/2ML
2 INJECTION, SOLUTION INTRAMUSCULAR; INTRAVENOUS
Status: DISCONTINUED | OUTPATIENT
Start: 2022-10-24 | End: 2022-10-24 | Stop reason: HOSPADM

## 2022-10-24 RX ORDER — AMITRIPTYLINE HYDROCHLORIDE 50 MG/1
50 TABLET, FILM COATED ORAL NIGHTLY
Status: DISCONTINUED | OUTPATIENT
Start: 2022-10-24 | End: 2022-10-26 | Stop reason: HOSPADM

## 2022-10-24 RX ORDER — EPHEDRINE SULFATE 50 MG/ML
INJECTION, SOLUTION INTRAVENOUS PRN
Status: DISCONTINUED | OUTPATIENT
Start: 2022-10-24 | End: 2022-10-24 | Stop reason: SDUPTHER

## 2022-10-24 RX ORDER — ONDANSETRON 4 MG/1
4 TABLET, ORALLY DISINTEGRATING ORAL EVERY 8 HOURS PRN
Status: DISCONTINUED | OUTPATIENT
Start: 2022-10-24 | End: 2022-10-26 | Stop reason: HOSPADM

## 2022-10-24 RX ORDER — SODIUM CHLORIDE 9 MG/ML
INJECTION, SOLUTION INTRAVENOUS CONTINUOUS
Status: DISCONTINUED | OUTPATIENT
Start: 2022-10-24 | End: 2022-10-26 | Stop reason: HOSPADM

## 2022-10-24 RX ORDER — FAMOTIDINE 20 MG/1
20 TABLET, FILM COATED ORAL ONCE
Status: COMPLETED | OUTPATIENT
Start: 2022-10-24 | End: 2022-10-24

## 2022-10-24 RX ORDER — SODIUM CHLORIDE 9 MG/ML
INJECTION, SOLUTION INTRAVENOUS PRN
Status: DISCONTINUED | OUTPATIENT
Start: 2022-10-24 | End: 2022-10-26 | Stop reason: HOSPADM

## 2022-10-24 RX ORDER — DEXTROSE MONOHYDRATE 100 MG/ML
INJECTION, SOLUTION INTRAVENOUS CONTINUOUS PRN
Status: DISCONTINUED | OUTPATIENT
Start: 2022-10-24 | End: 2022-10-26 | Stop reason: HOSPADM

## 2022-10-24 RX ORDER — MEPERIDINE HYDROCHLORIDE 25 MG/ML
12.5 INJECTION INTRAMUSCULAR; INTRAVENOUS; SUBCUTANEOUS EVERY 5 MIN PRN
Status: DISCONTINUED | OUTPATIENT
Start: 2022-10-24 | End: 2022-10-24 | Stop reason: HOSPADM

## 2022-10-24 RX ORDER — ALBUTEROL SULFATE 90 UG/1
2 AEROSOL, METERED RESPIRATORY (INHALATION) EVERY 6 HOURS PRN
Status: DISCONTINUED | OUTPATIENT
Start: 2022-10-24 | End: 2022-10-24 | Stop reason: CLARIF

## 2022-10-24 RX ORDER — ONDANSETRON 2 MG/ML
4 INJECTION INTRAMUSCULAR; INTRAVENOUS EVERY 6 HOURS PRN
Status: DISCONTINUED | OUTPATIENT
Start: 2022-10-24 | End: 2022-10-26 | Stop reason: HOSPADM

## 2022-10-24 RX ORDER — SODIUM CHLORIDE 0.9 % (FLUSH) 0.9 %
5-40 SYRINGE (ML) INJECTION EVERY 12 HOURS SCHEDULED
Status: DISCONTINUED | OUTPATIENT
Start: 2022-10-24 | End: 2022-10-26 | Stop reason: HOSPADM

## 2022-10-24 RX ORDER — HYDROMORPHONE HYDROCHLORIDE 1 MG/ML
0.25 INJECTION, SOLUTION INTRAMUSCULAR; INTRAVENOUS; SUBCUTANEOUS
Status: DISCONTINUED | OUTPATIENT
Start: 2022-10-24 | End: 2022-10-26 | Stop reason: HOSPADM

## 2022-10-24 RX ORDER — ACETAMINOPHEN 325 MG/1
650 TABLET ORAL EVERY 6 HOURS
Status: DISCONTINUED | OUTPATIENT
Start: 2022-10-24 | End: 2022-10-26 | Stop reason: HOSPADM

## 2022-10-24 RX ORDER — OXYCODONE HYDROCHLORIDE 5 MG/1
10 TABLET ORAL EVERY 4 HOURS PRN
Status: DISCONTINUED | OUTPATIENT
Start: 2022-10-24 | End: 2022-10-26 | Stop reason: HOSPADM

## 2022-10-24 RX ORDER — KETOTIFEN FUMARATE 0.35 MG/ML
1 SOLUTION/ DROPS OPHTHALMIC 2 TIMES DAILY
Status: DISCONTINUED | OUTPATIENT
Start: 2022-10-24 | End: 2022-10-26 | Stop reason: HOSPADM

## 2022-10-24 RX ORDER — ASCORBIC ACID 500 MG
1000 TABLET ORAL DAILY
Status: DISCONTINUED | OUTPATIENT
Start: 2022-10-24 | End: 2022-10-26 | Stop reason: HOSPADM

## 2022-10-24 RX ORDER — OXYCODONE HYDROCHLORIDE 5 MG/1
5 TABLET ORAL EVERY 4 HOURS PRN
Status: DISCONTINUED | OUTPATIENT
Start: 2022-10-24 | End: 2022-10-26 | Stop reason: HOSPADM

## 2022-10-24 RX ORDER — BUPIVACAINE HYDROCHLORIDE 2.5 MG/ML
INJECTION, SOLUTION INFILTRATION; PERINEURAL PRN
Status: DISCONTINUED | OUTPATIENT
Start: 2022-10-24 | End: 2022-10-24 | Stop reason: ALTCHOICE

## 2022-10-24 RX ORDER — INSULIN LISPRO 100 [IU]/ML
0-4 INJECTION, SOLUTION INTRAVENOUS; SUBCUTANEOUS
Status: DISCONTINUED | OUTPATIENT
Start: 2022-10-24 | End: 2022-10-26 | Stop reason: HOSPADM

## 2022-10-24 RX ORDER — MORPHINE SULFATE 2 MG/ML
2 INJECTION, SOLUTION INTRAMUSCULAR; INTRAVENOUS EVERY 5 MIN PRN
Status: DISCONTINUED | OUTPATIENT
Start: 2022-10-24 | End: 2022-10-24 | Stop reason: HOSPADM

## 2022-10-24 RX ORDER — SCOLOPAMINE TRANSDERMAL SYSTEM 1 MG/1
1 PATCH, EXTENDED RELEASE TRANSDERMAL
Status: DISCONTINUED | OUTPATIENT
Start: 2022-10-24 | End: 2022-10-24

## 2022-10-24 RX ORDER — LIDOCAINE HYDROCHLORIDE 10 MG/ML
1 INJECTION, SOLUTION EPIDURAL; INFILTRATION; INTRACAUDAL; PERINEURAL
Status: DISCONTINUED | OUTPATIENT
Start: 2022-10-24 | End: 2022-10-24 | Stop reason: HOSPADM

## 2022-10-24 RX ORDER — TIZANIDINE 4 MG/1
4 TABLET ORAL 3 TIMES DAILY PRN
Status: DISCONTINUED | OUTPATIENT
Start: 2022-10-24 | End: 2022-10-26 | Stop reason: HOSPADM

## 2022-10-24 RX ORDER — VITAMIN B COMPLEX
2000 TABLET ORAL DAILY
Status: DISCONTINUED | OUTPATIENT
Start: 2022-10-24 | End: 2022-10-26 | Stop reason: HOSPADM

## 2022-10-24 RX ORDER — DEXAMETHASONE SODIUM PHOSPHATE 10 MG/ML
10 INJECTION, SOLUTION INTRAMUSCULAR; INTRAVENOUS ONCE
Status: COMPLETED | OUTPATIENT
Start: 2022-10-24 | End: 2022-10-24

## 2022-10-24 RX ORDER — ZINC SULFATE 50(220)MG
50 CAPSULE ORAL DAILY
Status: DISCONTINUED | OUTPATIENT
Start: 2022-10-24 | End: 2022-10-26 | Stop reason: HOSPADM

## 2022-10-24 RX ORDER — CELECOXIB 200 MG/1
200 CAPSULE ORAL ONCE
Status: COMPLETED | OUTPATIENT
Start: 2022-10-24 | End: 2022-10-24

## 2022-10-24 RX ORDER — CLINDAMYCIN PHOSPHATE 900 MG/50ML
900 INJECTION INTRAVENOUS EVERY 8 HOURS
Status: COMPLETED | OUTPATIENT
Start: 2022-10-24 | End: 2022-10-26

## 2022-10-24 RX ORDER — TRANEXAMIC ACID 100 MG/ML
INJECTION, SOLUTION INTRAVENOUS PRN
Status: DISCONTINUED | OUTPATIENT
Start: 2022-10-24 | End: 2022-10-24 | Stop reason: SDUPTHER

## 2022-10-24 RX ORDER — OXYCODONE HYDROCHLORIDE 5 MG/1
15 TABLET ORAL EVERY 4 HOURS PRN
Status: DISCONTINUED | OUTPATIENT
Start: 2022-10-24 | End: 2022-10-26 | Stop reason: HOSPADM

## 2022-10-24 RX ORDER — ACETAMINOPHEN 500 MG
1000 TABLET ORAL ONCE
Status: COMPLETED | OUTPATIENT
Start: 2022-10-24 | End: 2022-10-24

## 2022-10-24 RX ORDER — METOCLOPRAMIDE HYDROCHLORIDE 5 MG/ML
10 INJECTION INTRAMUSCULAR; INTRAVENOUS
Status: DISCONTINUED | OUTPATIENT
Start: 2022-10-24 | End: 2022-10-24 | Stop reason: HOSPADM

## 2022-10-24 RX ORDER — TRAMADOL HYDROCHLORIDE 50 MG/1
50 TABLET ORAL EVERY 6 HOURS
Status: DISCONTINUED | OUTPATIENT
Start: 2022-10-24 | End: 2022-10-26 | Stop reason: HOSPADM

## 2022-10-24 RX ORDER — ATORVASTATIN CALCIUM 40 MG/1
40 TABLET, FILM COATED ORAL DAILY
Status: DISCONTINUED | OUTPATIENT
Start: 2022-10-24 | End: 2022-10-26 | Stop reason: HOSPADM

## 2022-10-24 RX ORDER — PANTOPRAZOLE SODIUM 40 MG/1
40 TABLET, DELAYED RELEASE ORAL
Status: DISCONTINUED | OUTPATIENT
Start: 2022-10-25 | End: 2022-10-26 | Stop reason: HOSPADM

## 2022-10-24 RX ADMIN — TRANEXAMIC ACID 1000 MG: 1 INJECTION, SOLUTION INTRAVENOUS at 13:16

## 2022-10-24 RX ADMIN — ACETAMINOPHEN 1000 MG: 500 TABLET ORAL at 10:23

## 2022-10-24 RX ADMIN — CELECOXIB 200 MG: 200 CAPSULE ORAL at 10:23

## 2022-10-24 RX ADMIN — DEXAMETHASONE SODIUM PHOSPHATE 10 MG: 10 INJECTION, SOLUTION INTRAMUSCULAR; INTRAVENOUS at 11:51

## 2022-10-24 RX ADMIN — KETOTIFEN FUMARATE 1 DROP: 0.35 SOLUTION/ DROPS OPHTHALMIC at 20:43

## 2022-10-24 RX ADMIN — HYDROMORPHONE HYDROCHLORIDE 0.5 MG: 1 INJECTION, SOLUTION INTRAMUSCULAR; INTRAVENOUS; SUBCUTANEOUS at 14:01

## 2022-10-24 RX ADMIN — ZINC SULFATE 220 MG (50 MG) CAPSULE 50 MG: CAPSULE at 18:48

## 2022-10-24 RX ADMIN — BISACODYL 5 MG: 5 TABLET, COATED ORAL at 18:48

## 2022-10-24 RX ADMIN — EPHEDRINE SULFATE 10 MG: 50 INJECTION INTRAMUSCULAR; INTRAVENOUS; SUBCUTANEOUS at 12:17

## 2022-10-24 RX ADMIN — PROPOFOL 50 MG: 10 INJECTION, EMULSION INTRAVENOUS at 11:56

## 2022-10-24 RX ADMIN — EPHEDRINE SULFATE 10 MG: 50 INJECTION INTRAMUSCULAR; INTRAVENOUS; SUBCUTANEOUS at 12:48

## 2022-10-24 RX ADMIN — OXYCODONE HYDROCHLORIDE 10 MG: 10 TABLET, FILM COATED, EXTENDED RELEASE ORAL at 10:23

## 2022-10-24 RX ADMIN — SODIUM CHLORIDE: 9 INJECTION, SOLUTION INTRAVENOUS at 15:22

## 2022-10-24 RX ADMIN — MORPHINE SULFATE 4 MG: 4 INJECTION, SOLUTION INTRAMUSCULAR; INTRAVENOUS at 14:23

## 2022-10-24 RX ADMIN — HYDROMORPHONE HYDROCHLORIDE 0.2 MG: 1 INJECTION, SOLUTION INTRAMUSCULAR; INTRAVENOUS; SUBCUTANEOUS at 13:46

## 2022-10-24 RX ADMIN — EPHEDRINE SULFATE 10 MG: 50 INJECTION INTRAMUSCULAR; INTRAVENOUS; SUBCUTANEOUS at 12:45

## 2022-10-24 RX ADMIN — MORPHINE SULFATE 4 MG: 4 INJECTION, SOLUTION INTRAMUSCULAR; INTRAVENOUS at 14:28

## 2022-10-24 RX ADMIN — ACETAMINOPHEN 650 MG: 325 TABLET, FILM COATED ORAL at 20:42

## 2022-10-24 RX ADMIN — LATANOPROST 1 DROP: 50 SOLUTION OPHTHALMIC at 20:43

## 2022-10-24 RX ADMIN — ROCURONIUM BROMIDE 100 MG: 10 INJECTION, SOLUTION INTRAVENOUS at 11:38

## 2022-10-24 RX ADMIN — SUGAMMADEX 300 MG: 100 INJECTION, SOLUTION INTRAVENOUS at 13:26

## 2022-10-24 RX ADMIN — CEFAZOLIN 2000 MG: 2 INJECTION, POWDER, FOR SOLUTION INTRAMUSCULAR; INTRAVENOUS at 11:46

## 2022-10-24 RX ADMIN — TRANEXAMIC ACID 1000 MG: 1 INJECTION, SOLUTION INTRAVENOUS at 11:45

## 2022-10-24 RX ADMIN — CEFAZOLIN 2000 MG: 2 INJECTION, POWDER, FOR SOLUTION INTRAMUSCULAR; INTRAVENOUS at 20:43

## 2022-10-24 RX ADMIN — INSULIN LISPRO 1 UNITS: 100 INJECTION, SOLUTION INTRAVENOUS; SUBCUTANEOUS at 18:47

## 2022-10-24 RX ADMIN — ACETAMINOPHEN 650 MG: 325 TABLET, FILM COATED ORAL at 18:47

## 2022-10-24 RX ADMIN — OXYCODONE HYDROCHLORIDE AND ACETAMINOPHEN 1000 MG: 500 TABLET ORAL at 18:49

## 2022-10-24 RX ADMIN — TRAMADOL HYDROCHLORIDE 50 MG: 50 TABLET, COATED ORAL at 18:48

## 2022-10-24 RX ADMIN — LIDOCAINE HYDROCHLORIDE 50 MG: 10 INJECTION, SOLUTION EPIDURAL; INFILTRATION; INTRACAUDAL; PERINEURAL at 11:37

## 2022-10-24 RX ADMIN — CLINDAMYCIN IN 5 PERCENT DEXTROSE 900 MG: 18 INJECTION, SOLUTION INTRAVENOUS at 18:47

## 2022-10-24 RX ADMIN — ATORVASTATIN CALCIUM 40 MG: 40 TABLET, FILM COATED ORAL at 18:48

## 2022-10-24 RX ADMIN — ONDANSETRON 4 MG: 2 INJECTION INTRAMUSCULAR; INTRAVENOUS at 13:17

## 2022-10-24 RX ADMIN — MORPHINE SULFATE 4 MG: 4 INJECTION, SOLUTION INTRAMUSCULAR; INTRAVENOUS at 14:18

## 2022-10-24 RX ADMIN — PROPOFOL 150 MG: 10 INJECTION, EMULSION INTRAVENOUS at 11:37

## 2022-10-24 RX ADMIN — EPHEDRINE SULFATE 20 MG: 50 INJECTION INTRAMUSCULAR; INTRAVENOUS; SUBCUTANEOUS at 13:20

## 2022-10-24 RX ADMIN — METFORMIN HYDROCHLORIDE 500 MG: 500 TABLET ORAL at 18:48

## 2022-10-24 RX ADMIN — Medication 2000 UNITS: at 18:47

## 2022-10-24 RX ADMIN — FAMOTIDINE 20 MG: 20 TABLET ORAL at 10:23

## 2022-10-24 RX ADMIN — SODIUM CHLORIDE, POTASSIUM CHLORIDE, SODIUM LACTATE AND CALCIUM CHLORIDE: 600; 310; 30; 20 INJECTION, SOLUTION INTRAVENOUS at 10:23

## 2022-10-24 ASSESSMENT — PAIN SCALES - GENERAL
PAINLEVEL_OUTOF10: 7
PAINLEVEL_OUTOF10: 7
PAINLEVEL_OUTOF10: 10
PAINLEVEL_OUTOF10: 0
PAINLEVEL_OUTOF10: 7
PAINLEVEL_OUTOF10: 0

## 2022-10-24 ASSESSMENT — ENCOUNTER SYMPTOMS: SHORTNESS OF BREATH: 0

## 2022-10-24 ASSESSMENT — LIFESTYLE VARIABLES: SMOKING_STATUS: 0

## 2022-10-24 ASSESSMENT — PAIN DESCRIPTION - LOCATION
LOCATION: KNEE
LOCATION: HIP
LOCATION: HIP

## 2022-10-24 ASSESSMENT — COPD QUESTIONNAIRES: CAT_SEVERITY: SEVERE

## 2022-10-24 ASSESSMENT — PAIN DESCRIPTION - ORIENTATION
ORIENTATION: RIGHT
ORIENTATION: RIGHT

## 2022-10-24 ASSESSMENT — PAIN - FUNCTIONAL ASSESSMENT
PAIN_FUNCTIONAL_ASSESSMENT: PREVENTS OR INTERFERES SOME ACTIVE ACTIVITIES AND ADLS
PAIN_FUNCTIONAL_ASSESSMENT: 0-10

## 2022-10-24 NOTE — ANESTHESIA POSTPROCEDURE EVALUATION
Department of Anesthesiology  Postprocedure Note    Patient: Glenn Prince  MRN: [id number]  [place]: 1955  Date of evaluation: 10/24/2022      Procedure Summary     Date: 10/24/22 Room / Location: [address]    Anesthesia Start: 1134 Anesthesia Stop: 1408    Procedure: RIGHT HIP TOTAL ARTHROPLASTY (Right: Hip) Diagnosis:       Primary osteoarthritis of right hip      (Primary osteoarthritis of right hip [M16.11])    Surgeons: Saul Napier MD Responsible Provider: Don Barclay MD    Anesthesia Type: general ASA Status: 4          Anesthesia Type: No value filed.     Santosh Phase I: Santosh Score: 7    Santosh Phase II:        Anesthesia Post Evaluation    Patient location during evaluation: PACU  Patient participation: complete - patient participated  Level of consciousness: sleepy but conscious  Pain score: 5  Airway patency: patent  Nausea & Vomiting: no nausea and no vomiting  Complications: no  Cardiovascular status: blood pressure returned to baseline  Respiratory status: acceptable  Hydration status: stable

## 2022-10-24 NOTE — BRIEF OP NOTE
Brief Postoperative Note      Patient: Ailin Jaquez  YOB: 1955  MRN: [id number]    Date of Procedure: 10/24/2022    Pre-Op Diagnosis: Primary osteoarthritis of right hip [M16.11]    Post-Op Diagnosis: Same       Procedure(s):  RIGHT HIP TOTAL ARTHROPLASTY    Surgeon(s):  Carolyn Gongora MD    Assistant:  Surgical Assistant: Rosa Colby Assistant: Haider Marte PA-C    Anesthesia: General    Estimated Blood Loss (mL): 866    Complications: None    Specimens:   ID Type Source Tests Collected by Time Destination   A : RIGHT FEMORAL HEAD  Bone Joint, Hip SURGICAL PATHOLOGY Carolyn Gongora MD 10/24/2022 1221        Implants:  Implant Name Type Inv.  Item Serial No.  Lot No. LRB No. Used Action   SHELL ACET SZ F [id number] OSSEOTI 2 MOBILITY G7 - [id number]  SHELL ACET SZ F [id number] OSSEOTI 2 MOBILITY G7  [employer]- [id number] Right 1 Implanted   BONE SCREW 6.5X35 SELF-TAP - [id number]  BONE SCREW 6.5X35 SELF-TAP  [employer]- [id number] Right 1 Implanted   G7 DUAL MOBILITY LINER 44MM F - [id number]  G7 DUAL MOBILITY LINER 44MM F  [employer]- [id number] Right 1 Implanted   STEM FEM SZ 8 L111MM 12/14 TAPR STD OFFSET HIP DUOFIX CLLRD - [id number]  STEM FEM SZ 8 L111MM 12/14 TAPR STD OFFSET HIP DUOFIX CLLRD  [facility] [employer]- [id number] Right 1 Implanted   HEAD FEM [id number] +5MM OFFSET 12/14 TAPR HIP CERAMIC BIOLOX - [id number]  HEAD FEM [id number] +5MM OFFSET 12/14 TAPR HIP CERAMIC BIOLOX  [facility] [employer]-WD [id number] Right 1 Implanted   VIVACIT-E DM BEARING [id number] - [id number]  VIVACIT-E DM BEARING [id number]  [employer]- [id number] Right 1 Implanted         Drains:   Urinary Catheter 10/24/22 Nolvia (Active)       Findings:     Electronically signed by Carolyn Gongora MD on 10/24/2022 at 1:23 PM

## 2022-10-24 NOTE — H&P
[facility] ([facility]) Pre-Operative History and Physical    Patient Name: Jorge Alejo  : 1955    BP (!) 160/79   Pulse 89   Temp 97.2 °F (36.2 °C) (Tympanic)   Resp 20   Ht 5' 6\" (1.676 m)   Wt 230 lb (104.3 kg)   SpO2 97%   BMI 37.12 kg/m²     Pre-Operative Diagnosis:  Hip replacement    Proposed Surgical Procedure:   Hip replacement      Past Medical Hisotry:       Diagnosis Date    Arthritis     Bilateral hearing loss     Colon polyps     COPD (chronic obstructive pulmonary disease) ([facility] [address]     sees dr. Merlinda [facility] at resp dx clinic    COVID-19 2021    low oxygen; pneumonia; received monoclonal infusion     Depression     Diabetes mellitus ([facility] [address]     Hip pain     Hyperlipidemia     Hypertension     Low back pain 2016    Oxygen dependent     4l/m nc     Palliative care patient 2018    Pneumonia 2021    inpt     Past Surgical History:       Procedure Laterality Date    BACK SURGERY      COLONOSCOPY  ?     Dr. MOY    COLONOSCOPY  2012    3 polyps, 2 adenomatous, 1 hyperplastic    COLONOSCOPY  2015    diverticulosis    ENDOSCOPY, COLON, DIAGNOSTIC      FRACTURE SURGERY      KNEE ARTHROSCOPY  2014    [place] EGD TRANSORAL BIOPSY SINGLE/MULTIPLE N/A 2016    Dr MARIA ELENA Ellisw/dilation over wire, 48 [place]atzki's Ring, gastritis/gastropathy-prn    TOTAL HIP ARTHROPLASTY Left 10/20/2021    LEFT TOTAL HIP REPLACEMENT performed by Jeff Skelton MD at [address] [place] [address]  2017    Dr MARIA ELENA Gómez/dilation over wire, [address] [place]     UPPER GASTROINTESTINAL ENDOSCOPY  2017    Dr MARIA ELENA Gómez/dilation over wire, 46 [place] Ring     WRIST FRACTURE SURGERY      [id number]       Medications:   Prior to Admission medications    Medication Sig Start Date End Date Taking? Authorizing Provider   oxyCODONE (ROXICODONE) 20 MG immediate release tablet Take 20 mg by mouth in the morning and 20 mg at noon and 20 mg in the evening and 20 mg before bedtime. Historical Provider, MD   aspirin 81 MG EC tablet Take 81 mg by mouth daily    Historical Provider, MD   NONFORMULARY Take 1 tablet by mouth daily Otc \"water pill\"    Historical Provider, MD   erythromycin (ROMYCIN) 5 MG/GM ophthalmic ointment Place 0.25 Doses into both eyes nightly    Historical Provider, MD   ketotifen (ZADITOR) 0.025 % ophthalmic solution Place 1 drop into both eyes 2 times daily    Historical Provider, MD   amitriptyline (ELAVIL) 25 MG tablet Take 50 mg by mouth nightly    Historical Provider, MD   zinc sulfate (ZINCATE) 220 (50 Zn) MG capsule Take 1 capsule by mouth daily 1/8/21   Mary Michaud MD   ascorbic acid (VITAMIN C) 1000 MG tablet Take 1 tablet by mouth daily 1/8/21   Mary Michaud MD   Vitamin D (CHOLECALCIFEROL) 50 MCG (2000 UT) TABS tablet Take 1 tablet by mouth daily 1/8/21   Mary Michaud MD   metFORMIN (GLUCOPHAGE) 500 MG tablet Take 500 mg by mouth 2 times daily (with meals)    Historical Provider, MD   busPIRone (BUSPAR) 7.5 MG tablet Take 7.5 mg by mouth 2 times daily  1/24/19   Historical Provider, MD   tiZANidine (ZANAFLEX) 4 MG tablet Take 1 tablet by mouth 3 times daily as needed (muscle spasms) 10/19/17   CATY Laurent   omeprazole (PRILOSEC) 40 MG delayed release capsule Take 40 mg by mouth daily  2/10/17   Historical Provider, MD   latanoprost (XALATAN) 0.005 % ophthalmic solution Place 1 drop into both eyes nightly  Patient not taking: No sig reported    Historical Provider, MD   albuterol sulfate HFA (PROAIR HFA) 108 (90 BASE) MCG/ACT inhaler Inhale 2 puffs into the lungs every 6 hours as needed for Wheezing  Patient not taking: No sig reported 1/6/16   Nader Leyva MD   atorvastatin (LIPITOR) 40 MG tablet Take 40 mg by mouth daily. Historical Provider, MD   AMLODIPINE BESYLATE PO Take 5 mg by mouth daily.     Historical Provider, MD   LISINOPRIL PO Take 40 mg by mouth daily    Historical Provider, MD       Allergies:  Lyrica [pregabalin] and Neurontin [gabapentin]    Social History:   Tobacco:  reports that he quit smoking about 6 years ago. His smoking use included cigarettes. He has a 92.00 pack-year smoking history. He has never used smokeless tobacco.   Alcohol:  reports that he does not currently use alcohol. Review of Systems:  General: Denies any fever or chills  EYES: Denies any diplopia  ENT: Tinnitus or vertigo  Resp: Denies any shortness of breath, cough or wheezing  Cardiac: Denies any chest pain, palpitations, claudication or edema  GI: Denies any melena, hematochezia, hematemesis or pyrosis  : Denies any frequency, urgency, hesitancy or incontinence  Musculoskeletal: Denies back pain, joint pain, myalgias  Heme: Denies bruising or bleeding easily  Endocrine: Denies any history of diabetes or thyroid disease  Psych: Denies anxiety or depression  Neuro: Denies any focal motor or sensory deficits    NEUROLOGIC: CN II-XI grossly intact, no motor or sensory deficits   PSYCH: mood and affect are normal with a normal rate and tone of speech    Physical Exam:  Vitals: BP (!) 160/79   Pulse 89   Temp 97.2 °F (36.2 °C) (Tympanic)   Resp 20   Ht 5' 6\" (1.676 m)   Wt 230 lb (104.3 kg)   SpO2 97%   BMI 37.12 kg/m²   CONSTITUTIONAL: Alert, appropriate, no acute distress. EYES: Non icteric, EOM intact, pupils equal round and reactive to light  ENT: Mucus membranes moist, no oral pharyngeal lesions, nares patent   NECK: Supple, no masses, no JVD, trachea mid line   CHEST/LUNGS: CTA bilaterally, normal respiratory effort   CARDIOVASCULAR: RRR, no murmurs,  2+ DP and radial pulses bilaterally  ABDOMEN: soft, nontender  EXTREMITIES: warm, well perfused, no edema   SKIN: warm, dry with no rashes or lesions  LYMPH: No cervical or inguinal lymphadenopathy    General Appearance: Patient is well nourished, well developed    HEENT: Normal    CARDIOVASCULAR: Normal S1 and S2.  Regular rhythm. No murmurs, gallops, or rubs. PULMONARY: Normal.    GASTROINTESTINAL: Soft, non-tender, normal bowel sounds. No bruits, organomegaly or masses.     EXTREMITIES: positive exam findings: lateral joint line tenderness, tender over tibial tubercle, ecchymosis noted entire limb and ROM limited to approximately 80 degrees    MUSCULOSKELETAL: Tenderness over right hip(s)    NEUROLOGICAL: speech normal    Diagnostic Studies:      Labs: CBC with Differential:    Lab Results   Component Value Date/Time    WBC 11.0 09/29/2021 09:40 AM    RBC 3.95 09/29/2021 09:40 AM    HGB 8.9 10/21/2021 03:48 AM    HCT 27.9 10/21/2021 03:48 AM     09/29/2021 09:40 AM    MCV 97.2 09/29/2021 09:40 AM    MCH 29.1 09/29/2021 09:40 AM    MCHC 29.9 09/29/2021 09:40 AM    RDW 13.3 09/29/2021 09:40 AM    LYMPHOPCT 21.4 09/29/2021 09:40 AM    MONOPCT 6.1 09/29/2021 09:40 AM    BASOPCT 0.5 09/29/2021 09:40 AM    MONOSABS 0.70 09/29/2021 09:40 AM    LYMPHSABS 2.3 09/29/2021 09:40 AM    EOSABS 0.30 09/29/2021 09:40 AM    BASOSABS 0.10 09/29/2021 09:40 AM     BMP:    Lab Results   Component Value Date/Time     10/27/2021 09:30 AM    K 4.2 10/27/2021 09:30 AM    K 5.2 10/21/2021 03:48 AM    CL 98 10/27/2021 09:30 AM    CO2 30 10/27/2021 09:30 AM    BUN 8 10/27/2021 09:30 AM    LABALBU 4.0 09/29/2021 09:40 AM    CREATININE 0.6 10/27/2021 09:30 AM    CALCIUM 9.1 10/27/2021 09:30 AM    GFRAA >59 10/27/2021 09:30 AM    LABGLOM >60 10/27/2021 09:30 AM    GLUCOSE 123 10/27/2021 09:30 AM     Sodium:    Lab Results   Component Value Date/Time     10/27/2021 09:30 AM     Potassium:    Lab Results   Component Value Date/Time    K 4.2 10/27/2021 09:30 AM    K 5.2 10/21/2021 03:48 AM     BUN/Creatinine:    Lab Results   Component Value Date/Time    BUN 8 10/27/2021 09:30 AM    CREATININE 0.6 10/27/2021 09:30 AM     PT/INR:    Lab Results   Component Value Date/Time    PROTIME 13.2 10/05/2022 09:50 AM    INR 1.01 10/05/2022 09:50 AM     PTT:    Lab Results   Component Value Date/Time    APTT 29.4 10/05/2022 09:50 AM   [APTT}  U/A:    Lab Results   Component Value Date/Time    COLORU YELLOW 10/05/2022 09:30 AM    PHUR 6.5 10/05/2022 09:30 AM    WBCUA 2 09/29/2021 09:40 AM    RBCUA 1 09/29/2021 09:40 AM    BACTERIA None Seen 09/29/2021 09:40 AM    CLARITYU Clear 10/05/2022 09:30 AM    SPECGRAV <=1.005 10/05/2022 09:30 AM    LEUKOCYTESUR Negative 10/05/2022 09:30 AM    UROBILINOGEN 0.2 10/05/2022 09:30 AM    BILIRUBINUR Negative 10/05/2022 09:30 AM    BLOODU Negative 10/05/2022 09:30 AM    GLUCOSEU NEGATIVE 10/05/2022 09:30 AM     HgBA1c:  No components found for: HGBA1C        PLAN:  R KATALINA      Electronically signed by Marciano Jewell MD on 10/24/2022 at 10:08 AM

## 2022-10-24 NOTE — PROGRESS NOTES
4 Eyes Skin Assessment    Kareen Duran is being assessed upon: Admission    I agree that Katherine Haley RN, along with Claudette Campanile RN  have performed a thorough Head to Toe Skin Assessment on the patient. ALL assessment sites listed below have been assessed. Areas assessed by both nurses:     [x]   Head, Face, and Ears   [x]   Shoulders, Back, and Chest  [x]   Arms, Elbows, and Hands   [x]   Coccyx, Sacrum, and Ischium  [x]   Legs, Feet, and Heels    Does the Patient have Skin Breakdown?  No    Marcello Prevention initiated: No  Wound Care Orders initiated: No    [facility] nurse consulted for Pressure Injury (Stage 3,4, Unstageable, DTI, NWPT, and Complex wounds) and New or Established Ostomies: No        Primary Nurse eSignature: Bettina Green RN on 10/24/2022 at 3:37 PM      Co-Signer eSignature: Electronically signed by Claudette Campanile, RN on 10/24/22 at 7:27 PM CDT

## 2022-10-24 NOTE — ANESTHESIA PRE PROCEDURE
Department of Anesthesiology  Preprocedure Note       Name:  Glendy Peña   Age:  79 y.o.  :  1955                                          MRN:  [id number]         Date:  10/24/2022      Surgeon: Nick Harmon):  Levon Markham MD    Procedure: Procedure(s):  RIGHT HIP TOTAL ARTHROPLASTY    Medications prior to admission:   Prior to Admission medications    Medication Sig Start Date End Date Taking? Authorizing Provider   oxyCODONE (ROXICODONE) 20 MG immediate release tablet Take 20 mg by mouth in the morning and 20 mg at noon and 20 mg in the evening and 20 mg before bedtime.     Historical Provider, MD   aspirin 81 MG EC tablet Take 81 mg by mouth daily    Historical Provider, MD   NONFORMULARY Take 1 tablet by mouth daily Otc \"water pill\"    Historical Provider, MD   erythromycin (ROMYCIN) 5 MG/GM ophthalmic ointment Place 0.25 Doses into both eyes nightly    Historical Provider, MD   ketotifen (ZADITOR) 0.025 % ophthalmic solution Place 1 drop into both eyes 2 times daily    Historical Provider, MD   amitriptyline (ELAVIL) 25 MG tablet Take 50 mg by mouth nightly    Historical Provider, MD   zinc sulfate (ZINCATE) 220 (50 Zn) MG capsule Take 1 capsule by mouth daily 21   Leona García MD   ascorbic acid (VITAMIN C) 1000 MG tablet Take 1 tablet by mouth daily 21   Leona García MD   Vitamin D (CHOLECALCIFEROL) 50 MCG (2000 UT) TABS tablet Take 1 tablet by mouth daily 21   Leona García MD   metFORMIN (GLUCOPHAGE) 500 MG tablet Take 500 mg by mouth 2 times daily (with meals)    Historical Provider, MD   busPIRone (BUSPAR) 7.5 MG tablet Take 7.5 mg by mouth 2 times daily  19   Historical Provider, MD   tiZANidine (ZANAFLEX) 4 MG tablet Take 1 tablet by mouth 3 times daily as needed (muscle spasms) 10/19/17   CATY Walker   omeprazole (PRILOSEC) 40 MG delayed release capsule Take 40 mg by mouth daily  2/10/17   Historical Provider, MD   latanoprost (XALATAN) 0.005 % ophthalmic solution Place 1 drop into both eyes nightly  Patient not taking: No sig reported    Historical Provider, MD   albuterol sulfate HFA (PROAIR HFA) 108 (90 BASE) MCG/ACT inhaler Inhale 2 puffs into the lungs every 6 hours as needed for Wheezing  Patient not taking: No sig reported 1/6/16   Elsi Melchor MD   atorvastatin (LIPITOR) 40 MG tablet Take 40 mg by mouth daily. Historical Provider, MD   AMLODIPINE BESYLATE PO Take 5 mg by mouth daily. Historical Provider, MD   LISINOPRIL PO Take 40 mg by mouth daily    Historical Provider, MD       Current medications:    Current Facility-Administered Medications   Medication Dose Route Frequency Provider Last Rate Last Admin    ceFAZolin (ANCEF) 2,000 mg in sterile water 20 mL IV syringe  2,000 mg IntraVENous Once Mary Anne Akins MD        celecoxib (CELEBREX) capsule 200 mg  200 mg Oral Once Mary Anne Akins MD        oxyCODONE Garrett Ambrosia) extended release tablet 10 mg  10 mg Oral Once Mary Anne Akins MD        acetaminophen (TYLENOL) tablet 1,000 mg  1,000 mg Oral Once Mary Anne Akins MD        dexamethasone (PF) (DECADRON) injection 10 mg  10 mg IntraVENous Once Mary Anne Akins MD           Allergies:     Allergies   Allergen Reactions    Lyrica [Pregabalin] Other (See Comments)     He states \"it just made everything look blurry\"    Neurontin [Gabapentin] Other (See Comments)     States \"it made me want to shoot myself\"       Problem List:    Patient Active Problem List   Diagnosis Code    [facility] J44.1    Acute on chronic respiratory failure with hypoxia and hypercapnia ([facility]) J96.21, V49.91    Neutrophilic leukocytosis W75.2    Hyperlipidemia E78.5    Chronic low back pain M54.50, G89.29    COPD (chronic obstructive pulmonary disease) ([facility]) J44.9    Hypertension I10    Lower esophageal ring (Schatz[place]) K22.2    Gastritis without bleeding K29.70    Chronic GERD K21.9    History of esophageal stricture Z87.19    Status post dilation of esophageal narrowing Z98.890, Z87.19    Bilateral hearing loss H91.93    Palliative care patient Z51.5    Hyperglycemia R73.9    Grade I diastolic dysfunction A31.70    Obesity due to excess calories E66.09    Personal history of noncompliance with medical treatment, presenting hazards to health Z91.199    Sepsis ([facility] A41.9    HCAP (healthcare-associated pneumonia) J18.9    Normocytic anemia D64.9    Hyponatremia E87.1    Pain of right hand M79.641    Oxygen dependent Z99.81    Pneumonia due to COVID-19 virus U07.1, J12.82    Primary osteoarthritis of left hip M16.12       Past Medical History:        Diagnosis Date    Arthritis     Bilateral hearing loss     Colon polyps     COPD (chronic obstructive pulmonary disease) ([facility]     sees dr. Carlos Sanders [place] at [facility]    COVID-19 01/2021    low oxygen; pneumonia; received monoclonal infusion     Depression     Diabetes mellitus ([facility]     Hip pain     Hyperlipidemia     Hypertension     Low back pain 1/29/2016    Oxygen dependent     4l/m nc     Palliative care patient 12/28/2018    Pneumonia 01/2021    inpt       Past Surgical History:        Procedure Laterality Date    BACK SURGERY  2010    COLONOSCOPY  ? 2005    Dr. Amanuel De La Rosa    COLONOSCOPY  01/2012    3 polyps, 2 adenomatous, 1 hyperplastic    COLONOSCOPY  01/2015    diverticulosis    ENDOSCOPY, COLON, DIAGNOSTIC      FRACTURE SURGERY      KNEE ARTHROSCOPY  2014    LA EGD TRANSORAL BIOPSY SINGLE/MULTIPLE N/A 12/21/2016    Dr MARIA ELENA Rehman-thai/dilation over wire, 48 Ethiopian-Schatzki's Ring, gastritis/gastropathy-prn    TOTAL HIP ARTHROPLASTY Left 10/20/2021    LEFT TOTAL HIP REPLACEMENT performed by Marciano Jewell MD at [facility] ENDOSCOPY  4/24/2017    Dr MARIA ELENA Rehman-thai/dilation over wire, 51 Ethiopian-Schatzki Ring     UPPER GASTROINTESTINAL ENDOSCOPY  4/24/2017    Dr MARIA ELENA Rehman-thai/dilation over wire, 51 Ethiopian-Schatzki Ring     WRIST FRACTURE SURGERY      [id number]       Social History:    Social History     Tobacco Use    Smoking status: Former     Packs/day: 2.00     Years: 46.00     Pack years: 92.00     Types: Cigarettes     Quit date: 2016     Years since quittin.8    Smokeless tobacco: Never    Tobacco comments:     started at age 15, quit at age 61, smoked at 2 PPD   Substance Use Topics    Alcohol use: Not Currently     Comment: quit in ~                                Counseling given: Not Answered  Tobacco comments: started at age 15, quit at age 61, smoked at 2 PPD      Vital Signs (Current):   Vitals:    10/24/22 0924   BP: (!) 160/79   Pulse: 89   Resp: 20   Temp: 97.2 °F (36.2 °C)   TempSrc: Tympanic   SpO2: 97%   Weight: 230 lb (104.3 kg)   Height: 5' 6\" (1.676 m)                                              BP Readings from Last 3 Encounters:   10/24/22 (!) 160/79   10/21/21 122/73   10/20/21 (!) 95/50       NPO Status: Time of last liquid consumption:                         Time of last solid consumption:                         Date of last liquid consumption: 10/23/22                        Date of last solid food consumption: 10/23/22    BMI:   Wt Readings from Last 3 Encounters:   10/24/22 230 lb (104.3 kg)   10/05/22 227 lb (103 kg)   10/20/21 226 lb (102.5 kg)     Body mass index is 37.12 kg/m².     CBC:   Lab Results   Component Value Date/Time    WBC 11.0 2021 09:40 AM    RBC 3.95 2021 09:40 AM    HGB 8.9 10/21/2021 03:48 AM    HCT 27.9 10/21/2021 03:48 AM    MCV 97.2 2021 09:40 AM    RDW 13.3 2021 09:40 AM     2021 09:40 AM       CMP:   Lab Results   Component Value Date/Time     10/27/2021 09:30 AM    K 4.2 10/27/2021 09:30 AM    K 5.2 10/21/2021 03:48 AM    CL 98 10/27/2021 09:30 AM    CO2 30 10/27/2021 09:30 AM    BUN 8 10/27/2021 09:30 AM    CREATININE 0.6 10/27/2021 09:30 AM    GFRAA >59 10/27/2021 09:30 AM    LABGLOM >60 10/27/2021 09:30 AM GLUCOSE 123 10/27/2021 09:30 AM    PROT 8.4 09/29/2021 09:40 AM    PROT 6.8 03/01/2013 07:30 AM    CALCIUM 9.1 10/27/2021 09:30 AM    BILITOT 0.3 09/29/2021 09:40 AM    ALKPHOS 196 09/29/2021 09:40 AM    AST 19 09/29/2021 09:40 AM    ALT 20 09/29/2021 09:40 AM       POC Tests: No results for input(s): POCGLU, POCNA, POCK, POCCL, POCBUN, POCHEMO, POCHCT in the last 72 hours. Coags:   Lab Results   Component Value Date/Time    PROTIME 13.2 10/05/2022 09:50 AM    INR 1.01 10/05/2022 09:50 AM    APTT 29.4 10/05/2022 09:50 AM       HCG (If Applicable): No results found for: PREGTESTUR, PREGSERUM, HCG, HCGQUANT     ABGs:   Lab Results   Component Value Date/Time    PHART 7.380 09/29/2021 09:53 AM    PO2ART 99.0 09/29/2021 09:53 AM    UEZ4HXJ 56.0 09/29/2021 09:53 AM    QEQ2NWH 33.1 09/29/2021 09:53 AM    BEART 6.5 09/29/2021 09:53 AM    Z1VNXNKU 96.1 09/29/2021 09:53 AM        Type & Screen (If Applicable):  No results found for: LABABO, LABRH    Drug/Infectious Status (If Applicable):  No results found for: HIV, HEPCAB    COVID-19 Screening (If Applicable):   Lab Results   Component Value Date/Time    COVID19 Not Detected 10/20/2022 09:15 AM           Anesthesia Evaluation  Patient summary reviewed and Nursing notes reviewed no history of anesthetic complications:   Airway: Mallampati: II  TM distance: >3 FB   Neck ROM: full  Mouth opening: > = 3 FB   Dental: normal exam   (+) upper dentures and lower dentures      Pulmonary:Negative Pulmonary ROS and normal exam  breath sounds clear to auscultation  (+) pneumonia:  COPD: severe,      (-) shortness of breath and not a current smoker          Patient did not smoke on day of surgery.                  Cardiovascular:    (+) hypertension:,     (-) CAD,  angina and  CHF    NYHA Classification: I  ECG reviewed  Rhythm: regular  Rate: normal           Beta Blocker:  Not on Beta Blocker         Neuro/Psych:   Negative Neuro/Psych ROS  (+) psychiatric history:   (-) seizures, CVA and depression/anxiety            GI/Hepatic/Renal: Neg GI/Hepatic/Renal ROS       (-) hiatal hernia and GERD       Endo/Other: Negative Endo/Other ROS   (+) Diabetes, . Pt had PAT visit. Abdominal:   (+) obese,     Abdomen: soft. Vascular: Other Findings:           Anesthesia Plan      general     ASA 4     (Iv zofran within 30 min of closing   Severe copd, but on coumadin on 3l/m oxygen )  Induction: intravenous. BIS  MIPS: Postoperative opioids intended and Prophylactic antiemetics administered. Anesthetic plan and risks discussed with patient. Use of blood products discussed with patient whom. Plan discussed with CRNA.     Attending anesthesiologist reviewed and agrees with Pre Eval content                Lamine Landeros MD   10/24/2022

## 2022-10-25 LAB
ANION GAP SERPL CALCULATED.3IONS-SCNC: 9 MMOL/L (ref 7–19)
BUN BLDV-MCNC: 12 MG/DL (ref 8–23)
CALCIUM SERPL-MCNC: 8.5 MG/DL (ref 8.8–10.2)
CHLORIDE BLD-SCNC: 98 MMOL/L (ref 98–111)
CO2: 30 MMOL/L (ref 22–29)
CREAT SERPL-MCNC: 0.7 MG/DL (ref 0.5–1.2)
GFR SERPL CREATININE-BSD FRML MDRD: >60 ML/MIN/{1.73_M2}
GLUCOSE BLD-MCNC: 115 MG/DL (ref 70–99)
GLUCOSE BLD-MCNC: 118 MG/DL (ref 70–99)
GLUCOSE BLD-MCNC: 124 MG/DL (ref 70–99)
GLUCOSE BLD-MCNC: 129 MG/DL (ref 70–99)
GLUCOSE BLD-MCNC: 143 MG/DL (ref 74–109)
HCT VFR BLD CALC: 28.4 % (ref 42–52)
HEMOGLOBIN: 8.8 G/DL (ref 14–18)
PERFORMED ON: ABNORMAL
POTASSIUM REFLEX MAGNESIUM: 5.3 MMOL/L (ref 3.5–5)
SODIUM BLD-SCNC: 137 MMOL/L (ref 136–145)

## 2022-10-25 PROCEDURE — 2580000003 HC RX 258: Performed by: ORTHOPAEDIC SURGERY

## 2022-10-25 PROCEDURE — 6360000002 HC RX W HCPCS: Performed by: ORTHOPAEDIC SURGERY

## 2022-10-25 PROCEDURE — G0378 HOSPITAL OBSERVATION PER HR: HCPCS

## 2022-10-25 PROCEDURE — 97162 PT EVAL MOD COMPLEX 30 MIN: CPT

## 2022-10-25 PROCEDURE — 97165 OT EVAL LOW COMPLEX 30 MIN: CPT

## 2022-10-25 PROCEDURE — 36415 COLL VENOUS BLD VENIPUNCTURE: CPT

## 2022-10-25 PROCEDURE — 97535 SELF CARE MNGMENT TRAINING: CPT

## 2022-10-25 PROCEDURE — 2700000000 HC OXYGEN THERAPY PER DAY

## 2022-10-25 PROCEDURE — 85014 HEMATOCRIT: CPT

## 2022-10-25 PROCEDURE — 6370000000 HC RX 637 (ALT 250 FOR IP): Performed by: ORTHOPAEDIC SURGERY

## 2022-10-25 PROCEDURE — 85018 HEMOGLOBIN: CPT

## 2022-10-25 PROCEDURE — 2500000003 HC RX 250 WO HCPCS: Performed by: ORTHOPAEDIC SURGERY

## 2022-10-25 PROCEDURE — 82947 ASSAY GLUCOSE BLOOD QUANT: CPT

## 2022-10-25 PROCEDURE — 96375 TX/PRO/DX INJ NEW DRUG ADDON: CPT

## 2022-10-25 PROCEDURE — 96366 THER/PROPH/DIAG IV INF ADDON: CPT

## 2022-10-25 PROCEDURE — 97116 GAIT TRAINING THERAPY: CPT

## 2022-10-25 PROCEDURE — 96365 THER/PROPH/DIAG IV INF INIT: CPT

## 2022-10-25 PROCEDURE — 80048 BASIC METABOLIC PNL TOTAL CA: CPT

## 2022-10-25 RX ORDER — OXYCODONE HYDROCHLORIDE 10 MG/1
10 TABLET ORAL SEE ADMIN INSTRUCTIONS
Qty: 90 TABLET | Refills: 0 | Status: SHIPPED | OUTPATIENT
Start: 2022-10-25 | End: 2022-11-24

## 2022-10-25 RX ORDER — RIVAROXABAN 10 MG/1
TABLET, FILM COATED ORAL
Qty: 21 TABLET | Refills: 0 | Status: SHIPPED | OUTPATIENT
Start: 2022-10-25

## 2022-10-25 RX ORDER — ONDANSETRON 4 MG/1
4 TABLET, FILM COATED ORAL EVERY 8 HOURS PRN
Qty: 30 TABLET | Refills: 0 | Status: SHIPPED | OUTPATIENT
Start: 2022-10-25

## 2022-10-25 RX ADMIN — RIVAROXABAN 10 MG: 10 TABLET, FILM COATED ORAL at 17:56

## 2022-10-25 RX ADMIN — ACETAMINOPHEN 650 MG: 325 TABLET, FILM COATED ORAL at 11:12

## 2022-10-25 RX ADMIN — TRAMADOL HYDROCHLORIDE 50 MG: 50 TABLET, COATED ORAL at 11:12

## 2022-10-25 RX ADMIN — METFORMIN HYDROCHLORIDE 500 MG: 500 TABLET ORAL at 16:40

## 2022-10-25 RX ADMIN — TRAMADOL HYDROCHLORIDE 50 MG: 50 TABLET, COATED ORAL at 22:17

## 2022-10-25 RX ADMIN — OXYCODONE 10 MG: 5 TABLET ORAL at 09:21

## 2022-10-25 RX ADMIN — CEFAZOLIN 2000 MG: 2 INJECTION, POWDER, FOR SOLUTION INTRAMUSCULAR; INTRAVENOUS at 05:18

## 2022-10-25 RX ADMIN — OXYCODONE 10 MG: 5 TABLET ORAL at 05:18

## 2022-10-25 RX ADMIN — CLINDAMYCIN IN 5 PERCENT DEXTROSE 900 MG: 18 INJECTION, SOLUTION INTRAVENOUS at 16:40

## 2022-10-25 RX ADMIN — ATORVASTATIN CALCIUM 40 MG: 40 TABLET, FILM COATED ORAL at 09:15

## 2022-10-25 RX ADMIN — ACETAMINOPHEN 650 MG: 325 TABLET, FILM COATED ORAL at 16:40

## 2022-10-25 RX ADMIN — LATANOPROST 1 DROP: 50 SOLUTION OPHTHALMIC at 20:20

## 2022-10-25 RX ADMIN — BUSPIRONE HYDROCHLORIDE 7.5 MG: 5 TABLET ORAL at 20:18

## 2022-10-25 RX ADMIN — TRAMADOL HYDROCHLORIDE 50 MG: 50 TABLET, COATED ORAL at 16:40

## 2022-10-25 RX ADMIN — ACETAMINOPHEN 650 MG: 325 TABLET, FILM COATED ORAL at 05:18

## 2022-10-25 RX ADMIN — Medication 2000 UNITS: at 09:14

## 2022-10-25 RX ADMIN — CLINDAMYCIN IN 5 PERCENT DEXTROSE 900 MG: 18 INJECTION, SOLUTION INTRAVENOUS at 09:13

## 2022-10-25 RX ADMIN — OXYCODONE 10 MG: 5 TABLET ORAL at 18:23

## 2022-10-25 RX ADMIN — OXYCODONE 10 MG: 5 TABLET ORAL at 22:17

## 2022-10-25 RX ADMIN — KETOTIFEN FUMARATE 1 DROP: 0.35 SOLUTION/ DROPS OPHTHALMIC at 20:20

## 2022-10-25 RX ADMIN — AMITRIPTYLINE HYDROCHLORIDE 50 MG: 50 TABLET, FILM COATED ORAL at 20:19

## 2022-10-25 RX ADMIN — HYDROMORPHONE HYDROCHLORIDE 1 MG: 1 INJECTION, SOLUTION INTRAMUSCULAR; INTRAVENOUS; SUBCUTANEOUS at 20:19

## 2022-10-25 RX ADMIN — RIVAROXABAN 10 MG: 10 TABLET, FILM COATED ORAL at 00:00

## 2022-10-25 RX ADMIN — OXYCODONE HYDROCHLORIDE AND ACETAMINOPHEN 1000 MG: 500 TABLET ORAL at 09:15

## 2022-10-25 RX ADMIN — OXYCODONE 10 MG: 5 TABLET ORAL at 14:07

## 2022-10-25 RX ADMIN — BUSPIRONE HYDROCHLORIDE 7.5 MG: 5 TABLET ORAL at 09:14

## 2022-10-25 RX ADMIN — PANTOPRAZOLE SODIUM 40 MG: 40 TABLET, DELAYED RELEASE ORAL at 05:18

## 2022-10-25 RX ADMIN — BISACODYL 5 MG: 5 TABLET, COATED ORAL at 09:15

## 2022-10-25 RX ADMIN — KETOTIFEN FUMARATE 1 DROP: 0.35 SOLUTION/ DROPS OPHTHALMIC at 09:16

## 2022-10-25 RX ADMIN — ZINC SULFATE 220 MG (50 MG) CAPSULE 50 MG: CAPSULE at 09:16

## 2022-10-25 RX ADMIN — CLINDAMYCIN IN 5 PERCENT DEXTROSE 900 MG: 18 INJECTION, SOLUTION INTRAVENOUS at 00:13

## 2022-10-25 RX ADMIN — ACETAMINOPHEN 650 MG: 325 TABLET, FILM COATED ORAL at 22:16

## 2022-10-25 RX ADMIN — METFORMIN HYDROCHLORIDE 500 MG: 500 TABLET ORAL at 09:15

## 2022-10-25 RX ADMIN — OXYCODONE 10 MG: 5 TABLET ORAL at 00:11

## 2022-10-25 ASSESSMENT — PAIN DESCRIPTION - ORIENTATION
ORIENTATION: LEFT
ORIENTATION: RIGHT

## 2022-10-25 ASSESSMENT — PAIN DESCRIPTION - DESCRIPTORS
DESCRIPTORS: ACHING

## 2022-10-25 ASSESSMENT — PAIN DESCRIPTION - LOCATION
LOCATION: HIP
LOCATION: HIP
LOCATION: KNEE
LOCATION: HIP

## 2022-10-25 ASSESSMENT — PAIN SCALES - GENERAL
PAINLEVEL_OUTOF10: 8
PAINLEVEL_OUTOF10: 5
PAINLEVEL_OUTOF10: 0
PAINLEVEL_OUTOF10: 4
PAINLEVEL_OUTOF10: 4
PAINLEVEL_OUTOF10: 7

## 2022-10-25 ASSESSMENT — PAIN - FUNCTIONAL ASSESSMENT
PAIN_FUNCTIONAL_ASSESSMENT: PREVENTS OR INTERFERES SOME ACTIVE ACTIVITIES AND ADLS

## 2022-10-25 ASSESSMENT — PAIN DESCRIPTION - PAIN TYPE: TYPE: ACUTE PAIN;SURGICAL PAIN

## 2022-10-25 NOTE — PROGRESS NOTES
Occupational Therapy Initial Assessment  Date: 10/25/2022   Patient Name: Nubia Crowder  MRN: [id number]     : 1955    Date of Service: 10/25/2022    Discharge Recommendations:  Home with assist PRN       Assessment   Assessment: Evaluation completed and tx initiated. All education completed this visit. No barriers to stated discharge plan. Treatment Diagnosis: Right anterior hip replacement  REQUIRES OT FOLLOW-UP: No  Activity Tolerance  Activity Tolerance: Patient Tolerated treatment well           Patient Diagnosis(es): The primary encounter diagnosis was Primary osteoarthritis of both hips. A diagnosis of Primary osteoarthritis of right hip was also pertinent to this visit. has a past medical history of Arthritis, Bilateral hearing loss, Colon polyps, COPD (chronic obstructive pulmonary disease) ([facility] COVID-19, Depression, Diabetes mellitus ([facility] Hip pain, Hyperlipidemia, Hypertension, Low back pain, Oxygen dependent, Palliative care patient, and Pneumonia. has a past surgical history that includes Wrist fracture surgery; fracture surgery; Colonoscopy (? ); Knee arthroscopy (); Colonoscopy (2012); Colonoscopy (2015); pr egd transoral biopsy single/multiple (N/A, 2016); Upper gastrointestinal endoscopy (2017); Upper gastrointestinal endoscopy (2017); Endoscopy, colon, diagnostic; back surgery (); Total hip arthroplasty (Left, 10/20/2021); and Total hip arthroplasty (Right, 10/24/2022).     Treatment Diagnosis: Right anterior hip replacement      Restrictions  Restrictions/Precautions  Restrictions/Precautions: Fall Risk, Surgical Protocols, Weight Bearing  Lower Extremity Weight Bearing Restrictions  Right Lower Extremity Weight Bearing: Weight Bearing As Tolerated  Position Activity Restriction  Hip Precautions: Anterior hip precautions    Subjective   General  Chart Reviewed: Yes  Patient assessed for rehabilitation services?: Yes  Family / Caregiver Present: No  Pre Treatment Pain Screening  Pain at present: 7 (Had pain meds prior)  Scale Used: Numeric Score (RLE)  Intervention List: Patient able to continue with treatment  Comments / Details: 10/10 with activity in RLE, elevated/iced  Vital Signs  Temp: 98.1 °F (36.7 °C)  Temp Source: Temporal  Heart Rate: 99  Heart Rate Source: Monitor  Resp: 18  BP: 136/69  BP Location: Left upper arm  MAP (Calculated): 91.33  Patient Position: Supine  Oxygen Therapy  SpO2: 96 %  O2 Device: Nasal cannula  O2 Flow Rate (L/min): 2 L/min    Social/Functional History  Social/Functional History  Lives With: Spouse (and sister)  Type of Home: House  Home Layout: One level  Home Access: Ramped entrance  Bathroom Shower/Tub:  (garden tub)  Bathroom Equipment: Tub transfer bench, Grab bars in shower  Home Equipment: Rollator, Cane  ADL Assistance: Independent  Ambulation Assistance: Independent (used cane at times)  Transfer Assistance: Independent  Active : Yes  Additional Comments: other hip done about 1 year ago       Objective   Hearing Exceptions: Hard of hearing/hearing concerns          Toilet Transfers  Toilet - Technique: Ambulating (with RW)  Toilet Transfer: Contact guard assistance  ADL  Feeding: Independent  Grooming: Independent;Setup  UE Bathing: Independent;Setup  LE Bathing: Minimal assistance  UE Dressing: Independent;Setup  LE Dressing: Minimal assistance  Toileting: Contact guard assistance        Bed mobility  Supine to Sit: Minimal assistance  Bed Mobility Comments:  Independent with EOB sitting  Transfers  Stand Step Transfers: Contact guard assistance     Cognition  Overall Cognitive Status: WNL                 LUE AROM (degrees)  LUE AROM : WFL  RUE AROM (degrees)  RUE AROM : WFL                    Plan   Occupational Therapy Plan  Times Per Week: 3-5    Goals  Short Term Goals  Short Term Goal 1: The patient will be able to demo/verbalize anterior hip precautions/positioning/activity/DME/AE as they relate to mobility and ADL (met)     Tx initiated:  AE/ADL/DME training, training in precautions, training in positioning. Patient able to verbalize/demo.  (15 mins)          Verner Rigger, OT/VIVIAN  Electronically signed by Verner Rigger OT/L on 10/25/2022 at 9:03 AM.

## 2022-10-25 NOTE — CARE COORDINATION
Spoke with patient regarding MD orders for [facility] services. Patient agreeable and has chosen [facility]. Referral Faxed. [facility] [phone]. [phone]. Please notify [facility] when patient discharges and fax DC Summary,  DC med list and any new [facility] orders. The Patient and/or patient representative was provided with a choice of provider and agrees   with the discharge plan. [x] Yes [] No    Freedom of choice list was provided with basic dialogue that supports the patient's individualized plan of care/goals, treatment preferences and shares the quality data associated with the providers.  [x] Yes [] No  Electronically signed by Montserrat Holbrook on 10/25/2022 at 10:09 AM

## 2022-10-25 NOTE — PROGRESS NOTES
Subjective:     Post-Operative Day: 1 Status Post right ashvin  Systemic or Specific Complaints:No Complaints  no nausea Pain 6    Objective:     Patient Vitals for the past 24 hrs:   BP Temp Temp src Pulse Resp SpO2 Height Weight   10/25/22 0445 129/68 97.9 °F (36.6 °C) Temporal 95 18 93 % -- --   10/25/22 0041 -- -- -- -- 18 -- -- --   10/25/22 0000 128/64 97.1 °F (36.2 °C) Temporal 82 18 92 % -- --   10/24/22 2130 115/61 -- -- -- -- -- -- --   10/24/22 2002 (!) 102/50 -- -- -- -- -- -- --   10/24/22 2000 (!) 86/50 (!) 96.7 °F (35.9 °C) Temporal 81 16 95 % -- --   10/24/22 1918 -- -- -- -- 18 -- -- --   10/24/22 1646 126/66 96.8 °F (36 °C) Temporal 81 16 93 % -- --   10/24/22 1545 -- -- -- 78 14 95 % -- --   10/24/22 1500 113/77 (!) 96.3 °F (35.7 °C) Temporal 81 12 -- -- --   10/24/22 1435 -- 97.7 °F (36.5 °C) Temporal -- -- -- -- --   10/24/22 1425 (!) 90/59 -- -- -- (!) 7 (!) 89 % -- --   10/24/22 1410 (!) 109/55 -- -- 85 16 96 % -- --   10/24/22 1408 -- 97.7 °F (36.5 °C) -- -- -- -- -- --   10/24/22 1405 (!) 102/59 -- -- 88 14 (!) 88 % -- --   10/24/22 1401 (!) 102/59 97.7 °F (36.5 °C) Temporal 89 12 96 % -- --   10/24/22 1359 -- 97.7 °F (36.5 °C) Temporal -- -- -- -- --   10/24/22 0924 (!) 160/79 97.2 °F (36.2 °C) Tympanic 89 20 97 % 5' 6\" (1.676 m) 230 lb (104.3 kg)       General: alert, appears stated age, and cooperative   Exam: Incision clean, dry, and intact, no evidence of infection. Neurovascular: Exam normal       Data Review:  Recent Labs     10/25/22  0157   HGB 8.8*     Recent Labs     10/25/22  0157      K 5.3*   CREATININE 0.7     Recent Labs     10/25/22  0157   LABGLOM >60           Assessment:     Status Post right ashvin. Plan:     Continues current post-op course.       Electronically signed by Sharona Sanchez MD on 10/25/2022 at 7:24 AM

## 2022-10-25 NOTE — PROGRESS NOTES
Physical Therapy  Name: Yue Rater  MRN:  [id number]  Date of service:  10/25/2022     10/25/22 1551   Restrictions/Precautions   Restrictions/Precautions Fall Risk;Surgical Protocols;Weight Bearing   Lower Extremity Weight Bearing Restrictions   Right Lower Extremity Weight Bearing Weight Bearing As Tolerated   Position Activity Restriction   Hip Precautions Anterior hip precautions   General   Chart Reviewed Yes   Subjective   Subjective Pt up in recliner, agrees to walk. Pain Assessment   Pain Assessment 0-10   Pain Level 4   Pain Location Hip   Pain Orientation Right   Pain Descriptors Aching   Functional Pain Assessment Prevents or interferes some active activities and ADLs   Pain Type Acute pain;Surgical pain   Oxygen Therapy   O2 Device Nasal cannula   O2 Flow Rate (L/min) 3 L/min   Bed Mobility   Sit to Supine Minimal assistance   Transfers   Sit to Stand Contact guard assistance   Stand to Sit Contact guard assistance   Ambulation   WB Status FWBAT RLE   Ambulation   Surface Level tile   Device Rolling Walker   Other Apparatus O2   Assistance Contact guard assistance   Quality of Gait steady   Gait Deviations Slow Natalie;Decreased step length;Decreased step height   Distance 70'   Short Term Goals   Time Frame for Short Term Goals 2 wks   Short Term Goal 1 supine to sit indep   Short Term Goal 2 sit to stand indep   Short Term Goal 3 amb. 100' with RW SBA   Conditions Requiring Skilled Therapeutic Intervention   Body Structures, Functions, Activity Limitations Requiring Skilled Therapeutic Intervention Decreased functional mobility ; Decreased ROM; Decreased endurance;Decreased strength;Decreased balance; Increased pain   Assessment Pt doing well overall with mobility, able to tolerate increased amb distance with rwx with no c/o increased pain. Pt positioned for comfort in supine with all needs in reach post gait.    Activity Tolerance   Activity Tolerance Patient tolerated treatment well   PT Plan of Care Tuesday X   Safety Devices   Type of Devices Bed alarm in place;Call light within reach;Gait belt;Left in bed         Electronically signed by Makayla Garcia PTA on 10/25/2022 at 3:58 PM

## 2022-10-25 NOTE — OP NOTE
[facility] [employer] [facility] [address]                                OPERATIVE REPORT    PATIENT NAME: Thu Medellin                      :        1955  MED REC NO:   [id number]                              ROOM:       [facility]  ACCOUNT NO:   [de-identified]                           ADMIT DATE: 10/24/2022  PROVIDER:     Duke Burleson MD    DATE OF PROCEDURE:  10/24/2022    PREOPERATIVE DIAGNOSIS:  Primary osteoarthritis, right hip. POSTOPERATIVE DIAGNOSIS:  Primary osteoarthritis, right hip. PROCEDURE PERFORMED:  Right total hip arthroplasty. SURGEON:  Duke Burleson MD    FIRST ASSISTANT:  Nena Pulido PA-C. Please note, he is a critical  assistant in exposure and placement of implants. ANESTHESIA:  General.    EBL:  500 mL. FLUIDS:  _____ mL of crystalloid. URINE OUTPUT:  250 mL. SPECIMENS:  Femoral head, sent in formalin for pathology. COMPONENTS USED:  For the acetabular cup is the size 56 G7 cup, 35 mm  screw, 44 metal liner, stem is a size 8 standard offset ACTIS stem, head  is a 28 mm +5 head and the dual mobility liner was 44 mm dual mobility  liner. INDICATIONS:  This is a 42-year-old gentleman with severe arthritis of  the right hip. He did have some mild heterotopic ossification on his  left hip postoperatively, so he was given 760 centigray of radiation  this morning by Dr. Ludy Barlow for prophylaxis. OPERATIVE PROCEDURE:  Following this, he was taken to the operating  room, was given 2 gm of Ancef, underwent general anesthesia. He was  placed on the [place] table. A combined 20-degree internal rotation view  was taken of the right hip. The right hip was prepped and draped in the  usual sterile fashion. A 10 cm incision was made lateral to the ASIS  and extending distally. TFL fascia was incised. TFL was taken  laterally, sartorius and rectus medially.   Ascending branch of the  lateral femoral circumflex vessels were identified and cauterized. Anterior capsulotomy was performed. A neck resection was made at the  saddle of the neck and equator of the femoral head. The neck fragment  and femoral head were removed, and sent for pathology. We then rotated  the leg out and dropped the leg. This sequence was repeated twice. We  had a good exposure, used our ACTIS system, broached up to a size 6  broach, used our calcar planer. This was removed. Acetabulum was  exposed. Labrum was excised. We then started reaming with a 51 mm  reamer and reamed up to a 55 mm reamer. Trial 55 shell fit snuggly. We  irrigated with 1 liter of irrigation. The final size 56 G7 cup was  press-fit in about 40 degrees of abduction, 20 degrees of anteversion  and had excellent purchase. We drilled, measured and placed a 35 mm  screw. Following this, several trial reductions were done with a  standard poly and liner and head. We had excellent stability to +5 head  and neck component. However, the patient had a lumbosacral fusion and  for this reason, I elected to go with a dual mobility system even though  we had a very good stability with the standard components. We then  removed the trial liner, placed the size 44 metal liner. We then  exposed the proximal femur. We used our [address]  which let me to go up by 2 sizes of the stem, it was a standard offset. We broached up to a size 8 broach, used our calcar planer. Following  this, the final 28 mm +5 ceramic head was mated with a 44 mm dual  mobility liner. This was placed onto the [place] taper. The hip was  reduced. We had excellent stability to anterior maneuvers. Fluoroscopic views revealed excellent alignment of the femoral and  acetabular components. Our final numbers showed that we lengthened the leg by 7 mm, added 1 mm  to the femoral offset, and decreased the total offset by 2 mm.   The  patient had 39 degrees of abduction and 25 degrees of anteversion, well  within our templating goals. We irrigated with a total of 3 liters of  irrigation. We mixed 20 mL of Exparel with 30 mL of saline and 50 mL of  0.25% Marcaine. We injected the TFL and rectus muscles with this  solution. TFL fascia was closed with 0 Vicryl suture, 2-0 Vicryl suture  for subcutaneous tissues, and 3-0 Vicryl for subcuticular stitch. Prineo Dermabond and soft dressings were applied. The patient was taken  to the recovery room in stable condition. POSTOPERATIVE PLANS:  1. The patient will be on our typical postop protocol. 2.  He will be on 2 doses of Ancef and 6 doses of clindamycin. 3.  He will be on Xarelto 10 mg a day for 21 days followed by a baby  aspirin 81 mg twice a day for another 3 weeks.         Tiago Kruger MD    D: 10/24/2022 14:35:21      T: 10/24/2022 23:51:46     [email or web address]  Job#: [id number]     Doc#: [id number]    CC:

## 2022-10-26 VITALS
HEIGHT: 66 IN | TEMPERATURE: 97 F | SYSTOLIC BLOOD PRESSURE: 147 MMHG | RESPIRATION RATE: 16 BRPM | OXYGEN SATURATION: 98 % | BODY MASS INDEX: 36.96 KG/M2 | WEIGHT: 230 LBS | HEART RATE: 89 BPM | DIASTOLIC BLOOD PRESSURE: 63 MMHG

## 2022-10-26 LAB
ANION GAP SERPL CALCULATED.3IONS-SCNC: 4 MMOL/L (ref 7–19)
BUN BLDV-MCNC: 13 MG/DL (ref 8–23)
CALCIUM SERPL-MCNC: 8.5 MG/DL (ref 8.8–10.2)
CHLORIDE BLD-SCNC: 99 MMOL/L (ref 98–111)
CO2: 36 MMOL/L (ref 22–29)
CREAT SERPL-MCNC: 0.7 MG/DL (ref 0.5–1.2)
GFR SERPL CREATININE-BSD FRML MDRD: >60 ML/MIN/{1.73_M2}
GLUCOSE BLD-MCNC: 103 MG/DL (ref 70–99)
GLUCOSE BLD-MCNC: 109 MG/DL (ref 70–99)
GLUCOSE BLD-MCNC: 95 MG/DL (ref 74–109)
HCT VFR BLD CALC: 27.7 % (ref 42–52)
HEMOGLOBIN: 8.3 G/DL (ref 14–18)
PERFORMED ON: ABNORMAL
PERFORMED ON: ABNORMAL
POTASSIUM REFLEX MAGNESIUM: 4.8 MMOL/L (ref 3.5–5)
SODIUM BLD-SCNC: 139 MMOL/L (ref 136–145)

## 2022-10-26 PROCEDURE — 6370000000 HC RX 637 (ALT 250 FOR IP): Performed by: ORTHOPAEDIC SURGERY

## 2022-10-26 PROCEDURE — 96366 THER/PROPH/DIAG IV INF ADDON: CPT

## 2022-10-26 PROCEDURE — 85018 HEMOGLOBIN: CPT

## 2022-10-26 PROCEDURE — 2700000000 HC OXYGEN THERAPY PER DAY

## 2022-10-26 PROCEDURE — 97116 GAIT TRAINING THERAPY: CPT

## 2022-10-26 PROCEDURE — 85014 HEMATOCRIT: CPT

## 2022-10-26 PROCEDURE — G0378 HOSPITAL OBSERVATION PER HR: HCPCS

## 2022-10-26 PROCEDURE — 2500000003 HC RX 250 WO HCPCS: Performed by: ORTHOPAEDIC SURGERY

## 2022-10-26 PROCEDURE — 80048 BASIC METABOLIC PNL TOTAL CA: CPT

## 2022-10-26 PROCEDURE — 82947 ASSAY GLUCOSE BLOOD QUANT: CPT

## 2022-10-26 PROCEDURE — 36415 COLL VENOUS BLD VENIPUNCTURE: CPT

## 2022-10-26 RX ADMIN — TRAMADOL HYDROCHLORIDE 50 MG: 50 TABLET, COATED ORAL at 10:28

## 2022-10-26 RX ADMIN — BISACODYL 5 MG: 5 TABLET, COATED ORAL at 07:22

## 2022-10-26 RX ADMIN — ZINC SULFATE 220 MG (50 MG) CAPSULE 50 MG: CAPSULE at 07:24

## 2022-10-26 RX ADMIN — PANTOPRAZOLE SODIUM 40 MG: 40 TABLET, DELAYED RELEASE ORAL at 06:13

## 2022-10-26 RX ADMIN — KETOTIFEN FUMARATE 1 DROP: 0.35 SOLUTION/ DROPS OPHTHALMIC at 07:24

## 2022-10-26 RX ADMIN — ACETAMINOPHEN 650 MG: 325 TABLET, FILM COATED ORAL at 10:28

## 2022-10-26 RX ADMIN — Medication 2000 UNITS: at 07:24

## 2022-10-26 RX ADMIN — METFORMIN HYDROCHLORIDE 500 MG: 500 TABLET ORAL at 07:24

## 2022-10-26 RX ADMIN — OXYCODONE 10 MG: 5 TABLET ORAL at 04:27

## 2022-10-26 RX ADMIN — OXYCODONE HYDROCHLORIDE AND ACETAMINOPHEN 1000 MG: 500 TABLET ORAL at 07:24

## 2022-10-26 RX ADMIN — BUSPIRONE HYDROCHLORIDE 7.5 MG: 5 TABLET ORAL at 07:23

## 2022-10-26 RX ADMIN — ACETAMINOPHEN 650 MG: 325 TABLET, FILM COATED ORAL at 04:27

## 2022-10-26 RX ADMIN — OXYCODONE 10 MG: 5 TABLET ORAL at 09:11

## 2022-10-26 RX ADMIN — TRAMADOL HYDROCHLORIDE 50 MG: 50 TABLET, COATED ORAL at 04:27

## 2022-10-26 RX ADMIN — CLINDAMYCIN IN 5 PERCENT DEXTROSE 900 MG: 18 INJECTION, SOLUTION INTRAVENOUS at 07:28

## 2022-10-26 RX ADMIN — ATORVASTATIN CALCIUM 40 MG: 40 TABLET, FILM COATED ORAL at 07:24

## 2022-10-26 RX ADMIN — CLINDAMYCIN IN 5 PERCENT DEXTROSE 900 MG: 18 INJECTION, SOLUTION INTRAVENOUS at 02:09

## 2022-10-26 ASSESSMENT — PAIN DESCRIPTION - ORIENTATION
ORIENTATION: RIGHT

## 2022-10-26 ASSESSMENT — PAIN SCALES - GENERAL
PAINLEVEL_OUTOF10: 6
PAINLEVEL_OUTOF10: 10
PAINLEVEL_OUTOF10: 7

## 2022-10-26 ASSESSMENT — PAIN DESCRIPTION - DESCRIPTORS
DESCRIPTORS: ACHING
DESCRIPTORS: THROBBING
DESCRIPTORS: ACHING

## 2022-10-26 ASSESSMENT — PAIN - FUNCTIONAL ASSESSMENT
PAIN_FUNCTIONAL_ASSESSMENT: PREVENTS OR INTERFERES SOME ACTIVE ACTIVITIES AND ADLS
PAIN_FUNCTIONAL_ASSESSMENT: PREVENTS OR INTERFERES SOME ACTIVE ACTIVITIES AND ADLS

## 2022-10-26 ASSESSMENT — PAIN DESCRIPTION - FREQUENCY: FREQUENCY: CONTINUOUS

## 2022-10-26 ASSESSMENT — PAIN DESCRIPTION - LOCATION
LOCATION: HIP

## 2022-10-26 ASSESSMENT — PAIN DESCRIPTION - PAIN TYPE: TYPE: ACUTE PAIN;SURGICAL PAIN

## 2022-10-26 NOTE — PROGRESS NOTES
Patient discharged home today with [facility]. Went over all discharge instructions and new medications with patient and provided a copy of all new medications to take home. Patient verbalized understanding. Patient was stable upon discharge.    Electronically signed by Ravinder Moya RN on 10/26/2022 at 11:30 AM

## 2022-10-26 NOTE — PROGRESS NOTES
Physical Therapy  Name: Yue Rater  MRN:  [id number]  Date of service:  10/26/2022     10/26/22 5101   Restrictions/Precautions   Restrictions/Precautions Fall Risk;Surgical Protocols;Weight Bearing   Lower Extremity Weight Bearing Restrictions   Right Lower Extremity Weight Bearing Weight Bearing As Tolerated   Position Activity Restriction   Hip Precautions Anterior hip precautions   Subjective   Subjective Pt agreed to therapy. Pain Assessment   Pain Assessment 0-10   Pain Level 10   Pain Location Hip   Pain Orientation Right   Pain Descriptors Aching   Functional Pain Assessment Prevents or interferes some active activities and ADLs   Pain Type Acute pain;Surgical pain   Pain Frequency Continuous   Non-Pharmaceutical Pain Intervention(s) Ambulation/Increased Activity;Cold pack;Repositioned;Rest;Nurse notified (comment)   Response to Pain Intervention Patient satisfied   Bed Mobility   Supine to Sit Minimal assistance   Transfers   Sit to Stand Contact guard assistance   Stand to Sit Contact guard assistance   Ambulation   WB Status FWBAT RLE   Ambulation   Surface Level tile   Device Rolling Walker   Other Apparatus O2   Assistance Contact guard assistance   Quality of Gait steady   Gait Deviations Slow Natalie;Decreased step length;Decreased step height   Distance 50'   Patient Goals    Patient Goals  go home   Short Term Goals   Time Frame for Short Term Goals 2 wks   Short Term Goal 1 supine to sit indep   Short Term Goal 2 sit to stand indep   Short Term Goal 3 amb. 100' with RW SBA   Conditions Requiring Skilled Therapeutic Intervention   Body Structures, Functions, Activity Limitations Requiring Skilled Therapeutic Intervention Decreased functional mobility ; Decreased ROM; Decreased endurance;Decreased strength;Decreased balance; Increased pain   Assessment Pt able to amb without LOB, demonstrates flexed posture. Reported SOA after amb, instructed in deep breathing.  Assisted to chair with all needs in reach. Activity Tolerance   Activity Tolerance Patient tolerated treatment well   PT Plan of Care   Wednesday X   Safety Devices   Type of Devices Call light within reach; Chair alarm in place; Left in chair         Electronically signed by Amado Hubbard PTA on 10/26/2022 at 9:10 AM

## 2022-10-26 NOTE — DISCHARGE INSTR - DIET
Good nutrition is important when healing from an illness, injury, or surgery. Follow any nutrition recommendations given to you during your hospital stay. If you were given an oral nutrition supplement while in the hospital, continue to take this supplement at home. You can take it with meals, in-between meals, and/or before bedtime. These supplements can be purchased at most local grocery stores, pharmacies, and chain [employer]-stores. If you have any questions about your diet or nutrition, call the hospital and ask for the dietitian.              Low carb / High protein

## 2022-10-26 NOTE — PROGRESS NOTES
Subjective:     Post-Operative Day: 2 Status Post right ashvin. Pt is awake and alert. Ox3. Doing well this am.  No new complaints. He was able to ambulate 85 ft with PT yesterday. Systemic or Specific Complaints: No Complaints  no nausea Pain 6    Objective:     Patient Vitals for the past 24 hrs:   BP Temp Temp src Pulse Resp SpO2   10/26/22 0758 (!) 147/63 97 °F (36.1 °C) Temporal 89 20 98 %   10/26/22 0419 (!) 165/82 97.3 °F (36.3 °C) Temporal (!) 103 20 97 %   10/26/22 0112 (!) 150/77 98.6 °F (37 °C) Temporal 87 16 95 %   10/25/22 1919 (!) 160/77 98.4 °F (36.9 °C) Temporal (!) 101 18 99 %   10/25/22 1632 (!) 142/72 98.1 °F (36.7 °C) Temporal 98 16 98 %   10/25/22 1406 138/70 99 °F (37.2 °C) Temporal (!) 108 -- 98 %       General: alert, appears stated age, and cooperative   Exam: Incision clean, dry, and intact, no evidence of infection. Good active motion to right lower extremity   Neurovascular: Exam normal       Data Review:  Recent Labs     10/25/22  0157 10/26/22  0147   HGB 8.8* 8.3*     Recent Labs     10/26/22  0147      K 4.8   CREATININE 0.7     Recent Labs     10/26/22  0147   LABGLOM >60           Assessment:     Status Post right ashvin. Plan:     Pt is doing well this am.  He is ready for home with home health as per anterior hip protocol. He may f/u in office in 6 weeks.       Electronically signed by Tammy Galeazzi, PA-C on 10/26/2022 at 8:25 AM

## 2022-10-26 NOTE — PROGRESS NOTES
Patient discharged home today with [facility]. Facility notified of patient's discharge and all documents were faxed. P ; F .    Electronically signed by Martha Acosta RN on 10/26/2022 at 11:31 AM

## 2022-10-26 NOTE — PROGRESS NOTES
CLINICAL PHARMACY NOTE: MEDS TO BEDS    Total # of Prescriptions Filled: 4   The following medications were delivered to the patient:  Aspirin 81 mg  Ondansetron 4 mg  Xarelto 10 mg  Oxycodone 10 mg    Additional Documentation:   Delivered Rx's to patients room. Gave Rx's to patient. Patient paid $55.60 copay with cash.

## 2022-10-26 NOTE — CONSULTS
Consult Note      CHIEF COMPLAINT:  Right Hip Pain    Reason for Admission:  Right KATALINA    History Obtained From:  Patient, chart    HISTORY OF PRESENT ILLNESS:      The patient is a 79 y.o. male who was admitted to Dr. Ian Tran service and underwent a right KATALINA. He has no CP or SOA. He has no abdominal pain or N/V. He has no dysuria. He is eating. He has no HA or dizziness. He has no fevers .    Past Medical History:        Diagnosis Date    Arthritis     Bilateral hearing loss     Colon polyps     COPD (chronic obstructive pulmonary disease) ([facility] [address]     sees dr. Jun Dickey Lamberton at [facility] clinic    COVID-19 01/2021    low oxygen; pneumonia; received monoclonal infusion     Depression     Diabetes mellitus ([facility] [address]     Hip pain     Hyperlipidemia     Hypertension     Low back pain 1/29/2016    Oxygen dependent     4l/m nc     Palliative care patient 12/28/2018    Pneumonia 01/2021    inpt     Past Surgical History:        Procedure Laterality Date    BACK SURGERY  2010    COLONOSCOPY  ? 2005    Dr. MOY    COLONOSCOPY  01/2012    3 polyps, 2 adenomatous, 1 hyperplastic    COLONOSCOPY  01/2015    diverticulosis    ENDOSCOPY, COLON, DIAGNOSTIC      FRACTURE SURGERY      KNEE ARTHROSCOPY  2014    ND EGD TRANSORAL BIOPSY SINGLE/MULTIPLE N/A 12/21/2016    Dr MARIA ELENA Gómez/dilation over wire, 48 [place]atzki's Ring, gastritis/gastropathy-prn    TOTAL HIP ARTHROPLASTY Left 10/20/2021    LEFT TOTAL HIP REPLACEMENT performed by Kita Renee MD at [facility] Right 10/24/2022    RIGHT HIP TOTAL ARTHROPLASTY performed by Kita Renee MD at [address] [place] [address]  4/24/2017    Dr MARIA ELENA Gómez/dilation over wire, 51 [place] Ring     UPPER GASTROINTESTINAL ENDOSCOPY  4/24/2017    Dr MARIA ELENA Gómez/dilation over wire, 46 [place]-Schatzki Ring     WRIST FRACTURE SURGERY      [id number]         Medications Prior to Admission:    Medications Prior to Admission: oxyCODONE (ROXICODONE) 20 MG immediate release tablet, Take 20 mg by mouth in the morning and 20 mg at noon and 20 mg in the evening and 20 mg before bedtime. NONFORMULARY, Take 1 tablet by mouth daily Otc \"water pill\"  erythromycin (ROMYCIN) 5 MG/GM ophthalmic ointment, Place 0.25 Doses into both eyes nightly  ketotifen (ZADITOR) 0.025 % ophthalmic solution, Place 1 drop into both eyes 2 times daily  [DISCONTINUED] aspirin 81 MG EC tablet, Take 81 mg by mouth daily  amitriptyline (ELAVIL) 25 MG tablet, Take 50 mg by mouth nightly  zinc sulfate (ZINCATE) 220 (50 Zn) MG capsule, Take 1 capsule by mouth daily  ascorbic acid (VITAMIN C) 1000 MG tablet, Take 1 tablet by mouth daily  Vitamin D (CHOLECALCIFEROL) 50 MCG (2000 UT) TABS tablet, Take 1 tablet by mouth daily  metFORMIN (GLUCOPHAGE) 500 MG tablet, Take 500 mg by mouth 2 times daily (with meals)  busPIRone (BUSPAR) 7.5 MG tablet, Take 7.5 mg by mouth 2 times daily   tiZANidine (ZANAFLEX) 4 MG tablet, Take 1 tablet by mouth 3 times daily as needed (muscle spasms)  omeprazole (PRILOSEC) 40 MG delayed release capsule, Take 40 mg by mouth daily   latanoprost (XALATAN) 0.005 % ophthalmic solution, Place 1 drop into both eyes nightly (Patient not taking: No sig reported)  albuterol sulfate HFA (PROAIR HFA) 108 (90 BASE) MCG/ACT inhaler, Inhale 2 puffs into the lungs every 6 hours as needed for Wheezing (Patient not taking: No sig reported)  atorvastatin (LIPITOR) 40 MG tablet, Take 40 mg by mouth daily. AMLODIPINE BESYLATE PO, Take 5 mg by mouth daily. LISINOPRIL PO, Take 40 mg by mouth daily    Allergies:  Lyrica [pregabalin] and Neurontin [gabapentin]    Social History:   TOBACCO:   reports that he quit smoking about 6 years ago. His smoking use included cigarettes. He has a 92.00 pack-year smoking history. He has never used smokeless tobacco.  ETOH:   reports that he does not currently use alcohol. DRUGS:   reports no history of drug use.   MARITAL STATUS:    OCCUPATION:  retired  Patient currently lives with family       Family History:       Problem Relation Age of Onset    Kidney Disease Mother     High Blood Pressure Mother     Glaucoma Mother     Alcohol Abuse Father     Heart Disease Brother     No Known Problems Brother     Other Sister         pancreatitis    Brain Cancer Son         also had brain clot, both legs were removed s/p MVA    Colon Cancer Neg Hx     Colon Polyps Neg Hx      REVIEW OF SYSTEMS:  Constitutional: neg  CV: neg  Pulmonary: neg  GI: neg  : neg  Psych: neg  Neuro: neg  Skin: neg  MusculoSkeletal: neg  HEENT: neg  Joints: right hip pain  Vitals:  BP (!) 165/82   Pulse (!) 103   Temp 97.3 °F (36.3 °C) (Temporal)   Resp 20   Ht 5' 6\" (1.676 m)   Wt 230 lb (104.3 kg)   SpO2 97%   BMI 37.12 kg/m²     PHYSICAL EXAM:  Gen: NAD, alert, pleasant, on O2  HEENT: WNL  Lymph: no LAD  Neck: no JVD or masses  Chest: CTA bilat  CV: RRR  Abdomen: NT/ND  Extrem: no C/C/E  Neuro: non focal  Skin: no rashes  Joints: no redness  DATA:  I have reviewed the admission labs and imaging tests.     ASSESSMENT AND PLAN:      Principal Problem:    Primary osteoarthritis of left hip, S/P Left KATALINA---follow with Orthopedics, continue pain treatment and therapy    ABL Anemia---follow lab    COPD, CRF---stable    HTN---follow with BP    DM2---follow glucose        Karian Giang MD  7:05 AM 10/26/2022

## 2022-10-26 NOTE — DISCHARGE SUMMARY
[facility]  Dr. Jyotsna Foley  Discharge Summary       Fernando Veras is a 79 y.o. male underwent right total hip replacement procedure without complication. Fernando Veras was admitted to the floor following their recovery in the PACU.      Discharge Diagnosis  right Hip Replacement    Current Inpatient Medications    Current Facility-Administered Medications: atorvastatin (LIPITOR) tablet 40 mg, 40 mg, Oral, Daily  latanoprost (XALATAN) 0.005 % ophthalmic solution 1 drop, 1 drop, Both Eyes, Nightly  tiZANidine (ZANAFLEX) tablet 4 mg, 4 mg, Oral, TID PRN  busPIRone (BUSPAR) tablet 7.5 mg, 7.5 mg, Oral, BID  metFORMIN (GLUCOPHAGE) tablet 500 mg, 500 mg, Oral, BID WC  zinc sulfate (ZINCATE) capsule 50 mg, 50 mg, Oral, Daily  ascorbic acid (VITAMIN C) tablet 1,000 mg, 1,000 mg, Oral, Daily  vitamin D (CHOLECALCIFEROL) tablet 2,000 Units, 2,000 Units, Oral, Daily  amitriptyline (ELAVIL) tablet 50 mg, 50 mg, Oral, Nightly  ketotifen (ZADITOR) 0.025 % ophthalmic solution 1 drop, 1 drop, Both Eyes, BID  pantoprazole (PROTONIX) tablet 40 mg, 40 mg, Oral, QAM AC  glucose chewable tablet 16 g, 4 tablet, Oral, PRN  dextrose bolus 10% 125 mL, 125 mL, IntraVENous, PRN **OR** dextrose bolus 10% 250 mL, 250 mL, IntraVENous, PRN  glucagon (rDNA) injection 1 mg, 1 mg, SubCUTAneous, PRN  dextrose 10 % infusion, , IntraVENous, Continuous PRN  0.9 % sodium chloride infusion, , IntraVENous, Continuous  0.9 % sodium chloride bolus, 500 mL, IntraVENous, PRN  sodium chloride flush 0.9 % injection 5-40 mL, 5-40 mL, IntraVENous, 2 times per day  sodium chloride flush 0.9 % injection 5-40 mL, 5-40 mL, IntraVENous, PRN  0.9 % sodium chloride infusion, , IntraVENous, PRN  acetaminophen (TYLENOL) tablet 650 mg, 650 mg, Oral, Q6H  bisacodyl (DULCOLAX) EC tablet 5 mg, 5 mg, Oral, Daily  ondansetron (ZOFRAN-ODT) disintegrating tablet 4 mg, 4 mg, Oral, Q8H PRN **OR** ondansetron (ZOFRAN) injection 4 mg, 4 mg, IntraVENous, Q6H PRN  insulin lispro (HUMALOG) injection vial 0-4 Units, 0-4 Units, SubCUTAneous, TID WC  insulin lispro (HUMALOG) injection vial 0-4 Units, 0-4 Units, SubCUTAneous, Nightly  oxyCODONE (ROXICODONE) immediate release tablet 5 mg, 5 mg, Oral, Q4H PRN **OR** oxyCODONE (ROXICODONE) immediate release tablet 10 mg, 10 mg, Oral, Q4H PRN **OR** oxyCODONE (ROXICODONE) immediate release tablet 15 mg, 15 mg, Oral, Q4H PRN  HYDROmorphone HCl PF (DILAUDID) injection 0.25 mg, 0.25 mg, IntraVENous, Q3H PRN **OR** HYDROmorphone HCl PF (DILAUDID) injection 0.5 mg, 0.5 mg, IntraVENous, Q3H PRN **OR** HYDROmorphone HCl PF (DILAUDID) injection 1 mg, 1 mg, IntraVENous, Q3H PRN  traMADol (ULTRAM) tablet 50 mg, 50 mg, Oral, Q6H  diphenhydrAMINE (BENADRYL) tablet 25 mg, 25 mg, Oral, Q6H PRN  clindamycin (CLEOCIN) 900 mg in dextrose 5 % 50 mL IVPB, 900 mg, IntraVENous, Q8H  rivaroxaban (XARELTO) tablet 10 mg, 10 mg, Oral, Daily  albuterol (PROVENTIL) nebulizer solution 2.5 mg, 2.5 mg, Nebulization, Q6H PRN    Post-operatively the patients diet was advanced as tolerated and their incision was checked on POD #1. The incision is was clean, dry and intact with no signs of infection. The patient remained neurovascularly intact in the lower extremity and had intact pulses distally. Patients calf remained soft and showed no evidence of DVT. The patient was able to move his right leg and ankle/foot without any problems post-operatively. Physical therapy and occupational therapy were consulted and began working with the patient post-operatively. The patient progressed with PT/OT as would be expected and continued to improve through their stay. The patients pain was initially controlled with IV medications but we were able to transition to oral pain medications soon after arrival to the floor and their pain remained under good control through their hospital stay.   From a medical standpoint the patient remained stable and continued to have the medicine team follow throughout their stay. Acute postoperative blood loss anemia after joint replacement being monitored with daily hemoglobin/hematocrit. The patients dressing was changed/incision was checked on day of d/c. The patient will be discharged at this time to home with home health per anterior hip protocol with their current diet restrictions and will continue to follow the hip precautions outlined to them by us and PT/OT. Condition on Discharge: Stable    Plan  Followup at scheduled appointment time (1 month post-op). Patient was instructed on the use of pain medications, the signs and symptoms of infection, and was given our number to call should they have any questions or concerns following discharge.

## 2022-10-26 NOTE — DISCHARGE INSTRUCTIONS
Dr Katrin Chappell Total Hip Replacement  Home Instructions     * Elevate and ice affected extremity 20-30 minutes every 2 hours as needed for swelling and pain. Use a barrier ( cloth) between ice pack and skin to prevent frostbite. *Surgical Site Care:         Cleanse crease once daily with soap and water. Clean crease three times daily with rubbing alcohol avoiding incision line. Please use a clean dry rolled washcloth applied into hip crease . Change twice a day. The adhesive dressing (glue strip) can be removed in 2 weeks. May shower and clean wound on Wednesday but do not scrub incision. Pat dry. NO tub bathing or swimming. Wash hands frequently during the day. *Activity:         SAFETY FIRST walk with a walker         Home Health therapy will come to the house two times a week to make sure you can do things around the house:          Get in and out of your bed          Getting up and down out of the chair          Bathroom  / bathing activities          Do NOT walk for exercise ( it will increase pain and swelling )          Walk several times a day but only for short distances                                                                                                        FEVER of 101.5 or less  *Pain Medicines:                                                                         Take Tylenol x 2          Take prescribed medicine as needed for pain                      Deep breath x 10          Take Tylenol as your pain decreases                                   Cough, Cough, Cough          Take a stool softener of your choice while on pain meds     Recheck in 1-11/2 hours     *To prevent blood clots: Take prescribed blood thinner as directed. (Xarelto 10 mg daily for 3 weeks)          After completing, you will take E.C.  Aspirin 81 mg twice a day for three more weeks          While taking the blood thinner and Aspirin, DO NOT take any other NSAIDS like Naprosyn, Ibuprofen, etc.          Do ankle pump exercises when sitting in the chair             *To Prevent Pneumonia:           Sit up and take deep breaths several times a day and use the incentive spirometer     *Call the office if:           Increased redness, drainage or an opening at your incision, severe pain, new onset fever or chills. Also notify of any calf pain, tenderness, swelling or redness. Notify of any rash, nausea and vomiting             **If you have any questions or problems please call our office at 1 [phone]**   or contact Ortho Navigator at 1 [phone] Mariano Christine).      Thank you

## 2022-10-27 ENCOUNTER — TELEPHONE (OUTPATIENT)
Dept: INPATIENT UNIT | Age: 67
End: 2022-10-27

## 2022-10-27 NOTE — PROGRESS NOTES
Progress Note  [place]  10/26/2022 10:21 PM  Subjective:   Admit Date:   10/24/2022      CC/ADMIT DX:       Interval History:   Reviewed overnight events and nursing notes. He has no c/o CP or SOA. His pain is controlled. I have reviewed all labs/diagnostics from the last 24hrs. ROS:   I have done a 10 point ROS and all are negative, except what is mentioned in the HPI. No diet orders on file    Medications:             Objective:   Vitals: BP (!) 147/63   Pulse 89   Temp 97 °F (36.1 °C) (Temporal)   Resp 16   Ht 5' 6\" (1.676 m)   Wt 230 lb (104.3 kg)   SpO2 98%   BMI 37.12 kg/m²    Intake/Output Summary (Last 24 hours) at 10/26/2022 2221  Last data filed at 10/26/2022 1000  Gross per 24 hour   Intake 440 ml   Output --   Net 440 ml     General appearance: alert and cooperative with exam  Lungs: clear to auscultation bilaterally  Heart: RRR  Abdomen: soft, non-tender; bowel sounds normal; no masses,  no organomegaly  Extremities: extremities normal, atraumatic, no cyanosis or edema  Neurologic:  No obvious focal neurologic deficits. Assessment and Plan:   Principal Problem:    Degenerative joint disease (DJD) of hip  Active Problems:    Primary osteoarthritis of right hip  Resolved Problems:    * No resolved hospital problems. *      Plan:   Continue present medication(s)    Follow with Orthopedics   OK for d/c home with Home Health      Discharge planning:    home    Reviewed treatment plans with the patient and/or family.              Electronically signed by Mikey Griffiths MD on 10/26/2022 at 10:21 PM

## 2022-11-01 ENCOUNTER — HOSPITAL ENCOUNTER (OUTPATIENT)
Dept: RADIATION ONCOLOGY | Facility: HOSPITAL | Age: 67
Setting detail: RADIATION/ONCOLOGY SERIES
End: 2022-11-01

## 2023-03-30 ENCOUNTER — OFFICE VISIT (OUTPATIENT)
Dept: PULMONOLOGY | Facility: CLINIC | Age: 68
End: 2023-03-30
Payer: MEDICARE

## 2023-03-30 ENCOUNTER — TELEPHONE (OUTPATIENT)
Dept: PULMONOLOGY | Facility: CLINIC | Age: 68
End: 2023-03-30

## 2023-03-30 VITALS
HEIGHT: 63 IN | DIASTOLIC BLOOD PRESSURE: 66 MMHG | BODY MASS INDEX: 40.4 KG/M2 | OXYGEN SATURATION: 94 % | WEIGHT: 228 LBS | SYSTOLIC BLOOD PRESSURE: 138 MMHG | HEART RATE: 76 BPM

## 2023-03-30 DIAGNOSIS — G47.33 OBSTRUCTIVE SLEEP APNEA: Chronic | ICD-10-CM

## 2023-03-30 DIAGNOSIS — Z87.891 PERSONAL HISTORY OF NICOTINE DEPENDENCE: Chronic | ICD-10-CM

## 2023-03-30 DIAGNOSIS — J44.9 STAGE 3 SEVERE COPD BY GOLD CLASSIFICATION: Primary | Chronic | ICD-10-CM

## 2023-03-30 DIAGNOSIS — K21.9 GASTROESOPHAGEAL REFLUX DISEASE WITHOUT ESOPHAGITIS: Chronic | ICD-10-CM

## 2023-03-30 DIAGNOSIS — R91.1 LUNG NODULE: Chronic | ICD-10-CM

## 2023-03-30 DIAGNOSIS — E66.01 MORBID OBESITY WITH BMI OF 40.0-44.9, ADULT: Chronic | ICD-10-CM

## 2023-03-30 DIAGNOSIS — J96.11 CHRONIC RESPIRATORY FAILURE WITH HYPOXIA: Chronic | ICD-10-CM

## 2023-03-30 DIAGNOSIS — J43.2 CENTRILOBULAR EMPHYSEMA: Chronic | ICD-10-CM

## 2023-03-30 PROCEDURE — 1160F RVW MEDS BY RX/DR IN RCRD: CPT | Performed by: NURSE PRACTITIONER

## 2023-03-30 PROCEDURE — 1159F MED LIST DOCD IN RCRD: CPT | Performed by: NURSE PRACTITIONER

## 2023-03-30 PROCEDURE — 99214 OFFICE O/P EST MOD 30 MIN: CPT | Performed by: NURSE PRACTITIONER

## 2023-04-21 ENCOUNTER — HOSPITAL ENCOUNTER (OUTPATIENT)
Dept: CT IMAGING | Facility: HOSPITAL | Age: 68
Discharge: HOME | End: 2023-04-21
Payer: MEDICARE

## 2023-05-08 ENCOUNTER — TELEPHONE (OUTPATIENT)
Dept: PULMONOLOGY | Facility: CLINIC | Age: 68
End: 2023-05-08
Payer: MEDICARE

## 2023-05-11 ENCOUNTER — HOSPITAL ENCOUNTER (OUTPATIENT)
Dept: CT IMAGING | Facility: HOSPITAL | Age: 68
Discharge: HOME | End: 2023-05-11
Payer: MEDICARE

## 2023-05-11 DIAGNOSIS — Z87.891 PERSONAL HISTORY OF NICOTINE DEPENDENCE: Chronic | ICD-10-CM

## 2023-05-11 PROCEDURE — 71271 CT THORAX LUNG CANCER SCR C-: CPT

## 2023-07-21 ENCOUNTER — LAB (OUTPATIENT)
Dept: LAB | Facility: HOSPITAL | Age: 68
End: 2023-07-21
Payer: MEDICARE

## 2023-07-21 DIAGNOSIS — J44.9 STAGE 3 SEVERE COPD BY GOLD CLASSIFICATION: ICD-10-CM

## 2023-07-21 DIAGNOSIS — J44.1 COPD EXACERBATION: ICD-10-CM

## 2023-07-21 PROCEDURE — 0202U NFCT DS 22 TRGT SARS-COV-2: CPT

## 2023-07-31 ENCOUNTER — TELEPHONE (OUTPATIENT)
Dept: PULMONOLOGY | Facility: CLINIC | Age: 68
End: 2023-07-31
Payer: MEDICARE

## 2023-08-01 ENCOUNTER — OFFICE VISIT (OUTPATIENT)
Dept: PULMONOLOGY | Facility: CLINIC | Age: 68
End: 2023-08-01
Payer: MEDICARE

## 2023-08-01 VITALS
OXYGEN SATURATION: 93 % | DIASTOLIC BLOOD PRESSURE: 80 MMHG | HEIGHT: 64 IN | SYSTOLIC BLOOD PRESSURE: 142 MMHG | HEART RATE: 96 BPM | WEIGHT: 220 LBS | BODY MASS INDEX: 37.56 KG/M2

## 2023-08-01 DIAGNOSIS — R91.1 LUNG NODULE: Chronic | ICD-10-CM

## 2023-08-01 DIAGNOSIS — G47.33 OBSTRUCTIVE SLEEP APNEA: Chronic | ICD-10-CM

## 2023-08-01 DIAGNOSIS — J43.2 CENTRILOBULAR EMPHYSEMA: Chronic | ICD-10-CM

## 2023-08-01 DIAGNOSIS — Z01.811 PREOP PULMONARY/RESPIRATORY EXAM: Primary | ICD-10-CM

## 2023-08-01 DIAGNOSIS — J96.11 CHRONIC RESPIRATORY FAILURE WITH HYPOXIA: Chronic | ICD-10-CM

## 2023-08-01 DIAGNOSIS — E66.01 SEVERE OBESITY (BMI 35.0-39.9) WITH COMORBIDITY: Chronic | ICD-10-CM

## 2023-08-01 DIAGNOSIS — J44.9 STAGE 3 SEVERE COPD BY GOLD CLASSIFICATION: ICD-10-CM

## 2023-08-01 DIAGNOSIS — Z87.891 PERSONAL HISTORY OF NICOTINE DEPENDENCE: Chronic | ICD-10-CM

## 2023-08-29 ENCOUNTER — TELEPHONE (OUTPATIENT)
Dept: PULMONOLOGY | Facility: CLINIC | Age: 68
End: 2023-08-29
Payer: MEDICARE

## 2023-10-13 ENCOUNTER — PROCEDURE VISIT (OUTPATIENT)
Dept: PULMONOLOGY | Facility: CLINIC | Age: 68
End: 2023-10-13
Payer: MEDICARE

## 2023-10-13 ENCOUNTER — OFFICE VISIT (OUTPATIENT)
Dept: PULMONOLOGY | Facility: CLINIC | Age: 68
End: 2023-10-13
Payer: MEDICARE

## 2023-10-13 VITALS
OXYGEN SATURATION: 93 % | HEIGHT: 64 IN | WEIGHT: 221.6 LBS | HEART RATE: 87 BPM | SYSTOLIC BLOOD PRESSURE: 122 MMHG | BODY MASS INDEX: 37.83 KG/M2 | DIASTOLIC BLOOD PRESSURE: 80 MMHG

## 2023-10-13 DIAGNOSIS — E66.01 SEVERE OBESITY (BMI 35.0-39.9) WITH COMORBIDITY: Chronic | ICD-10-CM

## 2023-10-13 DIAGNOSIS — R91.1 LUNG NODULE: Chronic | ICD-10-CM

## 2023-10-13 DIAGNOSIS — J44.9 STAGE 3 SEVERE COPD BY GOLD CLASSIFICATION: Primary | ICD-10-CM

## 2023-10-13 DIAGNOSIS — G47.33 OBSTRUCTIVE SLEEP APNEA: Chronic | ICD-10-CM

## 2023-10-13 DIAGNOSIS — Z87.891 PERSONAL HISTORY OF NICOTINE DEPENDENCE: ICD-10-CM

## 2023-10-13 DIAGNOSIS — J96.11 CHRONIC RESPIRATORY FAILURE WITH HYPOXIA: Chronic | ICD-10-CM

## 2023-10-13 DIAGNOSIS — J43.2 CENTRILOBULAR EMPHYSEMA: Chronic | ICD-10-CM

## 2023-10-13 RX ORDER — CHLORTHALIDONE 25 MG/1
25 TABLET ORAL DAILY
COMMUNITY
Start: 2023-10-09

## 2023-10-13 RX ORDER — FERROUS SULFATE 325(65) MG
325 TABLET ORAL
COMMUNITY
Start: 2023-10-09

## 2023-12-12 ENCOUNTER — TRANSCRIBE ORDERS (OUTPATIENT)
Dept: ADMINISTRATIVE | Facility: HOSPITAL | Age: 68
End: 2023-12-12
Payer: MEDICARE

## 2023-12-12 ENCOUNTER — HOSPITAL ENCOUNTER (OUTPATIENT)
Dept: GENERAL RADIOLOGY | Facility: HOSPITAL | Age: 68
Discharge: HOME | End: 2023-12-12
Admitting: NURSE PRACTITIONER
Payer: MEDICARE

## 2023-12-12 DIAGNOSIS — G89.29 OTHER CHRONIC PAIN: ICD-10-CM

## 2023-12-12 DIAGNOSIS — M54.16 LUMBAR RADICULOPATHY: ICD-10-CM

## 2023-12-12 DIAGNOSIS — M96.1 POSTLAMINECTOMY SYNDROME, CERVICAL REGION: ICD-10-CM

## 2023-12-12 DIAGNOSIS — M96.1 POSTLAMINECTOMY SYNDROME, CERVICAL REGION: Primary | ICD-10-CM

## 2023-12-12 DIAGNOSIS — Z79.891 ENCOUNTER FOR LONG-TERM METHADONE USE: ICD-10-CM

## 2023-12-12 PROCEDURE — 72110 X-RAY EXAM L-2 SPINE 4/>VWS: CPT

## 2023-12-18 ENCOUNTER — TELEPHONE (OUTPATIENT)
Dept: PULMONOLOGY | Facility: CLINIC | Age: 68
End: 2023-12-18
Payer: MEDICARE

## 2024-05-05 ENCOUNTER — HOSPITAL ENCOUNTER (EMERGENCY)
Facility: HOSPITAL | Age: 69
Discharge: HOME | End: 2024-05-06
Attending: STUDENT IN AN ORGANIZED HEALTH CARE EDUCATION/TRAINING PROGRAM
Payer: MEDICARE

## 2024-05-05 ENCOUNTER — APPOINTMENT (OUTPATIENT)
Dept: GENERAL RADIOLOGY | Facility: HOSPITAL | Age: 69
End: 2024-05-05
Payer: MEDICARE

## 2024-05-05 VITALS
RESPIRATION RATE: 20 BRPM | TEMPERATURE: 98.1 F | BODY MASS INDEX: 33.43 KG/M2 | HEART RATE: 97 BPM | HEIGHT: 66 IN | WEIGHT: 208 LBS | SYSTOLIC BLOOD PRESSURE: 135 MMHG | OXYGEN SATURATION: 94 % | DIASTOLIC BLOOD PRESSURE: 62 MMHG

## 2024-05-05 DIAGNOSIS — J44.1 COPD EXACERBATION: Primary | ICD-10-CM

## 2024-05-05 LAB
ALBUMIN SERPL-MCNC: 3.9 G/DL (ref 3.5–5.2)
ALBUMIN/GLOB SERPL: 1 G/DL
ALP SERPL-CCNC: 185 U/L (ref 39–117)
ALT SERPL W P-5'-P-CCNC: 23 U/L (ref 1–41)
ANION GAP SERPL CALCULATED.3IONS-SCNC: 6 MMOL/L (ref 5–15)
AST SERPL-CCNC: 20 U/L (ref 1–40)
BASOPHILS # BLD AUTO: 0.03 10*3/MM3 (ref 0–0.2)
BASOPHILS NFR BLD AUTO: 0.3 % (ref 0–1.5)
BILIRUB SERPL-MCNC: 0.3 MG/DL (ref 0–1.2)
BUN SERPL-MCNC: 7 MG/DL (ref 8–23)
BUN/CREAT SERPL: 10.8 (ref 7–25)
CALCIUM SPEC-SCNC: 9 MG/DL (ref 8.6–10.5)
CHLORIDE SERPL-SCNC: 97 MMOL/L (ref 98–107)
CO2 SERPL-SCNC: 35 MMOL/L (ref 22–29)
CREAT SERPL-MCNC: 0.65 MG/DL (ref 0.76–1.27)
D DIMER PPP FEU-MCNC: 0.6 MCGFEU/ML (ref 0–0.69)
DEPRECATED RDW RBC AUTO: 46.9 FL (ref 37–54)
EGFRCR SERPLBLD CKD-EPI 2021: 102 ML/MIN/1.73
EOSINOPHIL # BLD AUTO: 0.39 10*3/MM3 (ref 0–0.4)
EOSINOPHIL NFR BLD AUTO: 4.1 % (ref 0.3–6.2)
ERYTHROCYTE [DISTWIDTH] IN BLOOD BY AUTOMATED COUNT: 14.1 % (ref 12.3–15.4)
FLUAV RNA RESP QL NAA+PROBE: NOT DETECTED
FLUBV RNA RESP QL NAA+PROBE: NOT DETECTED
GLOBULIN UR ELPH-MCNC: 4 GM/DL
GLUCOSE SERPL-MCNC: 118 MG/DL (ref 65–99)
HCT VFR BLD AUTO: 36.6 % (ref 37.5–51)
HGB BLD-MCNC: 11.7 G/DL (ref 13–17.7)
HOLD SPECIMEN: NORMAL
IMM GRANULOCYTES # BLD AUTO: 0.05 10*3/MM3 (ref 0–0.05)
IMM GRANULOCYTES NFR BLD AUTO: 0.5 % (ref 0–0.5)
LYMPHOCYTES # BLD AUTO: 1.16 10*3/MM3 (ref 0.7–3.1)
LYMPHOCYTES NFR BLD AUTO: 12.3 % (ref 19.6–45.3)
MCH RBC QN AUTO: 29.2 PG (ref 26.6–33)
MCHC RBC AUTO-ENTMCNC: 32 G/DL (ref 31.5–35.7)
MCV RBC AUTO: 91.3 FL (ref 79–97)
MONOCYTES # BLD AUTO: 0.85 10*3/MM3 (ref 0.1–0.9)
MONOCYTES NFR BLD AUTO: 9 % (ref 5–12)
NEUTROPHILS NFR BLD AUTO: 6.98 10*3/MM3 (ref 1.7–7)
NEUTROPHILS NFR BLD AUTO: 73.8 % (ref 42.7–76)
NRBC BLD AUTO-RTO: 0 /100 WBC (ref 0–0.2)
NT-PROBNP SERPL-MCNC: 56.6 PG/ML (ref 0–900)
PLATELET # BLD AUTO: 267 10*3/MM3 (ref 140–450)
PMV BLD AUTO: 8.4 FL (ref 6–12)
POTASSIUM SERPL-SCNC: 3.9 MMOL/L (ref 3.5–5.2)
PROT SERPL-MCNC: 7.9 G/DL (ref 6–8.5)
RBC # BLD AUTO: 4.01 10*6/MM3 (ref 4.14–5.8)
RSV RNA RESP QL NAA+PROBE: NOT DETECTED
SARS-COV-2 RNA RESP QL NAA+PROBE: NOT DETECTED
SODIUM SERPL-SCNC: 138 MMOL/L (ref 136–145)
WBC NRBC COR # BLD AUTO: 9.46 10*3/MM3 (ref 3.4–10.8)
WHOLE BLOOD HOLD COAG: NORMAL
WHOLE BLOOD HOLD SPECIMEN: NORMAL

## 2024-05-05 PROCEDURE — 96375 TX/PRO/DX INJ NEW DRUG ADDON: CPT

## 2024-05-05 PROCEDURE — 94640 AIRWAY INHALATION TREATMENT: CPT

## 2024-05-05 PROCEDURE — 85025 COMPLETE CBC W/AUTO DIFF WBC: CPT | Performed by: STUDENT IN AN ORGANIZED HEALTH CARE EDUCATION/TRAINING PROGRAM

## 2024-05-05 PROCEDURE — 85379 FIBRIN DEGRADATION QUANT: CPT | Performed by: STUDENT IN AN ORGANIZED HEALTH CARE EDUCATION/TRAINING PROGRAM

## 2024-05-05 PROCEDURE — 94761 N-INVAS EAR/PLS OXIMETRY MLT: CPT

## 2024-05-05 PROCEDURE — 87637 SARSCOV2&INF A&B&RSV AMP PRB: CPT | Performed by: STUDENT IN AN ORGANIZED HEALTH CARE EDUCATION/TRAINING PROGRAM

## 2024-05-05 PROCEDURE — 99284 EMERGENCY DEPT VISIT MOD MDM: CPT

## 2024-05-05 PROCEDURE — 25010000002 MAGNESIUM SULFATE 2 GM/50ML SOLUTION: Performed by: STUDENT IN AN ORGANIZED HEALTH CARE EDUCATION/TRAINING PROGRAM

## 2024-05-05 PROCEDURE — 94799 UNLISTED PULMONARY SVC/PX: CPT

## 2024-05-05 PROCEDURE — 83880 ASSAY OF NATRIURETIC PEPTIDE: CPT | Performed by: STUDENT IN AN ORGANIZED HEALTH CARE EDUCATION/TRAINING PROGRAM

## 2024-05-05 PROCEDURE — 25010000002 METHYLPREDNISOLONE PER 125 MG: Performed by: STUDENT IN AN ORGANIZED HEALTH CARE EDUCATION/TRAINING PROGRAM

## 2024-05-05 PROCEDURE — 80053 COMPREHEN METABOLIC PANEL: CPT | Performed by: STUDENT IN AN ORGANIZED HEALTH CARE EDUCATION/TRAINING PROGRAM

## 2024-05-05 PROCEDURE — 96365 THER/PROPH/DIAG IV INF INIT: CPT

## 2024-05-05 PROCEDURE — 71045 X-RAY EXAM CHEST 1 VIEW: CPT

## 2024-05-05 RX ORDER — METHYLPREDNISOLONE SODIUM SUCCINATE 125 MG/2ML
125 INJECTION, POWDER, LYOPHILIZED, FOR SOLUTION INTRAMUSCULAR; INTRAVENOUS ONCE
Status: COMPLETED | OUTPATIENT
Start: 2024-05-05 | End: 2024-05-05

## 2024-05-05 RX ORDER — IPRATROPIUM BROMIDE AND ALBUTEROL SULFATE 2.5; .5 MG/3ML; MG/3ML
3 SOLUTION RESPIRATORY (INHALATION) ONCE
Status: COMPLETED | OUTPATIENT
Start: 2024-05-05 | End: 2024-05-05

## 2024-05-05 RX ORDER — METHYLPREDNISOLONE 4 MG/1
TABLET ORAL
Qty: 21 TABLET | Refills: 0 | Status: SHIPPED | OUTPATIENT
Start: 2024-05-05

## 2024-05-05 RX ORDER — ALBUTEROL SULFATE 2.5 MG/3ML
5 SOLUTION RESPIRATORY (INHALATION) CONTINUOUS
Status: DISCONTINUED | OUTPATIENT
Start: 2024-05-05 | End: 2024-05-05

## 2024-05-05 RX ORDER — SODIUM CHLORIDE 0.9 % (FLUSH) 0.9 %
10 SYRINGE (ML) INJECTION AS NEEDED
Status: DISCONTINUED | OUTPATIENT
Start: 2024-05-05 | End: 2024-05-06 | Stop reason: HOSPADM

## 2024-05-05 RX ORDER — MAGNESIUM SULFATE HEPTAHYDRATE 40 MG/ML
2 INJECTION, SOLUTION INTRAVENOUS ONCE
Status: COMPLETED | OUTPATIENT
Start: 2024-05-05 | End: 2024-05-05

## 2024-05-05 RX ORDER — OXYMETAZOLINE HYDROCHLORIDE 0.05 G/100ML
2 SPRAY NASAL 2 TIMES DAILY
Status: DISCONTINUED | OUTPATIENT
Start: 2024-05-05 | End: 2024-05-06 | Stop reason: HOSPADM

## 2024-05-05 RX ORDER — ALBUTEROL SULFATE 2.5 MG/3ML
10 SOLUTION RESPIRATORY (INHALATION) CONTINUOUS
Status: DISPENSED | OUTPATIENT
Start: 2024-05-05 | End: 2024-05-05

## 2024-05-05 RX ORDER — DOXYCYCLINE 100 MG/1
100 CAPSULE ORAL 2 TIMES DAILY
Qty: 20 CAPSULE | Refills: 0 | Status: SHIPPED | OUTPATIENT
Start: 2024-05-05 | End: 2024-05-15

## 2024-05-05 RX ADMIN — METHYLPREDNISOLONE SODIUM SUCCINATE 125 MG: 125 INJECTION, POWDER, FOR SOLUTION INTRAMUSCULAR; INTRAVENOUS at 22:39

## 2024-05-05 RX ADMIN — IPRATROPIUM BROMIDE AND ALBUTEROL SULFATE 3 ML: .5; 3 SOLUTION RESPIRATORY (INHALATION) at 21:27

## 2024-05-05 RX ADMIN — ALBUTEROL SULFATE 5 MG: 2.5 SOLUTION RESPIRATORY (INHALATION) at 21:46

## 2024-05-05 RX ADMIN — MAGNESIUM SULFATE HEPTAHYDRATE 2 G: 2 INJECTION, SOLUTION INTRAVENOUS at 22:36

## 2024-05-05 RX ADMIN — ALBUTEROL SULFATE 10 MG: 2.5 SOLUTION RESPIRATORY (INHALATION) at 21:56

## 2024-05-05 RX ADMIN — Medication 2 SPRAY: at 22:37

## 2024-05-13 ENCOUNTER — HOSPITAL ENCOUNTER (OUTPATIENT)
Dept: CT IMAGING | Facility: HOSPITAL | Age: 69
Discharge: HOME | End: 2024-05-13
Admitting: NURSE PRACTITIONER
Payer: MEDICARE

## 2024-05-13 DIAGNOSIS — Z87.891 PERSONAL HISTORY OF NICOTINE DEPENDENCE: ICD-10-CM

## 2024-05-13 PROCEDURE — 71271 CT THORAX LUNG CANCER SCR C-: CPT

## 2024-05-14 RX ORDER — FUROSEMIDE 40 MG/1
40 TABLET ORAL DAILY
COMMUNITY
Start: 2024-02-29 | End: 2024-05-21

## 2024-05-21 ENCOUNTER — OFFICE VISIT (OUTPATIENT)
Dept: PULMONOLOGY | Facility: CLINIC | Age: 69
End: 2024-05-21
Payer: MEDICARE

## 2024-05-21 VITALS
DIASTOLIC BLOOD PRESSURE: 76 MMHG | OXYGEN SATURATION: 93 % | WEIGHT: 226 LBS | BODY MASS INDEX: 36.32 KG/M2 | HEIGHT: 66 IN | RESPIRATION RATE: 16 BRPM | HEART RATE: 76 BPM | SYSTOLIC BLOOD PRESSURE: 140 MMHG

## 2024-05-21 DIAGNOSIS — G47.33 OBSTRUCTIVE SLEEP APNEA: Chronic | ICD-10-CM

## 2024-05-21 DIAGNOSIS — R91.1 LUNG NODULE: Chronic | ICD-10-CM

## 2024-05-21 DIAGNOSIS — J44.9 STAGE 3 SEVERE COPD BY GOLD CLASSIFICATION: Chronic | ICD-10-CM

## 2024-05-21 DIAGNOSIS — K21.9 GASTROESOPHAGEAL REFLUX DISEASE WITHOUT ESOPHAGITIS: Chronic | ICD-10-CM

## 2024-05-21 DIAGNOSIS — J96.11 CHRONIC RESPIRATORY FAILURE WITH HYPOXIA: Chronic | ICD-10-CM

## 2024-05-21 DIAGNOSIS — R06.09 DYSPNEA ON EXERTION: ICD-10-CM

## 2024-05-21 DIAGNOSIS — Z87.891 PERSONAL HISTORY OF NICOTINE DEPENDENCE: Chronic | ICD-10-CM

## 2024-05-21 DIAGNOSIS — E66.01 SEVERE OBESITY (BMI 35.0-39.9) WITH COMORBIDITY: Chronic | ICD-10-CM

## 2024-05-21 DIAGNOSIS — J18.9 COMMUNITY ACQUIRED PNEUMONIA OF RIGHT LUNG, UNSPECIFIED PART OF LUNG: Primary | ICD-10-CM

## 2024-05-21 PROBLEM — J43.8 OTHER EMPHYSEMA: Status: ACTIVE | Noted: 2018-11-27

## 2024-05-21 PROBLEM — J43.9 COPD (CHRONIC OBSTRUCTIVE PULMONARY DISEASE) WITH EMPHYSEMA: Chronic | Status: RESOLVED | Noted: 2018-11-27 | Resolved: 2024-05-21

## 2024-05-21 RX ORDER — PREDNISONE 10 MG/1
TABLET ORAL
Qty: 31 TABLET | Refills: 0 | Status: SHIPPED | OUTPATIENT
Start: 2024-05-21

## 2024-05-21 RX ORDER — GUAIFENESIN 600 MG/1
1200 TABLET, EXTENDED RELEASE ORAL 2 TIMES DAILY
Qty: 60 TABLET | Refills: 11 | Status: SHIPPED | OUTPATIENT
Start: 2024-05-21

## 2024-05-21 RX ORDER — CEFDINIR 300 MG/1
300 CAPSULE ORAL 2 TIMES DAILY
Qty: 14 CAPSULE | Refills: 0 | Status: SHIPPED | OUTPATIENT
Start: 2024-05-21 | End: 2024-05-28

## 2024-05-31 ENCOUNTER — HOSPITAL ENCOUNTER (OUTPATIENT)
Dept: CARDIOLOGY | Facility: HOSPITAL | Age: 69
Discharge: HOME | End: 2024-05-31
Payer: MEDICARE

## 2024-05-31 VITALS
HEIGHT: 66 IN | BODY MASS INDEX: 36.32 KG/M2 | DIASTOLIC BLOOD PRESSURE: 76 MMHG | SYSTOLIC BLOOD PRESSURE: 140 MMHG | WEIGHT: 226 LBS

## 2024-05-31 DIAGNOSIS — R06.09 DYSPNEA ON EXERTION: ICD-10-CM

## 2024-05-31 LAB
ASCENDING AORTA: 4 CM
BH CV ECHO LEFT VENTRICLE GLOBAL LONGITUDINAL STRAIN: -15 %
BH CV ECHO MEAS - AO MAX PG: 8.9 MMHG
BH CV ECHO MEAS - AO MEAN PG: 4.9 MMHG
BH CV ECHO MEAS - AO ROOT DIAM: 3.4 CM
BH CV ECHO MEAS - AO V2 MAX: 149.5 CM/SEC
BH CV ECHO MEAS - AO V2 VTI: 28 CM
BH CV ECHO MEAS - AVA(I,D): 2.8 CM2
BH CV ECHO MEAS - EDV(CUBED): 117.4 ML
BH CV ECHO MEAS - EDV(MOD-SP2): 82.6 ML
BH CV ECHO MEAS - EDV(MOD-SP4): 57.5 ML
BH CV ECHO MEAS - EF(MOD-BP): 66 %
BH CV ECHO MEAS - EF(MOD-SP2): 61 %
BH CV ECHO MEAS - EF(MOD-SP4): 72.3 %
BH CV ECHO MEAS - ESV(CUBED): 33.9 ML
BH CV ECHO MEAS - ESV(MOD-SP2): 32.2 ML
BH CV ECHO MEAS - ESV(MOD-SP4): 15.9 ML
BH CV ECHO MEAS - FS: 33.9 %
BH CV ECHO MEAS - IVS/LVPW: 1.11 CM
BH CV ECHO MEAS - IVSD: 1.14 CM
BH CV ECHO MEAS - LA DIMENSION: 3.6 CM
BH CV ECHO MEAS - LAT PEAK E' VEL: 9 CM/SEC
BH CV ECHO MEAS - LV DIASTOLIC VOL/BSA (35-75): 27.3 CM2
BH CV ECHO MEAS - LV MASS(C)D: 195.6 GRAMS
BH CV ECHO MEAS - LV MAX PG: 4.9 MMHG
BH CV ECHO MEAS - LV MEAN PG: 2.29 MMHG
BH CV ECHO MEAS - LV SYSTOLIC VOL/BSA (12-30): 7.6 CM2
BH CV ECHO MEAS - LV V1 MAX: 111 CM/SEC
BH CV ECHO MEAS - LV V1 VTI: 20.6 CM
BH CV ECHO MEAS - LVIDD: 4.9 CM
BH CV ECHO MEAS - LVIDS: 3.2 CM
BH CV ECHO MEAS - LVOT AREA: 3.8 CM2
BH CV ECHO MEAS - LVOT DIAM: 2.2 CM
BH CV ECHO MEAS - LVPWD: 1.02 CM
BH CV ECHO MEAS - MED PEAK E' VEL: 9 CM/SEC
BH CV ECHO MEAS - MV A MAX VEL: 87.8 CM/SEC
BH CV ECHO MEAS - MV DEC SLOPE: 618.7 CM/SEC2
BH CV ECHO MEAS - MV DEC TIME: 0.15 SEC
BH CV ECHO MEAS - MV E MAX VEL: 92.2 CM/SEC
BH CV ECHO MEAS - MV E/A: 1.05
BH CV ECHO MEAS - MV MAX PG: 4.6 MMHG
BH CV ECHO MEAS - MV MEAN PG: 2.04 MMHG
BH CV ECHO MEAS - MV P1/2T: 43 MSEC
BH CV ECHO MEAS - MV V2 VTI: 20 CM
BH CV ECHO MEAS - MVA(P1/2T): 5.1 CM2
BH CV ECHO MEAS - MVA(VTI): 3.9 CM2
BH CV ECHO MEAS - PA V2 MAX: 103.7 CM/SEC
BH CV ECHO MEAS - PULM A REVS DUR: 0.1 SEC
BH CV ECHO MEAS - PULM A REVS VEL: 25 CM/SEC
BH CV ECHO MEAS - RAP SYSTOLE: 3 MMHG
BH CV ECHO MEAS - RV MAX PG: 4.6 MMHG
BH CV ECHO MEAS - RV V1 MAX: 106.8 CM/SEC
BH CV ECHO MEAS - RV V1 VTI: 22.1 CM
BH CV ECHO MEAS - RVDD: 3.8 CM
BH CV ECHO MEAS - RVSP: 21.1 MMHG
BH CV ECHO MEAS - SV(LVOT): 78.2 ML
BH CV ECHO MEAS - SV(MOD-SP2): 50.4 ML
BH CV ECHO MEAS - SV(MOD-SP4): 41.6 ML
BH CV ECHO MEAS - SVI(LVOT): 37.1 ML/M2
BH CV ECHO MEAS - SVI(MOD-SP2): 23.9 ML/M2
BH CV ECHO MEAS - SVI(MOD-SP4): 19.7 ML/M2
BH CV ECHO MEAS - TAPSE (>1.6): 2.5 CM
BH CV ECHO MEAS - TR MAX PG: 18.1 MMHG
BH CV ECHO MEAS - TR MAX VEL: 212.7 CM/SEC
BH CV ECHO MEASUREMENTS AVERAGE E/E' RATIO: 10.24
BH CV XLRA - RV BASE: 3.7 CM
BH CV XLRA - RV LENGTH: 8.2 CM
BH CV XLRA - RV MID: 4.8 CM
BH CV XLRA - TDI S': 16 CM/SEC
LEFT ATRIUM VOLUME INDEX: 24 ML/M2
LEFT ATRIUM VOLUME: 47 ML

## 2024-05-31 PROCEDURE — 93306 TTE W/DOPPLER COMPLETE: CPT

## 2024-05-31 PROCEDURE — 93356 MYOCRD STRAIN IMG SPCKL TRCK: CPT

## 2024-06-04 LAB
ASCENDING AORTA: 4 CM
BH CV ECHO LEFT VENTRICLE GLOBAL LONGITUDINAL STRAIN: -17 %
BH CV ECHO MEAS - AO MAX PG: 8.9 MMHG
BH CV ECHO MEAS - AO MEAN PG: 4.9 MMHG
BH CV ECHO MEAS - AO ROOT DIAM: 3.4 CM
BH CV ECHO MEAS - AO V2 MAX: 149.5 CM/SEC
BH CV ECHO MEAS - AO V2 VTI: 28 CM
BH CV ECHO MEAS - AVA(I,D): 2.8 CM2
BH CV ECHO MEAS - EDV(CUBED): 117.4 ML
BH CV ECHO MEAS - EDV(MOD-SP2): 82.6 ML
BH CV ECHO MEAS - EDV(MOD-SP4): 57.5 ML
BH CV ECHO MEAS - EF(MOD-BP): 66 %
BH CV ECHO MEAS - EF(MOD-SP2): 61 %
BH CV ECHO MEAS - EF(MOD-SP4): 72.3 %
BH CV ECHO MEAS - ESV(CUBED): 33.9 ML
BH CV ECHO MEAS - ESV(MOD-SP2): 32.2 ML
BH CV ECHO MEAS - ESV(MOD-SP4): 15.9 ML
BH CV ECHO MEAS - FS: 33.9 %
BH CV ECHO MEAS - IVS/LVPW: 1.11 CM
BH CV ECHO MEAS - IVSD: 1.14 CM
BH CV ECHO MEAS - LA DIMENSION: 3.6 CM
BH CV ECHO MEAS - LAT PEAK E' VEL: 9 CM/SEC
BH CV ECHO MEAS - LV DIASTOLIC VOL/BSA (35-75): 27.3 CM2
BH CV ECHO MEAS - LV MASS(C)D: 195.6 GRAMS
BH CV ECHO MEAS - LV MAX PG: 4.9 MMHG
BH CV ECHO MEAS - LV MEAN PG: 2.29 MMHG
BH CV ECHO MEAS - LV SYSTOLIC VOL/BSA (12-30): 7.6 CM2
BH CV ECHO MEAS - LV V1 MAX: 111 CM/SEC
BH CV ECHO MEAS - LV V1 VTI: 20.6 CM
BH CV ECHO MEAS - LVIDD: 4.9 CM
BH CV ECHO MEAS - LVIDS: 3.2 CM
BH CV ECHO MEAS - LVOT AREA: 3.8 CM2
BH CV ECHO MEAS - LVOT DIAM: 2.2 CM
BH CV ECHO MEAS - LVPWD: 1.02 CM
BH CV ECHO MEAS - MED PEAK E' VEL: 9 CM/SEC
BH CV ECHO MEAS - MV A MAX VEL: 87.8 CM/SEC
BH CV ECHO MEAS - MV DEC SLOPE: 618.7 CM/SEC2
BH CV ECHO MEAS - MV DEC TIME: 0.15 SEC
BH CV ECHO MEAS - MV E MAX VEL: 92.2 CM/SEC
BH CV ECHO MEAS - MV E/A: 1.05
BH CV ECHO MEAS - MV MAX PG: 4.6 MMHG
BH CV ECHO MEAS - MV MEAN PG: 2.04 MMHG
BH CV ECHO MEAS - MV P1/2T: 43 MSEC
BH CV ECHO MEAS - MV V2 VTI: 20 CM
BH CV ECHO MEAS - MVA(P1/2T): 5.1 CM2
BH CV ECHO MEAS - MVA(VTI): 3.9 CM2
BH CV ECHO MEAS - PA V2 MAX: 103.7 CM/SEC
BH CV ECHO MEAS - PULM A REVS DUR: 0.1 SEC
BH CV ECHO MEAS - PULM A REVS VEL: 25 CM/SEC
BH CV ECHO MEAS - RAP SYSTOLE: 3 MMHG
BH CV ECHO MEAS - RV MAX PG: 4.6 MMHG
BH CV ECHO MEAS - RV V1 MAX: 106.8 CM/SEC
BH CV ECHO MEAS - RV V1 VTI: 22.1 CM
BH CV ECHO MEAS - RVDD: 3.8 CM
BH CV ECHO MEAS - RVSP: 21.1 MMHG
BH CV ECHO MEAS - SV(LVOT): 78.2 ML
BH CV ECHO MEAS - SV(MOD-SP2): 50.4 ML
BH CV ECHO MEAS - SV(MOD-SP4): 41.6 ML
BH CV ECHO MEAS - SVI(LVOT): 37.1 ML/M2
BH CV ECHO MEAS - SVI(MOD-SP2): 23.9 ML/M2
BH CV ECHO MEAS - SVI(MOD-SP4): 19.7 ML/M2
BH CV ECHO MEAS - TAPSE (>1.6): 2.5 CM
BH CV ECHO MEAS - TR MAX PG: 18.1 MMHG
BH CV ECHO MEAS - TR MAX VEL: 212.7 CM/SEC
BH CV ECHO MEASUREMENTS AVERAGE E/E' RATIO: 10.24
BH CV XLRA - RV BASE: 3.7 CM
BH CV XLRA - RV LENGTH: 8.2 CM
BH CV XLRA - RV MID: 4.8 CM
BH CV XLRA - TDI S': 16 CM/SEC
LEFT ATRIUM VOLUME INDEX: 24 ML/M2
LEFT ATRIUM VOLUME: 47 ML

## 2024-06-10 ENCOUNTER — HOSPITAL ENCOUNTER (OUTPATIENT)
Dept: GENERAL RADIOLOGY | Facility: HOSPITAL | Age: 69
Discharge: HOME | End: 2024-06-10
Admitting: NURSE PRACTITIONER
Payer: MEDICARE

## 2024-06-10 DIAGNOSIS — J18.9 COMMUNITY ACQUIRED PNEUMONIA OF RIGHT LUNG, UNSPECIFIED PART OF LUNG: ICD-10-CM

## 2024-06-10 PROCEDURE — 71046 X-RAY EXAM CHEST 2 VIEWS: CPT

## 2024-06-11 ENCOUNTER — OFFICE VISIT (OUTPATIENT)
Dept: PULMONOLOGY | Facility: CLINIC | Age: 69
End: 2024-06-11
Payer: MEDICARE

## 2024-06-11 VITALS
BODY MASS INDEX: 34.84 KG/M2 | WEIGHT: 216.8 LBS | SYSTOLIC BLOOD PRESSURE: 148 MMHG | DIASTOLIC BLOOD PRESSURE: 84 MMHG | OXYGEN SATURATION: 94 % | HEIGHT: 66 IN | HEART RATE: 96 BPM

## 2024-06-11 DIAGNOSIS — R07.89 CHEST PAIN, ATYPICAL: ICD-10-CM

## 2024-06-11 DIAGNOSIS — Z87.891 PERSONAL HISTORY OF NICOTINE DEPENDENCE: Chronic | ICD-10-CM

## 2024-06-11 DIAGNOSIS — G47.33 OBSTRUCTIVE SLEEP APNEA: Chronic | ICD-10-CM

## 2024-06-11 DIAGNOSIS — K21.9 GASTROESOPHAGEAL REFLUX DISEASE WITHOUT ESOPHAGITIS: Chronic | ICD-10-CM

## 2024-06-11 DIAGNOSIS — R91.1 LUNG NODULE: Chronic | ICD-10-CM

## 2024-06-11 DIAGNOSIS — J96.11 CHRONIC RESPIRATORY FAILURE WITH HYPOXIA: Chronic | ICD-10-CM

## 2024-06-11 DIAGNOSIS — J44.9 STAGE 3 SEVERE COPD BY GOLD CLASSIFICATION: Primary | ICD-10-CM

## 2024-06-11 DIAGNOSIS — E66.01 SEVERE OBESITY (BMI 35.0-39.9) WITH COMORBIDITY: Chronic | ICD-10-CM

## 2024-06-11 DIAGNOSIS — I77.810 MILD DILATION OF ASCENDING AORTA: ICD-10-CM

## 2024-06-11 PROCEDURE — 1160F RVW MEDS BY RX/DR IN RCRD: CPT | Performed by: NURSE PRACTITIONER

## 2024-06-11 PROCEDURE — 1159F MED LIST DOCD IN RCRD: CPT | Performed by: NURSE PRACTITIONER

## 2024-06-11 PROCEDURE — 99214 OFFICE O/P EST MOD 30 MIN: CPT | Performed by: NURSE PRACTITIONER

## 2024-06-11 RX ORDER — BISOPROLOL FUMARATE 5 MG/1
5 TABLET, FILM COATED ORAL DAILY
COMMUNITY
Start: 2024-06-04

## 2024-06-11 RX ORDER — ALBUTEROL SULFATE 2.5 MG/3ML
2.5 SOLUTION RESPIRATORY (INHALATION) EVERY 4 HOURS PRN
COMMUNITY

## 2024-06-11 RX ORDER — FUROSEMIDE 20 MG/1
20 TABLET ORAL DAILY
COMMUNITY
Start: 2024-06-04

## 2024-06-27 ENCOUNTER — HOSPITAL ENCOUNTER (OUTPATIENT)
Dept: CARDIOLOGY | Facility: HOSPITAL | Age: 69
Discharge: HOME | End: 2024-06-27
Admitting: HOSPITALIST
Payer: MEDICARE

## 2024-06-27 ENCOUNTER — OFFICE VISIT (OUTPATIENT)
Dept: CARDIAC SURGERY | Facility: CLINIC | Age: 69
End: 2024-06-27
Payer: MEDICARE

## 2024-06-27 VITALS
WEIGHT: 215.6 LBS | HEART RATE: 75 BPM | SYSTOLIC BLOOD PRESSURE: 123 MMHG | DIASTOLIC BLOOD PRESSURE: 65 MMHG | BODY MASS INDEX: 34.82 KG/M2 | OXYGEN SATURATION: 95 %

## 2024-06-27 VITALS
HEART RATE: 92 BPM | SYSTOLIC BLOOD PRESSURE: 145 MMHG | WEIGHT: 216 LBS | HEIGHT: 66 IN | OXYGEN SATURATION: 90 % | BODY MASS INDEX: 34.72 KG/M2 | DIASTOLIC BLOOD PRESSURE: 92 MMHG

## 2024-06-27 DIAGNOSIS — I25.10 CORONARY ARTERY DISEASE INVOLVING NATIVE CORONARY ARTERY OF NATIVE HEART, UNSPECIFIED WHETHER ANGINA PRESENT: Primary | ICD-10-CM

## 2024-06-27 DIAGNOSIS — I10 ESSENTIAL HYPERTENSION: ICD-10-CM

## 2024-06-27 DIAGNOSIS — E78.5 HYPERLIPIDEMIA, UNSPECIFIED HYPERLIPIDEMIA TYPE: ICD-10-CM

## 2024-06-27 DIAGNOSIS — R06.09 DYSPNEA ON EXERTION: ICD-10-CM

## 2024-06-27 DIAGNOSIS — I77.810 MILD DILATION OF ASCENDING AORTA: Primary | ICD-10-CM

## 2024-06-27 DIAGNOSIS — I50.812 CHRONIC RIGHT HEART FAILURE: ICD-10-CM

## 2024-06-27 DIAGNOSIS — I77.810 ASCENDING AORTA DILATATION: ICD-10-CM

## 2024-06-27 LAB
ANION GAP SERPL CALCULATED.3IONS-SCNC: 7 MMOL/L (ref 5–15)
BUN SERPL-MCNC: 13 MG/DL (ref 8–23)
BUN/CREAT SERPL: 14.3 (ref 7–25)
CALCIUM SPEC-SCNC: 9.4 MG/DL (ref 8.6–10.5)
CHLORIDE SERPL-SCNC: 95 MMOL/L (ref 98–107)
CHOLEST SERPL-MCNC: 124 MG/DL (ref 0–200)
CO2 SERPL-SCNC: 36 MMOL/L (ref 22–29)
CREAT SERPL-MCNC: 0.91 MG/DL (ref 0.76–1.27)
EGFRCR SERPLBLD CKD-EPI 2021: 91.2 ML/MIN/1.73
GLUCOSE SERPL-MCNC: 131 MG/DL (ref 65–99)
HDLC SERPL-MCNC: 38 MG/DL (ref 40–60)
LDLC SERPL CALC-MCNC: 62 MG/DL (ref 0–100)
LDLC/HDLC SERPL: 1.55 {RATIO}
NT-PROBNP SERPL-MCNC: 100.4 PG/ML (ref 0–900)
POTASSIUM SERPL-SCNC: 4.1 MMOL/L (ref 3.5–5.2)
QT INTERVAL: 408 MS
QTC INTERVAL: 452 MS
SODIUM SERPL-SCNC: 138 MMOL/L (ref 136–145)
TRIGL SERPL-MCNC: 135 MG/DL (ref 0–150)
VLDLC SERPL-MCNC: 24 MG/DL (ref 5–40)

## 2024-06-27 PROCEDURE — 93005 ELECTROCARDIOGRAM TRACING: CPT | Performed by: HOSPITALIST

## 2024-06-27 PROCEDURE — 80048 BASIC METABOLIC PNL TOTAL CA: CPT | Performed by: HOSPITALIST

## 2024-06-27 PROCEDURE — 83880 ASSAY OF NATRIURETIC PEPTIDE: CPT | Performed by: HOSPITALIST

## 2024-06-27 PROCEDURE — 80061 LIPID PANEL: CPT | Performed by: HOSPITALIST

## 2024-06-27 PROCEDURE — 99203 OFFICE O/P NEW LOW 30 MIN: CPT | Performed by: SURGERY

## 2024-08-15 ENCOUNTER — HOSPITAL ENCOUNTER (OUTPATIENT)
Dept: CARDIOLOGY | Facility: HOSPITAL | Age: 69
Discharge: HOME | End: 2024-08-15
Payer: MEDICARE

## 2024-08-15 ENCOUNTER — TELEPHONE (OUTPATIENT)
Dept: CARDIOLOGY | Facility: HOSPITAL | Age: 69
End: 2024-08-15
Payer: MEDICARE

## 2024-08-15 VITALS
DIASTOLIC BLOOD PRESSURE: 63 MMHG | BODY MASS INDEX: 35.03 KG/M2 | WEIGHT: 218 LBS | HEART RATE: 68 BPM | SYSTOLIC BLOOD PRESSURE: 146 MMHG | HEIGHT: 66 IN

## 2024-08-15 DIAGNOSIS — R06.09 DYSPNEA ON EXERTION: ICD-10-CM

## 2024-08-15 DIAGNOSIS — I25.10 CORONARY ARTERY DISEASE INVOLVING NATIVE CORONARY ARTERY OF NATIVE HEART, UNSPECIFIED WHETHER ANGINA PRESENT: ICD-10-CM

## 2024-08-15 LAB
BH CV REST NUCLEAR ISOTOPE DOSE: 11.6 MCI
BH CV STRESS BP STAGE 1: NORMAL
BH CV STRESS COMMENTS STAGE 1: NORMAL
BH CV STRESS DOSE REGADENOSON STAGE 1: 0.4
BH CV STRESS DURATION MIN STAGE 1: 0
BH CV STRESS DURATION SEC STAGE 1: 10
BH CV STRESS HR STAGE 1: 82
BH CV STRESS NUCLEAR ISOTOPE DOSE: 36 MCI
BH CV STRESS PROTOCOL 1: NORMAL
BH CV STRESS RECOVERY BP: NORMAL MMHG
BH CV STRESS RECOVERY HR: 72 BPM
BH CV STRESS STAGE 1: 1
LV EF NUC BP: 79 %
MAXIMAL PREDICTED HEART RATE: 151 BPM
PERCENT MAX PREDICTED HR: 54.3 %
STRESS BASELINE BP: NORMAL MMHG
STRESS BASELINE HR: 67 BPM
STRESS PERCENT HR: 64 %
STRESS POST EXERCISE DUR MIN: 0 MIN
STRESS POST EXERCISE DUR SEC: 10 SEC
STRESS POST PEAK BP: NORMAL MMHG
STRESS POST PEAK HR: 82 BPM
STRESS TARGET HR: 128 BPM

## 2024-08-15 PROCEDURE — 93017 CV STRESS TEST TRACING ONLY: CPT

## 2024-08-15 PROCEDURE — 78452 HT MUSCLE IMAGE SPECT MULT: CPT

## 2024-08-15 PROCEDURE — 0 TECHNETIUM TETROFOSMIN KIT: Performed by: HOSPITALIST

## 2024-08-15 PROCEDURE — 25010000002 REGADENOSON 0.4 MG/5ML SOLUTION: Performed by: INTERNAL MEDICINE

## 2024-08-15 PROCEDURE — A9502 TC99M TETROFOSMIN: HCPCS | Performed by: HOSPITALIST

## 2024-08-15 PROCEDURE — 25010000002 AMINOPHYLLINE PER 250 MG: Performed by: INTERNAL MEDICINE

## 2024-08-15 RX ORDER — REGADENOSON 0.08 MG/ML
0.4 INJECTION, SOLUTION INTRAVENOUS ONCE
Status: COMPLETED | OUTPATIENT
Start: 2024-08-15 | End: 2024-08-15

## 2024-08-15 RX ORDER — AMINOPHYLLINE 25 MG/ML
50 INJECTION, SOLUTION INTRAVENOUS ONCE
Status: COMPLETED | OUTPATIENT
Start: 2024-08-15 | End: 2024-08-15

## 2024-08-15 RX ADMIN — REGADENOSON 0.4 MG: 0.08 INJECTION, SOLUTION INTRAVENOUS at 09:35

## 2024-08-15 RX ADMIN — TETROFOSMIN 1 DOSE: 1.38 INJECTION, POWDER, LYOPHILIZED, FOR SOLUTION INTRAVENOUS at 08:03

## 2024-08-15 RX ADMIN — TETROFOSMIN 1 DOSE: 1.38 INJECTION, POWDER, LYOPHILIZED, FOR SOLUTION INTRAVENOUS at 09:56

## 2024-08-15 RX ADMIN — AMINOPHYLLINE 150 MG: 25 INJECTION, SOLUTION INTRAVENOUS at 09:40

## 2024-08-22 ENCOUNTER — HOSPITAL ENCOUNTER (OUTPATIENT)
Dept: CARDIOLOGY | Facility: HOSPITAL | Age: 69
Discharge: HOME | End: 2024-08-22
Admitting: HOSPITALIST
Payer: MEDICARE

## 2024-08-22 VITALS
WEIGHT: 219.8 LBS | BODY MASS INDEX: 35.49 KG/M2 | HEART RATE: 74 BPM | OXYGEN SATURATION: 96 % | DIASTOLIC BLOOD PRESSURE: 69 MMHG | SYSTOLIC BLOOD PRESSURE: 129 MMHG

## 2024-08-22 DIAGNOSIS — I50.812 CHRONIC RIGHT HEART FAILURE: Primary | ICD-10-CM

## 2024-08-22 DIAGNOSIS — E11.9 TYPE 2 DIABETES MELLITUS WITHOUT COMPLICATION, WITHOUT LONG-TERM CURRENT USE OF INSULIN: ICD-10-CM

## 2024-08-22 DIAGNOSIS — I25.10 CORONARY ARTERY DISEASE INVOLVING NATIVE CORONARY ARTERY OF NATIVE HEART, UNSPECIFIED WHETHER ANGINA PRESENT: ICD-10-CM

## 2024-08-22 DIAGNOSIS — E78.5 HYPERLIPIDEMIA, UNSPECIFIED HYPERLIPIDEMIA TYPE: ICD-10-CM

## 2024-08-22 DIAGNOSIS — I10 ESSENTIAL HYPERTENSION: ICD-10-CM

## 2024-08-22 LAB
ANION GAP SERPL CALCULATED.3IONS-SCNC: 8 MMOL/L (ref 5–15)
BUN SERPL-MCNC: 13 MG/DL (ref 8–23)
BUN/CREAT SERPL: 15.1 (ref 7–25)
CALCIUM SPEC-SCNC: 9.2 MG/DL (ref 8.6–10.5)
CHLORIDE SERPL-SCNC: 95 MMOL/L (ref 98–107)
CO2 SERPL-SCNC: 34 MMOL/L (ref 22–29)
CREAT SERPL-MCNC: 0.86 MG/DL (ref 0.76–1.27)
EGFRCR SERPLBLD CKD-EPI 2021: 93.7 ML/MIN/1.73
GLUCOSE SERPL-MCNC: 138 MG/DL (ref 65–99)
POTASSIUM SERPL-SCNC: 4.3 MMOL/L (ref 3.5–5.2)
SODIUM SERPL-SCNC: 137 MMOL/L (ref 136–145)

## 2024-08-22 PROCEDURE — G0463 HOSPITAL OUTPT CLINIC VISIT: HCPCS | Performed by: HOSPITALIST

## 2024-08-22 PROCEDURE — 80048 BASIC METABOLIC PNL TOTAL CA: CPT | Performed by: HOSPITALIST

## 2024-09-12 ENCOUNTER — OFFICE VISIT (OUTPATIENT)
Dept: PULMONOLOGY | Facility: CLINIC | Age: 69
End: 2024-09-12
Payer: MEDICARE

## 2024-09-12 VITALS
BODY MASS INDEX: 35.17 KG/M2 | WEIGHT: 218.8 LBS | HEART RATE: 72 BPM | OXYGEN SATURATION: 95 % | DIASTOLIC BLOOD PRESSURE: 68 MMHG | SYSTOLIC BLOOD PRESSURE: 122 MMHG | HEIGHT: 66 IN

## 2024-09-12 DIAGNOSIS — Z87.891 PERSONAL HISTORY OF NICOTINE DEPENDENCE: Chronic | ICD-10-CM

## 2024-09-12 DIAGNOSIS — G47.33 OBSTRUCTIVE SLEEP APNEA: Chronic | ICD-10-CM

## 2024-09-12 DIAGNOSIS — R91.1 LUNG NODULE: Chronic | ICD-10-CM

## 2024-09-12 DIAGNOSIS — J96.11 CHRONIC RESPIRATORY FAILURE WITH HYPOXIA: Chronic | ICD-10-CM

## 2024-09-12 DIAGNOSIS — J44.9 STAGE 3 SEVERE COPD BY GOLD CLASSIFICATION: Primary | Chronic | ICD-10-CM

## 2024-09-12 DIAGNOSIS — E66.01 SEVERE OBESITY (BMI 35.0-39.9) WITH COMORBIDITY: Chronic | ICD-10-CM

## 2024-09-12 DIAGNOSIS — K21.9 GASTROESOPHAGEAL REFLUX DISEASE WITHOUT ESOPHAGITIS: Chronic | ICD-10-CM

## 2024-09-12 DIAGNOSIS — Z23 NEED FOR VACCINATION: ICD-10-CM

## 2024-09-12 RX ORDER — BUPRENORPHINE 10 UG/H
PATCH, EXTENDED RELEASE TRANSDERMAL
COMMUNITY
Start: 2024-09-05

## 2024-09-12 RX ORDER — IPRATROPIUM BROMIDE AND ALBUTEROL SULFATE 2.5; .5 MG/3ML; MG/3ML
3 SOLUTION RESPIRATORY (INHALATION)
Qty: 120 ML | Refills: 11 | Status: SHIPPED | OUTPATIENT
Start: 2024-09-12

## 2024-10-22 ENCOUNTER — HOSPITAL ENCOUNTER (OUTPATIENT)
Dept: CARDIOLOGY | Facility: HOSPITAL | Age: 69
Discharge: HOME | End: 2024-10-22
Admitting: NURSE PRACTITIONER
Payer: MEDICARE

## 2024-10-22 VITALS
HEART RATE: 71 BPM | WEIGHT: 213.8 LBS | BODY MASS INDEX: 34.53 KG/M2 | OXYGEN SATURATION: 96 % | SYSTOLIC BLOOD PRESSURE: 144 MMHG | DIASTOLIC BLOOD PRESSURE: 72 MMHG

## 2024-10-22 DIAGNOSIS — I10 ESSENTIAL HYPERTENSION: ICD-10-CM

## 2024-10-22 DIAGNOSIS — I25.10 CORONARY ARTERY DISEASE INVOLVING NATIVE CORONARY ARTERY OF NATIVE HEART, UNSPECIFIED WHETHER ANGINA PRESENT: ICD-10-CM

## 2024-10-22 DIAGNOSIS — R06.09 DYSPNEA ON EXERTION: ICD-10-CM

## 2024-10-22 DIAGNOSIS — E78.5 HYPERLIPIDEMIA, UNSPECIFIED HYPERLIPIDEMIA TYPE: ICD-10-CM

## 2024-10-22 DIAGNOSIS — I50.812 CHRONIC RIGHT HEART FAILURE: Primary | ICD-10-CM

## 2024-10-22 LAB
ANION GAP SERPL CALCULATED.3IONS-SCNC: 10 MMOL/L (ref 5–15)
BUN SERPL-MCNC: 15 MG/DL (ref 8–23)
BUN/CREAT SERPL: 14.2 (ref 7–25)
CALCIUM SPEC-SCNC: 9.1 MG/DL (ref 8.6–10.5)
CHLORIDE SERPL-SCNC: 93 MMOL/L (ref 98–107)
CO2 SERPL-SCNC: 32 MMOL/L (ref 22–29)
CREAT SERPL-MCNC: 1.06 MG/DL (ref 0.76–1.27)
EGFRCR SERPLBLD CKD-EPI 2021: 76 ML/MIN/1.73
GLUCOSE SERPL-MCNC: 137 MG/DL (ref 65–99)
POTASSIUM SERPL-SCNC: 4.1 MMOL/L (ref 3.5–5.2)
SODIUM SERPL-SCNC: 135 MMOL/L (ref 136–145)

## 2024-10-22 PROCEDURE — 80048 BASIC METABOLIC PNL TOTAL CA: CPT | Performed by: NURSE PRACTITIONER

## 2024-10-22 PROCEDURE — G0463 HOSPITAL OUTPT CLINIC VISIT: HCPCS | Performed by: NURSE PRACTITIONER

## 2024-12-31 PROBLEM — I71.21 ANEURYSM OF ASCENDING AORTA WITHOUT RUPTURE: Chronic | Status: ACTIVE | Noted: 2024-06-11

## 2024-12-31 PROBLEM — I71.21 ANEURYSM OF ASCENDING AORTA WITHOUT RUPTURE: Status: ACTIVE | Noted: 2024-06-11

## 2024-12-31 NOTE — PROGRESS NOTES
DASH Palomino  [facility] [place] [facility]  [address]   [address]            [place] [address]  Phone: [phone]  Fax: [phone]          Chief Complaint  Aortic Aneurysm (Patient is here for follow up w/CT)    Tracey Bermudez presents to [facility] for appointment.    History of Present Illness  The patient presents for evaluation of an aneurysm and lung nodule. He is accompanied by his daughter.    He reports a general sense of well-being, with no specific complaints at this time. His respiratory function appears to be satisfactory, and he has a scheduled appointment with Dr. Ramirez on 03/19/2025 at 9:00 AM. No increasing shortness of breath, no fever, no chills, no cough with purulent sputum.       Objective   Past Medical History:   Diagnosis Date    Anemia     Anxiety     Bell's palsy     COPD (chronic obstructive pulmonary disease)     Costochondritis     De Quervain's tenosynovitis     Diverticulosis     GERD (gastroesophageal reflux disease)     Glaucoma     Goiter     History of adenomatous polyp of colon     Hyperglycemia     Hyperlipidemia     Hypertension     Hypoxia     Lung neoplasm     Nocturia     Oxygen dependent     Pneumonia     Respiratory failure    ,   Past Surgical History:   Procedure Laterality Date    BACK SURGERY      COLONOSCOPY  01/26/2009    6 polyps, adenomatous, hyperplastic, diverticulosis    ENDOSCOPY      ENDOSCOPY N/A 03/21/2019    Procedure: ESOPHAGOGASTRODUODENOSCOPY WITH ANESTHESIA;  Surgeon: Srinivasan Mathews MD;  Location: [facility] ENDOSCOPY;  Service: Gastroenterology    KNEE SURGERY      TOTAL HIP ARTHROPLASTY Left 10/2021    Dr. Boswell    TOTAL HIP ARTHROPLASTY Right 10/2022    right   ,   Family History   Problem Relation Age of Onset    Colon cancer Neg Hx     Colon polyps Neg Hx    ,   Social History     Tobacco Use    Smoking status: Former     Current packs/day: 0.00     Average packs/day: 2.0  "packs/day for 40.0 years (80.0 ttl pk-yrs)     Types: Cigarettes     Start date: 1975     Quit date: 2015     Years since quitting: 10.0     Passive exposure: Current    Smokeless tobacco: Never   Vaping Use    Vaping status: Never Used   Substance Use Topics    Alcohol use: No    Drug use: No   , (Not in a hospital admission)  , Allergies: Gabapentin and Lyrica [pregabalin]    Vital Signs:   /79   Pulse 84   Ht 167.6 cm (65.98\")   Wt 97.5 kg (215 lb)   SpO2 95%   BMI 34.72 kg/m²        Physical Exam  Vitals reviewed.   Constitutional:       General: He is not in acute distress.     Appearance: He is well-developed. He is obese.      Interventions: Nasal cannula in place.   HENT:      Head: Normocephalic and atraumatic.   Eyes:      General: No scleral icterus.     Conjunctiva/sclera: Conjunctivae normal.      Pupils: Pupils are equal, round, and reactive to light.   Cardiovascular:      Rate and Rhythm: Normal rate.   Pulmonary:      Effort: Pulmonary effort is normal. No respiratory distress.      Breath sounds: Decreased breath sounds present. No wheezing or rales.   Musculoskeletal:         General: Normal range of motion.      Cervical back: Normal range of motion and neck supple.   Skin:     General: Skin is warm and dry.      Comments: BLE CVS changes    Neurological:      Mental Status: He is alert and oriented to person, place, and time.   Psychiatric:         Behavior: Behavior normal.         Thought Content: Thought content normal.         Judgment: Judgment normal.          Result Review :  The following data was reviewed by: DASH Rivas on 01/09/2025:    CT Angiogram Chest (01/09/2025 08:29)   IMPRESSION:  1. A spiculated nodule or scar in the right upper lobe is slightly  larger on the current study. A small neoplasm is in the differential.  Follow-up PET/CT recommended. This was marked as \"lung finding\" in PACS for follow-up.  2. New 4 mm left upper lobe nodule image 30 " series 7. Imaging follow-up is recommended.  3. Atheromatous disease of the thoracic aorta. Mild coronary artery  calcification. Ectasia of the ascending aorta measuring 3.9 cm. No  evidence of dissection. Main pulmonary artery segment is dilated at 3.3 cm suggesting pulmonary hypertension.  4. Centrilobular and paraseptal emphysema.  5. A 5 cm upper pole renal cyst on the left. Degenerative changes of the spine and shoulders.    PFT Values          8/1/2023    10:45 10/13/2023    09:15   Pre Drug PFT Results   FVC 56 53   FEV1 42 39   FEF 25-75% 25 23   FEV1/FVC 59 57   Other Tests PFT Results   DLCO  48   D/VAsb  72       Results for orders placed in visit on 10/13/23    Spirometry with Diffusion Capacity    Narrative  Spirometry with Diffusion Capacity    Performed by: Jennifer Quarles RRT  Authorized by: Anita Reza APRN  Pre Drug % Predicted  FVC: 53%  FEV1: 39%  FEF 25-75%: 23%  FEV1/FVC: 57%  DLCO: 48%  D/VAsb: 72%    Interpretation  Spirometry  Spirometry shows severe obstruction. There is reduced midflow suggesting small airway/airflow obstruction.  Review of FVL curve  Patient's effort is normal.  Diffusion Capacity  The patient's diffusion capacity is severely reduced.  Diffusion capacity is mildly reduced when corrected for alveolar volume.      Results for orders placed in visit on 08/01/23    Spirometry    Narrative  Spirometry  Performed by: Jennifer Quarles RRT  Authorized by: Anita Reza APRN    Pre Drug % Predicted  FVC: 56%  FEV1: 42%  FEF 25-75%: 25%  FEV1/FVC: 59%      Results for orders placed in visit on 09/30/22    Pulmonary Function Test    Narrative  Pulmonary Function Test  Performed by: Jennifer Quarles RRT  Authorized by: Anita Reza APRN    Pre Drug % Predicted  FVC: 53%  FEV1: 40%  FEF 25-75%: 22%  FEV1/FVC: 58%  DLCO: 51%  D/VAsb: 73%    Interpretation  Spirometry  Spirometry shows severe obstruction. There is reduced midflow suggesting  small airway/airflow obstruction.  Diffusion Capacity  The patient's diffusion capacity is moderately reduced.  Diffusion capacity is mildly reduced when corrected for alveolar volume.             Assessment and Plan  Diagnoses and all orders for this visit:    1. Aneurysm of ascending aorta without rupture (Primary)  Overview:  Per Dr. Flynn 6/27/24 mid ascending aortic dilation measures to be 4.1.         Assessment & Plan:  I have personally reviewed CTA of the chest performed 1/9/25 and compared to CTA of the chest performed 5/13/24 and the following is my interpretation:  Mid ascending aorta measures 3.9 cm in maxium dimension, the distal ascending/proximal arch measures 3.3 cm in maximum dimension, aortic root measures 3.7 cm in maximum dimension.   No evidence of IMH, DESMOND, or dissection. Will review with Dr. Flynn and notify patient by phone of any changes.     We discussed the natural course history of aortic aneurysmal disease. We discussed the specific size of aneurysm today and potential risk of aortic complications. We discussed the operative treatment of aneursymal disease broadly. We discussed the recommendation to plan surveillance with CT scans. We discussed signs and symptoms of acute aortic pathology and the need to present to the emergency department for further evaluation. Lastly, we discussed the value of exercise while being mindful of a known aneurysm and the potential risk that high intensity, isometric, or valsalva type exercises presents.   Potential medical therapy including the use of a beta-blocker and perhaps other agents to accomplish strict control of pressure were discussed.     Obtain f/u CTA chest in 1 year.           Orders:  -     Cancel: CT Angiogram Chest; Future    2. Lung nodule  Overview:  11 mm spiculated nodule or scarring in the right upper lobe, identified 8/6/16, stable 6/8/21    CT Chest Low Dose Cancer Screening WO (05/13/2024 12:37)  There is an area of spiculation in  the right upper lobe centered within probable scar tissue. The nodular component of the spiculation is unchanged, measuring approximately 13 mm.     CT Angiogram Chest (01/09/2025 08:29)  LUNG NODULES:  1. New 4 mm left upper lobe nodule medially image 30 series 7.  2. A 1.5 cm spiculated lesion or scar in the right upper lobe image 60  series 7 measures 1.4 cm in 2024 and measures about 1.2 cm in 2021. This  appears to be slowly increasing in size. PET/CT recommended to further  assess this lesion.  3. A 3 mm probable fissural lymph node along the right minor fissure  image 79 series 7 and image 75 series 900.  4. Stable 4-5 mm left lower lobe perivascular nodule image 121 series 7.    CT Angiogram Chest (01/09/2025 08:29)  LUNG NODULES:  1. New 4 mm left upper lobe nodule medially image 30 series 7.  2. A 1.5 cm spiculated lesion or scar in the right upper lobe image 60  series 7 measures 1.4 cm in 2024 and measures about 1.2 cm in 2021. This  appears to be slowly increasing in size. PET/CT recommended to further  assess this lesion.  3. A 3 mm probable fissural lymph node along the right minor fissure  image 79 series 7 and image 75 series 900.  4. Stable 4-5 mm left lower lobe perivascular nodule image 121 series 7.       Assessment & Plan:  CTA chest from 1/9/25 shows 1 new 4mm CLOVIS nodule. The spiculated lesion or scar in the RUL now measures 1.5cm, previously 1.4cm in 2024.  Discussed findings with the patient.  He is agreeable to have a PET scan and follow-up in a few weeks to further discuss.    Orders:  -     Cancel: CT Angiogram Chest; Future  -     NM PET/CT Skull Base to Mid Thigh; Future    3. Personal history of nicotine dependence  Overview:  Former cigaretter smoker, quit 2015.    Assessment & Plan:  Byron Bermudez  reports that he quit smoking about 10 years ago. His smoking use included cigarettes. He started smoking about 50 years ago. He has a 80 pack-year smoking history. He has been exposed to  tobacco smoke. He has never used smokeless tobacco. I have educated him on the risk of diseases from using tobacco products such as cancer, COPD, and heart disease.     The patient will complete surveillance CTs regarding aortic aneurysm. This will suffice for low-dose lung cancer screening monitoring.                    Advance Care Planning   ACP discussion was held with the patient during this visit. Patient does not have an advance directive, declines further assistance.       Follow Up  DASH Rivas  1/9/2025  13:47 CST    Return in about 2 weeks (around 1/23/2025) for PET with Anita RIVERA .    Patient was given instructions and counseling regarding his condition or for health maintenance advice. Please see specific information pulled into the AVS if appropriate.     Please note that portions of this note were completed with a voice recognition program.    Patient or patient representative verbalized consent for the use of Ambient Listening during the visit with  DASH Rivas for chart documentation. 1/9/2025  10:16 CST

## 2025-01-09 ENCOUNTER — OFFICE VISIT (OUTPATIENT)
Dept: CARDIAC SURGERY | Facility: CLINIC | Age: 70
End: 2025-01-09
Payer: MEDICARE

## 2025-01-09 ENCOUNTER — HOSPITAL ENCOUNTER (OUTPATIENT)
Dept: CT IMAGING | Facility: HOSPITAL | Age: 70
Discharge: HOME | End: 2025-01-09
Admitting: NURSE PRACTITIONER
Payer: MEDICARE

## 2025-01-09 VITALS
HEART RATE: 84 BPM | OXYGEN SATURATION: 95 % | DIASTOLIC BLOOD PRESSURE: 79 MMHG | HEIGHT: 66 IN | SYSTOLIC BLOOD PRESSURE: 129 MMHG | WEIGHT: 215 LBS | BODY MASS INDEX: 34.55 KG/M2

## 2025-01-09 DIAGNOSIS — I71.21 ANEURYSM OF ASCENDING AORTA WITHOUT RUPTURE: Primary | ICD-10-CM

## 2025-01-09 DIAGNOSIS — Z87.891 PERSONAL HISTORY OF NICOTINE DEPENDENCE: Chronic | ICD-10-CM

## 2025-01-09 DIAGNOSIS — I77.810 MILD DILATION OF ASCENDING AORTA: ICD-10-CM

## 2025-01-09 DIAGNOSIS — R91.1 LUNG NODULE: Chronic | ICD-10-CM

## 2025-01-09 LAB — CREAT BLDA-MCNC: 1.2 MG/DL (ref 0.6–1.3)

## 2025-01-09 PROCEDURE — 1160F RVW MEDS BY RX/DR IN RCRD: CPT | Performed by: NURSE PRACTITIONER

## 2025-01-09 PROCEDURE — 82565 ASSAY OF CREATININE: CPT

## 2025-01-09 PROCEDURE — 71275 CT ANGIOGRAPHY CHEST: CPT

## 2025-01-09 PROCEDURE — 25510000001 IOPAMIDOL PER 1 ML: Performed by: NURSE PRACTITIONER

## 2025-01-09 PROCEDURE — 99214 OFFICE O/P EST MOD 30 MIN: CPT | Performed by: NURSE PRACTITIONER

## 2025-01-09 PROCEDURE — 1159F MED LIST DOCD IN RCRD: CPT | Performed by: NURSE PRACTITIONER

## 2025-01-09 RX ORDER — IOPAMIDOL 755 MG/ML
100 INJECTION, SOLUTION INTRAVASCULAR
Status: COMPLETED | OUTPATIENT
Start: 2025-01-09 | End: 2025-01-09

## 2025-01-09 RX ADMIN — IOPAMIDOL 71 ML: 755 INJECTION, SOLUTION INTRAVENOUS at 08:29

## 2025-01-09 NOTE — ASSESSMENT & PLAN NOTE
CTA chest from 1/9/25 shows 1 new 4mm CLOVIS nodule. The spiculated lesion or scar in the RUL now measures 1.5cm, previously 1.4cm in 2024.  Discussed findings with the patient.  He is agreeable to have a PET scan and follow-up in a few weeks to further discuss.

## 2025-01-09 NOTE — LETTER
January 9, 2025     Glnyn Koch MD  [address] [address]    Patient: Byron Bermudez   YOB: 1955   Date of Visit: 1/9/2025     Dear Glynn Koch MD:       Thank you for referring Byron Bermudez to me for evaluation. Below are the relevant portions of my assessment and plan of care.    If you have questions, please do not hesitate to call me. I look forward to following Byron along with you.         Sincerely,        DASH Rivas        CC: No Recipients    Anita Reza APRN  01/09/25 1348  Sign when Signing Visit   DASH Palomino  [facility]  [address] [place] [address].   [address]            [place] [address]  Phone: [phone]  Fax: [phone]          Chief Complaint  Aortic Aneurysm (Patient is here for follow up w/CT)    Subjective    Byron Bermudez presents to [facility] for appointment.    History of Present Illness  The patient presents for evaluation of an aneurysm and lung nodule. He is accompanied by his daughter.    He reports a general sense of well-being, with no specific complaints at this time. His respiratory function appears to be satisfactory, and he has a scheduled appointment with Dr. Ramirez on 03/19/2025 at 9:00 AM. No increasing shortness of breath, no fever, no chills, no cough with purulent sputum.       Objective  Past Medical History:   Diagnosis Date   • Anemia    • Anxiety    • Bell's palsy    • COPD (chronic obstructive pulmonary disease)    • Costochondritis    • De Quervain's tenosynovitis    • Diverticulosis    • GERD (gastroesophageal reflux disease)    • Glaucoma    • Goiter    • History of adenomatous polyp of colon    • Hyperglycemia    • Hyperlipidemia    • Hypertension    • Hypoxia    • Lung neoplasm    • Nocturia    • Oxygen dependent    • Pneumonia    • Respiratory failure    ,   Past Surgical History:   Procedure Laterality Date   • BACK SURGERY     •  "COLONOSCOPY  01/26/2009    6 polyps, adenomatous, hyperplastic, diverticulosis   • ENDOSCOPY     • ENDOSCOPY N/A 03/21/2019    Procedure: ESOPHAGOGASTRODUODENOSCOPY WITH ANESTHESIA;  Surgeon: Srinivasan Mathews MD;  Location: [facility] ENDOSCOPY;  Service: Gastroenterology   • KNEE SURGERY     • TOTAL HIP ARTHROPLASTY Left 10/2021    Dr. Boswell   • TOTAL HIP ARTHROPLASTY Right 10/2022    right   ,   Family History   Problem Relation Age of Onset   • Colon cancer Neg Hx    • Colon polyps Neg Hx    ,   Social History     Tobacco Use   • Smoking status: Former     Current packs/day: 0.00     Average packs/day: 2.0 packs/day for 40.0 years (80.0 ttl pk-yrs)     Types: Cigarettes     Start date: 1975     Quit date: 2015     Years since quitting: 10.0     Passive exposure: Current   • Smokeless tobacco: Never   Vaping Use   • Vaping status: Never Used   Substance Use Topics   • Alcohol use: No   • Drug use: No   , (Not in a hospital admission)  , Allergies: Gabapentin and Lyrica [pregabalin]    Vital Signs:   /79   Pulse 84   Ht 167.6 cm (65.98\")   Wt 97.5 kg (215 lb)   SpO2 95%   BMI 34.72 kg/m²        Physical Exam  Vitals reviewed.   Constitutional:       General: He is not in acute distress.     Appearance: He is well-developed. He is obese.      Interventions: Nasal cannula in place.   HENT:      Head: Normocephalic and atraumatic.   Eyes:      General: No scleral icterus.     Conjunctiva/sclera: Conjunctivae normal.      Pupils: Pupils are equal, round, and reactive to light.   Cardiovascular:      Rate and Rhythm: Normal rate.   Pulmonary:      Effort: Pulmonary effort is normal. No respiratory distress.      Breath sounds: Decreased breath sounds present. No wheezing or rales.   Musculoskeletal:         General: Normal range of motion.      Cervical back: Normal range of motion and neck supple.   Skin:     General: Skin is warm and dry.      Comments: BLE CVS changes    Neurological:      Mental Status: He " "is alert and oriented to person, place, and time.   Psychiatric:         Behavior: Behavior normal.         Thought Content: Thought content normal.         Judgment: Judgment normal.          Result Review:  The following data was reviewed by: DASH Rivas on 01/09/2025:    CT Angiogram Chest (01/09/2025 08:29)   IMPRESSION:  1. A spiculated nodule or scar in the right upper lobe is slightly  larger on the current study. A small neoplasm is in the differential.  Follow-up PET/CT recommended. This was marked as \"lung finding\" in PACS for follow-up.  2. New 4 mm left upper lobe nodule image 30 series 7. Imaging follow-up is recommended.  3. Atheromatous disease of the thoracic aorta. Mild coronary artery  calcification. Ectasia of the ascending aorta measuring 3.9 cm. No  evidence of dissection. Main pulmonary artery segment is dilated at 3.3 cm suggesting pulmonary hypertension.  4. Centrilobular and paraseptal emphysema.  5. A 5 cm upper pole renal cyst on the left. Degenerative changes of the spine and shoulders.    PFT Values          8/1/2023    10:45 10/13/2023    09:15   Pre Drug PFT Results   FVC 56 53   FEV1 42 39   FEF 25-75% 25 23   FEV1/FVC 59 57   Other Tests PFT Results   DLCO  48   D/VAsb  72       Results for orders placed in visit on 10/13/23    Spirometry with Diffusion Capacity    Narrative  Spirometry with Diffusion Capacity    Performed by: Jennifer Quarles, RRT  Authorized by: Anita Reza APRN  Pre Drug % Predicted  FVC: 53%  FEV1: 39%  FEF 25-75%: 23%  FEV1/FVC: 57%  DLCO: 48%  D/VAsb: 72%    Interpretation  Spirometry  Spirometry shows severe obstruction. There is reduced midflow suggesting small airway/airflow obstruction.  Review of FVL curve  Patient's effort is normal.  Diffusion Capacity  The patient's diffusion capacity is severely reduced.  Diffusion capacity is mildly reduced when corrected for alveolar volume.      Results for orders placed in visit on " 08/01/23    Spirometry    Narrative  Spirometry  Performed by: Jennifer Quarles, RRT  Authorized by: Anita Reza APRN    Pre Drug % Predicted  FVC: 56%  FEV1: 42%  FEF 25-75%: 25%  FEV1/FVC: 59%      Results for orders placed in visit on 09/30/22    Pulmonary Function Test    Narrative  Pulmonary Function Test  Performed by: Jennifer Quarles, RRT  Authorized by: Anita Reza APRN    Pre Drug % Predicted  FVC: 53%  FEV1: 40%  FEF 25-75%: 22%  FEV1/FVC: 58%  DLCO: 51%  D/VAsb: 73%    Interpretation  Spirometry  Spirometry shows severe obstruction. There is reduced midflow suggesting small airway/airflow obstruction.  Diffusion Capacity  The patient's diffusion capacity is moderately reduced.  Diffusion capacity is mildly reduced when corrected for alveolar volume.             Assessment and Plan  Diagnoses and all orders for this visit:    1. Aneurysm of ascending aorta without rupture (Primary)  Overview:  Per Dr. Flynn 6/27/24 mid ascending aortic dilation measures to be 4.1.         Assessment & Plan:  I have personally reviewed CTA of the chest performed 1/9/25 and compared to CTA of the chest performed 5/13/24 and the following is my interpretation:  Mid ascending aorta measures 3.9 cm in maxium dimension, the distal ascending/proximal arch measures 3.3 cm in maximum dimension, aortic root measures 3.7 cm in maximum dimension.   No evidence of IMH, DESMOND, or dissection. Will review with Dr. Flynn and notify patient by phone of any changes.     We discussed the natural course history of aortic aneurysmal disease. We discussed the specific size of aneurysm today and potential risk of aortic complications. We discussed the operative treatment of aneursymal disease broadly. We discussed the recommendation to plan surveillance with CT scans. We discussed signs and symptoms of acute aortic pathology and the need to present to the emergency department for further evaluation. Lastly, we discussed  the value of exercise while being mindful of a known aneurysm and the potential risk that high intensity, isometric, or valsalva type exercises presents.   Potential medical therapy including the use of a beta-blocker and perhaps other agents to accomplish strict control of pressure were discussed.     Obtain f/u CTA chest in 1 year.           Orders:  -     Cancel: CT Angiogram Chest; Future    2. Lung nodule  Overview:  11 mm spiculated nodule or scarring in the right upper lobe, identified 8/6/16, stable 6/8/21    CT Chest Low Dose Cancer Screening WO (05/13/2024 12:37)  There is an area of spiculation in the right upper lobe centered within probable scar tissue. The nodular component of the spiculation is unchanged, measuring approximately 13 mm.     CT Angiogram Chest (01/09/2025 08:29)  LUNG NODULES:  1. New 4 mm left upper lobe nodule medially image 30 series 7.  2. A 1.5 cm spiculated lesion or scar in the right upper lobe image 60  series 7 measures 1.4 cm in 2024 and measures about 1.2 cm in 2021. This  appears to be slowly increasing in size. PET/CT recommended to further  assess this lesion.  3. A 3 mm probable fissural lymph node along the right minor fissure  image 79 series 7 and image 75 series 900.  4. Stable 4-5 mm left lower lobe perivascular nodule image 121 series 7.    CT Angiogram Chest (01/09/2025 08:29)  LUNG NODULES:  1. New 4 mm left upper lobe nodule medially image 30 series 7.  2. A 1.5 cm spiculated lesion or scar in the right upper lobe image 60  series 7 measures 1.4 cm in 2024 and measures about 1.2 cm in 2021. This  appears to be slowly increasing in size. PET/CT recommended to further  assess this lesion.  3. A 3 mm probable fissural lymph node along the right minor fissure  image 79 series 7 and image 75 series 900.  4. Stable 4-5 mm left lower lobe perivascular nodule image 121 series 7.       Assessment & Plan:  CTA chest from 1/9/25 shows 1 new 4mm CLOVIS nodule. The spiculated  lesion or scar in the RUL now measures 1.5cm, previously 1.4cm in 2024.  Discussed findings with the patient.  He is agreeable to have a PET scan and follow-up in a few weeks to further discuss.    Orders:  -     Cancel: CT Angiogram Chest; Future  -     NM PET/CT Skull Base to Mid Thigh; Future    3. Personal history of nicotine dependence  Overview:  Former cigaretter smoker, quit 2015.    Assessment & Plan:  Byron Bermudez  reports that he quit smoking about 10 years ago. His smoking use included cigarettes. He started smoking about 50 years ago. He has a 80 pack-year smoking history. He has been exposed to tobacco smoke. He has never used smokeless tobacco. I have educated him on the risk of diseases from using tobacco products such as cancer, COPD, and heart disease.     The patient will complete surveillance CTs regarding aortic aneurysm. This will suffice for low-dose lung cancer screening monitoring.                    Advance Care Planning  ACP discussion was held with the patient during this visit. Patient does not have an advance directive, declines further assistance.       Follow Up  DASH Rivas  1/9/2025  13:47 CST    Return in about 2 weeks (around 1/23/2025) for PET with Ainta RIVERA .    Patient was given instructions and counseling regarding his condition or for health maintenance advice. Please see specific information pulled into the AVS if appropriate.     Please note that portions of this note were completed with a voice recognition program.    Patient or patient representative verbalized consent for the use of Ambient Listening during the visit with  DASH Rivas for chart documentation. 1/9/2025  10:16 CST

## 2025-01-09 NOTE — ASSESSMENT & PLAN NOTE
Byron NATIVIDAD ZazuetaAidan  reports that he quit smoking about 10 years ago. His smoking use included cigarettes. He started smoking about 50 years ago. He has a 80 pack-year smoking history. He has been exposed to tobacco smoke. He has never used smokeless tobacco. I have educated him on the risk of diseases from using tobacco products such as cancer, COPD, and heart disease.     The patient will complete surveillance CTs regarding aortic aneurysm. This will suffice for low-dose lung cancer screening monitoring.

## 2025-01-09 NOTE — ASSESSMENT & PLAN NOTE
I have personally reviewed CTA of the chest performed 1/9/25 and compared to CTA of the chest performed 5/13/24 and the following is my interpretation:  Mid ascending aorta measures 3.9 cm in maxium dimension, the distal ascending/proximal arch measures 3.3 cm in maximum dimension, aortic root measures 3.7 cm in maximum dimension.   No evidence of IMH, DESMOND, or dissection. Will review with Dr. Flynn and notify patient by phone of any changes.     We discussed the natural course history of aortic aneurysmal disease. We discussed the specific size of aneurysm today and potential risk of aortic complications. We discussed the operative treatment of aneursymal disease broadly. We discussed the recommendation to plan surveillance with CT scans. We discussed signs and symptoms of acute aortic pathology and the need to present to the emergency department for further evaluation. Lastly, we discussed the value of exercise while being mindful of a known aneurysm and the potential risk that high intensity, isometric, or valsalva type exercises presents.   Potential medical therapy including the use of a beta-blocker and perhaps other agents to accomplish strict control of pressure were discussed.     Obtain f/u CTA chest in 1 year.          98.1

## 2025-01-21 ENCOUNTER — HOSPITAL ENCOUNTER (OUTPATIENT)
Dept: CT IMAGING | Facility: HOSPITAL | Age: 70
Discharge: HOME | End: 2025-01-21
Payer: MEDICARE

## 2025-01-21 DIAGNOSIS — R91.1 LUNG NODULE: Chronic | ICD-10-CM

## 2025-01-23 ENCOUNTER — HOSPITAL ENCOUNTER (OUTPATIENT)
Dept: CARDIOLOGY | Facility: HOSPITAL | Age: 70
Discharge: HOME | End: 2025-01-23
Payer: MEDICARE

## 2025-01-23 ENCOUNTER — HOSPITAL ENCOUNTER (OUTPATIENT)
Dept: CT IMAGING | Facility: HOSPITAL | Age: 70
Discharge: HOME | End: 2025-01-23
Payer: MEDICARE

## 2025-01-23 VITALS
WEIGHT: 216 LBS | DIASTOLIC BLOOD PRESSURE: 78 MMHG | SYSTOLIC BLOOD PRESSURE: 132 MMHG | OXYGEN SATURATION: 94 % | HEART RATE: 73 BPM | BODY MASS INDEX: 34.88 KG/M2

## 2025-01-23 DIAGNOSIS — I50.812 CHRONIC RIGHT HEART FAILURE: Primary | ICD-10-CM

## 2025-01-23 DIAGNOSIS — E78.5 HYPERLIPIDEMIA, UNSPECIFIED HYPERLIPIDEMIA TYPE: ICD-10-CM

## 2025-01-23 DIAGNOSIS — I10 ESSENTIAL HYPERTENSION: ICD-10-CM

## 2025-01-23 DIAGNOSIS — I25.10 CORONARY ARTERY DISEASE INVOLVING NATIVE CORONARY ARTERY OF NATIVE HEART, UNSPECIFIED WHETHER ANGINA PRESENT: ICD-10-CM

## 2025-01-23 LAB
ANION GAP SERPL CALCULATED.3IONS-SCNC: 9 MMOL/L (ref 5–15)
BUN SERPL-MCNC: 11 MG/DL (ref 8–23)
BUN/CREAT SERPL: 12.9 (ref 7–25)
CALCIUM SPEC-SCNC: 9.1 MG/DL (ref 8.6–10.5)
CHLORIDE SERPL-SCNC: 97 MMOL/L (ref 98–107)
CO2 SERPL-SCNC: 32 MMOL/L (ref 22–29)
CREAT SERPL-MCNC: 0.85 MG/DL (ref 0.76–1.27)
EGFRCR SERPLBLD CKD-EPI 2021: 94.1 ML/MIN/1.73
GLUCOSE SERPL-MCNC: 126 MG/DL (ref 65–99)
POTASSIUM SERPL-SCNC: 4.1 MMOL/L (ref 3.5–5.2)
SODIUM SERPL-SCNC: 138 MMOL/L (ref 136–145)

## 2025-01-23 PROCEDURE — A9552 F18 FDG: HCPCS | Performed by: NURSE PRACTITIONER

## 2025-01-23 PROCEDURE — 34310000005 FLUDEOXYGLUCOSE F18 SOLUTION: Performed by: NURSE PRACTITIONER

## 2025-01-23 PROCEDURE — 78815 PET IMAGE W/CT SKULL-THIGH: CPT

## 2025-01-23 PROCEDURE — 80048 BASIC METABOLIC PNL TOTAL CA: CPT | Performed by: NURSE PRACTITIONER

## 2025-01-23 PROCEDURE — G0463 HOSPITAL OUTPT CLINIC VISIT: HCPCS | Performed by: NURSE PRACTITIONER

## 2025-01-23 RX ADMIN — FLUDEOXYGLUCOSE F18 1 DOSE: 300 INJECTION INTRAVENOUS at 09:36

## 2025-01-23 NOTE — PROGRESS NOTES
Anita Reza, DASH  [facility]  [address]   [address]            [place] [address]  Phone: [phone]  Fax: [phone]          Chief Complaint  Lung Nodule (Patient is here for follow up w/PET Scan)    Tracey Bermduez presents to [facility] CARDIOTH[facility]GERY for appointment.    History of Present Illness  The patient presents for evaluation of lung nodules. He is accompanied by his daughter.    He reports a persistent cough, which he attributes to a recent respiratory infection. He mentions a decrease in energy levels over the past 2 to 3 days. He states that he is feeling some better today. He denies fever, chills, or purulent sputum production.     He has an upcoming appointment with Dr. Koch scheduled for March. He expresses concern about potential growth of scar tissue and questions the necessity of radiation treatment. He recalls undergoing radiation therapy at a heart center approximately 3 years ago, prior to his hip replacement surgery. He does not have a living will or advanced directive in place but has established a power of .    PET scan reviewed with patient and his daughter as noted below.     Objective   Past Medical History:   Diagnosis Date    Anemia     Anxiety     Bell's palsy     COPD (chronic obstructive pulmonary disease)     Costochondritis     De Quervain's tenosynovitis     Diverticulosis     GERD (gastroesophageal reflux disease)     Glaucoma     Goiter     History of adenomatous polyp of colon     Hyperglycemia     Hyperlipidemia     Hypertension     Hypoxia     Lung neoplasm     Nocturia     Oxygen dependent     Pneumonia     Respiratory failure    ,   Past Surgical History:   Procedure Laterality Date    BACK SURGERY      COLONOSCOPY  01/26/2009    6 polyps, adenomatous, hyperplastic, diverticulosis    ENDOSCOPY      ENDOSCOPY N/A 03/21/2019    Procedure: ESOPHAGOGASTRODUODENOSCOPY WITH ANESTHESIA;  Surgeon:  "Srinivasan Mathews MD;  Location: [facility] ENDOSCOPY;  Service: Gastroenterology    KNEE SURGERY      TOTAL HIP ARTHROPLASTY Left 10/2021    Dr. Boswell    TOTAL HIP ARTHROPLASTY Right 10/2022    right   ,   Family History   Problem Relation Age of Onset    Colon cancer Neg Hx     Colon polyps Neg Hx    ,   Social History     Tobacco Use    Smoking status: Former     Current packs/day: 0.00     Average packs/day: 2.0 packs/day for 40.0 years (80.0 ttl pk-yrs)     Types: Cigarettes     Start date: 1975     Quit date: 2015     Years since quitting: 10.0     Passive exposure: Current    Smokeless tobacco: Never   Vaping Use    Vaping status: Never Used   Substance Use Topics    Alcohol use: No    Drug use: No   , (Not in a hospital admission)  , Allergies: Gabapentin and Lyrica [pregabalin]    Vital Signs:   /85   Pulse 83   Ht 167.6 cm (65.98\")   Wt 95.6 kg (210 lb 12.8 oz)   SpO2 90%   BMI 34.04 kg/m²        Physical Exam  Vitals reviewed.   Constitutional:       General: He is not in acute distress.     Appearance: He is well-developed. He is obese.      Interventions: Nasal cannula in place.   HENT:      Head: Normocephalic and atraumatic.   Eyes:      General: No scleral icterus.     Conjunctiva/sclera: Conjunctivae normal.      Pupils: Pupils are equal, round, and reactive to light.   Cardiovascular:      Rate and Rhythm: Normal rate.   Pulmonary:      Effort: Pulmonary effort is normal. No respiratory distress.      Breath sounds: Wheezing (faint) present. No rales.   Musculoskeletal:         General: Normal range of motion.      Cervical back: Normal range of motion and neck supple.   Skin:     General: Skin is warm and dry.   Neurological:      Mental Status: He is alert and oriented to person, place, and time.   Psychiatric:         Behavior: Behavior normal.         Thought Content: Thought content normal.         Judgment: Judgment normal.          Result Review :  The following data was reviewed by: " "Anita Jimenezr, APRN on 01/28/2025:    NM PET/CT Skull Base to Mid Thigh (01/23/2025 11:16)   IMPRESSION:  1. Previously described foci of nodularity including the newly described  nodule in the medial left upper lobe as well as the nodule with  irregular margins in the right upper lobe demonstrate uptake of the  radiopharmaceutical but with low SUVs. Benignity would be favored but  continued follow-up would be suggested. There is also a subcarinal node  within the middle mediastinum which demonstrates mildly elevated  metabolic activity as described above. This lymph node is stable from a  more remote CT of 2021 and I would favor is benign in etiology.  2. Mild symmetric uptake within the palatine tonsils may be related to  chronic inflammatory changes. There are also mildly prominent proximal  jugular chain lymph nodes bilaterally including a node within the level  2 anam group on the right demonstrating mild metabolic activity. Given  the symmetry of the finding and relatively low SUV I would favor a  reactive node. These could be followed up with subsequent imaging.  3. Constipation. There is diverticulosis of the descending and sigmoid  colon with no diverticulitis. There are multiple cortical cysts of both  kidneys.    CT Angiogram Chest (01/09/2025 08:29)   IMPRESSION:  1. A spiculated nodule or scar in the right upper lobe is slightly  larger on the current study. A small neoplasm is in the differential.  Follow-up PET/CT recommended. This was marked as \"lung finding\" in PACS  for follow-up.  2. New 4 mm left upper lobe nodule image 30 series 7. Imaging follow-up  is recommended.  3. Atheromatous disease of the thoracic aorta. Mild coronary artery  calcification. Ectasia of the ascending aorta measuring 3.9 cm. No  evidence of dissection. Main pulmonary artery segment is dilated at 3.3  cm suggesting pulmonary hypertension.  4. Centrilobular and paraseptal emphysema.  5. A 5 cm upper pole renal cyst on " the left. Degenerative changes of the  spine and shoulders.      PFT Values          8/1/2023    10:45 10/13/2023    09:15   Pre Drug PFT Results   FVC 56 53   FEV1 42 39   FEF 25-75% 25 23   FEV1/FVC 59 57   Other Tests PFT Results   DLCO  48   D/VAsb  72       Results for orders placed in visit on 10/13/23    Spirometry with Diffusion Capacity    Narrative  Spirometry with Diffusion Capacity    Performed by: Jennifer Quarles RRT  Authorized by: Anita Reza APRN  Pre Drug % Predicted  FVC: 53%  FEV1: 39%  FEF 25-75%: 23%  FEV1/FVC: 57%  DLCO: 48%  D/VAsb: 72%    Interpretation  Spirometry  Spirometry shows severe obstruction. There is reduced midflow suggesting small airway/airflow obstruction.  Review of FVL curve  Patient's effort is normal.  Diffusion Capacity  The patient's diffusion capacity is severely reduced.  Diffusion capacity is mildly reduced when corrected for alveolar volume.      Results for orders placed in visit on 08/01/23    Spirometry    Narrative  Spirometry  Performed by: Jennifer Quarles RRT  Authorized by: Anita Reza APRN    Pre Drug % Predicted  FVC: 56%  FEV1: 42%  FEF 25-75%: 25%  FEV1/FVC: 59%      Results for orders placed in visit on 09/30/22    Pulmonary Function Test    Narrative  Pulmonary Function Test  Performed by: Jennifer Quarles RRT  Authorized by: Anita Reza APRN    Pre Drug % Predicted  FVC: 53%  FEV1: 40%  FEF 25-75%: 22%  FEV1/FVC: 58%  DLCO: 51%  D/VAsb: 73%    Interpretation  Spirometry  Spirometry shows severe obstruction. There is reduced midflow suggesting small airway/airflow obstruction.  Diffusion Capacity  The patient's diffusion capacity is moderately reduced.  Diffusion capacity is mildly reduced when corrected for alveolar volume.             Assessment and Plan  Diagnoses and all orders for this visit:    1. Lung nodule (Primary)  Overview:  11 mm spiculated nodule or scarring in the right upper lobe,  identified 8/6/16, stable 6/8/21    CT Chest Low Dose Cancer Screening WO (05/13/2024 12:37)  There is an area of spiculation in the right upper lobe centered within probable scar tissue. The nodular component of the spiculation is unchanged, measuring approximately 13 mm.     CT Angiogram Chest (01/09/2025 08:29)  LUNG NODULES:  1. New 4 mm left upper lobe nodule medially image 30 series 7.  2. A 1.5 cm spiculated lesion or scar in the right upper lobe image 60  series 7 measures 1.4 cm in 2024 and measures about 1.2 cm in 2021. This  appears to be slowly increasing in size. PET/CT recommended to further  assess this lesion.  3. A 3 mm probable fissural lymph node along the right minor fissure  image 79 series 7 and image 75 series 900.  4. Stable 4-5 mm left lower lobe perivascular nodule image 121 series 7.    CT Angiogram Chest (01/09/2025 08:29)  LUNG NODULES:  1. New 4 mm left upper lobe nodule medially image 30 series 7.  2. A 1.5 cm spiculated lesion or scar in the right upper lobe image 60  series 7 measures 1.4 cm in 2024 and measures about 1.2 cm in 2021. This  appears to be slowly increasing in size. PET/CT recommended to further  assess this lesion.  3. A 3 mm probable fissural lymph node along the right minor fissure  image 79 series 7 and image 75 series 900.  4. Stable 4-5 mm left lower lobe perivascular nodule image 121 series 7.    NM PET/CT Skull Base to Mid Thigh (01/23/2025 11:16)   IMPRESSION:  1. Previously described foci of nodularity including the newly described  nodule in the medial left upper lobe as well as the nodule with  irregular margins in the right upper lobe demonstrate uptake of the  radiopharmaceutical but with low SUVs. Benignity would be favored but  continued follow-up would be suggested. There is also a subcarinal node  within the middle mediastinum which demonstrates mildly elevated  metabolic activity as described above. This lymph node is stable from a  more remote CT of  2021 and I would favor is benign in etiology.  2. Mild symmetric uptake within the palatine tonsils may be related to  chronic inflammatory changes. There are also mildly prominent proximal  jugular chain lymph nodes bilaterally including a node within the level  2 anam group on the right demonstrating mild metabolic activity. Given  the symmetry of the finding and relatively low SUV I would favor a  reactive node. These could be followed up with subsequent imaging.  3. Constipation. There is diverticulosis of the descending and sigmoid  colon with no diverticulitis. There are multiple cortical cysts of both  kidneys.       Assessment & Plan:  PET scan reviewed from 1/23/25 with patient and daughter. Overall, there was low SUV uptake in the medial left upper lobe, right upper lobe, subcarinal node, palatine tonsils, jugular chain lymph nodes bilaterally.      Options for further evaluation were discussed with patient including possible robotic ION bronchoscopy, possible SBRT, or f/u CT in 3 months. The patient is a poor surgical candidate given his known lung disease, chronic respiratory failure, heart failure, and aortic aneurysm. He and his daughter would like to have f/u CT in 3 months. Order was placed.     Orders:  -     CT Chest Without Contrast; Future    2. Aneurysm of ascending aorta without rupture  Overview:  Per Dr. Flynn 6/27/24 mid ascending aortic dilation measures to be 4.1.    OV 1/9/25 - Mid ascending aorta measures 3.9 cm in maxium dimension, the distal ascending/proximal arch measures 3.3 cm in maximum dimension, aortic root measures 3.7 cm in maximum dimension.   No evidence of IMH, DESMOND, or dissection.         Assessment & Plan:  Continue to monitor yearly.       3. Personal history of nicotine dependence  Overview:  Former cigaretter smoker, quit 2015.    Assessment & Plan:  Byron Bermudez  reports that he quit smoking about 10 years ago. His smoking use included cigarettes. He started smoking  about 50 years ago. He has a 80 pack-year smoking history. He has been exposed to tobacco smoke. He has never used smokeless tobacco. I have educated him on the risk of diseases from using tobacco products such as cancer, COPD, and heart disease.     The patient will complete surveillance CTs regarding aortic aneurysm. This will suffice for low-dose lung cancer screening monitoring.                    Advance Care Planning   ACP discussion was held with the patient during this visit. Patient does not have an advance directive, declines further assistance.   He does not have a living will or advanced directive in place but has established a power of .    Follow Up  DASH Rivas  1/28/2025  09:50 CST    Return in about 3 months (around 5/5/2025) for CT, with Anita ESQUIVEL.    Patient was given instructions and counseling regarding his condition or for health maintenance advice. Please see specific information pulled into the AVS if appropriate.     Please note that portions of this note were completed with a voice recognition program.    Patient or patient representative verbalized consent for the use of Ambient Listening during the visit with  DASH Rivas for chart documentation. 1/28/2025  09:40 CST

## 2025-01-23 NOTE — PROGRESS NOTES
Heart Failure Clinic Progress Note  Reason For Visit:  CHF    Subjective    Byron Bermudez is a 69 y.o. male with the below pertinent PMH who presents for follow-up of CHF.    Byron Bermudez was most recently seen in the heart failure clinic on 10/22/2024.  At that time he was doing reasonably well.  He continues to note some shortness of breath on exertion but felt that was overall stable.  He denied any other associated symptoms.  He had tolerated Jardiance and was down 6 pounds at that visit.  He was euvolemic on exam and no changes were made to his therapy.    Reports that he has continued to do well.  He continues with dyspnea on exertion that he feels is at his baseline.  He also continues to note that he has some on and off lower extremity edema that he states will get better/worse on and off throughout the day depending on his activity.  He denies any lightheadedness, dizziness, or any significant amounts of longstanding peripheral edema.  He denies any chest pain.  His weight today is up 3 pounds since last visit.    Review of Systems   Constitutional: Negative for malaise/fatigue.   Cardiovascular:  Positive for dyspnea on exertion. Negative for chest pain, leg swelling, orthopnea, palpitations and syncope.   Respiratory:  Negative for sleep disturbances due to breathing.    Neurological:  Negative for dizziness and light-headedness.     Cardiac Studies  Echo 4/20/2021: LVEF 61 to 65%, RV mildly dilated with normal systolic function, normal biatrial size, no hemodynamically significant valve abnormalities, normal RVSP  Echo 6/4/2024: On my personal review of the echo colon normal LV systolic function, normal LV size/thickness, GLS likely inaccurate due to suboptimal myocardial tracking, RV is mildly dilated with normal systolic function, normal LA size, RA is mildly dilated, there is insufficient TR jet for adequate RVSP assessment, there is a prominent epicardial fat pad with no clear pericardial  effusion (this is consistent with CT chest from 5/13/2024 showing no pericardial effusion but prominent fat pad).    Noncontrast CT chest from 5/13/2024: Mild dilation of the ascending aorta measuring 4-4.1 cm.  There are calcifications in the LAD.  No pericardial effusion or other significant cardiac findings.  Lexiscan myocardial perfusion SPECT 8/15/2024: Low risk study.  No myocardial perfusion evidence of significant ischemia.  Normal LV systolic function.     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    Current Outpatient Medications   Medication Instructions    amitriptyline (ELAVIL) 50 mg, Every Night at Bedtime    aspirin 81 MG tablet Take  by mouth.    atorvastatin (LIPITOR) 40 mg, Every Night at Bedtime    bisoprolol (ZEBETA) 5 mg, Daily    empagliflozin (JARDIANCE) 10 mg, Oral, Daily    FeroSul 325 mg, Daily With Breakfast    furosemide (LASIX) 20 mg, Daily    ipratropium (ATROVENT) 0.5 mg, Nebulization, 4 Times Daily PRN    ipratropium-albuterol (DUO-NEB) 0.5-2.5 mg/3 ml nebulizer 3 mL, Nebulization, Every 4 to 6 Hours PRN    lisinopril (PRINIVIL,ZESTRIL) 40 mg, Daily    metFORMIN (GLUCOPHAGE) 500 MG tablet TAKE ONE TABLET BY MOUTH TWICE DAILY WITH MEALS FOR DIABETES    omeprazole (PRILOSEC) 40 mg, Daily    oxyCODONE (ROXICODONE) 15 mg, Every 6 Hours PRN    tiZANidine (ZANAFLEX) 4 MG tablet TAKE 1 TABLET BY MOUTH EVERY 8 HOURS AS NEEDED NO MORE THAN 3 DOSES IN 24 HOURS        Objective   Vital Signs:  /78 (BP Location: Left arm, Patient Position: Sitting)   Pulse 73   Wt 98 kg (216 lb)   SpO2 94% Comment: 3L O2 via NC  BMI 34.88 kg/m²   Estimated body mass index is 34.88 kg/m² as calculated from the following:    Height as of 1/9/25: 167.6 cm  "(65.98\").    Weight as of this encounter: 98 kg (216 lb).    Neck:      Vascular: No JVD.   Pulmonary:      Effort: Pulmonary effort is normal.      Breath sounds: Examination of the right-lower field reveals decreased breath sounds. Examination of the left-lower field reveals decreased breath sounds.   Cardiovascular:      Normal rate. Regular rhythm. Normal S1. Normal S2.       Murmurs: There is no murmur.      No gallop.  No click. No rub.   Pulses:     Intact distal pulses.   Edema:     Peripheral edema absent.   Abdominal:      Palpations: Abdomen is soft.      Tenderness: There is no abdominal tenderness.   Skin:     General: Skin is warm and dry.   Neurological:      General: No focal deficit present.      Mental Status: Alert and oriented to person, place and time.   Psychiatric:         Behavior: Behavior is cooperative.        The following data was reviewed by myself, DASH Slater  BMP   BMP          10/22/2024    08:43 1/9/2025    08:19 1/23/2025    08:17   BMP   BUN 15   11    Creatinine 1.06  1.20  0.85    Sodium 135   138    Potassium 4.1   4.1    Chloride 93   97    CO2 32.0   32.0    Calcium 9.1   9.1      ReDS No results found for: \"ABSOLUTELUNG\"    Results for orders placed during the hospital encounter of 05/31/24    Adult Transthoracic Echo Complete W/ Cont if Necessary Per Protocol    Interpretation Summary    Left ventricular systolic function is normal. Left ventricular ejection fraction appears to be 66 - 70%.    Normal global longitudinal LV strain (GLS) = -17.0%    The right ventricular cavity is mildly dilated. Normal right ventricular systolic function noted.    No evidence of pulmonary hypertension is present.    No hemodynamically significant valvular disease.    Mild dilation of the ascending aorta is present.    There is a small (<1cm) pericardial effusion. There is no evidence of cardiac tamponade.       Assessment   Assessment and Plan  Diagnoses and all orders for this " visit:    1. Chronic right heart failure (Primary)  2. Coronary artery disease involving native coronary artery of native heart, unspecified whether angina present  3. Hyperlipidemia, unspecified hyperlipidemia type  4. Essential hypertension  Appears to be at baseline with regard to his heart failure.  He appears euvolemic on examination.  Labs are overall stable without any new concerns.  - Jardiance 10 mg daily  - Lasix 20 mg daily  - Lisinopril 40 mg daily  - Bisoprolol 5 mg daily  - Amlodipine 5 mg daily  - Atorvastatin 40 mg daily  - Aspirin 81 mg daily       Follow Up:  Return in about 3 months (around 4/23/2025) for recheck.    Patient was given instructions and counseling regarding his condition or for health maintenance advice.      Aamir Hernandez, APRN  01/23/25  08:58 CST

## 2025-01-28 ENCOUNTER — OFFICE VISIT (OUTPATIENT)
Dept: CARDIAC SURGERY | Facility: CLINIC | Age: 70
End: 2025-01-28
Payer: MEDICARE

## 2025-01-28 VITALS
WEIGHT: 210.8 LBS | SYSTOLIC BLOOD PRESSURE: 120 MMHG | BODY MASS INDEX: 33.88 KG/M2 | DIASTOLIC BLOOD PRESSURE: 85 MMHG | OXYGEN SATURATION: 90 % | HEIGHT: 66 IN | HEART RATE: 83 BPM

## 2025-01-28 DIAGNOSIS — I71.21 ANEURYSM OF ASCENDING AORTA WITHOUT RUPTURE: Chronic | ICD-10-CM

## 2025-01-28 DIAGNOSIS — R91.1 LUNG NODULE: Primary | Chronic | ICD-10-CM

## 2025-01-28 DIAGNOSIS — Z87.891 PERSONAL HISTORY OF NICOTINE DEPENDENCE: Chronic | ICD-10-CM

## 2025-01-28 PROCEDURE — 1160F RVW MEDS BY RX/DR IN RCRD: CPT | Performed by: NURSE PRACTITIONER

## 2025-01-28 PROCEDURE — 99214 OFFICE O/P EST MOD 30 MIN: CPT | Performed by: NURSE PRACTITIONER

## 2025-01-28 PROCEDURE — 1159F MED LIST DOCD IN RCRD: CPT | Performed by: NURSE PRACTITIONER

## 2025-01-28 NOTE — PATIENT INSTRUCTIONS
Obesity, Adult  Obesity is having too much body fat. Being obese means that your weight is more than what is healthy for you.   BMI (body mass index) is a number that explains how much body fat you have. If you have a BMI of 30 or more, you are obese.  Obesity can cause serious health problems, such as:  Stroke.  Coronary artery disease (CAD).  Type 2 diabetes.  Some types of cancer.  High blood pressure (hypertension).  High cholesterol.  Gallbladder stones.  Obesity can also contribute to:  Osteoarthritis.  Sleep apnea.  Infertility problems.  What are the causes?  Eating meals each day that are high in calories, sugar, and fat.  Drinking a lot of drinks that have sugar in them.  Being born with genes that may make you more likely to become obese.  Having a medical condition that causes obesity.  Taking certain medicines.  Sitting a lot (having a sedentary lifestyle).  Not getting enough sleep.  What increases the risk?  Having a family history of obesity.  Living in an area with limited access to:  [place], recreation centers, or sidewalks.  Healthy food choices, such as grocery stores and farmers' markets.  What are the signs or symptoms?  The main sign is having too much body fat.  How is this treated?  Treatment for this condition often includes changing your lifestyle. Treatment may include:  Changing your diet. This may include making a healthy meal plan.  Exercise. This may include activity that causes your heart to beat faster (aerobic exercise) and strength training. Work with your doctor to design a program that works for you.  Medicine to help you lose weight. This may be used if you are not able to lose one pound a week after 6 weeks of healthy eating and more exercise.  Treating conditions that cause the obesity.  Surgery. Options may include gastric banding and gastric bypass. This may be done if:  Other treatments have not helped to improve your condition.  You have a BMI of 40 or higher.  You have  life-threatening health problems related to obesity.  Follow these instructions at home:  Eating and drinking    Follow advice from your doctor about what to eat and drink. Your doctor may tell you to:  Limit fast food, sweets, and processed snack foods.  Choose low-fat options. For example, choose low-fat milk instead of whole milk.  Eat five or more servings of fruits or vegetables each day.  Eat at home more often. This gives you more control over what you eat.  Choose healthy foods when you eat out.  Learn to read food labels. This will help you learn how much food is in one serving.  Keep low-fat snacks available.  Avoid drinks that have a lot of sugar in them. These include soda, fruit juice, iced tea with sugar, and flavored milk.  Drink enough water to keep your pee (urine) pale yellow.  Do not go on fad diets.  Physical activity  Exercise often, as told by your doctor. Most adults should get up to 150 minutes of moderate-intensity exercise every week.Ask your doctor:  What types of exercise are safe for you.  How often you should exercise.  Warm up and stretch before being active.  Do slow stretching after being active (cool down).  Rest between times of being active.  Lifestyle  Work with your doctor and a food expert (dietitian) to set a weight-loss goal that is best for you.  Limit your screen time.  Find ways to reward yourself that do not involve food.  Do not drink alcohol if:  Your doctor tells you not to drink.  You are pregnant, may be pregnant, or are planning to become pregnant.  If you drink alcohol:  Limit how much you have to:  0-1 drink a day for women.  0-2 drinks a day for men.  Know how much alcohol is in your drink. In the [place] one drink equals one 12 oz bottle of beer (355 mL), one 5 oz glass of wine (148 mL), or one 1½ oz glass of hard liquor (44 mL).  General instructions  Keep a weight-loss journal. This can help you keep track of:  The food that you eat.  How much exercise you  get.  Take over-the-counter and prescription medicines only as told by your doctor.  Take vitamins and supplements only as told by your doctor.  Think about joining a support group.  Pay attention to your mental health as obesity can lead to depression or self esteem issues.  Keep all follow-up visits.  Contact a doctor if:  You cannot meet your weight-loss goal after you have changed your diet and lifestyle for 6 weeks.  You are having trouble breathing.  Summary  Obesity is having too much body fat.  Being obese means that your weight is more than what is healthy for you.  Work with your doctor to set a weight-loss goal.  Get regular exercise as told by your doctor.  This information is not intended to replace advice given to you by your health care provider. Make sure you discuss any questions you have with your health care provider.  Document Revised: 07/26/2022 Document Reviewed: 07/26/2022  Elsevier Patient Education © 2024 [employer]

## 2025-01-28 NOTE — ASSESSMENT & PLAN NOTE
PET scan reviewed from 1/23/25 with patient and daughter. Overall, there was low SUV uptake in the medial left upper lobe, right upper lobe, subcarinal node, palatine tonsils, jugular chain lymph nodes bilaterally.      Options for further evaluation were discussed with patient including possible robotic ION bronchoscopy, possible SBRT, or f/u CT in 3 months. The patient is a poor surgical candidate given his known lung disease, chronic respiratory failure, heart failure, and aortic aneurysm. He and his daughter would like to have f/u CT in 3 months. Order was placed.

## 2025-03-19 ENCOUNTER — OFFICE VISIT (OUTPATIENT)
Dept: PULMONOLOGY | Facility: CLINIC | Age: 70
End: 2025-03-19
Payer: MEDICARE

## 2025-03-19 VITALS
DIASTOLIC BLOOD PRESSURE: 78 MMHG | HEIGHT: 66 IN | OXYGEN SATURATION: 92 % | HEART RATE: 107 BPM | BODY MASS INDEX: 34.46 KG/M2 | SYSTOLIC BLOOD PRESSURE: 142 MMHG | WEIGHT: 214.4 LBS

## 2025-03-19 DIAGNOSIS — R91.1 LUNG NODULE: Chronic | ICD-10-CM

## 2025-03-19 DIAGNOSIS — J98.4 SCARRING OF LUNG: Chronic | ICD-10-CM

## 2025-03-19 DIAGNOSIS — J44.9 STAGE 3 SEVERE COPD BY GOLD CLASSIFICATION: Chronic | ICD-10-CM

## 2025-03-19 DIAGNOSIS — J96.11 CHRONIC RESPIRATORY FAILURE WITH HYPOXIA: Primary | Chronic | ICD-10-CM

## 2025-03-19 DIAGNOSIS — E66.9 OBESITY (BMI 30-39.9): Chronic | ICD-10-CM

## 2025-03-19 DIAGNOSIS — G47.33 OBSTRUCTIVE SLEEP APNEA: Chronic | ICD-10-CM

## 2025-03-19 DIAGNOSIS — Z86.16 PERSONAL HISTORY OF COVID-19: Chronic | ICD-10-CM

## 2025-03-19 DIAGNOSIS — Z87.891 PERSONAL HISTORY OF NICOTINE DEPENDENCE: Chronic | ICD-10-CM

## 2025-03-19 NOTE — ASSESSMENT & PLAN NOTE
Byron Bermudez  reports that he quit smoking about 10 years ago. His smoking use included cigarettes. He started smoking about 50 years ago. He has a 80 pack-year smoking history. He has been exposed to tobacco smoke. He has never used smokeless tobacco.

## 2025-03-19 NOTE — PATIENT INSTRUCTIONS
The patient is being followed by Anita Reza at thoracic surgery regarding his lung nodule and is scheduled for a follow-up CAT scan in late April.  He did inquire about possible black lung evaluation I told him we could see about getting a B reader chest x-ray interpretation through Dr. Alva at [facility], operated by [facility] and if he wanted to pursue it further after that report I would recommend complete pulmonary functions at [facility] and then referral to an occupational medicine specialist.  Otherwise I will see him back in 4 months.

## 2025-03-19 NOTE — ASSESSMENT & PLAN NOTE
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  We can see about getting a B reader interpretation of a chest x-ray through Dr. Alva at [facility] and we can also get complete PFTs including body box lung volumes at [facility] and based on those studies we could consider referring him to an occupational medicine specialist.  I told him we will see about getting the x-ray ordered through [facility] first before pursuing any additional workup.

## 2025-03-19 NOTE — ASSESSMENT & PLAN NOTE
Patient's (Body mass index is 34.62 kg/m².) indicates that they are obese (BMI >30) with health conditions that include obstructive sleep apnea . Weight is unchanged.  Diet and exercise are encouraged he will follow-up with his primary care physician regarding his elevated BMI otherwise.

## 2025-03-19 NOTE — PROGRESS NOTES
Chief Complaint  Chronic respiratory failure with hypoxia and COPD    Subjective    History of Present Illness {CC  Problem List  Visit Diagnosis   Encounters  Notes  Medications  Labs  Result Review Imaging  Media: 23}    yBron Bermudez presents to [facility] for COPD and chronic respiratory failure with hypoxia.    History of Present Illness   Patient is accompanied by his daughter today.  He is being followed by Anita Reza thoracic surgery regarding a lung nodule and the plans are just to continue serial imaging studies at this time.  I reviewed his most recent CT with him.  He inquired about any possible component of coal workers pneumoconiosis and he does have a few areas of focal scarring but I do see anything to definitely suggest a pneumoconiosis picture.  He did have some cystic changes consistent with emphysema and again I reviewed the scan with him and his daughter.  He did state he like to pursue a coal workers pneumoconiosis evaluation I told him we will see about getting a B reader evaluation through Dr. Artie Martinez and if that report indicates the possibility of pneumoconiosis we can get complete PFTs with and without bronchodilators at [facility] and then the next step would be referral to an occupational medicine specialist and I advised him that likely would be somewhere in either [place] or [place] or some other larger [facility].  Otherwise he will continue his oxygen therapy and his inhaled medications.  He has had the COVID-19 vaccine including a standard booster in the form of the Moderna vaccine and a bivalent booster in the form of the Pfizer vaccine.  He has had the Prevnar 20, the Pneumovax, and the RSV vaccine in the past as well.Byron Bermudez  reports that he quit smoking about 10 years ago. His smoking use included cigarettes. He started smoking about 50 years ago. He has a 80 pack-year smoking history. He has  been exposed to tobacco smoke. He has never used smokeless tobacco.           Current Outpatient Medications:     amitriptyline (ELAVIL) 50 MG tablet, Take 1 tablet by mouth every night at bedtime., Disp: , Rfl: 1    aspirin 81 MG tablet, Take  by mouth., Disp: , Rfl:     atorvastatin (LIPITOR) 40 MG tablet, Take 1 tablet by mouth every night at bedtime., Disp: , Rfl: 0    bisoprolol (ZEBeta) 5 MG tablet, Take 1 tablet by mouth Daily., Disp: , Rfl:     empagliflozin (Jardiance) 10 MG tablet tablet, Take 1 tablet by mouth Daily., Disp: 30 tablet, Rfl: 11    FeroSul 325 (65 Fe) MG tablet, Take 1 tablet by mouth Daily With Breakfast., Disp: , Rfl:     furosemide (LASIX) 20 MG tablet, Take 1 tablet by mouth Daily., Disp: , Rfl:     ipratropium (ATROVENT) 0.02 % nebulizer solution, Take 2.5 mL by nebulization 4 (Four) Times a Day As Needed for Wheezing or Shortness of Air., Disp: 37.5 mL, Rfl: 11    ipratropium-albuterol (DUO-NEB) 0.5-2.5 mg/3 ml nebulizer, Take 3 mL by nebulization Every 4 (Four) to 6 (Six) Hours As Needed for Wheezing or Shortness of Air., Disp: 120 mL, Rfl: 11    lisinopril (PRINIVIL,ZESTRIL) 40 MG tablet, Take 1 tablet by mouth Daily., Disp: , Rfl: 1    metFORMIN (GLUCOPHAGE) 500 MG tablet, TAKE ONE TABLET BY MOUTH TWICE DAILY WITH MEALS FOR DIABETES, Disp: , Rfl:     omeprazole (priLOSEC) 40 MG capsule, Take 1 capsule by mouth Daily., Disp: , Rfl: 1    oxyCODONE (ROXICODONE) 15 MG immediate release tablet, Take 1 tablet by mouth Every 6 (Six) Hours As Needed for Moderate Pain or Severe Pain., Disp: , Rfl:     tiZANidine (ZANAFLEX) 4 MG tablet, TAKE 1 TABLET BY MOUTH EVERY 8 HOURS AS NEEDED NO MORE THAN 3 DOSES IN 24 HOURS, Disp: , Rfl: 3    Social History     Socioeconomic History    Marital status:    Tobacco Use    Smoking status: Former     Current packs/day: 0.00     Average packs/day: 2.0 packs/day for 40.0 years (80.0 ttl pk-yrs)     Types: Cigarettes     Start date: 1975     Quit date:  "2015     Years since quitting: 10.2     Passive exposure: Current    Smokeless tobacco: Never   Vaping Use    Vaping status: Never Used   Substance and Sexual Activity    Alcohol use: No    Drug use: No    Sexual activity: Defer       Objective   Vital Signs:   /78   Pulse 107   Ht 167.6 cm (65.98\")   Wt 97.3 kg (214 lb 6.4 oz)   SpO2 92% Comment: 3L PD  BMI 34.62 kg/m²     Physical Exam  Vitals and nursing note reviewed.   Constitutional:       Appearance: He is obese.      Comments: Is mildly tachycardic initially with a pulse of 107.  After resting the exam room for a few minutes his pulse was down into the 90s.   HENT:      Head: Normocephalic.      Comments: Nasal oxygen is in place.  Eyes:      Extraocular Movements: Extraocular movements intact.      Pupils: Pupils are equal, round, and reactive to light.   Cardiovascular:      Rate and Rhythm: Regular rhythm. Tachycardia present.      Comments: Again he was initially tachycardic with a pulse of 107 but his pulse came down to the 90s after he been sitting and resting the exam room for a few minutes.  Pulmonary:      Effort: Pulmonary effort is normal.      Comments: Breath sounds are diminished and there are few end expiratory rhonchi noted posteriorly.  Musculoskeletal:         General: Normal range of motion.   Skin:     General: Skin is warm and dry.   Neurological:      General: No focal deficit present.      Mental Status: He is alert and oriented to person, place, and time.   Psychiatric:         Mood and Affect: Mood normal.         Behavior: Behavior normal.        Result Review :    PFT Values          8/1/2023    10:45 10/13/2023    09:15   Pre Drug PFT Results   FVC 56 53   FEV1 42 39   FEF 25-75% 25 23   FEV1/FVC 59 57   Other Tests PFT Results   DLCO  48   D/VAsb  72         Results for orders placed in visit on 10/13/23    Spirometry with Diffusion Capacity    Narrative  Spirometry with Diffusion Capacity    Performed by: Willam" Jennifer WARREN RRT  Authorized by: Anita Reza APRN  Pre Drug % Predicted  FVC: 53%  FEV1: 39%  FEF 25-75%: 23%  FEV1/FVC: 57%  DLCO: 48%  D/VAsb: 72%    Interpretation  Spirometry  Spirometry shows severe obstruction. There is reduced midflow suggesting small airway/airflow obstruction.  Review of FVL curve  Patient's effort is normal.  Diffusion Capacity  The patient's diffusion capacity is severely reduced.  Diffusion capacity is mildly reduced when corrected for alveolar volume.      Results for orders placed in visit on 08/01/23    Spirometry    Narrative  Spirometry  Performed by: Jennifer Quarles RRT  Authorized by: Anita Reza APRN    Pre Drug % Predicted  FVC: 56%  FEV1: 42%  FEF 25-75%: 25%  FEV1/FVC: 59%      Results for orders placed in visit on 09/30/22    Pulmonary Function Test    Narrative  Pulmonary Function Test  Performed by: Jennifer Quarles RRT  Authorized by: Anita Reza APRN    Pre Drug % Predicted  FVC: 53%  FEV1: 40%  FEF 25-75%: 22%  FEV1/FVC: 58%  DLCO: 51%  D/VAsb: 73%    Interpretation  Spirometry  Spirometry shows severe obstruction. There is reduced midflow suggesting small airway/airflow obstruction.  Diffusion Capacity  The patient's diffusion capacity is moderately reduced.  Diffusion capacity is mildly reduced when corrected for alveolar volume.                 My interpretation of imaging:    NM PET/CT Skull Base to Mid Thigh (01/23/2025 11:16)   CT Angiogram Chest (01/09/2025 08:29)     Assessment and Plan {CC Problem List  Visit Diagnosis  ROS  Review (Popup)  [facility] Maintenance  Quality  BestPractice  Medications  SmartSets  SnapShot Encounters  Media : 23}    Diagnoses and all orders for this visit:    1. Chronic respiratory failure with hypoxia (Primary)  Assessment & Plan:  He will continue his oxygen therapy.  He is definitely benefiting from it.      2. Lung nodule  Assessment & Plan:  At this point plans are to just  continue serial imaging studies.      3. Stage 3 severe COPD by GOLD classification  Assessment & Plan:  He will continue his DuoNeb treatments.      4. 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  We can see about getting a B reader interpretation of a chest x-ray through Dr. Alva at [facility] and we can also get complete PFTs including body box lung volumes at [facility] and based on those studies we could consider referring him to an occupational medicine specialist.  I told him we will see about getting the x-ray ordered through [facility] first before pursuing any additional workup.      5. Obstructive sleep apnea  Assessment & Plan:  He currently is not on any sort of CPAP device will continue his oxygen therapy.      6. Personal history of COVID-19  Assessment & Plan:  I do not think he has any significant sequela of his prior COVID-19 infection.      7. Obesity (BMI 30-39.9)  Assessment & Plan:  Patient's (Body mass index is 34.62 kg/m².) indicates that they are obese (BMI >30) with health conditions that include obstructive sleep apnea . Weight is unchanged.  Diet and exercise are encouraged he will follow-up with his primary care physician regarding his elevated BMI otherwise.      8. Personal history of nicotine dependence  Assessment & Plan:  Byron Bermudez  reports that he quit smoking about 10 years ago. His smoking use included cigarettes. He started smoking about 50 years ago. He has a 80 pack-year smoking history. He has been exposed to tobacco smoke. He has never used smokeless tobacco.                   Glynn Koch MD  3/19/2025  10:23 CDT    Follow Up   Return in about 4 months (around 7/19/2025) for To see me specifically.    Patient was given instructions and counseling regarding his condition or for health maintenance advice. Please see specific  information pulled into the AVS if appropriate.

## 2025-03-21 DIAGNOSIS — Z00.00 ADULT GENERAL MEDICAL EXAM: Primary | ICD-10-CM

## 2025-04-06 ENCOUNTER — HOSPITAL ENCOUNTER (EMERGENCY)
Facility: HOSPITAL | Age: 70
Discharge: HOME | End: 2025-04-06
Attending: EMERGENCY MEDICINE | Admitting: EMERGENCY MEDICINE
Payer: MEDICARE

## 2025-04-06 ENCOUNTER — APPOINTMENT (OUTPATIENT)
Dept: GENERAL RADIOLOGY | Facility: HOSPITAL | Age: 70
End: 2025-04-06
Payer: MEDICARE

## 2025-04-06 VITALS
WEIGHT: 212 LBS | OXYGEN SATURATION: 94 % | BODY MASS INDEX: 34.07 KG/M2 | RESPIRATION RATE: 18 BRPM | SYSTOLIC BLOOD PRESSURE: 128 MMHG | DIASTOLIC BLOOD PRESSURE: 85 MMHG | HEART RATE: 90 BPM | HEIGHT: 66 IN | TEMPERATURE: 97.8 F

## 2025-04-06 DIAGNOSIS — M10.9 ACUTE GOUTY ARTHRITIS: Primary | ICD-10-CM

## 2025-04-06 LAB
ALBUMIN SERPL-MCNC: 4.1 G/DL (ref 3.5–5.2)
ALBUMIN/GLOB SERPL: 0.9 G/DL
ALP SERPL-CCNC: 186 U/L (ref 39–117)
ALT SERPL W P-5'-P-CCNC: 24 U/L (ref 1–41)
ANION GAP SERPL CALCULATED.3IONS-SCNC: 11 MMOL/L (ref 5–15)
AST SERPL-CCNC: 21 U/L (ref 1–40)
BASOPHILS # BLD AUTO: 0.05 10*3/MM3 (ref 0–0.2)
BASOPHILS NFR BLD AUTO: 0.3 % (ref 0–1.5)
BILIRUB SERPL-MCNC: 0.3 MG/DL (ref 0–1.2)
BUN SERPL-MCNC: 17 MG/DL (ref 8–23)
BUN/CREAT SERPL: 16 (ref 7–25)
CALCIUM SPEC-SCNC: 9.5 MG/DL (ref 8.6–10.5)
CHLORIDE SERPL-SCNC: 94 MMOL/L (ref 98–107)
CO2 SERPL-SCNC: 32 MMOL/L (ref 22–29)
CREAT SERPL-MCNC: 1.06 MG/DL (ref 0.76–1.27)
D DIMER PPP FEU-MCNC: 0.81 MCGFEU/ML (ref 0–0.7)
DEPRECATED RDW RBC AUTO: 48.1 FL (ref 37–54)
EGFRCR SERPLBLD CKD-EPI 2021: 75.5 ML/MIN/1.73
EOSINOPHIL # BLD AUTO: 0.31 10*3/MM3 (ref 0–0.4)
EOSINOPHIL NFR BLD AUTO: 2.1 % (ref 0.3–6.2)
ERYTHROCYTE [DISTWIDTH] IN BLOOD BY AUTOMATED COUNT: 13.4 % (ref 12.3–15.4)
ERYTHROCYTE [SEDIMENTATION RATE] IN BLOOD: 48 MM/HR (ref 0–20)
GLOBULIN UR ELPH-MCNC: 4.8 GM/DL
GLUCOSE SERPL-MCNC: 106 MG/DL (ref 65–99)
HCT VFR BLD AUTO: 39.3 % (ref 37.5–51)
HGB BLD-MCNC: 12.4 G/DL (ref 13–17.7)
IMM GRANULOCYTES # BLD AUTO: 0.05 10*3/MM3 (ref 0–0.05)
IMM GRANULOCYTES NFR BLD AUTO: 0.3 % (ref 0–0.5)
LYMPHOCYTES # BLD AUTO: 2.05 10*3/MM3 (ref 0.7–3.1)
LYMPHOCYTES NFR BLD AUTO: 14.1 % (ref 19.6–45.3)
MCH RBC QN AUTO: 30.5 PG (ref 26.6–33)
MCHC RBC AUTO-ENTMCNC: 31.6 G/DL (ref 31.5–35.7)
MCV RBC AUTO: 96.8 FL (ref 79–97)
MONOCYTES # BLD AUTO: 0.87 10*3/MM3 (ref 0.1–0.9)
MONOCYTES NFR BLD AUTO: 6 % (ref 5–12)
NEUTROPHILS NFR BLD AUTO: 11.16 10*3/MM3 (ref 1.7–7)
NEUTROPHILS NFR BLD AUTO: 77.2 % (ref 42.7–76)
NRBC BLD AUTO-RTO: 0 /100 WBC (ref 0–0.2)
PLATELET # BLD AUTO: 240 10*3/MM3 (ref 140–450)
PMV BLD AUTO: 8.2 FL (ref 6–12)
POTASSIUM SERPL-SCNC: 4.3 MMOL/L (ref 3.5–5.2)
PROT SERPL-MCNC: 8.9 G/DL (ref 6–8.5)
RBC # BLD AUTO: 4.06 10*6/MM3 (ref 4.14–5.8)
SODIUM SERPL-SCNC: 137 MMOL/L (ref 136–145)
URATE SERPL-MCNC: 9.7 MG/DL (ref 3.4–7)
WBC NRBC COR # BLD AUTO: 14.49 10*3/MM3 (ref 3.4–10.8)

## 2025-04-06 PROCEDURE — 99283 EMERGENCY DEPT VISIT LOW MDM: CPT

## 2025-04-06 PROCEDURE — 73630 X-RAY EXAM OF FOOT: CPT

## 2025-04-06 PROCEDURE — 36415 COLL VENOUS BLD VENIPUNCTURE: CPT

## 2025-04-06 PROCEDURE — 85379 FIBRIN DEGRADATION QUANT: CPT | Performed by: EMERGENCY MEDICINE

## 2025-04-06 PROCEDURE — 85652 RBC SED RATE AUTOMATED: CPT | Performed by: EMERGENCY MEDICINE

## 2025-04-06 PROCEDURE — 84550 ASSAY OF BLOOD/URIC ACID: CPT | Performed by: EMERGENCY MEDICINE

## 2025-04-06 PROCEDURE — 85025 COMPLETE CBC W/AUTO DIFF WBC: CPT | Performed by: EMERGENCY MEDICINE

## 2025-04-06 PROCEDURE — 80053 COMPREHEN METABOLIC PANEL: CPT | Performed by: EMERGENCY MEDICINE

## 2025-04-06 RX ORDER — PREDNISONE 20 MG/1
20 TABLET ORAL 2 TIMES DAILY
Qty: 10 TABLET | Refills: 0 | Status: SHIPPED | OUTPATIENT
Start: 2025-04-06

## 2025-04-06 RX ORDER — OXYCODONE AND ACETAMINOPHEN 7.5; 325 MG/1; MG/1
1 TABLET ORAL EVERY 6 HOURS PRN
Qty: 10 TABLET | Refills: 0 | Status: SHIPPED | OUTPATIENT
Start: 2025-04-06

## 2025-04-06 RX ORDER — COLCHICINE 0.6 MG/1
0.6 TABLET ORAL
Qty: 20 TABLET | Refills: 0 | Status: SHIPPED | OUTPATIENT
Start: 2025-04-06

## 2025-04-06 NOTE — ED PROVIDER NOTES
Subjective   History of Present Illness  Patient complains of pain in his left foot.  He said he woke up with it Friday morning hurting.  He went to bed Thursday night without any problems.  It is swollen.  Has gotten progressively worse since that time.  Denies any known injury.  He has never had this before.  He denies any fever or chills.  He denies any proximal pain or swelling.    History provided by:  Patient   used: No    Foot Pain  Location:  Left foot  Quality:  Aching and swollen  Severity:  Moderate  Onset quality:  Gradual  Duration:  2 days  Timing:  Constant  Progression:  Worsening  Chronicity:  New  Associated symptoms: no abdominal pain, no chest pain, no congestion, no cough, no diarrhea, no ear pain, no fatigue, no fever, no headaches, no loss of consciousness, no myalgias and no nausea        Review of Systems   Constitutional: Negative.  Negative for fatigue and fever.   HENT: Negative.  Negative for congestion and ear pain.    Respiratory: Negative.  Negative for cough.    Cardiovascular: Negative.  Negative for chest pain.   Gastrointestinal: Negative.  Negative for abdominal pain, diarrhea and nausea.   Genitourinary: Negative.    Musculoskeletal: Negative.  Negative for myalgias.   Skin: Negative.    Neurological: Negative.  Negative for loss of consciousness and headaches.   Psychiatric/Behavioral: Negative.     All other systems reviewed and are negative.      Past Medical History:   Diagnosis Date    Anemia     Anxiety     Bell's palsy     COPD (chronic obstructive pulmonary disease)     Costochondritis     De Quervain's tenosynovitis     Diverticulosis     GERD (gastroesophageal reflux disease)     Glaucoma     Goiter     History of adenomatous polyp of colon     Hyperglycemia     Hyperlipidemia     Hypertension     Hypoxia     Lung neoplasm     Nocturia     Oxygen dependent     Pneumonia     Respiratory failure        Allergies   Allergen Reactions    Gabapentin  Other (See Comments)     Suicidal     Lyrica [Pregabalin] Other (See Comments)     Vision problems       Past Surgical History:   Procedure Laterality Date    BACK SURGERY      COLONOSCOPY  01/26/2009    6 polyps, adenomatous, hyperplastic, diverticulosis    ENDOSCOPY      ENDOSCOPY N/A 03/21/2019    Procedure: ESOPHAGOGASTRODUODENOSCOPY WITH ANESTHESIA;  Surgeon: Srinivasan Mathews MD;  Location: [facility] ENDOSCOPY;  Service: Gastroenterology    KNEE SURGERY      TOTAL HIP ARTHROPLASTY Left 10/2021    Dr. Boswell    TOTAL HIP ARTHROPLASTY Right 10/2022    right       Family History   Problem Relation Age of Onset    Colon cancer Neg Hx     Colon polyps Neg Hx        Social History     Socioeconomic History    Marital status:    Tobacco Use    Smoking status: Former     Current packs/day: 0.00     Average packs/day: 2.0 packs/day for 40.0 years (80.0 ttl pk-yrs)     Types: Cigarettes     Start date: 1975     Quit date: 2015     Years since quitting: 10.2     Passive exposure: Current    Smokeless tobacco: Never   Vaping Use    Vaping status: Never Used   Substance and Sexual Activity    Alcohol use: No    Drug use: No    Sexual activity: Defer       Prior to Admission medications    Medication Sig Start Date End Date Taking? Authorizing Provider   amitriptyline (ELAVIL) 50 MG tablet Take 1 tablet by mouth every night at bedtime. 11/19/18   Alan Kemp MD   aspirin 81 MG tablet Take  by mouth.    ProviderAlan MD   atorvastatin (LIPITOR) 40 MG tablet Take 1 tablet by mouth every night at bedtime. 9/20/18   Alan Kemp MD   bisoprolol (ZEBeta) 5 MG tablet Take 1 tablet by mouth Daily. 6/4/24   Alan Kemp MD   empagliflozin (Jardiance) 10 MG tablet tablet Take 1 tablet by mouth Daily. 8/22/24   J Carlos Jackson MD   FeroSul 325 (65 Fe) MG tablet Take 1 tablet by mouth Daily With Breakfast. 10/9/23   Alan Kemp MD   furosemide (LASIX) 20 MG tablet Take 1 tablet by  mouth Daily. 6/4/24   Alan Kemp MD   ipratropium (ATROVENT) 0.02 % nebulizer solution Take 2.5 mL by nebulization 4 (Four) Times a Day As Needed for Wheezing or Shortness of Air. 6/11/24   Anita Reza APRN   ipratropium-albuterol (DUO-NEB) 0.5-2.5 mg/3 ml nebulizer Take 3 mL by nebulization Every 4 (Four) to 6 (Six) Hours As Needed for Wheezing or Shortness of Air. 9/12/24   Anita Reza APRN   lisinopril (PRINIVIL,ZESTRIL) 40 MG tablet Take 1 tablet by mouth Daily. 11/15/18   Alan Kemp MD   metFORMIN (GLUCOPHAGE) 500 MG tablet TAKE ONE TABLET BY MOUTH TWICE DAILY WITH MEALS FOR DIABETES 4/28/20   Alan Kemp MD   omeprazole (priLOSEC) 40 MG capsule Take 1 capsule by mouth Daily. 10/31/18   Alan Kemp MD   oxyCODONE (ROXICODONE) 15 MG immediate release tablet Take 1 tablet by mouth Every 6 (Six) Hours As Needed for Moderate Pain or Severe Pain. 12/11/17   Alan Kemp MD   tiZANidine (ZANAFLEX) 4 MG tablet TAKE 1 TABLET BY MOUTH EVERY 8 HOURS AS NEEDED NO MORE THAN 3 DOSES IN 24 HOURS 10/16/18   Alan Kemp MD       Medications - No data to display    Vitals:    04/06/25 1342   BP: 128/66   Pulse: 80   Resp: 19   Temp: 97.1 °F (36.2 °C)   SpO2: 92%         Objective   Physical Exam  Vitals and nursing note reviewed.   Constitutional:       Appearance: Normal appearance.   HENT:      Head: Normocephalic and atraumatic.   Eyes:      Extraocular Movements: Extraocular movements intact.      Pupils: Pupils are equal, round, and reactive to light.   Cardiovascular:      Rate and Rhythm: Normal rate and regular rhythm.   Pulmonary:      Effort: Pulmonary effort is normal.      Breath sounds: Normal breath sounds.   Musculoskeletal:         General: Swelling and tenderness present. Normal range of motion.      Comments: Patient has swelling and tenderness in his left foot.  This is most pronounced at the base of the great toe and some  swelling over the distal foot just proximal to the toes.  At the joint of the great toe is also warm and red.  There is no signs of ankle tenderness or calf tenderness or swelling.   Skin:     General: Skin is warm and dry.   Neurological:      General: No focal deficit present.      Mental Status: He is alert and oriented to person, place, and time.   Psychiatric:         Mood and Affect: Mood normal.         Behavior: Behavior normal.         Procedures         Lab Results (last 24 hours)       Procedure Component Value Units Date/Time    Comprehensive Metabolic Panel [[id number]]  (Abnormal) Collected: 04/06/25 1416    Specimen: Blood Updated: 04/06/25 1442     Glucose 106 mg/dL      BUN 17 mg/dL      Creatinine 1.06 mg/dL      Sodium 137 mmol/L      Potassium 4.3 mmol/L      Comment: Slight hemolysis detected by analyzer. Result may be falsely elevated.        Chloride 94 mmol/L      CO2 32.0 mmol/L      Calcium 9.5 mg/dL      Total Protein 8.9 g/dL      Albumin 4.1 g/dL      ALT (SGPT) 24 U/L      AST (SGOT) 21 U/L      Alkaline Phosphatase 186 U/L      Total Bilirubin 0.3 mg/dL      Globulin 4.8 gm/dL      A/G Ratio 0.9 g/dL      BUN/Creatinine Ratio 16.0     Anion Gap 11.0 mmol/L      eGFR 75.5 mL/min/1.73     Narrative:      GFR Categories in Chronic Kidney Disease (CKD)      GFR Category          GFR (mL/min/1.73)    Interpretation  G1                     90 or greater         Normal or high (1)  G2                      60-89                Mild decrease (1)  G3a                   45-59                Mild to moderate decrease  G3b                   30-44                Moderate to severe decrease  G4                    15-29                Severe decrease  G5                    14 or less           Kidney failure          (1)In the absence of evidence of kidney disease, neither GFR category G1 or G2 fulfill the criteria for CKD.    eGFR calculation 2021 CKD-EPI creatinine equation, which does not include  "race as a factor    Uric Acid [[id number]]  (Abnormal) Collected: 04/06/25 1416    Specimen: Blood Updated: 04/06/25 1445     Uric Acid 9.7 mg/dL     D-dimer, Quantitative [[id number]]  (Abnormal) Collected: 04/06/25 1416    Specimen: Blood Updated: 04/06/25 1439     D-Dimer, Quantitative 0.81 MCGFEU/mL     Narrative:      According to the assay 's published package insert, a normal (<0.50 MCGFEU/mL) D-dimer result in conjunction with a non-high clinical probability assessment, excludes deep vein thrombosis (DVT) and pulmonary embolism (PE) with high sensitivity.    D-dimer values increase with age and this can make VTE exclusion of an older population difficult. To address this, the [employer], based on best available evidence and recent guidelines, recommends that clinicians use age-adjusted D-dimer thresholds in patients greater than 50 years of age with: a) a low probability of PE who do not meet all Pulmonary Embolism Rule Out Criteria, or b) in those with intermediate probability of PE.   The formula for an age-adjusted D-dimer cut-off is \"age/100\".  For example, a 60 year old patient would have an age-adjusted cut-off of 0.60 MCGFEU/mL and an 80 year old 0.80 MCGFEU/mL.    CBC & Differential [[id number]]  (Abnormal) Collected: 04/06/25 1438    Specimen: Blood Updated: 04/06/25 1447    Narrative:      The following orders were created for panel order CBC & Differential.  Procedure                               Abnormality         Status                     ---------                               -----------         ------                     CBC Auto Differential[[id number]]        Abnormal            Final result                 Please view results for these tests on the individual orders.    Sedimentation Rate [[id number]]  (Abnormal) Collected: 04/06/25 1438    Specimen: Blood Updated: 04/06/25 1458     Sed Rate 48 mm/hr     CBC Auto Differential [[id number]]  (Abnormal) Collected: " 04/06/25 1438    Specimen: Blood Updated: 04/06/25 1447     WBC 14.49 10*3/mm3      RBC 4.06 10*6/mm3      Hemoglobin 12.4 g/dL      Hematocrit 39.3 %      MCV 96.8 fL      MCH 30.5 pg      MCHC 31.6 g/dL      RDW 13.4 %      RDW-SD 48.1 fl      MPV 8.2 fL      Platelets 240 10*3/mm3      Neutrophil % 77.2 %      Lymphocyte % 14.1 %      Monocyte % 6.0 %      Eosinophil % 2.1 %      Basophil % 0.3 %      Immature Grans % 0.3 %      Neutrophils, Absolute 11.16 10*3/mm3      Lymphocytes, Absolute 2.05 10*3/mm3      Monocytes, Absolute 0.87 10*3/mm3      Eosinophils, Absolute 0.31 10*3/mm3      Basophils, Absolute 0.05 10*3/mm3      Immature Grans, Absolute 0.05 10*3/mm3      nRBC 0.0 /100 WBC             XR Foot 3+ View Left   Final Result       No acute osseous finding.           This report was signed and finalized on 4/6/2025 3:08 PM by Jonh Mckinnon.              ED Course          MDM  Number of Diagnoses or Management Options  Acute gouty arthritis: new and requires workup  Diagnosis management comments: I told the patient his x-ray looks okay.  His lab work has an elevated uric acid level and his physical exam fits with gout.  Turns out that he also has a brother who has gout.  We will treat for same.  I will give him some extra pain medicine on top of his routine pain medicine because of new findings.  Will get him on some colchicine and prednisone.  He is discharged in stable condition.       Amount and/or Complexity of Data Reviewed  Clinical lab tests: ordered and reviewed  Tests in the radiology section of CPT®: ordered and reviewed    Risk of Complications, Morbidity, and/or Mortality  Presenting problems: moderate  Diagnostic procedures: moderate  Management options: moderate    Patient Progress  Patient progress: stable        Final diagnoses:   Acute gouty arthritis          Paul Jensen Jr., MD  04/06/25 5107

## 2025-04-16 DIAGNOSIS — Z00.00 ADULT GENERAL MEDICAL EXAM: ICD-10-CM

## 2025-04-22 ENCOUNTER — HOSPITAL ENCOUNTER (OUTPATIENT)
Dept: CARDIOLOGY | Facility: HOSPITAL | Age: 70
Discharge: HOME | End: 2025-04-22
Admitting: NURSE PRACTITIONER
Payer: MEDICARE

## 2025-04-22 VITALS
WEIGHT: 214 LBS | HEART RATE: 75 BPM | DIASTOLIC BLOOD PRESSURE: 65 MMHG | BODY MASS INDEX: 34.54 KG/M2 | SYSTOLIC BLOOD PRESSURE: 124 MMHG | OXYGEN SATURATION: 94 %

## 2025-04-22 DIAGNOSIS — I10 ESSENTIAL HYPERTENSION: ICD-10-CM

## 2025-04-22 DIAGNOSIS — E78.5 HYPERLIPIDEMIA, UNSPECIFIED HYPERLIPIDEMIA TYPE: ICD-10-CM

## 2025-04-22 DIAGNOSIS — I25.10 CORONARY ARTERY DISEASE INVOLVING NATIVE CORONARY ARTERY OF NATIVE HEART, UNSPECIFIED WHETHER ANGINA PRESENT: ICD-10-CM

## 2025-04-22 DIAGNOSIS — I50.812 CHRONIC RIGHT HEART FAILURE: Primary | ICD-10-CM

## 2025-04-22 LAB
ANION GAP SERPL CALCULATED.3IONS-SCNC: 7 MMOL/L (ref 5–15)
BUN SERPL-MCNC: 13 MG/DL (ref 8–23)
BUN/CREAT SERPL: 13.3 (ref 7–25)
CALCIUM SPEC-SCNC: 9.1 MG/DL (ref 8.6–10.5)
CHLORIDE SERPL-SCNC: 95 MMOL/L (ref 98–107)
CO2 SERPL-SCNC: 35 MMOL/L (ref 22–29)
CREAT SERPL-MCNC: 0.98 MG/DL (ref 0.76–1.27)
EGFRCR SERPLBLD CKD-EPI 2021: 83 ML/MIN/1.73
GLUCOSE SERPL-MCNC: 149 MG/DL (ref 65–99)
POTASSIUM SERPL-SCNC: 4.4 MMOL/L (ref 3.5–5.2)
SODIUM SERPL-SCNC: 137 MMOL/L (ref 136–145)

## 2025-04-22 PROCEDURE — G0463 HOSPITAL OUTPT CLINIC VISIT: HCPCS | Performed by: NURSE PRACTITIONER

## 2025-04-22 PROCEDURE — 80048 BASIC METABOLIC PNL TOTAL CA: CPT | Performed by: NURSE PRACTITIONER

## 2025-04-22 PROCEDURE — 94726 PLETHYSMOGRAPHY LUNG VOLUMES: CPT | Performed by: NURSE PRACTITIONER

## 2025-04-22 NOTE — PROGRESS NOTES
Heart Failure Clinic Progress Note  Reason For Visit:  CHF    Subjective    Byron Bermudez is a 70 y.o. male with the below pertinent PMH who presents for follow-up of CHF.    Byron Bermudez was most recently seen in the heart failure clinic on 1/23/2025.  At that time he noted he was doing well.  He continued have some dyspnea with exertion but felt this was at his baseline.  He had had some on and off lower extremity edema that seem to be dependent as it was dependent on his activity throughout the day.  His weight was up by 3 pounds.  No new changes were made at that time and he was continued on all of his treatments.    Patient notes that he is feeling well today.  He continues to have stable shortness of breath that has not changed recently.  He denies any significant amount of lower extremity edema although he does indicate some on and off dependent edema.  He is taking his medications appropriately and feels that he is tolerating them well.  His weight today is down by 2 pounds.    Review of Systems   Constitutional: Negative for malaise/fatigue.   Cardiovascular:  Positive for dyspnea on exertion. Negative for chest pain, leg swelling, orthopnea, palpitations and syncope.   Respiratory:  Negative for sleep disturbances due to breathing.    Neurological:  Negative for dizziness and light-headedness.     Cardiac Studies  Echo 4/20/2021: LVEF 61 to 65%, RV mildly dilated with normal systolic function, normal biatrial size, no hemodynamically significant valve abnormalities, normal RVSP  Echo 6/4/2024: On my personal review of the echo colon normal LV systolic function, normal LV size/thickness, GLS likely inaccurate due to suboptimal myocardial tracking, RV is mildly dilated with normal systolic function, normal LA size, RA is mildly dilated, there is insufficient TR jet for adequate RVSP assessment, there is a prominent epicardial fat pad with no clear pericardial effusion (this is consistent with CT chest  from 5/13/2024 showing no pericardial effusion but prominent fat pad).    Noncontrast CT chest from 5/13/2024: Mild dilation of the ascending aorta measuring 4-4.1 cm.  There are calcifications in the LAD.  No pericardial effusion or other significant cardiac findings.  Lexiscan myocardial perfusion SPECT 8/15/2024: Low risk study.  No myocardial perfusion evidence of significant ischemia.  Normal LV systolic function.     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    Current Outpatient Medications   Medication Instructions    amitriptyline (ELAVIL) 50 mg, Every Night at Bedtime    aspirin 81 MG tablet Take  by mouth.    atorvastatin (LIPITOR) 40 mg, Every Night at Bedtime    bisoprolol (ZEBETA) 5 mg, Daily    empagliflozin (JARDIANCE) 10 mg, Oral, Daily    FeroSul 325 mg, Daily With Breakfast    furosemide (LASIX) 20 mg, Daily    ipratropium (ATROVENT) 0.5 mg, Nebulization, 4 Times Daily PRN    ipratropium-albuterol (DUO-NEB) 0.5-2.5 mg/3 ml nebulizer 3 mL, Nebulization, Every 4 to 6 Hours PRN    lisinopril (PRINIVIL,ZESTRIL) 40 mg, Daily    metFORMIN (GLUCOPHAGE) 500 MG tablet TAKE ONE TABLET BY MOUTH TWICE DAILY WITH MEALS FOR DIABETES    omeprazole (PRILOSEC) 40 mg, Daily    oxyCODONE-acetaminophen (PERCOCET) 7.5-325 MG per tablet 1 tablet, Oral, Every 6 Hours PRN    predniSONE (DELTASONE) 20 mg, Oral, 2 Times Daily    tiZANidine (ZANAFLEX) 4 MG tablet TAKE 1 TABLET BY MOUTH EVERY 8 HOURS AS NEEDED NO MORE THAN 3 DOSES IN 24 HOURS        Objective   Vital Signs:  /65 (BP Location: Left arm, Patient Position: Sitting)   Pulse 75   Wt 97.1 kg (214 lb)   SpO2 94% Comment: 3L  BMI 34.54 kg/m²   Estimated body mass index is 34.54 kg/m² as calculated from the  "following:    Height as of 4/6/25: 167.6 cm (66\").    Weight as of this encounter: 97.1 kg (214 lb).    Neck:      Vascular: No JVD.   Pulmonary:      Effort: Pulmonary effort is normal.      Breath sounds: Examination of the right-lower field reveals decreased breath sounds. Examination of the left-lower field reveals decreased breath sounds. Decreased breath sounds present.   Cardiovascular:      Normal rate. Regular rhythm. Normal S1. Normal S2.       Murmurs: There is no murmur.      No gallop.  No click. No rub.   Pulses:     Intact distal pulses.   Edema:     Peripheral edema absent.   Abdominal:      Palpations: Abdomen is soft.      Tenderness: There is no abdominal tenderness.   Skin:     General: Skin is warm and dry.   Neurological:      General: No focal deficit present.      Mental Status: Alert and oriented to person, place and time.   Psychiatric:         Behavior: Behavior is cooperative.        The following data was reviewed by myself, DASH Slater  BMP   BMP          1/23/2025    08:17 4/6/2025    14:16 4/22/2025    08:11   BMP   BUN 11  17  13    Creatinine 0.85  1.06  0.98    Sodium 138  137  137    Potassium 4.1  4.3  4.4    Chloride 97  94  95    CO2 32.0  32.0  35.0    Calcium 9.1  9.5  9.1      ReDS No results found for: \"ABSOLUTELUNG\"    Results for orders placed during the hospital encounter of 05/31/24    Adult Transthoracic Echo Complete W/ Cont if Necessary Per Protocol    Interpretation Summary    Left ventricular systolic function is normal. Left ventricular ejection fraction appears to be 66 - 70%.    Normal global longitudinal LV strain (GLS) = -17.0%    The right ventricular cavity is mildly dilated. Normal right ventricular systolic function noted.    No evidence of pulmonary hypertension is present.    No hemodynamically significant valvular disease.    Mild dilation of the ascending aorta is present.    There is a small (<1cm) pericardial effusion. There is no evidence of " cardiac tamponade.       Assessment   Assessment and Plan  Diagnoses and all orders for this visit:    1. Chronic right heart failure (Primary)  2. Coronary artery disease involving native coronary artery of native heart, unspecified whether angina present  3. Hyperlipidemia, unspecified hyperlipidemia type  4. Essential hypertension  He continues to do well without any symptoms of hypervolemia.  Overall appears compensated and stable today without any concerns.  - Jardiance 10 mg daily  - Lasix 20 mg daily  - Lisinopril 40 mg daily  - Bisoprolol 5 mg daily  - Amlodipine 5 mg daily  - Atorvastatin 40 mg daily  - Aspirin 81 mg daily       Follow Up:  Return in about 4 months (around 8/22/2025) for recheck.    Patient was given instructions and counseling regarding his condition or for health maintenance advice.      Aamir Hernandez, DASH  04/22/25  09:49 CDT

## 2025-04-23 ENCOUNTER — HOSPITAL ENCOUNTER (OUTPATIENT)
Dept: CT IMAGING | Facility: HOSPITAL | Age: 70
Discharge: HOME | End: 2025-04-23
Admitting: NURSE PRACTITIONER
Payer: MEDICARE

## 2025-04-23 DIAGNOSIS — R91.1 LUNG NODULE: Chronic | ICD-10-CM

## 2025-04-23 PROCEDURE — 71250 CT THORAX DX C-: CPT

## 2025-04-28 NOTE — PROGRESS NOTES
Anita Reza, DASH  [facility]  [address]   [address]            [place] [address]  Phone: [phone]  Fax: [phone]          Chief Complaint  Lung Nodule (Pt is here for follow up w/ CT)    Tracey Bermudez presents to [facility] CARDIOTHORACIC SURGERY for appointment.    History of Present Illness  The patient presents today for follow-up of lung nodules. He is accompanied by his daughter.     He has expressed a preference for monitoring the lung nodules until any potential growth is observed. He has been under the care of Dr. Ramirez for approximately a decade and has an upcoming appointment scheduled with him in 06/2025.    A worsening rattle in his chest is reported, accompanied by green sputum production. He also notes a slight feeling of illness and an increase in coughing frequency this morning compared to his usual pattern. These symptoms have been present for the past 1 to 2 days. Symptoms have been managed with cough medicine, administered both in the morning and at night to facilitate sleep. I advised him to call Dr. Koch office as well to let them know his symptoms. He voiced understanding.     Additionally, severe allergies are mentioned.    Objective   Past Medical History:   Diagnosis Date    Anemia     Anxiety     Bell's palsy     COPD (chronic obstructive pulmonary disease)     Costochondritis     De Quervain's tenosynovitis     Diverticulosis     GERD (gastroesophageal reflux disease)     Glaucoma     Goiter     History of adenomatous polyp of colon     Hyperglycemia     Hyperlipidemia     Hypertension     Hypoxia     Lung neoplasm     Nocturia     Oxygen dependent     Pneumonia     Respiratory failure    ,   Past Surgical History:   Procedure Laterality Date    BACK SURGERY      COLONOSCOPY  01/26/2009    6 polyps, adenomatous, hyperplastic, diverticulosis    ENDOSCOPY      ENDOSCOPY N/A 03/21/2019    Procedure:  "ESOPHAGOGASTRODUODENOSCOPY WITH ANESTHESIA;  Surgeon: Srinivasan Mathews MD;  Location: [facility] ENDOSCOPY;  Service: Gastroenterology    KNEE SURGERY      TOTAL HIP ARTHROPLASTY Left 10/2021    Dr. Boswell    TOTAL HIP ARTHROPLASTY Right 10/2022    right   ,   Family History   Problem Relation Age of Onset    Colon cancer Neg Hx     Colon polyps Neg Hx    ,   Social History     Tobacco Use    Smoking status: Former     Current packs/day: 0.00     Average packs/day: 2.0 packs/day for 40.0 years (80.0 ttl pk-yrs)     Types: Cigarettes     Start date: 1975     Quit date: 2015     Years since quitting: 10.3     Passive exposure: Current    Smokeless tobacco: Never   Vaping Use    Vaping status: Never Used   Substance Use Topics    Alcohol use: No    Drug use: No   , (Not in a hospital admission)  , Allergies: Gabapentin and Lyrica [pregabalin]    Vital Signs:   /82   Pulse 89   Ht 167.6 cm (66\")   Wt 97.5 kg (215 lb)   SpO2 91%   BMI 34.70 kg/m²        Physical Exam  Vitals reviewed.   Constitutional:       General: He is not in acute distress.     Appearance: He is well-developed. He is obese.      Interventions: Nasal cannula in place.   HENT:      Head: Normocephalic and atraumatic.   Eyes:      General: No scleral icterus.     Conjunctiva/sclera: Conjunctivae normal.      Pupils: Pupils are equal, round, and reactive to light.   Cardiovascular:      Rate and Rhythm: Normal rate.   Pulmonary:      Effort: Pulmonary effort is normal. No respiratory distress.      Breath sounds: Decreased breath sounds and rhonchi present. No wheezing or rales.   Musculoskeletal:         General: Normal range of motion.      Cervical back: Normal range of motion and neck supple.   Skin:     General: Skin is warm and dry.   Neurological:      Mental Status: He is alert and oriented to person, place, and time.   Psychiatric:         Behavior: Behavior normal.         Thought Content: Thought content normal.         Judgment: " Judgment normal.          Result Review :  The following data was reviewed by: DASH Rivas on 04/29/2025:    CT Chest Without Contrast Diagnostic (04/23/2025 13:55)   IMPRESSION:   1. Stable 1.4 cm spiculated right upper lobe nodule and a region of parenchymal scarring favoring benign scarring with out significant interval change.  2. 5 x 7 mm left apical/upper lobe pulmonary nodule is similar to the 1/9/2025 study, however new compared to 5/13/2024 and continued short-term imaging surveillance is strongly recommended for this finding.   3. No increasing intrathoracic lymphadenopathy. Moderate emphysema. Vascular calcifications as described above.  4. Small gallstones without biliary dilatation or pericholecystic inflammation. Benign superior left renal cyst.    PFT Values          8/1/2023    10:45 10/13/2023    09:15   Pre Drug PFT Results   FVC 56 53   FEV1 42 39   FEF 25-75% 25 23   FEV1/FVC 59 57   Other Tests PFT Results   DLCO  48   D/VAsb  72       Results for orders placed in visit on 10/13/23    Spirometry with Diffusion Capacity    Narrative  Spirometry with Diffusion Capacity    Performed by: Jennifer Quarles, RRT  Authorized by: Anita Reza APRN  Pre Drug % Predicted  FVC: 53%  FEV1: 39%  FEF 25-75%: 23%  FEV1/FVC: 57%  DLCO: 48%  D/VAsb: 72%    Interpretation  Spirometry  Spirometry shows severe obstruction. There is reduced midflow suggesting small airway/airflow obstruction.  Review of FVL curve  Patient's effort is normal.  Diffusion Capacity  The patient's diffusion capacity is severely reduced.  Diffusion capacity is mildly reduced when corrected for alveolar volume.      Results for orders placed in visit on 08/01/23    Spirometry    Narrative  Spirometry  Performed by: Jennifer Quarles, RRT  Authorized by: Anita Reza APRN    Pre Drug % Predicted  FVC: 56%  FEV1: 42%  FEF 25-75%: 25%  FEV1/FVC: 59%      Results for orders placed in visit on  09/30/22    Pulmonary Function Test    Narrative  Pulmonary Function Test  Performed by: Jennifer Quarles, RRT  Authorized by: Anita Reza APRN    Pre Drug % Predicted  FVC: 53%  FEV1: 40%  FEF 25-75%: 22%  FEV1/FVC: 58%  DLCO: 51%  D/VAsb: 73%    Interpretation  Spirometry  Spirometry shows severe obstruction. There is reduced midflow suggesting small airway/airflow obstruction.  Diffusion Capacity  The patient's diffusion capacity is moderately reduced.  Diffusion capacity is mildly reduced when corrected for alveolar volume.             Assessment and Plan  Diagnoses and all orders for this visit:    1. Lung nodules (Primary)  Overview:  11 mm spiculated nodule or scarring in the right upper lobe, identified 8/6/16, stable 6/8/21    CT Chest Low Dose Cancer Screening WO (05/13/2024 12:37)  There is an area of spiculation in the right upper lobe centered within probable scar tissue. The nodular component of the spiculation is unchanged, measuring approximately 13 mm.     CT Angiogram Chest (01/09/2025 08:29)  LUNG NODULES:  1. New 4 mm left upper lobe nodule medially image 30 series 7.  2. A 1.5 cm spiculated lesion or scar in the right upper lobe image 60  series 7 measures 1.4 cm in 2024 and measures about 1.2 cm in 2021. This  appears to be slowly increasing in size. PET/CT recommended to further  assess this lesion.  3. A 3 mm probable fissural lymph node along the right minor fissure  image 79 series 7 and image 75 series 900.  4. Stable 4-5 mm left lower lobe perivascular nodule image 121 series 7.    CT Angiogram Chest (01/09/2025 08:29)  LUNG NODULES:  1. New 4 mm left upper lobe nodule medially image 30 series 7.  2. A 1.5 cm spiculated lesion or scar in the right upper lobe image 60  series 7 measures 1.4 cm in 2024 and measures about 1.2 cm in 2021. This  appears to be slowly increasing in size. PET/CT recommended to further  assess this lesion.  3. A 3 mm probable fissural lymph node  along the right minor fissure  image 79 series 7 and image 75 series 900.  4. Stable 4-5 mm left lower lobe perivascular nodule image 121 series 7.    NM PET/CT Skull Base to Mid Thigh (01/23/2025 11:16)   IMPRESSION:  1. Previously described foci of nodularity including the newly described  nodule in the medial left upper lobe as well as the nodule with  irregular margins in the right upper lobe demonstrate uptake of the  radiopharmaceutical but with low SUVs. Benignity would be favored but  continued follow-up would be suggested. There is also a subcarinal node  within the middle mediastinum which demonstrates mildly elevated  metabolic activity as described above. This lymph node is stable from a  more remote CT of 2021 and I would favor is benign in etiology.  2. Mild symmetric uptake within the palatine tonsils may be related to  chronic inflammatory changes. There are also mildly prominent proximal  jugular chain lymph nodes bilaterally including a node within the level  2 anam group on the right demonstrating mild metabolic activity. Given  the symmetry of the finding and relatively low SUV I would favor a  reactive node. These could be followed up with subsequent imaging.  3. Constipation. There is diverticulosis of the descending and sigmoid  colon with no diverticulitis. There are multiple cortical cysts of both  kidneys.    CT Chest Without Contrast Diagnostic (04/23/2025 13:55)   IMPRESSION:   1. Stable 1.4 cm spiculated right upper lobe nodule and a region of parenchymal scarring favoring benign scarring with out significant interval change.  2. 5 x 7 mm left apical/upper lobe pulmonary nodule is similar to the 1/9/2025 study, however new compared to 5/13/2024 and continued short-term imaging surveillance is strongly recommended for this finding.   3. No increasing intrathoracic lymphadenopathy. Moderate emphysema. Vascular calcifications as described above.  4. Small gallstones without biliary  dilatation or pericholecystic inflammation. Benign superior left renal cyst.       Assessment & Plan:  CT chest from 4/23/25 was reviewed with patient and his daughter today.  The 1.4 cm right upper lobe nodule is stable and favors benign scarring.  The 5 x 7 mm left apical/upper lobe nodule is similar to January 2025 study.    Options for further evaluation were discussed with patient including possible robotic ION bronchoscopy, possible SBRT, or f/u CT in 6 months. The patient is a poor surgical candidate given his known lung disease, chronic respiratory failure, heart failure, and aortic aneurysm. He and his daughter would like to have f/u CT in 6 months. Order was placed.     We discussed options for diagnosis including bronchoscopy, CT-guided needle biopsy, or surgical biopsy with either cervical mediastinoscopy or thoracoscopy with resection.     We discussed signs and symptoms suggesting malignancy including but not limited to hoarseness of voice, hemoptysis, persistent cough, unintended weight loss, chest pain, or persistent pneumonia.  The potential of a delayed diagnosis and adverse results were discussed.  Collaboratively a decision has been made that reflects the patient's risk factors, the data available and patient's considerations.  All questions have been answered to the best of my ability and patient is agreeable to the aforementioned plan.      Orders:  -     CT Chest Without Contrast; Future    2. Aneurysm of ascending aorta without rupture  Overview:  Per Dr. Flynn 6/27/24 mid ascending aortic dilation measures to be 4.1.    OV 1/9/25 - Mid ascending aorta measures 3.9 cm in maxium dimension, the distal ascending/proximal arch measures 3.3 cm in maximum dimension, aortic root measures 3.7 cm in maximum dimension.   No evidence of IMH, DESMOND, or dissection.         Assessment & Plan:  Continue to monitor yearly.       3. Personal history of nicotine dependence  Overview:  Former cigaretter smoker,  quit 2015.    Assessment & Plan:  Byron Bermudez  reports that he quit smoking about 10 years ago. His smoking use included cigarettes. He started smoking about 50 years ago. He has a 80 pack-year smoking history. He has been exposed to tobacco smoke. He has never used smokeless tobacco. I have educated him on the risk of diseases from using tobacco products such as cancer, COPD, and heart disease.           Advance Care Planning   ACP discussion was held with the patient during this visit. Patient has POA.        Follow Up  DASH Rivas  4/29/2025  09:23 CDT    Return in about 6 months (around 10/29/2025) for CT, with Anita RIVERA    Patient was given instructions and counseling regarding his condition or for health maintenance advice. Please see specific information pulled into the AVS if appropriate.     Please note that portions of this note were completed with a voice recognition program.    Patient or patient representative verbalized consent for the use of Ambient Listening during the visit with  DASH Rivas for chart documentation. 4/29/2025  08:55 CDT      Note to Patient:   The 21st Century Cures Act makes medical notes like this available to patients in the interest of transparency; however, please be advised this is a medical document.  It is intended as [id number] communication.  It is written in medical language and may contain abbreviations or verbiage that are unfamiliar.  It may appear blunt or direct.  Medical documents are intended to carry relevant information, facts as evident, and the clinical opinion of the practitioner.  This note may have been transcribed using a voice dictation system.  Voice-recognition errors may occur.  This should not be taken to alter the content or meaning of this note.

## 2025-04-29 ENCOUNTER — OFFICE VISIT (OUTPATIENT)
Dept: CARDIAC SURGERY | Facility: CLINIC | Age: 70
End: 2025-04-29
Payer: MEDICARE

## 2025-04-29 VITALS
BODY MASS INDEX: 34.55 KG/M2 | HEIGHT: 66 IN | OXYGEN SATURATION: 91 % | SYSTOLIC BLOOD PRESSURE: 136 MMHG | DIASTOLIC BLOOD PRESSURE: 82 MMHG | WEIGHT: 215 LBS | HEART RATE: 89 BPM

## 2025-04-29 DIAGNOSIS — R91.8 LUNG NODULES: Primary | ICD-10-CM

## 2025-04-29 DIAGNOSIS — I71.21 ANEURYSM OF ASCENDING AORTA WITHOUT RUPTURE: Chronic | ICD-10-CM

## 2025-04-29 DIAGNOSIS — Z87.891 PERSONAL HISTORY OF NICOTINE DEPENDENCE: Chronic | ICD-10-CM

## 2025-04-29 PROCEDURE — 99214 OFFICE O/P EST MOD 30 MIN: CPT | Performed by: NURSE PRACTITIONER

## 2025-04-29 PROCEDURE — 1159F MED LIST DOCD IN RCRD: CPT | Performed by: NURSE PRACTITIONER

## 2025-04-29 PROCEDURE — 1160F RVW MEDS BY RX/DR IN RCRD: CPT | Performed by: NURSE PRACTITIONER

## 2025-04-29 RX ORDER — AMLODIPINE BESYLATE 5 MG/1
5 TABLET ORAL DAILY
COMMUNITY
Start: 2025-04-01

## 2025-04-29 NOTE — ASSESSMENT & PLAN NOTE
CT chest from 4/23/25 was reviewed with patient and his daughter today.  The 1.4 cm right upper lobe nodule is stable and favors benign scarring.  The 5 x 7 mm left apical/upper lobe nodule is similar to January 2025 study.    Options for further evaluation were discussed with patient including possible robotic ION bronchoscopy, possible SBRT, or f/u CT in 6 months. The patient is a poor surgical candidate given his known lung disease, chronic respiratory failure, heart failure, and aortic aneurysm. He and his daughter would like to have f/u CT in 6 months. Order was placed.     We discussed options for diagnosis including bronchoscopy, CT-guided needle biopsy, or surgical biopsy with either cervical mediastinoscopy or thoracoscopy with resection.     We discussed signs and symptoms suggesting malignancy including but not limited to hoarseness of voice, hemoptysis, persistent cough, unintended weight loss, chest pain, or persistent pneumonia.  The potential of a delayed diagnosis and adverse results were discussed.  Collaboratively a decision has been made that reflects the patient's risk factors, the data available and patient's considerations.  All questions have been answered to the best of my ability and patient is agreeable to the aforementioned plan.

## 2025-04-29 NOTE — ASSESSMENT & PLAN NOTE
Byron NATIVIDAD ZazuetaAidan  reports that he quit smoking about 10 years ago. His smoking use included cigarettes. He started smoking about 50 years ago. He has a 80 pack-year smoking history. He has been exposed to tobacco smoke. He has never used smokeless tobacco. I have educated him on the risk of diseases from using tobacco products such as cancer, COPD, and heart disease.    Render Note In Bullet Format When Appropriate: No Post-Care Instructions: I reviewed with the patient in detail post-care instructions. Patient is to wear sunprotection, and avoid picking at any of the treated lesions. Pt may apply Vaseline to crusted or scabbing areas. Consent: The patient's consent was obtained including but not limited to risks of crusting, scabbing, blistering, scarring, darker or lighter pigmentary change, recurrence, incomplete removal and infection. Include Z78.9 (Other Specified Conditions Influencing Health Status) As An Associated Diagnosis?: Yes Medical Necessity Clause: This procedure was medically necessary because the lesions that were treated were: Detail Level: Simple Medical Necessity Information: It is in your best interest to select a reason for this procedure from the list below. All of these items fulfill various CMS LCD requirements except the new and changing color options.

## 2025-07-28 NOTE — PROGRESS NOTES
Comments:     Last Office Visit (last PCP visit):   6/30/2025    Next Visit Date:  Future Appointments   Date Time Provider Department Center   8/13/2025  3:00 PM Angela Skaggs MD [facility]   1/30/2026  9:30 AM Brian Lovell PA [facility]   3/10/2026  2:00 PM SCHEDULE, [facility]       **If hasn't been seen in over a year OR hasn't followed up according to last diabetes/ADHD visit, make appointment for patient before sending refill to provider.    Rx requested:  Requested Prescriptions     Pending Prescriptions Disp Refills    pregabalin (LYRICA) 50 MG capsule [Pharmacy Med Name: pregabalin 50 mg capsule] 90 capsule 2     Sig: Take 1 capsule by mouth 3 times daily.                Physical Therapy  Facility/Department: [facility] SURG SERVICES  Physical Therapy Initial Assessment    Name: Kareen Duran  : 1955  MRN: [id number]  Date of Service: 10/25/2022    Discharge Recommendations:  Continue to assess pending progress, 24 hour supervision or assist, Patient would benefit from continued therapy after discharge          Patient Diagnosis(es): The primary encounter diagnosis was Primary osteoarthritis of both hips. A diagnosis of Primary osteoarthritis of right hip was also pertinent to this visit. Past Medical History:  has a past medical history of Arthritis, Bilateral hearing loss, Colon polyps, COPD (chronic obstructive pulmonary disease) ([facility] [address] COVID-19, Depression, Diabetes mellitus ([facility] [address] Hip pain, Hyperlipidemia, Hypertension, Low back pain, Oxygen dependent, Palliative care patient, and Pneumonia. Past Surgical History:  has a past surgical history that includes Wrist fracture surgery; fracture surgery; Colonoscopy (? ); Knee arthroscopy (); Colonoscopy (2012); Colonoscopy (2015); pr egd transoral biopsy single/multiple (N/A, 2016); Upper gastrointestinal endoscopy (2017); Upper gastrointestinal endoscopy (2017); Endoscopy, colon, diagnostic; back surgery (); Total hip arthroplasty (Left, 10/20/2021); and Total hip arthroplasty (Right, 10/24/2022). Assessment   Body Structures, Functions, Activity Limitations Requiring Skilled Therapeutic Intervention: Decreased functional mobility ; Decreased ROM; Decreased endurance;Decreased strength;Decreased balance; Increased pain  Assessment: Pt. will benefit from cont. PT to decrease impairments. Pt. a fall risk and should not attempt mobility on his own at this time. Pt. needs to wear O2 during mobility since he wears it at home and became SOA with only short amb. distance. Pt. needs to use RW, gait belt and have staff A for mobility. Anticipate pt will be able to d/c home with family A.  Reports he has all needed equipment. Treatment Diagnosis: impaired gait and mobility  Therapy Prognosis: Good  Decision Making: Medium Complexity  Barriers to Learning: none noted  Requires PT Follow-Up: Yes  Activity Tolerance  Activity Tolerance: Patient tolerated treatment well;Patient limited by endurance  Activity Tolerance Comments: pt needed to wear O2 during amb. due to SOA without it for short distance     Plan   Physcial Therapy Plan  General Plan: 6-7 times per week  Current Treatment Recommendations: Strengthening, ROM, Balance training, Functional mobility training, Transfer training, Gait training, Endurance training, Safety education & training, Positioning, Equipment evaluation, education, & procurement, Therapeutic activities, Patient/Caregiver education & training  Safety Devices  Type of Devices: Call light within reach, Chair alarm in place, Gait belt, Nurse notified, Patient at risk for falls (pt still working with OT with adaptive equipment. Ice pack refreshed.)     Restrictions  Restrictions/Precautions  Restrictions/Precautions: Fall Risk, Surgical Protocols, Weight Bearing  Lower Extremity Weight Bearing Restrictions  Right Lower Extremity Weight Bearing: Weight Bearing As Tolerated  Position Activity Restriction  Hip Precautions: Anterior hip precautions     Subjective   Pain: 10/10 R hip with movement-ice and elevated  General  Chart Reviewed: Yes  Patient assessed for rehabilitation services?: Yes  Response To Previous Treatment: Not applicable  Referring Practitioner: Dr. Sharon Sanders  Referral Date : 10/24/22  Diagnosis: primary OA R hip  Follows Commands: Within Functional Limits  General Comment  Comments: RNJordan, jamison PT. 10/24 R KATALINA  Subjective  Subjective: Pt. willing to walk. States he sat up in the chair.          Social/Functional History  Social/Functional History  Lives With: Spouse (and sister)  Type of Home: House  Home Layout: One level  Home Access: Ramped entrance  Bathroom Shower/Tub: (garden tub)  Bathroom Equipment: Tub transfer bench, Grab bars in [address] [place]: Rollator, Cane  ADL Assistance: Independent  Ambulation Assistance: Independent (used cane at times)  Transfer Assistance: Independent  Active : Yes  Additional Comments: other hip done about 1 year ago  Vision/Hearing  Vision  Vision: Impaired  Vision Exceptions: Wears glasses at all times  Hearing  Hearing Exceptions: Hard of hearing/hearing concerns    Cognition   Orientation  Overall Orientation Status: Within Normal Limits  Cognition  Overall Cognitive Status: WNL     Objective   Heart Rate: 99  Heart Rate Source: Monitor  BP: 136/69  BP Location: Left upper arm  Patient Position: Supine  MAP (Calculated): 91.33  Resp: 18  SpO2: 96 %  O2 Device: Nasal cannula  Comment: 3LO2     Observation/Palpation  Posture: Good  Observation: 3LO2, IV        AROM RLE (degrees)  RLE AROM: Exceptions  RLE General AROM: 0-90 knee and hip, ankle neutral DF  AROM LLE (degrees)  LLE AROM : WFL  Strength RLE  Strength RLE: Exception  Comment: grossly 3-/5 hip, ankle and knee  Strength LLE  Strength LLE: Exception  Comment: 4/5           Bed mobility  Supine to Sit: Minimal assistance  Sit to Supine: Unable to assess  Bed Mobility Comments:  Independent with EOB sitting  Transfers  Sit to Stand: Minimal Assistance  Stand to Sit: Minimal Assistance  Bed to Chair: Minimal assistance  Comment: v. cues for hand placement during transfers  Ambulation  WB Status: FWBAT RLE  Ambulation  Surface: Level tile  Device: Rolling Walker  Other Apparatus: O2 (removed O2 for amb.)  Assistance: Minimal assistance  Quality of Gait: steady  Gait Deviations: Slow Natalie;Decreased step length;Decreased step height  Distance: 15'     Balance  Posture: Good  Sitting - Static: Good;+  Sitting - Dynamic: Good;-  Standing - Static: Fair;+  Standing - Dynamic: Poor;-           OutComes Score                                                  AM-PAC Score Tinneti Score       Goals  Short Term Goals  Time Frame for Short Term Goals: 2 wks  Short Term Goal 1: supine to sit indep  Short Term Goal 2: sit to stand indep  Short Term Goal 3: amb. 100' with RW SBA  Short Term Goal 4: -  Patient Goals   Patient Goals : go home       Education  Patient Education  Education Given To: Patient  Education Provided: Role of Therapy;Plan of Care;Transfer Training  Education Provided Comments: use of call light for needs, ant hip prec, staff A  Education Method: Verbal  Barriers to Learning: None  Education Outcome: Verbalized understanding;Continued education needed      Therapy Time   Individual Concurrent Group Co-treatment   Time In           Time Out           Minutes                   Luis Blandon PT     Electronically signed by Luis Blandon PT on 10/25/2022 at 9:55 AM

## 2025-08-25 ENCOUNTER — OFFICE VISIT (OUTPATIENT)
Dept: PULMONOLOGY | Facility: CLINIC | Age: 70
End: 2025-08-25
Payer: MEDICARE

## 2025-08-25 VITALS
HEIGHT: 66 IN | WEIGHT: 215 LBS | OXYGEN SATURATION: 94 % | BODY MASS INDEX: 34.55 KG/M2 | HEART RATE: 87 BPM | DIASTOLIC BLOOD PRESSURE: 88 MMHG | SYSTOLIC BLOOD PRESSURE: 162 MMHG

## 2025-08-25 DIAGNOSIS — J96.11 CHRONIC RESPIRATORY FAILURE WITH HYPOXIA: Primary | Chronic | ICD-10-CM

## 2025-08-25 DIAGNOSIS — Z86.16 PERSONAL HISTORY OF COVID-19: Chronic | ICD-10-CM

## 2025-08-25 DIAGNOSIS — G47.33 OBSTRUCTIVE SLEEP APNEA: Chronic | ICD-10-CM

## 2025-08-25 DIAGNOSIS — Z87.891 PERSONAL HISTORY OF NICOTINE DEPENDENCE: Chronic | ICD-10-CM

## 2025-08-25 DIAGNOSIS — I71.21 ANEURYSM OF ASCENDING AORTA WITHOUT RUPTURE: Chronic | ICD-10-CM

## 2025-08-25 DIAGNOSIS — J44.9 STAGE 3 SEVERE COPD BY GOLD CLASSIFICATION: Chronic | ICD-10-CM

## 2025-08-25 DIAGNOSIS — E66.9 OBESITY (BMI 30-39.9): Chronic | ICD-10-CM

## 2025-08-25 DIAGNOSIS — R91.8 LUNG NODULES: Chronic | ICD-10-CM

## 2025-08-25 PROCEDURE — 1160F RVW MEDS BY RX/DR IN RCRD: CPT | Performed by: INTERNAL MEDICINE

## 2025-08-25 PROCEDURE — 99214 OFFICE O/P EST MOD 30 MIN: CPT | Performed by: INTERNAL MEDICINE

## 2025-08-25 PROCEDURE — 1159F MED LIST DOCD IN RCRD: CPT | Performed by: INTERNAL MEDICINE

## 2025-08-25 PROCEDURE — G2211 COMPLEX E/M VISIT ADD ON: HCPCS | Performed by: INTERNAL MEDICINE

## 2025-08-25 RX ORDER — ALLOPURINOL 300 MG/1
300 TABLET ORAL DAILY
COMMUNITY

## 2025-08-28 ENCOUNTER — HOSPITAL ENCOUNTER (OUTPATIENT)
Dept: CARDIOLOGY | Facility: HOSPITAL | Age: 70
Discharge: HOME | End: 2025-08-28
Admitting: NURSE PRACTITIONER
Payer: MEDICARE

## 2025-08-28 VITALS
HEART RATE: 88 BPM | OXYGEN SATURATION: 94 % | BODY MASS INDEX: 35.49 KG/M2 | WEIGHT: 219.8 LBS | SYSTOLIC BLOOD PRESSURE: 142 MMHG | DIASTOLIC BLOOD PRESSURE: 70 MMHG

## 2025-08-28 DIAGNOSIS — I10 ESSENTIAL HYPERTENSION: ICD-10-CM

## 2025-08-28 DIAGNOSIS — I50.812 CHRONIC RIGHT HEART FAILURE: Primary | ICD-10-CM

## 2025-08-28 DIAGNOSIS — E78.5 HYPERLIPIDEMIA, UNSPECIFIED HYPERLIPIDEMIA TYPE: ICD-10-CM

## 2025-08-28 DIAGNOSIS — R06.09 DYSPNEA ON EXERTION: ICD-10-CM

## 2025-08-28 DIAGNOSIS — I25.10 CORONARY ARTERY DISEASE INVOLVING NATIVE CORONARY ARTERY OF NATIVE HEART, UNSPECIFIED WHETHER ANGINA PRESENT: ICD-10-CM

## 2025-08-28 LAB
ABSOLUTE LUNG FLUID CONTENT: 35 % (ref 20–35)
ANION GAP SERPL CALCULATED.3IONS-SCNC: 10 MMOL/L (ref 5–15)
BUN SERPL-MCNC: 15.2 MG/DL (ref 8–23)
BUN/CREAT SERPL: 14.2 (ref 7–25)
CALCIUM SPEC-SCNC: 9.3 MG/DL (ref 8.6–10.5)
CHLORIDE SERPL-SCNC: 97 MMOL/L (ref 98–107)
CO2 SERPL-SCNC: 31 MMOL/L (ref 22–29)
CREAT SERPL-MCNC: 1.07 MG/DL (ref 0.76–1.27)
EGFRCR SERPLBLD CKD-EPI 2021: 74.7 ML/MIN/1.73
GLUCOSE SERPL-MCNC: 143 MG/DL (ref 65–99)
POTASSIUM SERPL-SCNC: 4.1 MMOL/L (ref 3.5–5.2)
SODIUM SERPL-SCNC: 138 MMOL/L (ref 136–145)

## 2025-08-28 PROCEDURE — 80048 BASIC METABOLIC PNL TOTAL CA: CPT | Performed by: NURSE PRACTITIONER

## 2025-08-28 PROCEDURE — 94726 PLETHYSMOGRAPHY LUNG VOLUMES: CPT | Performed by: NURSE PRACTITIONER

## (undated) DEVICE — NON-WOVEN ADHESIVE WOUND DRESSING: Brand: PRIMAPORE ADHESIVE DRESSING 30*10CM

## (undated) DEVICE — SOLUTION IV 250ML 0.9% SOD CHL PH 5 INJ USP VIAFLX PLAS

## (undated) DEVICE — FAN SPRAY KIT: Brand: PULSAVAC®

## (undated) DEVICE — SUTURE VCRL SZ 2-0 L36IN ABSRB UD L36MM CT-1 1/2 CIR J945H

## (undated) DEVICE — 6.3MM ROUND FAST CUTTING BUR

## (undated) DEVICE — ENDO KIT,LOURDES HOSPITAL: Brand: MEDLINE INDUSTRIES, INC.

## (undated) DEVICE — CHLORAPREP 26ML ORANGE

## (undated) DEVICE — TUBE ET 7.5MM NSL ORAL BASIC CUF INTMED MURPHY EYE RADPQ

## (undated) DEVICE — GLOVE SURG SZ 75 CRM LTX FREE POLYISOPRENE POLYMER BEAD ANTI

## (undated) DEVICE — GLOVE SURG SZ 85 L12IN FNGR THK79MIL GRN LTX FREE

## (undated) DEVICE — SAVARY-GILLIARD DILATOR: Brand: SAVARY-GILLIARD

## (undated) DEVICE — GLOVE SURG SZ 85 CRM LTX FREE POLYISOPRENE POLYMER BEAD ANTI

## (undated) DEVICE — SENSR O2 OXIMAX FNGR A/ 18IN NONSTR

## (undated) DEVICE — Device: Brand: DEFENDO AIR/WATER/SUCTION AND BIOPSY VALVE

## (undated) DEVICE — FRCP BX RADJAW4 NDL 2.8 240 STD OG

## (undated) DEVICE — LIGHT SUCT UNTETHERED SCINTILLANT

## (undated) DEVICE — ENDOGATOR AUXILIARY WATER JET CONNECTOR: Brand: ENDOGATOR

## (undated) DEVICE — SUTURE VCRL SZ 3-0 L27IN ABSRB UD L26MM SH 1/2 CIR J416H

## (undated) DEVICE — SUTURE VCRL SZ 0 L27IN ABSRB UD L36MM CT-1 1/2 CIR J260H

## (undated) DEVICE — TOTAL TRAY, 16FR 10ML SIL FOLEY, URN: Brand: MEDLINE

## (undated) DEVICE — PIN FIX L229MM DIA4.8MM S STL STYL 6 DMND 1 END THRD STNMN
Type: IMPLANTABLE DEVICE | Site: HIP | Status: NON-FUNCTIONAL
Removed: 2021-10-20

## (undated) DEVICE — DUAL CUT SAGITTAL BLADE

## (undated) DEVICE — CUFF,BP,DISP,1 TUBE,ADULT,HP: Brand: MEDLINE

## (undated) DEVICE — YANKAUER,BULB TIP WITH VENT: Brand: ARGYLE

## (undated) DEVICE — Device

## (undated) DEVICE — PIN FIX L229MM DIA4.8MM S STL STYL 6 DMND 1 END THRD STNMN
Type: IMPLANTABLE DEVICE | Site: HIP | Status: NON-FUNCTIONAL
Removed: 2022-10-24

## (undated) DEVICE — LARYNGOSCOPE BLDE MAC HNDL M SZ 35 ST CURAPLEX CURAVIEW LED

## (undated) DEVICE — E-Z CLEAN, NON-STICK, PTFE COATED, ELECTROSURGICAL BLADE ELECTRODE, 6.5 INCH (16.5 CM): Brand: MEGADYNE

## (undated) DEVICE — BIPOLAR SEALER 23-112-1 AQM 6.0: Brand: AQUAMANTYS ®

## (undated) DEVICE — SUTURE ABSRB BRAID COAT UD CP NO 2 27IN VCRL J195H

## (undated) DEVICE — PACK ANT HIP CDS

## (undated) DEVICE — ROYAL SILK SURGICAL GOWN, XXL: Brand: CONVERTORS

## (undated) DEVICE — SYSTEM SKIN CLSR 22CM DERMBND PRINEO

## (undated) DEVICE — GLOVE SURG SZ 8 L12IN FNGR THK79MIL GRN LTX FREE

## (undated) DEVICE — SOLUTION IRRIG 3000ML 0.9% SOD CHL USP UROMATIC PLAS CONT

## (undated) DEVICE — THE CHANNEL CLEANING BRUSH IS A NYLON FLEXI BRUSH ATTACHED TO A FLEXIBLE PLASTIC SHEATH DESIGNED TO SAFELY REMOVE DEBRIS FROM FLEXIBLE ENDOSCOPES.

## (undated) DEVICE — TBG SMPL FLTR LINE NASL 02/C02 A/ BX/100

## (undated) DEVICE — COVER POS PERINL POST NS 082501

## (undated) DEVICE — SOLUTION IV IRRIG POUR BRL 0.9% SODIUM CHL 2F7124

## (undated) DEVICE — UNDERGLOVE SURG SZ 8 FNGR THK0.21MIL GRN LTX BEAD CUF

## (undated) DEVICE — GOWN,PRECEPT,XLNG/XXLARGE,STRL: Brand: MEDLINE

## (undated) DEVICE — CONMED SCOPE SAVER BITE BLOCK, 20X27 MM: Brand: SCOPE SAVER